# Patient Record
Sex: FEMALE | Race: WHITE | NOT HISPANIC OR LATINO | Employment: UNEMPLOYED | URBAN - METROPOLITAN AREA
[De-identification: names, ages, dates, MRNs, and addresses within clinical notes are randomized per-mention and may not be internally consistent; named-entity substitution may affect disease eponyms.]

---

## 2023-03-24 DIAGNOSIS — I42.2 HYPERTROPHIC CARDIOMYOPATHY (MULTI): Primary | ICD-10-CM

## 2023-03-24 DIAGNOSIS — I10 PRIMARY HYPERTENSION: ICD-10-CM

## 2023-03-24 RX ORDER — LISINOPRIL 20 MG/1
TABLET ORAL
Qty: 180 TABLET | Refills: 0 | Status: SHIPPED | OUTPATIENT
Start: 2023-03-24 | End: 2023-03-30

## 2023-03-24 RX ORDER — METOPROLOL TARTRATE 100 MG/1
TABLET ORAL
Qty: 180 TABLET | Refills: 0 | Status: SHIPPED | OUTPATIENT
Start: 2023-03-24 | End: 2023-03-30

## 2023-03-30 DIAGNOSIS — I42.2 HYPERTROPHIC CARDIOMYOPATHY (MULTI): ICD-10-CM

## 2023-03-30 DIAGNOSIS — I10 PRIMARY HYPERTENSION: ICD-10-CM

## 2023-03-30 RX ORDER — LISINOPRIL 20 MG/1
TABLET ORAL
Qty: 180 TABLET | Refills: 0 | Status: SHIPPED | OUTPATIENT
Start: 2023-03-30 | End: 2023-09-18

## 2023-03-30 RX ORDER — METOPROLOL TARTRATE 100 MG/1
TABLET ORAL
Qty: 180 TABLET | Refills: 0 | Status: SHIPPED | OUTPATIENT
Start: 2023-03-30 | End: 2023-10-13

## 2023-04-15 DIAGNOSIS — E11.9 TYPE 2 DIABETES MELLITUS WITHOUT COMPLICATION, WITHOUT LONG-TERM CURRENT USE OF INSULIN (MULTI): Primary | ICD-10-CM

## 2023-04-18 RX ORDER — SEMAGLUTIDE 1.34 MG/ML
INJECTION, SOLUTION SUBCUTANEOUS
Qty: 1.5 ML | Refills: 0 | Status: SHIPPED | OUTPATIENT
Start: 2023-04-18 | End: 2023-05-16

## 2023-05-16 DIAGNOSIS — E11.9 TYPE 2 DIABETES MELLITUS WITHOUT COMPLICATION, WITHOUT LONG-TERM CURRENT USE OF INSULIN (MULTI): ICD-10-CM

## 2023-05-16 RX ORDER — SEMAGLUTIDE 0.68 MG/ML
INJECTION, SOLUTION SUBCUTANEOUS
Qty: 3 ML | Refills: 0 | Status: SHIPPED | OUTPATIENT
Start: 2023-05-16 | End: 2023-06-20

## 2023-05-22 ENCOUNTER — TELEPHONE (OUTPATIENT)
Dept: PHARMACY | Facility: HOSPITAL | Age: 73
End: 2023-05-22
Payer: MEDICARE

## 2023-05-22 NOTE — TELEPHONE ENCOUNTER
Payor: Sandy   Reason: SUPD (statin use in patients with diabetes)   Medication: not on a statin   Outcome: left the patient a voicemail to discuss starting a statin with the patient.     Please contact the Clinical Pharmacy Team with any Questions. Thank you  Antoinette Santana, PharmD    Meds PGY-1 Pharmacy Resident

## 2023-06-20 DIAGNOSIS — E11.9 TYPE 2 DIABETES MELLITUS WITHOUT COMPLICATION, WITHOUT LONG-TERM CURRENT USE OF INSULIN (MULTI): ICD-10-CM

## 2023-06-20 RX ORDER — SEMAGLUTIDE 0.68 MG/ML
INJECTION, SOLUTION SUBCUTANEOUS
Qty: 3 ML | Refills: 0 | Status: SHIPPED | OUTPATIENT
Start: 2023-06-20 | End: 2023-06-22

## 2023-06-22 ENCOUNTER — OFFICE VISIT (OUTPATIENT)
Dept: PRIMARY CARE | Facility: CLINIC | Age: 73
End: 2023-06-22
Payer: MEDICARE

## 2023-06-22 VITALS
WEIGHT: 256.6 LBS | HEART RATE: 67 BPM | OXYGEN SATURATION: 97 % | BODY MASS INDEX: 42.7 KG/M2 | SYSTOLIC BLOOD PRESSURE: 134 MMHG | DIASTOLIC BLOOD PRESSURE: 86 MMHG

## 2023-06-22 DIAGNOSIS — E11.42 TYPE 2 DIABETES MELLITUS WITH DIABETIC POLYNEUROPATHY, WITHOUT LONG-TERM CURRENT USE OF INSULIN (MULTI): ICD-10-CM

## 2023-06-22 DIAGNOSIS — R21 RASH: Primary | ICD-10-CM

## 2023-06-22 PROBLEM — I10 BENIGN ESSENTIAL HTN: Status: ACTIVE | Noted: 2023-06-22

## 2023-06-22 PROBLEM — G56.02 CARPAL TUNNEL SYNDROME OF LEFT WRIST: Status: ACTIVE | Noted: 2023-06-22

## 2023-06-22 PROBLEM — J01.90 ACUTE SINUSITIS: Status: RESOLVED | Noted: 2023-06-22 | Resolved: 2023-06-22

## 2023-06-22 PROBLEM — R30.0 BURNING WITH URINATION: Status: RESOLVED | Noted: 2023-06-22 | Resolved: 2023-06-22

## 2023-06-22 PROBLEM — E11.9 CONTROLLED TYPE 2 DIABETES MELLITUS WITHOUT COMPLICATION, WITHOUT LONG-TERM CURRENT USE OF INSULIN (MULTI): Status: ACTIVE | Noted: 2023-06-22

## 2023-06-22 PROBLEM — M54.2 CERVICAL PAIN: Status: RESOLVED | Noted: 2023-06-22 | Resolved: 2023-06-22

## 2023-06-22 PROBLEM — G56.01 CARPAL TUNNEL SYNDROME OF RIGHT WRIST: Status: ACTIVE | Noted: 2023-06-22

## 2023-06-22 PROBLEM — R10.9 ABDOMINAL PAIN OF MULTIPLE SITES: Status: RESOLVED | Noted: 2023-06-22 | Resolved: 2023-06-22

## 2023-06-22 PROBLEM — M75.22 BICEPS TENDINITIS OF LEFT UPPER EXTREMITY: Status: RESOLVED | Noted: 2023-06-22 | Resolved: 2023-06-22

## 2023-06-22 PROBLEM — L57.0 ACTINIC KERATOSIS: Status: ACTIVE | Noted: 2023-06-22

## 2023-06-22 LAB — POC HEMOGLOBIN A1C: 8.7 % (ref 4.2–6.5)

## 2023-06-22 PROCEDURE — 1160F RVW MEDS BY RX/DR IN RCRD: CPT

## 2023-06-22 PROCEDURE — 4010F ACE/ARB THERAPY RXD/TAKEN: CPT

## 2023-06-22 PROCEDURE — 3079F DIAST BP 80-89 MM HG: CPT

## 2023-06-22 PROCEDURE — 83036 HEMOGLOBIN GLYCOSYLATED A1C: CPT

## 2023-06-22 PROCEDURE — 1159F MED LIST DOCD IN RCRD: CPT

## 2023-06-22 PROCEDURE — 3075F SYST BP GE 130 - 139MM HG: CPT

## 2023-06-22 PROCEDURE — 1036F TOBACCO NON-USER: CPT

## 2023-06-22 PROCEDURE — 99213 OFFICE O/P EST LOW 20 MIN: CPT

## 2023-06-22 RX ORDER — TRIAMCINOLONE ACETONIDE 1 MG/G
CREAM TOPICAL 2 TIMES DAILY
Qty: 30 G | Refills: 0 | Status: SHIPPED | OUTPATIENT
Start: 2023-06-22 | End: 2023-09-22 | Stop reason: ALTCHOICE

## 2023-06-22 RX ORDER — HYDROCHLOROTHIAZIDE 25 MG/1
25 TABLET ORAL EVERY MORNING
COMMUNITY
Start: 2023-06-15

## 2023-06-22 RX ORDER — IMIQUIMOD 12.5 MG/.25G
CREAM TOPICAL
COMMUNITY
Start: 2018-02-01 | End: 2024-03-28 | Stop reason: ENTERED-IN-ERROR

## 2023-06-22 NOTE — PROGRESS NOTES
Subjective   Patient ID: Debbie Rees is a 73 y.o. female who presents for Arm Pain (R wrist and hand) and Rash.  HPI  Debbie presents for pain on the outer side of her R wrist that has been sore. Both hands hurt worse in the morning, they feel stiff, this is not new. In her R hand she has carpal tunnel that was repaired but she still has pain and numbness that she has had for years.   She takes naproxen for her joint point but ran out about 2 weeks ago and has not taken it since. The pain her wrists seems to have worsened around that same time.   Sometimes over her wrist bone she has shooting pains.  Tylenol and ibuprofen doesn't help.    Also 2 weeks ago she started to get a rash, that is itchy and has red spots.  It is on her hands, forearms, legs. It is not on her upper arms, chest, face, or back.   Started on R arm then L arm and then down to the legs.  No travel, has not been outside a lot, no new exposures to animals.  She did change her shampoo to Pentene.  The little red spots do not open up or drain. No one else has the rash at home.  No fever or chills.    Past Surgical History:   Procedure Laterality Date     SECTION, CLASSIC  2016     Section    CHOLECYSTECTOMY  2016    Cholecystectomy    OTHER SURGICAL HISTORY  2016    Sigmoidoscopy (Fiberoptic, Therapeutic )    OTHER SURGICAL HISTORY  2022    Lower back surgery    OTHER SURGICAL HISTORY  2021    Carpal tunnel surgery    TOTAL KNEE ARTHROPLASTY  2016    Knee Replacement      Past Medical History:   Diagnosis Date    Abdominal pain of multiple sites 2023    Acute sinusitis 2023    Acute upper respiratory infection, unspecified 2017    Acute URI    Anxiety disorder, unspecified     Anxiety    Biceps tendinitis of left upper extremity 2023    Burning with urination 2023    Cervical pain 2023    Cervicalgia     Cervical pain (neck)    Encounter for screening for other  viral diseases 12/14/2017    Need for hepatitis C screening test    Morbid (severe) obesity due to excess calories (CMS/HCC)     Morbid obesity    Pain in left knee     Acute pain of left knee    Personal history of other diseases of the circulatory system     History of hypertension    Personal history of other diseases of the musculoskeletal system and connective tissue     History of arthritis    Personal history of other drug therapy 11/21/2019    History of influenza vaccination    Personal history of other endocrine, nutritional and metabolic disease     History of hyperlipidemia    Personal history of other endocrine, nutritional and metabolic disease     History of diabetes mellitus    Personal history of other specified conditions 04/20/2017    History of dysuria    Streptococcal pharyngitis 03/14/2017    Strep sore throat    Unspecified abdominal hernia without obstruction or gangrene     Abdominal hernia without obstruction and without gangrene    Unspecified asthma, uncomplicated 04/20/2017    Asthmatic bronchitis    Urinary tract infection, site not specified 04/20/2017    Acute UTI     Social History     Tobacco Use    Smoking status: Never    Smokeless tobacco: Never   Substance Use Topics    Alcohol use: Never    Drug use: Never        Review of Systems  10 point review of systems performed and is negative except as noted in the HPI.      Current Outpatient Medications:     hydroCHLOROthiazide (HYDRODiuril) 12.5 mg tablet, , Disp: , Rfl:     imiquimod (Aldara) 5 % cream, APPLY TO AFFECTED AREA IN THE EVENING ON TUESDAY, THURSDAY, AND SATURDAY. WASH OFF IN THE MORNING. x16wks, Disp: , Rfl:     lisinopril 20 mg tablet, TAKE ONE TABLET BY MOUTH TWO TIMES A DAY, Disp: 180 tablet, Rfl: 0    metoprolol tartrate (Lopressor) 100 mg tablet, TAKE ONE TABLET BY MOUTH TWO TIMES A DAY, Disp: 180 tablet, Rfl: 0    semaglutide (OZEMPIC) 1 mg/dose (4 mg/3 mL) pen injector, Inject 1 mg under the skin 1 (one) time per  week., Disp: 3 mL, Rfl: 3    triamcinolone (Kenalog) 0.1 % cream, Apply topically 2 times a day. Apply to affected area 1-2 times daily as needed. Avoid face and groin., Disp: 30 g, Rfl: 0     Objective   /86   Pulse 67   Wt 116 kg (256 lb 9.6 oz)   SpO2 97%   BMI 42.70 kg/m²     Physical Exam  Constitutional:       Appearance: Normal appearance. She is obese.   HENT:      Head: Normocephalic and atraumatic.   Eyes:      Conjunctiva/sclera: Conjunctivae normal.      Pupils: Pupils are equal, round, and reactive to light.   Cardiovascular:      Rate and Rhythm: Normal rate and regular rhythm.      Pulses: Normal pulses.      Heart sounds: Normal heart sounds. No murmur heard.  Pulmonary:      Effort: Pulmonary effort is normal.      Breath sounds: Normal breath sounds. No wheezing, rhonchi or rales.   Musculoskeletal:      Right wrist: Tenderness (along lateral and medial side of wrist) present. No swelling, deformity, effusion, bony tenderness or snuff box tenderness. Normal range of motion. Normal pulse.      Left wrist: Tenderness (along lateral and medial side of wrist) present. No swelling, deformity, effusion, bony tenderness or snuff box tenderness. Normal range of motion. Normal pulse.      Right hand: Normal. No swelling or tenderness. Normal range of motion. Normal sensation. Normal pulse.      Left hand: Normal. No swelling or tenderness. Normal range of motion. Normal sensation. Normal pulse.      Right knee: Normal.      Left knee: Normal.      Right lower leg: Swelling present. No tenderness. Edema (trace) present.      Left lower leg: No tenderness. Edema (trace) present.      Right ankle: No tenderness. Normal range of motion. Normal pulse.      Right Achilles Tendon: No tenderness.      Left ankle: No tenderness. Normal range of motion. Normal pulse.      Left Achilles Tendon: No tenderness.   Skin:     Findings: Rash present. Rash is papular. Rash is not crusting, pustular, scaling,  urticarial or vesicular.      Comments: Small erythematous papules on b/l forearms, dorsal and palmar surfaces of hands, calves of b/l legs. Some erythema on R lower leg.    Neurological:      Mental Status: She is alert and oriented to person, place, and time.   Psychiatric:         Mood and Affect: Mood normal.         Behavior: Behavior normal.         Assessment/Plan   Problem List Items Addressed This Visit    None  Visit Diagnoses       Rash    -  Primary    Relevant Medications    triamcinolone (Kenalog) 0.1 % cream    Type 2 diabetes mellitus with diabetic polyneuropathy, without long-term current use of insulin (CMS/[facility])        Relevant Medications    semaglutide (OZEMPIC) 1 mg/dose (4 mg/3 mL) pen injector    Other Relevant Orders    POCT glycosylated hemoglobin (Hb A1C) manually resulted (Completed)        Rash  Discussed prednisone for the rash, checked hgb A1c and it was 8.7. Prednisone not indicated due to uncontrolled diabetes. Instead gave topical triamcinolone cream to try on the rash.  Also can take zyrtec for the itching     DM Type 2   Increase ozempic to 1mg.  Follow up in 3 months to see how hgb A1c is.     Wrist pain - likely OA in the wrists   Recommend getting Voltaren cream to try as well on the wrists   She is picking up her naproxen refill after this visit and will re-start it  Follow up if not improving      Discussed at visit any disease processes that were of concern as well as the risks, benefits and instructions on any new medication provided. Patient (and/or caretaker of patient if present) stated all questions were answered, and they voiced understanding of instructions.

## 2023-06-22 NOTE — PATIENT INSTRUCTIONS
Voltaren cream - get it at the drug store it is cream for arthritis pain   Use the triamcinolone cream for the rash   Start the Ozempic 1mg. Follow up in 3 months to see how hemoglobin A1C is.   Can try zyrtec at night for the itching as well   Follow up if not improving  Call if you need the naproxen refilled

## 2023-07-18 DIAGNOSIS — G56.01 CARPAL TUNNEL SYNDROME OF RIGHT WRIST: Primary | ICD-10-CM

## 2023-07-19 RX ORDER — NAPROXEN 500 MG/1
TABLET ORAL
Qty: 60 TABLET | Refills: 0 | Status: SHIPPED | OUTPATIENT
Start: 2023-07-19 | End: 2023-09-15

## 2023-09-15 DIAGNOSIS — G56.01 CARPAL TUNNEL SYNDROME OF RIGHT WRIST: ICD-10-CM

## 2023-09-15 RX ORDER — NAPROXEN 500 MG/1
TABLET ORAL
Qty: 60 TABLET | Refills: 0 | Status: SHIPPED | OUTPATIENT
Start: 2023-09-15 | End: 2023-10-13

## 2023-09-16 DIAGNOSIS — I10 PRIMARY HYPERTENSION: ICD-10-CM

## 2023-09-18 RX ORDER — LISINOPRIL 20 MG/1
TABLET ORAL
Qty: 180 TABLET | Refills: 0 | Status: SHIPPED | OUTPATIENT
Start: 2023-09-18 | End: 2024-01-12

## 2023-09-22 ENCOUNTER — OFFICE VISIT (OUTPATIENT)
Dept: PRIMARY CARE | Facility: CLINIC | Age: 73
End: 2023-09-22
Payer: MEDICARE

## 2023-09-22 VITALS
OXYGEN SATURATION: 98 % | HEART RATE: 68 BPM | WEIGHT: 254.6 LBS | BODY MASS INDEX: 42.37 KG/M2 | SYSTOLIC BLOOD PRESSURE: 132 MMHG | DIASTOLIC BLOOD PRESSURE: 84 MMHG

## 2023-09-22 DIAGNOSIS — Z13.220 SCREENING FOR LIPID DISORDERS: ICD-10-CM

## 2023-09-22 DIAGNOSIS — Z00.00 ENCOUNTER FOR MEDICARE ANNUAL WELLNESS EXAM: ICD-10-CM

## 2023-09-22 DIAGNOSIS — Z23 NEED FOR VACCINATION: ICD-10-CM

## 2023-09-22 DIAGNOSIS — E11.9 TYPE 2 DIABETES MELLITUS WITHOUT COMPLICATION, WITHOUT LONG-TERM CURRENT USE OF INSULIN (MULTI): Primary | ICD-10-CM

## 2023-09-22 LAB
ALANINE AMINOTRANSFERASE (SGPT) (U/L) IN SER/PLAS: 30 U/L (ref 7–45)
ALBUMIN (G/DL) IN SER/PLAS: 3.8 G/DL (ref 3.4–5)
ALKALINE PHOSPHATASE (U/L) IN SER/PLAS: 87 U/L (ref 33–136)
ANION GAP IN SER/PLAS: 14 MMOL/L (ref 10–20)
ASPARTATE AMINOTRANSFERASE (SGOT) (U/L) IN SER/PLAS: 26 U/L (ref 9–39)
BASOPHILS (10*3/UL) IN BLOOD BY AUTOMATED COUNT: 0.04 X10E9/L (ref 0–0.1)
BASOPHILS/100 LEUKOCYTES IN BLOOD BY AUTOMATED COUNT: 0.5 % (ref 0–2)
BILIRUBIN TOTAL (MG/DL) IN SER/PLAS: 0.6 MG/DL (ref 0–1.2)
CALCIUM (MG/DL) IN SER/PLAS: 9.7 MG/DL (ref 8.6–10.3)
CARBON DIOXIDE, TOTAL (MMOL/L) IN SER/PLAS: 22 MMOL/L (ref 21–32)
CHLORIDE (MMOL/L) IN SER/PLAS: 102 MMOL/L (ref 98–107)
CHOLESTEROL (MG/DL) IN SER/PLAS: 149 MG/DL (ref 0–199)
CHOLESTEROL IN HDL (MG/DL) IN SER/PLAS: 40.1 MG/DL
CHOLESTEROL/HDL RATIO: 3.7
CREATININE (MG/DL) IN SER/PLAS: 0.78 MG/DL (ref 0.5–1.05)
EOSINOPHILS (10*3/UL) IN BLOOD BY AUTOMATED COUNT: 0.1 X10E9/L (ref 0–0.4)
EOSINOPHILS/100 LEUKOCYTES IN BLOOD BY AUTOMATED COUNT: 1.2 % (ref 0–6)
ERYTHROCYTE DISTRIBUTION WIDTH (RATIO) BY AUTOMATED COUNT: 13.1 % (ref 11.5–14.5)
ERYTHROCYTE MEAN CORPUSCULAR HEMOGLOBIN CONCENTRATION (G/DL) BY AUTOMATED: 31 G/DL (ref 32–36)
ERYTHROCYTE MEAN CORPUSCULAR VOLUME (FL) BY AUTOMATED COUNT: 96 FL (ref 80–100)
ERYTHROCYTES (10*6/UL) IN BLOOD BY AUTOMATED COUNT: 4.52 X10E12/L (ref 4–5.2)
GFR FEMALE: 80 ML/MIN/1.73M2
GLUCOSE (MG/DL) IN SER/PLAS: 150 MG/DL (ref 74–99)
HEMATOCRIT (%) IN BLOOD BY AUTOMATED COUNT: 43.6 % (ref 36–46)
HEMOGLOBIN (G/DL) IN BLOOD: 13.5 G/DL (ref 12–16)
IMMATURE GRANULOCYTES/100 LEUKOCYTES IN BLOOD BY AUTOMATED COUNT: 0.4 % (ref 0–0.9)
LDL: 91 MG/DL (ref 0–99)
LEUKOCYTES (10*3/UL) IN BLOOD BY AUTOMATED COUNT: 8.5 X10E9/L (ref 4.4–11.3)
LYMPHOCYTES (10*3/UL) IN BLOOD BY AUTOMATED COUNT: 1.76 X10E9/L (ref 0.8–3)
LYMPHOCYTES/100 LEUKOCYTES IN BLOOD BY AUTOMATED COUNT: 20.7 % (ref 13–44)
MONOCYTES (10*3/UL) IN BLOOD BY AUTOMATED COUNT: 0.41 X10E9/L (ref 0.05–0.8)
MONOCYTES/100 LEUKOCYTES IN BLOOD BY AUTOMATED COUNT: 4.8 % (ref 2–10)
NEUTROPHILS (10*3/UL) IN BLOOD BY AUTOMATED COUNT: 6.16 X10E9/L (ref 1.6–5.5)
NEUTROPHILS/100 LEUKOCYTES IN BLOOD BY AUTOMATED COUNT: 72.4 % (ref 40–80)
PLATELETS (10*3/UL) IN BLOOD AUTOMATED COUNT: 129 X10E9/L (ref 150–450)
POC HEMOGLOBIN A1C: 8 % (ref 4.2–6.5)
POTASSIUM (MMOL/L) IN SER/PLAS: 4.3 MMOL/L (ref 3.5–5.3)
PROTEIN TOTAL: 7.5 G/DL (ref 6.4–8.2)
SODIUM (MMOL/L) IN SER/PLAS: 134 MMOL/L (ref 136–145)
TRIGLYCERIDE (MG/DL) IN SER/PLAS: 88 MG/DL (ref 0–149)
UREA NITROGEN (MG/DL) IN SER/PLAS: 22 MG/DL (ref 6–23)
VLDL: 18 MG/DL (ref 0–40)

## 2023-09-22 PROCEDURE — 80053 COMPREHEN METABOLIC PANEL: CPT

## 2023-09-22 PROCEDURE — 1170F FXNL STATUS ASSESSED: CPT

## 2023-09-22 PROCEDURE — G0439 PPPS, SUBSEQ VISIT: HCPCS

## 2023-09-22 PROCEDURE — 90662 IIV NO PRSV INCREASED AG IM: CPT

## 2023-09-22 PROCEDURE — 83036 HEMOGLOBIN GLYCOSYLATED A1C: CPT

## 2023-09-22 PROCEDURE — 3075F SYST BP GE 130 - 139MM HG: CPT

## 2023-09-22 PROCEDURE — 1036F TOBACCO NON-USER: CPT

## 2023-09-22 PROCEDURE — 4010F ACE/ARB THERAPY RXD/TAKEN: CPT

## 2023-09-22 PROCEDURE — 99397 PER PM REEVAL EST PAT 65+ YR: CPT

## 2023-09-22 PROCEDURE — 85025 COMPLETE CBC W/AUTO DIFF WBC: CPT

## 2023-09-22 PROCEDURE — G0008 ADMIN INFLUENZA VIRUS VAC: HCPCS

## 2023-09-22 PROCEDURE — 82043 UR ALBUMIN QUANTITATIVE: CPT

## 2023-09-22 PROCEDURE — 1160F RVW MEDS BY RX/DR IN RCRD: CPT

## 2023-09-22 PROCEDURE — 3079F DIAST BP 80-89 MM HG: CPT

## 2023-09-22 PROCEDURE — 82570 ASSAY OF URINE CREATININE: CPT

## 2023-09-22 PROCEDURE — 99213 OFFICE O/P EST LOW 20 MIN: CPT

## 2023-09-22 PROCEDURE — 1159F MED LIST DOCD IN RCRD: CPT

## 2023-09-22 PROCEDURE — 80061 LIPID PANEL: CPT

## 2023-09-22 ASSESSMENT — ENCOUNTER SYMPTOMS
LOSS OF SENSATION IN FEET: 0
DEPRESSION: 0
OCCASIONAL FEELINGS OF UNSTEADINESS: 0

## 2023-09-22 ASSESSMENT — ACTIVITIES OF DAILY LIVING (ADL)
BATHING: INDEPENDENT
HEARING - LEFT EAR: FUNCTIONAL
JUDGMENT_ADEQUATE_SAFELY_COMPLETE_DAILY_ACTIVITIES: YES
DRESSING YOURSELF: INDEPENDENT
HEARING - RIGHT EAR: FUNCTIONAL
FEEDING YOURSELF: INDEPENDENT
GROOMING: INDEPENDENT
PATIENT'S MEMORY ADEQUATE TO SAFELY COMPLETE DAILY ACTIVITIES?: YES
WALKS IN HOME: INDEPENDENT
TOILETING: INDEPENDENT
ADEQUATE_TO_COMPLETE_ADL: YES

## 2023-09-22 ASSESSMENT — COLUMBIA-SUICIDE SEVERITY RATING SCALE - C-SSRS
6. HAVE YOU EVER DONE ANYTHING, STARTED TO DO ANYTHING, OR PREPARED TO DO ANYTHING TO END YOUR LIFE?: NO
2. HAVE YOU ACTUALLY HAD ANY THOUGHTS OF KILLING YOURSELF?: NO
1. IN THE PAST MONTH, HAVE YOU WISHED YOU WERE DEAD OR WISHED YOU COULD GO TO SLEEP AND NOT WAKE UP?: NO

## 2023-09-22 ASSESSMENT — PATIENT HEALTH QUESTIONNAIRE - PHQ9
1. LITTLE INTEREST OR PLEASURE IN DOING THINGS: NOT AT ALL
SUM OF ALL RESPONSES TO PHQ9 QUESTIONS 1 AND 2: 0
2. FEELING DOWN, DEPRESSED OR HOPELESS: NOT AT ALL

## 2023-09-22 NOTE — PROGRESS NOTES
Subjective   Patient ID: Debbie Rees is a 73 y.o. female who presents for Follow-up.  HPI  Debbie presents for diabetes follow up.   She has been doing 1mg of ozempic since she was here in . She has no side effects with this.     She does report that her cheeks got hot/flushed during the night last night.  She did sit on porch, but was not sitting in the sun.  Her cheeks do not itch, they just feel hot.  She did have a cortisone shot on Wednesday (2 days ago) in R shoulder.    BP - no dizziness, lightheadedness but increased headache lately, it does go away, wants to increase her water drinking.    Past Surgical History:   Procedure Laterality Date     SECTION, CLASSIC  2016     Section    CHOLECYSTECTOMY  2016    Cholecystectomy    OTHER SURGICAL HISTORY  2016    Sigmoidoscopy (Fiberoptic, Therapeutic )    OTHER SURGICAL HISTORY  2022    Lower back surgery    OTHER SURGICAL HISTORY  2021    Carpal tunnel surgery    TOTAL KNEE ARTHROPLASTY  2016    Knee Replacement      Past Medical History:   Diagnosis Date    Abdominal pain of multiple sites 2023    Acute sinusitis 2023    Acute upper respiratory infection, unspecified 2017    Acute URI    Anxiety disorder, unspecified     Anxiety    Biceps tendinitis of left upper extremity 2023    Burning with urination 2023    Cervical pain 2023    Cervicalgia     Cervical pain (neck)    Encounter for screening for other viral diseases 2017    Need for hepatitis C screening test    Morbid (severe) obesity due to excess calories (CMS/HCC)     Morbid obesity    Pain in left knee     Acute pain of left knee    Personal history of other diseases of the circulatory system     History of hypertension    Personal history of other diseases of the musculoskeletal system and connective tissue     History of arthritis    Personal history of other drug therapy 2019    History of  influenza vaccination    Personal history of other endocrine, nutritional and metabolic disease     History of hyperlipidemia    Personal history of other endocrine, nutritional and metabolic disease     History of diabetes mellitus    Personal history of other specified conditions 04/20/2017    History of dysuria    Streptococcal pharyngitis 03/14/2017    Strep sore throat    Unspecified abdominal hernia without obstruction or gangrene     Abdominal hernia without obstruction and without gangrene    Unspecified asthma, uncomplicated 04/20/2017    Asthmatic bronchitis    Urinary tract infection, site not specified 04/20/2017    Acute UTI     Social History     Tobacco Use    Smoking status: Never    Smokeless tobacco: Never   Substance Use Topics    Alcohol use: Never    Drug use: Never        Review of Systems  10 point review of systems performed and is negative except as noted in the HPI.      Current Outpatient Medications:     hydroCHLOROthiazide (HYDRODiuril) 12.5 mg tablet, , Disp: , Rfl:     imiquimod (Aldara) 5 % cream, APPLY TO AFFECTED AREA IN THE EVENING ON TUESDAY, THURSDAY, AND SATURDAY. WASH OFF IN THE MORNING. x16wks, Disp: , Rfl:     lisinopril 20 mg tablet, TAKE ONE TABLET BY MOUTH TWO TIMES A DAY, Disp: 180 tablet, Rfl: 0    metoprolol tartrate (Lopressor) 100 mg tablet, TAKE ONE TABLET BY MOUTH TWO TIMES A DAY, Disp: 180 tablet, Rfl: 0    naproxen (Naprosyn) 500 mg tablet, TAKE ONE TABLET BY MOUTH TWICE A DAY AS NEEDED FOR PAIN TAKE WITH FOOD, Disp: 60 tablet, Rfl: 0    semaglutide 2 mg/dose (8 mg/3 mL) pen injector, Inject 2 mg under the skin 1 (one) time per week., Disp: 3 mL, Rfl: 3     Objective   /84   Pulse 68   Wt 115 kg (254 lb 9.6 oz)   SpO2 98%   BMI 42.37 kg/m²     Physical Exam  Constitutional:       Appearance: Normal appearance. She is obese.   HENT:      Head: Normocephalic and atraumatic.      Comments: Both cheeks are flushed, warm to the touch.      Mouth/Throat:       Mouth: Mucous membranes are moist.      Pharynx: Oropharynx is clear.   Eyes:      General:         Right eye: No discharge.         Left eye: No discharge.      Conjunctiva/sclera: Conjunctivae normal.      Pupils: Pupils are equal, round, and reactive to light.   Musculoskeletal:         General: Normal range of motion.      Cervical back: Neck supple.      Right lower leg: No edema.      Left lower leg: No edema.      Comments: Ambulating with assistance of a cane   Skin:     Findings: No rash.   Neurological:      Mental Status: She is alert and oriented to person, place, and time.   Psychiatric:         Mood and Affect: Mood normal.         Behavior: Behavior normal.         Assessment/Plan   Problem List Items Addressed This Visit    None  Visit Diagnoses       Type 2 diabetes mellitus without complication, without long-term current use of insulin (CMS/[facility])    -  Primary    Relevant Medications    semaglutide 2 mg/dose (8 mg/3 mL) pen injector    Other Relevant Orders    POCT glycosylated hemoglobin (Hb A1C) manually resulted (Completed)    Comprehensive Metabolic Panel (Completed)    CBC and Auto Differential (Completed)    Albumin, urine, random (Completed)    Screening for lipid disorders        Relevant Orders    Lipid Panel (Completed)    Need for vaccination        Relevant Orders    Flu vaccine, quadrivalent, high-dose, preservative free, age 65y+ (FLUZONE) (Completed)        Increase ozempic to 2mg weekly    Discussed at visit any disease processes that were of concern as well as the risks, benefits and instructions on any new medication provided. Patient (and/or caretaker of patient if present) stated all questions were answered, and they voiced understanding of instructions.

## 2023-09-23 LAB
ALBUMIN (MG/L) IN URINE: 46.5 MG/L
ALBUMIN/CREATININE (UG/MG) IN URINE: 82.7 UG/MG CRT (ref 0–30)
CREATININE (MG/DL) IN URINE: 56.2 MG/DL (ref 20–320)

## 2023-09-23 ASSESSMENT — ENCOUNTER SYMPTOMS
COLOR CHANGE: 1
FEVER: 0
ACTIVITY CHANGE: 0
DIAPHORESIS: 0
EYE DISCHARGE: 0
HEADACHES: 1
EYE ITCHING: 0
EYE REDNESS: 0
FATIGUE: 0

## 2023-09-23 NOTE — PROGRESS NOTES
Subjective   Reason for Visit: Debbie Rees is an 73 y.o. female here for a Medicare Wellness visit.     Past Medical, Surgical, and Family History reviewed and updated in chart.    Reviewed all medications by prescribing practitioner or clinical pharmacist (such as prescriptions, OTCs, herbal therapies and supplements) and documented in the medical record.    HPI  Debbie presents for diabetes follow up and Medicare Wellness visit.   She has been doing 1mg of ozempic since she was here in June. She has no side effects with this.      She does report that her cheeks got hot/flushed during the night last night.  She did sit on porch, but was not sitting in the sun.  Her cheeks do not itch, they just feel hot.  She did have a cortisone shot on Wednesday (2 days ago) in R shoulder.     BP - no dizziness, lightheadedness but increased headache lately, it does go away, wants to increase her water drinking.    Does see Dr. Dickerson for her legs, has varicose veins and working to get the R leg less swollen. It has been that way for quite some time. She has had sclerotherapy on the R before that was not successful.    Patient Care Team:  Jarrell MACHADO DO as PCP - General  Jarrell MACHADO DO as PCP - [employer] PCP     Review of Systems   Constitutional:  Negative for activity change, diaphoresis, fatigue and fever.   HENT:  Negative for congestion.    Eyes:  Negative for discharge, redness and itching.   Skin:  Positive for color change.   Allergic/Immunologic: Negative for environmental allergies and food allergies.   Neurological:  Positive for headaches.   10 point review of systems performed and is negative except as noted in the HPI and above.    Objective   Vitals:  /84   Pulse 68   Wt 115 kg (254 lb 9.6 oz)   SpO2 98%   BMI 42.37 kg/m²       Physical Exam  Constitutional:       Appearance: Normal appearance. She is obese.   HENT:      Head: Normocephalic and atraumatic.      Comments:  Both cheeks are flushed, warm to the touch.     Nose: Nose normal.      Mouth/Throat:      Mouth: Mucous membranes are moist.      Pharynx: Oropharynx is clear.   Eyes:      General: Lids are normal.         Right eye: No discharge.         Left eye: No discharge.      Conjunctiva/sclera: Conjunctivae normal.      Pupils: Pupils are equal, round, and reactive to light.   Cardiovascular:      Heart sounds: Normal heart sounds.   Pulmonary:      Effort: Pulmonary effort is normal.      Breath sounds: Normal breath sounds.   Musculoskeletal:         General: Swelling (R leg larger than L) present. Normal range of motion.      Cervical back: Neck supple.      Comments: Ambulating with assistance of a cane   Skin:     Findings: No rash.   Neurological:      Mental Status: She is oriented to person, place, and time.   Psychiatric:         Mood and Affect: Mood normal.         Behavior: Behavior normal.         Assessment/Plan   Problem List Items Addressed This Visit    None  Visit Diagnoses       Type 2 diabetes mellitus without complication, without long-term current use of insulin (CMS/[facility])    -  Primary    Relevant Medications    semaglutide 2 mg/dose (8 mg/3 mL) pen injector    Other Relevant Orders    POCT glycosylated hemoglobin (Hb A1C) manually resulted (Completed)    Comprehensive Metabolic Panel (Completed)    CBC and Auto Differential (Completed)    Albumin, urine, random (Completed)    Screening for lipid disorders        Relevant Orders    Lipid Panel (Completed)    Need for vaccination        Relevant Orders    Flu vaccine, quadrivalent, high-dose, preservative free, age 65y+ (FLUZONE) (Completed)    Encounter for Medicare annual wellness exam            POCT hgbA1c is 8, down from last visit where it was 8.5. Increase Ozempic to 2mg.   Updated labs today   Follow up in 3 months to see how diabetes is managed   Updated flu vaccine today   Continuing to follow with Dr. Dickerson for leg swelling   Flushing on  face is likely due to cortisone shot - continue to monitor and call if not improving     -HTN - continue medication   -Cardiomyopathy - hyperthrophic- genetic/Rastafari variant - followed by cardiology   -Hyperlipidemia - refused statin, ASVCD risk 31.4%  -Leg swelling and varicose veins - followed by Dr. Dickerson    Health Maintenance Reminder:  -Blood Work: today  -Colonoscopy: done in 2016, repeat 10 years  -mammo: 3/2022 -updated order at next visit   -Lung Cancer: 50-80y - nonsmoker  -dexa: >65y - 11/14/2022 - recommended 2 year repeat (osteopenia in L femoral neck)  -pap: >65  -Shingrix: >50y - discuss next visit   -Prevnar 20: completed  -Flu:  Yearly - today     Discussed at visit any disease processes that were of concern as well as the risks, benefits and instructions on any new medication provided. Patient (and/or caretaker of patient if present) stated all questions were answered, and they voiced understanding of instructions.

## 2023-10-04 ENCOUNTER — TELEPHONE (OUTPATIENT)
Dept: PRIMARY CARE | Facility: CLINIC | Age: 73
End: 2023-10-04
Payer: MEDICARE

## 2023-10-04 NOTE — TELEPHONE ENCOUNTER
Can you call pt with recent lab results or let me know what they are and I can call her. Thank you.

## 2023-10-13 DIAGNOSIS — I42.2 HYPERTROPHIC CARDIOMYOPATHY (MULTI): ICD-10-CM

## 2023-10-13 DIAGNOSIS — G56.01 CARPAL TUNNEL SYNDROME OF RIGHT WRIST: ICD-10-CM

## 2023-10-13 RX ORDER — NAPROXEN 500 MG/1
TABLET ORAL
Qty: 60 TABLET | Refills: 0 | Status: SHIPPED | OUTPATIENT
Start: 2023-10-13 | End: 2023-12-26 | Stop reason: ALTCHOICE

## 2023-10-13 RX ORDER — METOPROLOL TARTRATE 100 MG/1
TABLET ORAL
Qty: 180 TABLET | Refills: 0 | Status: SHIPPED | OUTPATIENT
Start: 2023-10-13 | End: 2024-01-12

## 2023-10-20 ENCOUNTER — TELEPHONE (OUTPATIENT)
Dept: PRIMARY CARE | Facility: CLINIC | Age: 73
End: 2023-10-20
Payer: MEDICARE

## 2023-10-20 NOTE — TELEPHONE ENCOUNTER
She has not been able to get Ozempic 2mg anywhere so she has not been taking it, asked if she can do 1mg and if so can you send in refills to [facility] please

## 2023-10-23 DIAGNOSIS — E11.9 TYPE 2 DIABETES MELLITUS WITHOUT COMPLICATION, WITHOUT LONG-TERM CURRENT USE OF INSULIN (MULTI): ICD-10-CM

## 2023-12-06 DIAGNOSIS — L97.909 VENOUS ULCER (MULTI): Primary | ICD-10-CM

## 2023-12-06 DIAGNOSIS — I83.009 VENOUS ULCER (MULTI): Primary | ICD-10-CM

## 2023-12-06 RX ORDER — DOXYCYCLINE 100 MG/1
100 CAPSULE ORAL 2 TIMES DAILY
Qty: 14 CAPSULE | Refills: 0 | Status: SHIPPED | OUTPATIENT
Start: 2023-12-06 | End: 2023-12-13

## 2023-12-11 ENCOUNTER — OFFICE VISIT (OUTPATIENT)
Dept: PRIMARY CARE | Facility: CLINIC | Age: 73
End: 2023-12-11
Payer: MEDICARE

## 2023-12-11 VITALS
OXYGEN SATURATION: 98 % | DIASTOLIC BLOOD PRESSURE: 84 MMHG | WEIGHT: 254 LBS | HEART RATE: 62 BPM | BODY MASS INDEX: 42.27 KG/M2 | SYSTOLIC BLOOD PRESSURE: 124 MMHG

## 2023-12-11 DIAGNOSIS — I83.012: ICD-10-CM

## 2023-12-11 DIAGNOSIS — M79.89 LEG SWELLING: Primary | ICD-10-CM

## 2023-12-11 DIAGNOSIS — L97.211: ICD-10-CM

## 2023-12-11 PROCEDURE — 1160F RVW MEDS BY RX/DR IN RCRD: CPT

## 2023-12-11 PROCEDURE — 4010F ACE/ARB THERAPY RXD/TAKEN: CPT

## 2023-12-11 PROCEDURE — 1159F MED LIST DOCD IN RCRD: CPT

## 2023-12-11 PROCEDURE — 99213 OFFICE O/P EST LOW 20 MIN: CPT

## 2023-12-11 PROCEDURE — 3074F SYST BP LT 130 MM HG: CPT

## 2023-12-11 PROCEDURE — 3079F DIAST BP 80-89 MM HG: CPT

## 2023-12-11 PROCEDURE — 1036F TOBACCO NON-USER: CPT

## 2023-12-11 NOTE — PATIENT INSTRUCTIONS
Vascular is closed - call first thing in the morning to schedule ultrasound - [phone] ask for vascular department - STAT ultrasound of R leg for a blood clot

## 2023-12-11 NOTE — PROGRESS NOTES
Subjective   Patient ID: Debbie Rees is a 73 y.o. female who presents for Follow-up.  HPI  Debbie presents for ulcers and swelling in R lower leg.  She was here last week with her  who had an appointment and was started on doxycyline (23) for her leg and referred to a  vascular specialist.   She did not contact the vascular specialist yet.   She states that her lower leg was swelling then and it has continued to swell.   She put on a compression stocking yesterday and it helped some. Her leg did hurt all day. Around 4 or 5 pm she took the stocking off.   The ulcer area oozes and had some bleeding after she hit it on something. She put a wash cloth on it until it stopped.  She says her leg has been swelling for a couple weeks already.  Denies chest pain or tightness.    Her daughter has gotten blood clots when having surgery   Debbie denies having family history of Factor V Leiden or other known clotting or bleeding disorders     Past Surgical History:   Procedure Laterality Date     SECTION, CLASSIC  2016     Section    CHOLECYSTECTOMY  2016    Cholecystectomy    OTHER SURGICAL HISTORY  2016    Sigmoidoscopy (Fiberoptic, Therapeutic )    OTHER SURGICAL HISTORY  2022    Lower back surgery    OTHER SURGICAL HISTORY  2021    Carpal tunnel surgery    TOTAL KNEE ARTHROPLASTY  2016    Knee Replacement      Past Medical History:   Diagnosis Date    Abdominal pain of multiple sites 2023    Acute sinusitis 2023    Acute upper respiratory infection, unspecified 2017    Acute URI    Anxiety disorder, unspecified     Anxiety    Biceps tendinitis of left upper extremity 2023    Burning with urination 2023    Cervical pain 2023    Cervicalgia     Cervical pain (neck)    Encounter for screening for other viral diseases 2017    Need for hepatitis C screening test    Morbid (severe) obesity due to excess calories (CMS/HCC)      Morbid obesity    Pain in left knee     Acute pain of left knee    Personal history of other diseases of the circulatory system     History of hypertension    Personal history of other diseases of the musculoskeletal system and connective tissue     History of arthritis    Personal history of other drug therapy 11/21/2019    History of influenza vaccination    Personal history of other endocrine, nutritional and metabolic disease     History of hyperlipidemia    Personal history of other endocrine, nutritional and metabolic disease     History of diabetes mellitus    Personal history of other specified conditions 04/20/2017    History of dysuria    Streptococcal pharyngitis 03/14/2017    Strep sore throat    Unspecified abdominal hernia without obstruction or gangrene     Abdominal hernia without obstruction and without gangrene    Unspecified asthma, uncomplicated 04/20/2017    Asthmatic bronchitis    Urinary tract infection, site not specified 04/20/2017    Acute UTI     Social History     Tobacco Use    Smoking status: Never    Smokeless tobacco: Never   Vaping Use    Vaping Use: Never used   Substance Use Topics    Alcohol use: Never    Drug use: Never        Review of Systems  10 point review of systems performed and is negative except as noted in the HPI.      Current Outpatient Medications:     doxycycline (Vibramycin) 100 mg capsule, Take 1 capsule (100 mg) by mouth 2 times a day for 7 days. Take with at least 8 ounces (large glass) of water, do not lie down for 30 minutes after, Disp: 14 capsule, Rfl: 0    hydroCHLOROthiazide (HYDRODiuril) 12.5 mg tablet, , Disp: , Rfl:     imiquimod (Aldara) 5 % cream, APPLY TO AFFECTED AREA IN THE EVENING ON TUESDAY, THURSDAY, AND SATURDAY. WASH OFF IN THE MORNING. x16wks, Disp: , Rfl:     lisinopril 20 mg tablet, TAKE ONE TABLET BY MOUTH TWO TIMES A DAY, Disp: 180 tablet, Rfl: 0    metoprolol tartrate (Lopressor) 100 mg tablet, TAKE ONE TABLET BY MOUTH TWO TIMES A DAY,  Disp: 180 tablet, Rfl: 0    naproxen (Naprosyn) 500 mg tablet, TAKE ONE TABLET BY MOUTH TWICE DAILY AS NEEDED for pain take  with food, Disp: 60 tablet, Rfl: 0    semaglutide (OZEMPIC) 1 mg/dose (4 mg/3 mL) pen injector, Inject 1 mg under the skin 1 (one) time per week., Disp: 9 mL, Rfl: 1     Objective   /84   Pulse 62   Wt 115 kg (254 lb)   SpO2 98%   BMI 42.27 kg/m²     Physical Exam  Vitals reviewed.   Constitutional:       General: She is not in acute distress.     Appearance: Normal appearance. She is not ill-appearing.   HENT:      Head: Normocephalic and atraumatic.   Eyes:      Conjunctiva/sclera: Conjunctivae normal.      Pupils: Pupils are equal, round, and reactive to light.   Cardiovascular:      Heart sounds: Normal heart sounds.   Pulmonary:      Effort: Pulmonary effort is normal.      Breath sounds: Normal breath sounds. No wheezing, rhonchi or rales.   Musculoskeletal:      Right knee: Normal. No swelling. No tenderness.      Left knee: Normal. No swelling. No tenderness.      Right lower leg: Swelling and tenderness present. 1+ Pitting Edema present.      Left lower leg: Swelling present.      Right ankle: Swelling present. Anterior drawer test negative.      Comments: R lower leg tender when palpated. Negative stacy's sign.    Skin:            Comments: Dusky color, shiny skin on R lower extremity. 2 ulcers present on R lower extremity. No drainage seen from the ulcers.    Neurological:      Mental Status: She is alert and oriented to person, place, and time.         Assessment/Plan   Problem List Items Addressed This Visit    None  Visit Diagnoses       Leg swelling    -  Primary    Relevant Orders    Vascular US lower extremity venous duplex right    Referral to Vascular Medicine    Venous stasis ulcer of right calf limited to breakdown of skin, unspecified whether varicose veins present (CMS/[facility])        Relevant Orders    Referral to Vascular Medicine        Lower leg swelling   STAT  Venous duplex ultrasound of lower extremity to r/o blood clot due to swelling- Attempt was made to schedule for today, patient states she is unable to get a ride, instead could go tomorrow - vascular was not answering and patient to try to call in the morning - discussed the risks with waiting vs going to the ER   R lower leg has historically been more swollen compared to the L. Patient has been followed by [facility].   Discussed symptoms that would warrant a visit to the ER including but not limited to worsened leg pain, shortness of breath, chest pain, AMS, pain when taking a deep breath - if patient has any of these she is to go to the ER immediately   Discussed referral to vascular medicine as well for evaluation of venous stasis ulcers and venous stasis in her R lower leg that she has been dealing with quite some time. Previously saw Dr. Dickerson and had sclerotherapy but patient states it has been unsuccessful in the leg.     Discussed at visit any disease processes that were of concern as well as the risks, benefits and instructions on any new medication provided. Patient (and/or caretaker of patient if present) stated all questions were answered, and they voiced understanding of instructions.

## 2023-12-12 ENCOUNTER — TELEPHONE (OUTPATIENT)
Dept: PRIMARY CARE | Facility: CLINIC | Age: 73
End: 2023-12-12
Payer: MEDICARE

## 2023-12-12 ENCOUNTER — HOSPITAL ENCOUNTER (EMERGENCY)
Facility: HOSPITAL | Age: 73
Discharge: HOME | End: 2023-12-12
Payer: MEDICARE

## 2023-12-12 ENCOUNTER — APPOINTMENT (OUTPATIENT)
Dept: RADIOLOGY | Facility: HOSPITAL | Age: 73
End: 2023-12-12
Payer: MEDICARE

## 2023-12-12 ENCOUNTER — ANCILLARY PROCEDURE (OUTPATIENT)
Dept: RADIOLOGY | Facility: HOSPITAL | Age: 73
End: 2023-12-12
Payer: MEDICARE

## 2023-12-12 VITALS
RESPIRATION RATE: 17 BRPM | BODY MASS INDEX: 41.65 KG/M2 | WEIGHT: 250 LBS | OXYGEN SATURATION: 98 % | HEART RATE: 85 BPM | DIASTOLIC BLOOD PRESSURE: 76 MMHG | HEIGHT: 65 IN | SYSTOLIC BLOOD PRESSURE: 150 MMHG | TEMPERATURE: 96.3 F

## 2023-12-12 DIAGNOSIS — M79.605 PAIN IN BOTH LOWER EXTREMITIES: Primary | ICD-10-CM

## 2023-12-12 DIAGNOSIS — J18.9 PNEUMONIA DUE TO INFECTIOUS ORGANISM, UNSPECIFIED LATERALITY, UNSPECIFIED PART OF LUNG: ICD-10-CM

## 2023-12-12 DIAGNOSIS — M79.604 PAIN IN BOTH LOWER EXTREMITIES: Primary | ICD-10-CM

## 2023-12-12 DIAGNOSIS — R00.2 PALPITATIONS: ICD-10-CM

## 2023-12-12 DIAGNOSIS — R93.89 ABNORMAL CT SCAN: ICD-10-CM

## 2023-12-12 DIAGNOSIS — R06.00 DYSPNEA, UNSPECIFIED TYPE: ICD-10-CM

## 2023-12-12 LAB
ALBUMIN SERPL BCP-MCNC: 3.7 G/DL (ref 3.4–5)
ALP SERPL-CCNC: 84 U/L (ref 33–136)
ALT SERPL W P-5'-P-CCNC: 30 U/L (ref 7–45)
ANION GAP SERPL CALC-SCNC: 11 MMOL/L (ref 10–20)
APPEARANCE UR: CLEAR
AST SERPL W P-5'-P-CCNC: 29 U/L (ref 9–39)
BASOPHILS # BLD AUTO: 0.05 X10*3/UL (ref 0–0.1)
BASOPHILS NFR BLD AUTO: 0.6 %
BILIRUB SERPL-MCNC: 0.5 MG/DL (ref 0–1.2)
BILIRUB UR STRIP.AUTO-MCNC: NEGATIVE MG/DL
BNP SERPL-MCNC: 56 PG/ML (ref 0–99)
BUN SERPL-MCNC: 15 MG/DL (ref 6–23)
CALCIUM SERPL-MCNC: 9.2 MG/DL (ref 8.6–10.3)
CARDIAC TROPONIN I PNL SERPL HS: 10 NG/L (ref 0–13)
CHLORIDE SERPL-SCNC: 103 MMOL/L (ref 98–107)
CO2 SERPL-SCNC: 28 MMOL/L (ref 21–32)
COLOR UR: YELLOW
CREAT SERPL-MCNC: 0.77 MG/DL (ref 0.5–1.05)
CRP SERPL-MCNC: 0.51 MG/DL
D DIMER PPP FEU-MCNC: 637 NG/ML FEU
EOSINOPHIL # BLD AUTO: 0.18 X10*3/UL (ref 0–0.4)
EOSINOPHIL NFR BLD AUTO: 2 %
ERYTHROCYTE [DISTWIDTH] IN BLOOD BY AUTOMATED COUNT: 14.6 % (ref 11.5–14.5)
ERYTHROCYTE [SEDIMENTATION RATE] IN BLOOD BY WESTERGREN METHOD: 47 MM/H (ref 0–30)
FLUAV RNA RESP QL NAA+PROBE: NOT DETECTED
FLUBV RNA RESP QL NAA+PROBE: NOT DETECTED
GFR SERPL CREATININE-BSD FRML MDRD: 82 ML/MIN/1.73M*2
GLUCOSE SERPL-MCNC: 106 MG/DL (ref 74–99)
GLUCOSE UR STRIP.AUTO-MCNC: NEGATIVE MG/DL
HCT VFR BLD AUTO: 44.7 % (ref 36–46)
HGB BLD-MCNC: 14.4 G/DL (ref 12–16)
IMM GRANULOCYTES # BLD AUTO: 0.02 X10*3/UL (ref 0–0.5)
IMM GRANULOCYTES NFR BLD AUTO: 0.2 % (ref 0–0.9)
KETONES UR STRIP.AUTO-MCNC: NEGATIVE MG/DL
LACTATE SERPL-SCNC: 1 MMOL/L (ref 0.4–2)
LEUKOCYTE ESTERASE UR QL STRIP.AUTO: NEGATIVE
LYMPHOCYTES # BLD AUTO: 2.56 X10*3/UL (ref 0.8–3)
LYMPHOCYTES NFR BLD AUTO: 28.9 %
MAGNESIUM SERPL-MCNC: 1.41 MG/DL (ref 1.6–2.4)
MCH RBC QN AUTO: 30.6 PG (ref 26–34)
MCHC RBC AUTO-ENTMCNC: 32.2 G/DL (ref 32–36)
MCV RBC AUTO: 95 FL (ref 80–100)
MONOCYTES # BLD AUTO: 0.56 X10*3/UL (ref 0.05–0.8)
MONOCYTES NFR BLD AUTO: 6.3 %
NEUTROPHILS # BLD AUTO: 5.49 X10*3/UL (ref 1.6–5.5)
NEUTROPHILS NFR BLD AUTO: 62 %
NITRITE UR QL STRIP.AUTO: NEGATIVE
NRBC BLD-RTO: 0 /100 WBCS (ref 0–0)
PH UR STRIP.AUTO: 6 [PH]
PLATELET # BLD AUTO: 285 X10*3/UL (ref 150–450)
POTASSIUM SERPL-SCNC: 3.5 MMOL/L (ref 3.5–5.3)
PROT SERPL-MCNC: 7.1 G/DL (ref 6.4–8.2)
PROT UR STRIP.AUTO-MCNC: NEGATIVE MG/DL
RBC # BLD AUTO: 4.7 X10*6/UL (ref 4–5.2)
RBC # UR STRIP.AUTO: NEGATIVE /UL
RSV RNA RESP QL NAA+PROBE: NOT DETECTED
SARS-COV-2 RNA RESP QL NAA+PROBE: NOT DETECTED
SODIUM SERPL-SCNC: 138 MMOL/L (ref 136–145)
SP GR UR STRIP.AUTO: 1.01
UROBILINOGEN UR STRIP.AUTO-MCNC: <2 MG/DL
WBC # BLD AUTO: 8.9 X10*3/UL (ref 4.4–11.3)

## 2023-12-12 PROCEDURE — 36415 COLL VENOUS BLD VENIPUNCTURE: CPT | Performed by: NURSE PRACTITIONER

## 2023-12-12 PROCEDURE — 99285 EMERGENCY DEPT VISIT HI MDM: CPT | Mod: 25

## 2023-12-12 PROCEDURE — 83735 ASSAY OF MAGNESIUM: CPT | Performed by: NURSE PRACTITIONER

## 2023-12-12 PROCEDURE — 84075 ASSAY ALKALINE PHOSPHATASE: CPT | Performed by: NURSE PRACTITIONER

## 2023-12-12 PROCEDURE — 99284 EMERGENCY DEPT VISIT MOD MDM: CPT

## 2023-12-12 PROCEDURE — 93971 EXTREMITY STUDY: CPT | Mod: FOREIGN READ | Performed by: RADIOLOGY

## 2023-12-12 PROCEDURE — 81003 URINALYSIS AUTO W/O SCOPE: CPT | Performed by: NURSE PRACTITIONER

## 2023-12-12 PROCEDURE — 83605 ASSAY OF LACTIC ACID: CPT | Performed by: NURSE PRACTITIONER

## 2023-12-12 PROCEDURE — 71275 CT ANGIOGRAPHY CHEST: CPT

## 2023-12-12 PROCEDURE — 85379 FIBRIN DEGRADATION QUANT: CPT | Performed by: NURSE PRACTITIONER

## 2023-12-12 PROCEDURE — 85025 COMPLETE CBC W/AUTO DIFF WBC: CPT | Performed by: NURSE PRACTITIONER

## 2023-12-12 PROCEDURE — 83880 ASSAY OF NATRIURETIC PEPTIDE: CPT | Performed by: NURSE PRACTITIONER

## 2023-12-12 PROCEDURE — 2500000004 HC RX 250 GENERAL PHARMACY W/ HCPCS (ALT 636 FOR OP/ED): Performed by: NURSE PRACTITIONER

## 2023-12-12 PROCEDURE — 85652 RBC SED RATE AUTOMATED: CPT | Performed by: NURSE PRACTITIONER

## 2023-12-12 PROCEDURE — 87637 SARSCOV2&INF A&B&RSV AMP PRB: CPT | Performed by: NURSE PRACTITIONER

## 2023-12-12 PROCEDURE — 2500000001 HC RX 250 WO HCPCS SELF ADMINISTERED DRUGS (ALT 637 FOR MEDICARE OP): Performed by: NURSE PRACTITIONER

## 2023-12-12 PROCEDURE — 2550000001 HC RX 255 CONTRASTS: Performed by: NURSE PRACTITIONER

## 2023-12-12 PROCEDURE — 93970 EXTREMITY STUDY: CPT | Mod: FR

## 2023-12-12 PROCEDURE — 84484 ASSAY OF TROPONIN QUANT: CPT | Performed by: NURSE PRACTITIONER

## 2023-12-12 PROCEDURE — 87040 BLOOD CULTURE FOR BACTERIA: CPT | Mod: GEALAB | Performed by: NURSE PRACTITIONER

## 2023-12-12 PROCEDURE — 71275 CT ANGIOGRAPHY CHEST: CPT | Mod: FOREIGN READ | Performed by: RADIOLOGY

## 2023-12-12 PROCEDURE — 86140 C-REACTIVE PROTEIN: CPT | Performed by: NURSE PRACTITIONER

## 2023-12-12 RX ORDER — AMOXICILLIN AND CLAVULANATE POTASSIUM 875; 125 MG/1; MG/1
1 TABLET, FILM COATED ORAL EVERY 12 HOURS
Qty: 20 TABLET | Refills: 0 | Status: SHIPPED | OUTPATIENT
Start: 2023-12-12 | End: 2023-12-22

## 2023-12-12 RX ORDER — AMOXICILLIN AND CLAVULANATE POTASSIUM 875; 125 MG/1; MG/1
1 TABLET, FILM COATED ORAL ONCE
Status: COMPLETED | OUTPATIENT
Start: 2023-12-12 | End: 2023-12-12

## 2023-12-12 RX ORDER — LANOLIN ALCOHOL/MO/W.PET/CERES
400 CREAM (GRAM) TOPICAL DAILY
Status: DISCONTINUED | OUTPATIENT
Start: 2023-12-12 | End: 2023-12-13 | Stop reason: HOSPADM

## 2023-12-12 RX ADMIN — IOHEXOL 68 ML: 350 INJECTION, SOLUTION INTRAVENOUS at 19:49

## 2023-12-12 RX ADMIN — Medication 400 MG: at 21:54

## 2023-12-12 RX ADMIN — AMOXICILLIN AND CLAVULANATE POTASSIUM 875 MG: 875; 125 TABLET, FILM COATED ORAL at 22:28

## 2023-12-12 ASSESSMENT — PAIN SCALES - GENERAL: PAINLEVEL_OUTOF10: 2

## 2023-12-12 ASSESSMENT — HEART SCORE
AGE: 65+
HISTORY: SLIGHTLY SUSPICIOUS
RISK FACTORS: 1-2 RISK FACTORS
HEART SCORE: 4
TROPONIN: 1-3 TIMES NORMAL LIMIT
ECG: NORMAL

## 2023-12-12 ASSESSMENT — COLUMBIA-SUICIDE SEVERITY RATING SCALE - C-SSRS
6. HAVE YOU EVER DONE ANYTHING, STARTED TO DO ANYTHING, OR PREPARED TO DO ANYTHING TO END YOUR LIFE?: NO
1. IN THE PAST MONTH, HAVE YOU WISHED YOU WERE DEAD OR WISHED YOU COULD GO TO SLEEP AND NOT WAKE UP?: NO
2. HAVE YOU ACTUALLY HAD ANY THOUGHTS OF KILLING YOURSELF?: NO

## 2023-12-12 ASSESSMENT — LIFESTYLE VARIABLES
REASON UNABLE TO ASSESS: NO
EVER FELT BAD OR GUILTY ABOUT YOUR DRINKING: NO
HAVE PEOPLE ANNOYED YOU BY CRITICIZING YOUR DRINKING: NO
EVER HAD A DRINK FIRST THING IN THE MORNING TO STEADY YOUR NERVES TO GET RID OF A HANGOVER: NO
HAVE YOU EVER FELT YOU SHOULD CUT DOWN ON YOUR DRINKING: NO

## 2023-12-12 ASSESSMENT — PAIN DESCRIPTION - ORIENTATION: ORIENTATION: RIGHT

## 2023-12-12 ASSESSMENT — PAIN - FUNCTIONAL ASSESSMENT: PAIN_FUNCTIONAL_ASSESSMENT: 0-10

## 2023-12-12 ASSESSMENT — PAIN DESCRIPTION - PAIN TYPE: TYPE: ACUTE PAIN

## 2023-12-12 ASSESSMENT — PAIN DESCRIPTION - LOCATION: LOCATION: LEG

## 2023-12-12 NOTE — ED PROVIDER NOTES
[facility]  ED  Encounter Note  Admit Date/RoomTime: 2023  4:20 PM  ED Room: 18/[facility]  NAME: Debbie Rees  : 1950  MRN: [id number]     Chief Complaint:  Leg Swelling (Right leg swelling and some SOB. Sent in by Dr. Victoria's office to R/O blood clot. )    HISTORY OF PRESENT ILLNESS        Debbie Rees is a 73 y.o. female who presents to the ED for evaluation of several concerns - she has been treated for right leg swelling/wound for a few weeks. She has had intermittent swelling for some time. She did have what sounds like sclerotherapy by Dr Dickerson some years ago and has had recurrent of her symptoms.    She stated that she was to have us of leg tomorrow but felt could not wait - no prior hx of blood clot. She stated that has been having some chest pain, sob on and off few days or so. She is only on aspirin low dose and hydrochlorothiazide 12.5 mg. She may have had a workup some time ago from a cardiologist aspect, hx of family hocm.  Denied palpitations or near syncope.    Not sure if could have covid or flu. No feels weak. No home 02 or hx of copd.     ROS   Pertinent positives and negatives are stated within HPI, all other systems reviewed and are negative.    Past Medical History:  has a past medical history of Abdominal pain of multiple sites (2023), Acute sinusitis (2023), Acute upper respiratory infection, unspecified (2017), Anxiety disorder, unspecified, Biceps tendinitis of left upper extremity (2023), Burning with urination (2023), Cervical pain (2023), Cervicalgia, Encounter for screening for other viral diseases (2017), Morbid (severe) obesity due to excess calories (CMS/HCC), Pain in left knee, Personal history of other diseases of the circulatory system, Personal history of other diseases of the musculoskeletal system and connective tissue, Personal history of other drug therapy (2019), Personal history  of other endocrine, nutritional and metabolic disease, Personal history of other endocrine, nutritional and metabolic disease, Personal history of other specified conditions (2017), Streptococcal pharyngitis (2017), Unspecified abdominal hernia without obstruction or gangrene, Unspecified asthma, uncomplicated (2017), and Urinary tract infection, site not specified (2017).    Surgical History:  has a past surgical history that includes Cholecystectomy (2016);  section, classic (2016); Total knee arthroplasty (2016); Other surgical history (2016); Other surgical history (2022); and Other surgical history (2021).    Social History:  reports that she has never smoked. She has never used smokeless tobacco. She reports that she does not drink alcohol and does not use drugs.    Family History: family history is not on file.     Allergies: Procaine    PHYSICAL EXAM   Oxygen Saturation Interpretation: Normal.    Physical Exam  Constitutional/General: Alert and oriented x3, well appearing, non toxic  HEENT:  NC/NT. PERRLA.  Airway patent.  Neck: Supple, full ROM. No midline vertebral tenderness or crepitus.   Respiratory: Lung sounds decreased auscultation bilaterally. No wheezes, rhonchi or stridor. Not in respiratory distress.  CV:  Regular rate. Regular rhythm. No murmurs or rubs. 2+ distal pulses.  GI:  Abdomen soft, non-tender, non-distended. +BS. No rebound, guarding, or rigidity. No pulsatile masses.  Musculoskeletal: Moves all extremities x 4. Warm and well perfused. Capillary refill <3 seconds  Integument: Skin warm and dry. No rashes.   Neurologic: Alert and oriented with no focal deficits, symmetric strength 5/5 in the upper and lower extremities bilaterally.  Psychiatric: Normal affect.//right leg with swelling appears to have bilateral chronic venous statis. Neuro intact pulses +2    Lab / Imaging Results   (All laboratory and radiology results  have been personally reviewed by myself)  Labs:  Results for orders placed or performed during the hospital encounter of 12/12/23   Blood Culture    Specimen: Peripheral Venipuncture; Blood culture   Result Value Ref Range    Blood Culture No growth at 4 days -  FINAL REPORT    Blood Culture    Specimen: Peripheral Venipuncture; Blood culture   Result Value Ref Range    Blood Culture No growth at 4 days -  FINAL REPORT    CBC and Auto Differential   Result Value Ref Range    WBC 8.9 4.4 - 11.3 x10*3/uL    nRBC 0.0 0.0 - 0.0 /100 WBCs    RBC 4.70 4.00 - 5.20 x10*6/uL    Hemoglobin 14.4 12.0 - 16.0 g/dL    Hematocrit 44.7 36.0 - 46.0 %    MCV 95 80 - 100 fL    MCH 30.6 26.0 - 34.0 pg    MCHC 32.2 32.0 - 36.0 g/dL    RDW 14.6 (H) 11.5 - 14.5 %    Platelets 285 150 - 450 x10*3/uL    Neutrophils % 62.0 40.0 - 80.0 %    Immature Granulocytes %, Automated 0.2 0.0 - 0.9 %    Lymphocytes % 28.9 13.0 - 44.0 %    Monocytes % 6.3 2.0 - 10.0 %    Eosinophils % 2.0 0.0 - 6.0 %    Basophils % 0.6 0.0 - 2.0 %    Neutrophils Absolute 5.49 1.60 - 5.50 x10*3/uL    Immature Granulocytes Absolute, Automated 0.02 0.00 - 0.50 x10*3/uL    Lymphocytes Absolute 2.56 0.80 - 3.00 x10*3/uL    Monocytes Absolute 0.56 0.05 - 0.80 x10*3/uL    Eosinophils Absolute 0.18 0.00 - 0.40 x10*3/uL    Basophils Absolute 0.05 0.00 - 0.10 x10*3/uL   Magnesium   Result Value Ref Range    Magnesium 1.41 (L) 1.60 - 2.40 mg/dL   Comprehensive metabolic panel   Result Value Ref Range    Glucose 106 (H) 74 - 99 mg/dL    Sodium 138 136 - 145 mmol/L    Potassium 3.5 3.5 - 5.3 mmol/L    Chloride 103 98 - 107 mmol/L    Bicarbonate 28 21 - 32 mmol/L    Anion Gap 11 10 - 20 mmol/L    Urea Nitrogen 15 6 - 23 mg/dL    Creatinine 0.77 0.50 - 1.05 mg/dL    eGFR 82 >60 mL/min/1.73m*2    Calcium 9.2 8.6 - 10.3 mg/dL    Albumin 3.7 3.4 - 5.0 g/dL    Alkaline Phosphatase 84 33 - 136 U/L    Total Protein 7.1 6.4 - 8.2 g/dL    AST 29 9 - 39 U/L    Bilirubin, Total 0.5 0.0 - 1.2  mg/dL    ALT 30 7 - 45 U/L   Lactate   Result Value Ref Range    Lactate 1.0 0.4 - 2.0 mmol/L   Troponin I, High Sensitivity   Result Value Ref Range    Troponin I, High Sensitivity 10 0 - 13 ng/L   B-Type Natriuretic Peptide   Result Value Ref Range    BNP 56 0 - 99 pg/mL   D-dimer, VTE Exclusion   Result Value Ref Range    D-Dimer, Quantitative VTE Exclusion 637 (H) <=500 ng/mL FEU   Influenza A, and B PCR   Result Value Ref Range    Flu A Result Not Detected Not Detected    Flu B Result Not Detected Not Detected   SARS-CoV-2 RT PCR   Result Value Ref Range    Coronavirus 2019, PCR Not Detected Not Detected   RSV PCR   Result Value Ref Range    RSV PCR Not Detected Not Detected   Sedimentation Rate   Result Value Ref Range    Sedimentation Rate 47 (H) 0 - 30 mm/h   C-Reactive Protein   Result Value Ref Range    C-Reactive Protein 0.51 <1.00 mg/dL   Urinalysis with Reflex Microscopic and Culture   Result Value Ref Range    Color, Urine Yellow Straw, Yellow    Appearance, Urine Clear Clear    Specific Gravity, Urine 1.009 1.005 - 1.035    pH, Urine 6.0 5.0, 5.5, 6.0, 6.5, 7.0, 7.5, 8.0    Protein, Urine NEGATIVE NEGATIVE mg/dL    Glucose, Urine NEGATIVE NEGATIVE mg/dL    Blood, Urine NEGATIVE NEGATIVE    Ketones, Urine NEGATIVE NEGATIVE mg/dL    Bilirubin, Urine NEGATIVE NEGATIVE    Urobilinogen, Urine <2.0 <2.0 mg/dL    Nitrite, Urine NEGATIVE NEGATIVE    Leukocyte Esterase, Urine NEGATIVE NEGATIVE   ECG 12 lead   Result Value Ref Range    Ventricular Rate 73 BPM    Atrial Rate 73 BPM    MT Interval 160 ms    QRS Duration 74 ms    QT Interval 394 ms    QTC Calculation(Bazett) 434 ms    P Axis 29 degrees    R Axis -13 degrees    T Axis 49 degrees    QRS Count 12 beats    Q Onset 223 ms    P Onset 143 ms    P Offset 169 ms    T Offset 420 ms    QTC Fredericia 420 ms     Imaging:  All Radiology results interpreted by Radiologist unless otherwise noted.  CT angio chest for pulmonary embolism   Final Result   1.  No  definite CT evidence of a acute pulmonary embolus.   2.  No thoracic aortic aneurysm or dissection on this time gated exam.   3.  Possible mild bowel lower lobe groundglass opacity which may   represent an early pneumonia.   4.  No pleural effusions or overt airspace disease.       Signed by Robin Mccormick MD      Vascular US lower extremity venous duplex bilateral   Final Result   No evidence for DVT within the bilateral lower extremities.   Prominent bilateral inguinal lymph nodes, possibly reactive although   indeterminant. Consider follow-up CT scan in 3 months to reassess.   Signed by Maverick Lopez MD          ED Course / Medical Decision Making     Medications   iohexol (OMNIPaque) 350 mg iodine/mL solution 68 mL (68 mL intravenous Given 12/12/23 1949)   amoxicillin-pot clavulanate (Augmentin) 875-125 mg per tablet 875 mg (875 mg oral Given 12/12/23 2228)     ED Course as of 12/17/23 1642   Tue Dec 12, 2023   2137 Vascular US lower extremity venous duplex bilateral [PK]      ED Course User Index  [PK] Sarah Rhoades, LEAH-CNP         Diagnoses as of 12/17/23 1642   Pain in both lower extremities   Dyspnea, unspecified type   Abnormal CT scan   Palpitations   Pneumonia due to infectious organism, unspecified laterality, unspecified part of lung       Patient’s condition stable.    Consult(s):   None    Procedure(s):   EKG rate 73 bpm  Dec 12 2023 at 1700  Rate 73 bpm  Pr interval 160 ms  Qrs duration 74 ms  Qt qtc 394/434 ms  Prt axes 29 -13 49  Normal sinus rhythm rate and axis  Personally reviewed by me   No other ekgs available for comparision     MDM:     Debbie Rees is a 73 y.o. female who presents to the ED for evaluation of several concerns - she has been treated for right leg swelling/wound for a few weeks. She has had intermittent swelling for some time. She did have what sounds like sclerotherapy by Dr Dickerson some years ago and has had recurrent of her symptoms.    She stated that she was to have  us of leg tomorrow but felt could not wait - no prior hx of blood clot. She stated that has been having some chest pain, sob on and off few days or so. She is only on aspirin low dose and hydrochlorothiazide 12.5 mg. She may have had a workup some time ago from a cardiologist aspect, hx of family hocm.  Denied palpitations or near syncope.    Not sure if could have covid or flu. No feels weak. No home 02 or hx of copd.     ED workup  Us of legs stable no acute dvts. Discussed with pt daughter and multiple other family members that could be early dvt. If still having symptoms after 5 to 7 days, needs to get repeat us. She can call PCP and get script if needed.     + elevated d dimer, ct scan negative for pe but does have pneumonia. Started antibiotics and will continue outpatient - needs to see PCP to ensure that her pna is resolving. Any worsening s/s - breathing appetite cp - come to the ED.    Us did show abnormal lymph nodes in bilateral lower stomach area. She was advised that she needs to discuss these findings with her PCP. She was provided copies of her imaging results to discuss with her PCP= she needs to have ct scan abd pelvis to evaluate the enlarged lymph nodes. Etiology of the enlarged lymph nodes is unclear.    Also provided copies of last echo by Dr Rodriguez. She was to have followed up some months ago in their office re mitral regurg. She was not able to get around to that appt but will do so after the new year    Continue to use light compression to her legs to help with the swelling, elevate them when able.    Palpitations - discussed hx of atrial fib, gerd. She admits that she is overdue for cards appt.    Ddx: pneumonia leg swelling abnormal ct scan palpitations    Plan of Care/Counseling:  I reviewed today's visit with the patient daughter and other family family members in addition to providing specific details for the plan of care and counseling regarding the diagnosis and prognosis.   Questions are answered at this time and are agreeable with the plan.    ASSESSMENT     1. Pain in both lower extremities    2. Dyspnea, unspecified type    3. Abnormal CT scan    4. Palpitations    5. Pneumonia due to infectious organism, unspecified laterality, unspecified part of lung      PLAN   Discharge home    New Medications     New Medications Ordered This Visit   Medications    iohexol (OMNIPaque) 350 mg iodine/mL solution 68 mL    amoxicillin-pot clavulanate (Augmentin) 875-125 mg per tablet 875 mg     Order Specific Question:   Suspected Indication (Select all that apply)     Answer:   Pneumonia     Order Specific Question:   Type of Therapy     Answer:   Empiric    amoxicillin-pot clavulanate (Augmentin) 875-125 mg tablet     Sig: Take 1 tablet (875 mg) by mouth every 12 hours for 10 days.     Dispense:  20 tablet     Refill:  0     Electronically signed by CONSTANCE Becerra  **This report was transcribed using voice recognition software. Every effort was made to ensure accuracy; however, inadvertent computerized transcription errors may be present.  END OF ED PROVIDER NOTE     CONSTANCE Becerra  12/17/23 8470

## 2023-12-12 NOTE — TELEPHONE ENCOUNTER
I spoke to Debbie. She states that she called vascular and was not able to get a ride to an appointment until tomorrow at 11am. She states that now she feels short of breath. She also states she does not feel right. I told her that she should go to the ER for this to be evaluated right away. She agreed.

## 2023-12-13 ENCOUNTER — HOSPITAL ENCOUNTER (OUTPATIENT)
Dept: CARDIOLOGY | Facility: HOSPITAL | Age: 73
Discharge: HOME | End: 2023-12-13
Payer: MEDICARE

## 2023-12-13 ENCOUNTER — APPOINTMENT (OUTPATIENT)
Dept: VASCULAR MEDICINE | Facility: HOSPITAL | Age: 73
End: 2023-12-13
Payer: MEDICARE

## 2023-12-13 PROCEDURE — 93005 ELECTROCARDIOGRAM TRACING: CPT

## 2023-12-13 NOTE — DISCHARGE INSTRUCTIONS
Followup with primary care doctor - need to have ct scan abd pelvis in 3 months (around end of Feb 2023)  Followup with cardiologist re palpitations sob ? Need for monitor  Complete antibiotics

## 2023-12-17 LAB
BACTERIA BLD CULT: NORMAL
BACTERIA BLD CULT: NORMAL

## 2023-12-26 ENCOUNTER — OFFICE VISIT (OUTPATIENT)
Dept: PRIMARY CARE | Facility: CLINIC | Age: 73
End: 2023-12-26
Payer: MEDICARE

## 2023-12-26 VITALS
TEMPERATURE: 99.1 F | SYSTOLIC BLOOD PRESSURE: 136 MMHG | DIASTOLIC BLOOD PRESSURE: 84 MMHG | OXYGEN SATURATION: 98 % | WEIGHT: 257 LBS | HEART RATE: 93 BPM | BODY MASS INDEX: 42.77 KG/M2

## 2023-12-26 DIAGNOSIS — I42.2 HYPERTROPHIC CARDIOMYOPATHY (MULTI): Primary | ICD-10-CM

## 2023-12-26 DIAGNOSIS — I48.91 ATRIAL FIBRILLATION WITH RAPID VENTRICULAR RESPONSE (MULTI): ICD-10-CM

## 2023-12-26 DIAGNOSIS — J01.01 ACUTE RECURRENT MAXILLARY SINUSITIS: ICD-10-CM

## 2023-12-26 DIAGNOSIS — R06.02 SHORTNESS OF BREATH: ICD-10-CM

## 2023-12-26 DIAGNOSIS — E83.42 HYPOMAGNESEMIA: ICD-10-CM

## 2023-12-26 PROBLEM — I47.10 PAROXYSMAL SVT (SUPRAVENTRICULAR TACHYCARDIA) (CMS-HCC): Status: RESOLVED | Noted: 2023-12-26 | Resolved: 2023-12-26

## 2023-12-26 PROBLEM — L98.0 PYOGENIC GRANULOMA OF SKIN AND SUBCUTANEOUS TISSUE: Status: RESOLVED | Noted: 2023-12-26 | Resolved: 2023-12-26

## 2023-12-26 PROBLEM — R35.0 FREQUENT URINATION: Status: RESOLVED | Noted: 2023-12-26 | Resolved: 2023-12-26

## 2023-12-26 PROBLEM — M54.6 THORACIC SPINE PAIN: Status: RESOLVED | Noted: 2023-12-26 | Resolved: 2023-12-26

## 2023-12-26 PROBLEM — R74.01 ELEVATED TRANSAMINASE LEVEL: Status: ACTIVE | Noted: 2023-12-26

## 2023-12-26 PROBLEM — F41.9 ANXIETY: Status: ACTIVE | Noted: 2023-12-26

## 2023-12-26 PROBLEM — K44.9 HIATAL HERNIA: Status: ACTIVE | Noted: 2023-12-26

## 2023-12-26 PROBLEM — F41.1 GENERALIZED ANXIETY DISORDER: Status: ACTIVE | Noted: 2023-12-26

## 2023-12-26 PROBLEM — R20.0 HAND NUMBNESS: Status: RESOLVED | Noted: 2023-12-26 | Resolved: 2023-12-26

## 2023-12-26 LAB — MAGNESIUM SERPL-MCNC: 1.65 MG/DL (ref 1.6–2.4)

## 2023-12-26 PROCEDURE — 36415 COLL VENOUS BLD VENIPUNCTURE: CPT

## 2023-12-26 PROCEDURE — 99215 OFFICE O/P EST HI 40 MIN: CPT | Performed by: FAMILY MEDICINE

## 2023-12-26 PROCEDURE — 1159F MED LIST DOCD IN RCRD: CPT | Performed by: FAMILY MEDICINE

## 2023-12-26 PROCEDURE — 93000 ELECTROCARDIOGRAM COMPLETE: CPT | Performed by: FAMILY MEDICINE

## 2023-12-26 PROCEDURE — 1160F RVW MEDS BY RX/DR IN RCRD: CPT | Performed by: FAMILY MEDICINE

## 2023-12-26 PROCEDURE — 83735 ASSAY OF MAGNESIUM: CPT

## 2023-12-26 PROCEDURE — 4010F ACE/ARB THERAPY RXD/TAKEN: CPT | Performed by: FAMILY MEDICINE

## 2023-12-26 PROCEDURE — 3075F SYST BP GE 130 - 139MM HG: CPT | Performed by: FAMILY MEDICINE

## 2023-12-26 PROCEDURE — 83880 ASSAY OF NATRIURETIC PEPTIDE: CPT

## 2023-12-26 PROCEDURE — 3079F DIAST BP 80-89 MM HG: CPT | Performed by: FAMILY MEDICINE

## 2023-12-26 PROCEDURE — 1125F AMNT PAIN NOTED PAIN PRSNT: CPT | Performed by: FAMILY MEDICINE

## 2023-12-26 PROCEDURE — 1036F TOBACCO NON-USER: CPT | Performed by: FAMILY MEDICINE

## 2023-12-26 RX ORDER — AMOXICILLIN AND CLAVULANATE POTASSIUM 875; 125 MG/1; MG/1
875 TABLET, FILM COATED ORAL 2 TIMES DAILY
Qty: 14 TABLET | Refills: 0 | Status: SHIPPED | OUTPATIENT
Start: 2023-12-26 | End: 2024-01-02

## 2023-12-26 RX ORDER — LANOLIN ALCOHOL/MO/W.PET/CERES
400 CREAM (GRAM) TOPICAL DAILY
Qty: 30 TABLET | Refills: 11 | Status: SHIPPED | OUTPATIENT
Start: 2023-12-26 | End: 2024-12-25

## 2023-12-26 NOTE — PROGRESS NOTES
Subjective   Patient ID: Debbie Rees is a 73 y.o. female who presents for Cough (Chest congestion since yesterday).    HPI   Was sick better then sick again  Cough congested dry  Was in ER 2 weeks ago Dx pneumonia ( was week arms sore)  Completed augmentin yesterday   Legs arms week  Breathing not good   Was in ER Tx antibiotic was better   Today much worse  She feels things worse yesterday and today worse  Fatigue CAMERON much worse today  Was at [employer] Sunday  Has had spells heart racing coughs and goes away   Felt like heart is racing this is occurred over the years  Never truly diagnosed  To have a history of hypertrophic cardiomyopathy  Due to have an echocardiogram next summer  Did have a brother who had to have portion of the heart shaved down because muscle was too thick/myomectomy    Review of Systems    Objective   /84   Pulse 93   Temp 37.3 °C (99.1 °F)   Wt 117 kg (257 lb)   SpO2 98%   BMI 42.77 kg/m²     Physical Exam  General: alert, no apparent distress, good hygiene   HEAD:  Normocephalic, atraumatic    EARS:  EAC patent, TMs normal,   EYES:  sclera white, TETE, conjunctiva noninjected  NOSE: Nasal passages patent   MOUTH: Pharynx clear, tongue uvula midline, grade 3 airway  Neck:  supple, no masses, thyroid non enlarged non nodular, no cervical adenopathy,  Lungs:  no wheezing, no rales , no rhonchi, normal respiratory pattern, breath sounds not diminished  Heart: Irregularly irregular, no murmur, no ectopy, no S3 or S4, no carotid bruits  Extremities:  1+ / trace R>L no edema, no cyanosis, no clubbing,  2+ posterior tibialis pulse    Skin: Small venous ulcer right lower lateral malleolus , no erythema no odor 5 to 6 mm wide and 5 to 6 mm deep no rashes, no concerning skin lesions, normal texture  Assessment/Plan   Problem List Items Addressed This Visit             ICD-10-CM    Hypertrophic cardiomyopathy (CMS/HCC) - Primary I42.2    Relevant Orders    ECG 12 lead (Clinic Performed)     B-Type Natriuretic Peptide     Other Visit Diagnoses         Codes    Acute recurrent maxillary sinusitis     J01.01    Relevant Medications    amoxicillin-pot clavulanate (Augmentin) 875-125 mg tablet    Atrial fibrillation with rapid ventricular response (CMS/[facility])     I48.91    Relevant Medications    apixaban (Eliquis) 5 mg tablet    Other Relevant Orders    B-Type Natriuretic Peptide    Hypomagnesemia     E83.42    Relevant Medications    magnesium oxide (Mag-Ox) 400 mg (241.3 mg magnesium) tablet    Other Relevant Orders    Magnesium    Shortness of breath     R06.02    Relevant Orders    B-Type Natriuretic Peptide        Echocardiogram from 5/22 mild to moderate mitral regurg mild left atrial enlargement normal left ventricular outflow tract velocity moderate septal hypertrophy  EKG shows atrial fibrillation.  Discussed warfarin versus apixaban anticoagulation.  Patient has insurance hopefully apixaban will be covered samples given for enough for 1 month.  Discussed need to see cardiology they may possibly repeat echocardiogram at visit.  She is to follow-up if develops worsening shortness of breath more edema in legs.  Edema she states is more than she has had in the past and did end up sleeping in a chair last night.   Patient has some acute onset mild CHF.  Dyspnea on exertion PND increased edema.  Rate controlled upper 90s.  Do not feel she needs to be hospitalized at this time.  Will send EKG to cardiology and discussed with them when they would prefer to see her 3 to 4 weeks after anticoagulation or immediately.  Also depending how patient is doing at this point seems stable.  Pulse ox was 98% at rest.  Patient is not to perform any heavy activity.  Visit total was over 50 minutes.

## 2023-12-27 LAB — BNP SERPL-MCNC: 157 PG/ML (ref 0–99)

## 2024-01-09 LAB
ATRIAL RATE: 73 BPM
P AXIS: 29 DEGREES
P OFFSET: 169 MS
P ONSET: 143 MS
PR INTERVAL: 160 MS
Q ONSET: 223 MS
QRS COUNT: 12 BEATS
QRS DURATION: 74 MS
QT INTERVAL: 394 MS
QTC CALCULATION(BAZETT): 434 MS
QTC FREDERICIA: 420 MS
R AXIS: -13 DEGREES
T AXIS: 49 DEGREES
T OFFSET: 420 MS
VENTRICULAR RATE: 73 BPM

## 2024-01-12 DIAGNOSIS — I42.2 HYPERTROPHIC CARDIOMYOPATHY (MULTI): ICD-10-CM

## 2024-01-12 DIAGNOSIS — I10 PRIMARY HYPERTENSION: ICD-10-CM

## 2024-01-12 RX ORDER — LISINOPRIL 20 MG/1
TABLET ORAL
Qty: 180 TABLET | Refills: 0 | Status: SHIPPED | OUTPATIENT
Start: 2024-01-12 | End: 2024-04-25

## 2024-01-12 RX ORDER — METOPROLOL TARTRATE 100 MG/1
TABLET ORAL
Qty: 180 TABLET | Refills: 0 | Status: SHIPPED | OUTPATIENT
Start: 2024-01-12 | End: 2024-03-30 | Stop reason: HOSPADM

## 2024-03-28 ENCOUNTER — APPOINTMENT (OUTPATIENT)
Dept: PRIMARY CARE | Facility: CLINIC | Age: 74
End: 2024-03-28
Payer: MEDICARE

## 2024-03-28 ENCOUNTER — HOSPITAL ENCOUNTER (OUTPATIENT)
Facility: HOSPITAL | Age: 74
Setting detail: OBSERVATION
LOS: 1 days | Discharge: HOME | DRG: 309 | End: 2024-03-30
Attending: EMERGENCY MEDICINE | Admitting: FAMILY MEDICINE
Payer: MEDICARE

## 2024-03-28 ENCOUNTER — APPOINTMENT (OUTPATIENT)
Dept: CARDIOLOGY | Facility: HOSPITAL | Age: 74
DRG: 309 | End: 2024-03-28
Payer: MEDICARE

## 2024-03-28 ENCOUNTER — APPOINTMENT (OUTPATIENT)
Dept: RADIOLOGY | Facility: HOSPITAL | Age: 74
DRG: 309 | End: 2024-03-28
Payer: MEDICARE

## 2024-03-28 DIAGNOSIS — I48.0 PAROXYSMAL ATRIAL FIBRILLATION (MULTI): ICD-10-CM

## 2024-03-28 DIAGNOSIS — R07.9 CHEST PAIN, UNSPECIFIED TYPE: ICD-10-CM

## 2024-03-28 DIAGNOSIS — I48.91 ATRIAL FIBRILLATION WITH RAPID VENTRICULAR RESPONSE (MULTI): Primary | ICD-10-CM

## 2024-03-28 LAB
ALBUMIN SERPL BCP-MCNC: 3.9 G/DL (ref 3.4–5)
ALP SERPL-CCNC: 95 U/L (ref 33–136)
ALT SERPL W P-5'-P-CCNC: 40 U/L (ref 7–45)
ANION GAP SERPL CALC-SCNC: 12 MMOL/L (ref 10–20)
AST SERPL W P-5'-P-CCNC: 32 U/L (ref 9–39)
BASOPHILS # BLD AUTO: 0.06 X10*3/UL (ref 0–0.1)
BASOPHILS NFR BLD AUTO: 0.6 %
BILIRUB SERPL-MCNC: 0.5 MG/DL (ref 0–1.2)
BUN SERPL-MCNC: 21 MG/DL (ref 6–23)
CALCIUM SERPL-MCNC: 10.3 MG/DL (ref 8.6–10.3)
CARDIAC TROPONIN I PNL SERPL HS: 18 NG/L (ref 0–13)
CARDIAC TROPONIN I PNL SERPL HS: 23 NG/L (ref 0–13)
CARDIAC TROPONIN I PNL SERPL HS: 28 NG/L (ref 0–13)
CHLORIDE SERPL-SCNC: 100 MMOL/L (ref 98–107)
CHOLEST SERPL-MCNC: 144 MG/DL (ref 0–199)
CHOLESTEROL/HDL RATIO: 4.1
CO2 SERPL-SCNC: 25 MMOL/L (ref 21–32)
CREAT SERPL-MCNC: 0.87 MG/DL (ref 0.5–1.05)
EGFRCR SERPLBLD CKD-EPI 2021: 70 ML/MIN/1.73M*2
EOSINOPHIL # BLD AUTO: 0.16 X10*3/UL (ref 0–0.4)
EOSINOPHIL NFR BLD AUTO: 1.7 %
ERYTHROCYTE [DISTWIDTH] IN BLOOD BY AUTOMATED COUNT: 12.7 % (ref 11.5–14.5)
FLUAV RNA RESP QL NAA+PROBE: NOT DETECTED
FLUBV RNA RESP QL NAA+PROBE: NOT DETECTED
GLUCOSE BLD MANUAL STRIP-MCNC: 245 MG/DL (ref 74–99)
GLUCOSE SERPL-MCNC: 206 MG/DL (ref 74–99)
HCT VFR BLD AUTO: 43.9 % (ref 36–46)
HDLC SERPL-MCNC: 35 MG/DL
HGB BLD-MCNC: 14.5 G/DL (ref 12–16)
IMM GRANULOCYTES # BLD AUTO: 0.02 X10*3/UL (ref 0–0.5)
IMM GRANULOCYTES NFR BLD AUTO: 0.2 % (ref 0–0.9)
LYMPHOCYTES # BLD AUTO: 2.38 X10*3/UL (ref 0.8–3)
LYMPHOCYTES NFR BLD AUTO: 25.5 %
MAGNESIUM SERPL-MCNC: 1.61 MG/DL (ref 1.6–2.4)
MCH RBC QN AUTO: 30.9 PG (ref 26–34)
MCHC RBC AUTO-ENTMCNC: 33 G/DL (ref 32–36)
MCV RBC AUTO: 94 FL (ref 80–100)
MONOCYTES # BLD AUTO: 0.5 X10*3/UL (ref 0.05–0.8)
MONOCYTES NFR BLD AUTO: 5.4 %
NEUTROPHILS # BLD AUTO: 6.21 X10*3/UL (ref 1.6–5.5)
NEUTROPHILS NFR BLD AUTO: 66.6 %
NON-HDL CHOLESTEROL: 109 MG/DL (ref 0–149)
NRBC BLD-RTO: 0 /100 WBCS (ref 0–0)
PLATELET # BLD AUTO: 282 X10*3/UL (ref 150–450)
POTASSIUM SERPL-SCNC: 4 MMOL/L (ref 3.5–5.3)
PROT SERPL-MCNC: 7.7 G/DL (ref 6.4–8.2)
RBC # BLD AUTO: 4.69 X10*6/UL (ref 4–5.2)
S PYO DNA THROAT QL NAA+PROBE: NOT DETECTED
SARS-COV-2 RNA RESP QL NAA+PROBE: NOT DETECTED
SODIUM SERPL-SCNC: 133 MMOL/L (ref 136–145)
TSH SERPL-ACNC: 1.47 MIU/L (ref 0.44–3.98)
WBC # BLD AUTO: 9.3 X10*3/UL (ref 4.4–11.3)

## 2024-03-28 PROCEDURE — 84443 ASSAY THYROID STIM HORMONE: CPT | Performed by: FAMILY MEDICINE

## 2024-03-28 PROCEDURE — 2500000001 HC RX 250 WO HCPCS SELF ADMINISTERED DRUGS (ALT 637 FOR MEDICARE OP): Performed by: NURSE PRACTITIONER

## 2024-03-28 PROCEDURE — 84484 ASSAY OF TROPONIN QUANT: CPT | Performed by: EMERGENCY MEDICINE

## 2024-03-28 PROCEDURE — 82947 ASSAY GLUCOSE BLOOD QUANT: CPT | Mod: 59

## 2024-03-28 PROCEDURE — 2500000001 HC RX 250 WO HCPCS SELF ADMINISTERED DRUGS (ALT 637 FOR MEDICARE OP): Performed by: FAMILY MEDICINE

## 2024-03-28 PROCEDURE — 80053 COMPREHEN METABOLIC PANEL: CPT | Performed by: EMERGENCY MEDICINE

## 2024-03-28 PROCEDURE — 96365 THER/PROPH/DIAG IV INF INIT: CPT

## 2024-03-28 PROCEDURE — 84484 ASSAY OF TROPONIN QUANT: CPT | Performed by: FAMILY MEDICINE

## 2024-03-28 PROCEDURE — 87636 SARSCOV2 & INF A&B AMP PRB: CPT | Performed by: EMERGENCY MEDICINE

## 2024-03-28 PROCEDURE — 83036 HEMOGLOBIN GLYCOSYLATED A1C: CPT | Mod: GEALAB | Performed by: FAMILY MEDICINE

## 2024-03-28 PROCEDURE — 2500000001 HC RX 250 WO HCPCS SELF ADMINISTERED DRUGS (ALT 637 FOR MEDICARE OP): Performed by: INTERNAL MEDICINE

## 2024-03-28 PROCEDURE — 96375 TX/PRO/DX INJ NEW DRUG ADDON: CPT

## 2024-03-28 PROCEDURE — 83718 ASSAY OF LIPOPROTEIN: CPT | Performed by: FAMILY MEDICINE

## 2024-03-28 PROCEDURE — G0378 HOSPITAL OBSERVATION PER HR: HCPCS

## 2024-03-28 PROCEDURE — 93005 ELECTROCARDIOGRAM TRACING: CPT

## 2024-03-28 PROCEDURE — 96374 THER/PROPH/DIAG INJ IV PUSH: CPT | Mod: 59

## 2024-03-28 PROCEDURE — 2060000001 HC INTERMEDIATE ICU ROOM DAILY

## 2024-03-28 PROCEDURE — 85025 COMPLETE CBC W/AUTO DIFF WBC: CPT | Performed by: EMERGENCY MEDICINE

## 2024-03-28 PROCEDURE — 2500000004 HC RX 250 GENERAL PHARMACY W/ HCPCS (ALT 636 FOR OP/ED): Performed by: NURSE PRACTITIONER

## 2024-03-28 PROCEDURE — 96376 TX/PRO/DX INJ SAME DRUG ADON: CPT

## 2024-03-28 PROCEDURE — 36415 COLL VENOUS BLD VENIPUNCTURE: CPT | Performed by: EMERGENCY MEDICINE

## 2024-03-28 PROCEDURE — 99291 CRITICAL CARE FIRST HOUR: CPT | Mod: 25 | Performed by: EMERGENCY MEDICINE

## 2024-03-28 PROCEDURE — 71045 X-RAY EXAM CHEST 1 VIEW: CPT | Performed by: RADIOLOGY

## 2024-03-28 PROCEDURE — 2500000002 HC RX 250 W HCPCS SELF ADMINISTERED DRUGS (ALT 637 FOR MEDICARE OP, ALT 636 FOR OP/ED): Performed by: FAMILY MEDICINE

## 2024-03-28 PROCEDURE — 83735 ASSAY OF MAGNESIUM: CPT | Performed by: EMERGENCY MEDICINE

## 2024-03-28 PROCEDURE — 2500000004 HC RX 250 GENERAL PHARMACY W/ HCPCS (ALT 636 FOR OP/ED): Performed by: EMERGENCY MEDICINE

## 2024-03-28 PROCEDURE — 87651 STREP A DNA AMP PROBE: CPT | Performed by: EMERGENCY MEDICINE

## 2024-03-28 PROCEDURE — 99223 1ST HOSP IP/OBS HIGH 75: CPT | Performed by: FAMILY MEDICINE

## 2024-03-28 PROCEDURE — 71045 X-RAY EXAM CHEST 1 VIEW: CPT

## 2024-03-28 RX ORDER — DEXTROSE 50 % IN WATER (D50W) INTRAVENOUS SYRINGE
12.5
Status: DISCONTINUED | OUTPATIENT
Start: 2024-03-28 | End: 2024-03-30 | Stop reason: HOSPADM

## 2024-03-28 RX ORDER — HYDROCHLOROTHIAZIDE 25 MG/1
25 TABLET ORAL EVERY MORNING
Status: DISCONTINUED | OUTPATIENT
Start: 2024-03-29 | End: 2024-03-30 | Stop reason: HOSPADM

## 2024-03-28 RX ORDER — TALC
3 POWDER (GRAM) TOPICAL NIGHTLY PRN
Status: DISCONTINUED | OUTPATIENT
Start: 2024-03-28 | End: 2024-03-30 | Stop reason: HOSPADM

## 2024-03-28 RX ORDER — INSULIN LISPRO 100 [IU]/ML
0-10 INJECTION, SOLUTION INTRAVENOUS; SUBCUTANEOUS
Status: DISCONTINUED | OUTPATIENT
Start: 2024-03-28 | End: 2024-03-30 | Stop reason: HOSPADM

## 2024-03-28 RX ORDER — METOPROLOL SUCCINATE 50 MG/1
100 TABLET, EXTENDED RELEASE ORAL 2 TIMES DAILY
Status: DISCONTINUED | OUTPATIENT
Start: 2024-03-28 | End: 2024-03-30 | Stop reason: HOSPADM

## 2024-03-28 RX ORDER — LISINOPRIL 20 MG/1
20 TABLET ORAL 2 TIMES DAILY
Status: DISCONTINUED | OUTPATIENT
Start: 2024-03-28 | End: 2024-03-30 | Stop reason: HOSPADM

## 2024-03-28 RX ORDER — DILTIAZEM HCL/D5W 125 MG/125
5-15 PLASTIC BAG, INJECTION (ML) INTRAVENOUS CONTINUOUS
Status: DISCONTINUED | OUTPATIENT
Start: 2024-03-28 | End: 2024-03-28

## 2024-03-28 RX ORDER — METOPROLOL TARTRATE 50 MG/1
100 TABLET ORAL 2 TIMES DAILY
Status: DISCONTINUED | OUTPATIENT
Start: 2024-03-28 | End: 2024-03-28

## 2024-03-28 RX ORDER — ACETAMINOPHEN 325 MG/1
650 TABLET ORAL ONCE
Status: COMPLETED | OUTPATIENT
Start: 2024-03-28 | End: 2024-03-28

## 2024-03-28 RX ADMIN — AMIODARONE HYDROCHLORIDE 1 MG/MIN: 1.8 INJECTION, SOLUTION INTRAVENOUS at 16:46

## 2024-03-28 RX ADMIN — Medication 3 MG: at 22:02

## 2024-03-28 RX ADMIN — METOPROLOL SUCCINATE 100 MG: 50 TABLET, EXTENDED RELEASE ORAL at 21:46

## 2024-03-28 RX ADMIN — APIXABAN 5 MG: 5 TABLET, FILM COATED ORAL at 21:46

## 2024-03-28 RX ADMIN — Medication 5 MG/HR: at 11:16

## 2024-03-28 RX ADMIN — INSULIN LISPRO 4 UNITS: 100 INJECTION, SOLUTION INTRAVENOUS; SUBCUTANEOUS at 18:32

## 2024-03-28 RX ADMIN — ACETAMINOPHEN 650 MG: 325 TABLET ORAL at 22:02

## 2024-03-28 RX ADMIN — AMIODARONE HYDROCHLORIDE 150 MG: 1.5 INJECTION, SOLUTION INTRAVENOUS at 16:46

## 2024-03-28 RX ADMIN — LISINOPRIL 20 MG: 20 TABLET ORAL at 21:46

## 2024-03-28 RX ADMIN — AMIODARONE HYDROCHLORIDE 1 MG/MIN: 1.8 INJECTION, SOLUTION INTRAVENOUS at 22:38

## 2024-03-28 SDOH — SOCIAL STABILITY: SOCIAL INSECURITY: WERE YOU ABLE TO COMPLETE ALL THE BEHAVIORAL HEALTH SCREENINGS?: YES

## 2024-03-28 SDOH — SOCIAL STABILITY: SOCIAL INSECURITY: DO YOU FEEL UNSAFE GOING BACK TO THE PLACE WHERE YOU ARE LIVING?: NO

## 2024-03-28 SDOH — SOCIAL STABILITY: SOCIAL INSECURITY: ARE THERE ANY APPARENT SIGNS OF INJURIES/BEHAVIORS THAT COULD BE RELATED TO ABUSE/NEGLECT?: NO

## 2024-03-28 SDOH — SOCIAL STABILITY: SOCIAL INSECURITY: HAVE YOU HAD THOUGHTS OF HARMING ANYONE ELSE?: NO

## 2024-03-28 SDOH — SOCIAL STABILITY: SOCIAL INSECURITY: ARE YOU OR HAVE YOU BEEN THREATENED OR ABUSED PHYSICALLY, EMOTIONALLY, OR SEXUALLY BY ANYONE?: NO

## 2024-03-28 SDOH — SOCIAL STABILITY: SOCIAL INSECURITY: DOES ANYONE TRY TO KEEP YOU FROM HAVING/CONTACTING OTHER FRIENDS OR DOING THINGS OUTSIDE YOUR HOME?: NO

## 2024-03-28 SDOH — SOCIAL STABILITY: SOCIAL INSECURITY: DO YOU FEEL ANYONE HAS EXPLOITED OR TAKEN ADVANTAGE OF YOU FINANCIALLY OR OF YOUR PERSONAL PROPERTY?: NO

## 2024-03-28 SDOH — SOCIAL STABILITY: SOCIAL INSECURITY: ABUSE: ADULT

## 2024-03-28 SDOH — SOCIAL STABILITY: SOCIAL INSECURITY: HAS ANYONE EVER THREATENED TO HURT YOUR FAMILY OR YOUR PETS?: NO

## 2024-03-28 ASSESSMENT — ACTIVITIES OF DAILY LIVING (ADL)
HEARING - LEFT EAR: FUNCTIONAL
PATIENT'S MEMORY ADEQUATE TO SAFELY COMPLETE DAILY ACTIVITIES?: YES
LACK_OF_TRANSPORTATION: NO
WALKS IN HOME: INDEPENDENT
ASSISTIVE_DEVICE: CANE;DENTURES UPPER
GROOMING: INDEPENDENT
FEEDING YOURSELF: INDEPENDENT
BATHING: INDEPENDENT
DRESSING YOURSELF: INDEPENDENT
JUDGMENT_ADEQUATE_SAFELY_COMPLETE_DAILY_ACTIVITIES: YES
TOILETING: INDEPENDENT
ADEQUATE_TO_COMPLETE_ADL: YES
HEARING - RIGHT EAR: FUNCTIONAL

## 2024-03-28 ASSESSMENT — COGNITIVE AND FUNCTIONAL STATUS - GENERAL
DAILY ACTIVITIY SCORE: 24
MOBILITY SCORE: 24
PATIENT BASELINE BEDBOUND: NO
DAILY ACTIVITIY SCORE: 24
MOBILITY SCORE: 24

## 2024-03-28 ASSESSMENT — HEART SCORE
HEART SCORE: 5
RISK FACTORS: 1-2 RISK FACTORS
HISTORY: SLIGHTLY SUSPICIOUS
AGE: 65+
TROPONIN: 1-3 TIMES NORMAL LIMIT
ECG: NON-SPECIFIC REPOLARIZATION DISTURBANCE

## 2024-03-28 ASSESSMENT — ENCOUNTER SYMPTOMS
COUGH: 0
GASTROINTESTINAL NEGATIVE: 1
CHEST TIGHTNESS: 1
APPETITE CHANGE: 1
ENDOCRINE NEGATIVE: 1
DIZZINESS: 0
ABDOMINAL PAIN: 0
WEAKNESS: 0
PSYCHIATRIC NEGATIVE: 1
MUSCULOSKELETAL NEGATIVE: 1
FEVER: 0
CONSTITUTIONAL NEGATIVE: 1
PALPITATIONS: 1
NEUROLOGICAL NEGATIVE: 1
CHILLS: 0
SHORTNESS OF BREATH: 1

## 2024-03-28 ASSESSMENT — LIFESTYLE VARIABLES
AUDIT-C TOTAL SCORE: 0
EVER HAD A DRINK FIRST THING IN THE MORNING TO STEADY YOUR NERVES TO GET RID OF A HANGOVER: NO
TOTAL SCORE: 0
HOW OFTEN DO YOU HAVE 6 OR MORE DRINKS ON ONE OCCASION: NEVER
AUDIT-C TOTAL SCORE: 0
HAVE YOU EVER FELT YOU SHOULD CUT DOWN ON YOUR DRINKING: NO
SKIP TO QUESTIONS 9-10: 1
EVER FELT BAD OR GUILTY ABOUT YOUR DRINKING: NO
HAVE PEOPLE ANNOYED YOU BY CRITICIZING YOUR DRINKING: NO
HOW OFTEN DO YOU HAVE A DRINK CONTAINING ALCOHOL: NEVER
HOW MANY STANDARD DRINKS CONTAINING ALCOHOL DO YOU HAVE ON A TYPICAL DAY: PATIENT DOES NOT DRINK

## 2024-03-28 ASSESSMENT — PAIN SCALES - GENERAL
PAINLEVEL_OUTOF10: 8
PAINLEVEL_OUTOF10: 0 - NO PAIN
PAINLEVEL_OUTOF10: 0 - NO PAIN

## 2024-03-28 ASSESSMENT — PATIENT HEALTH QUESTIONNAIRE - PHQ9
1. LITTLE INTEREST OR PLEASURE IN DOING THINGS: NOT AT ALL
2. FEELING DOWN, DEPRESSED OR HOPELESS: NOT AT ALL
SUM OF ALL RESPONSES TO PHQ9 QUESTIONS 1 & 2: 0

## 2024-03-28 ASSESSMENT — PAIN DESCRIPTION - DESCRIPTORS: DESCRIPTORS: PRESSURE

## 2024-03-28 ASSESSMENT — PAIN DESCRIPTION - PAIN TYPE: TYPE: ACUTE PAIN

## 2024-03-28 ASSESSMENT — PAIN DESCRIPTION - LOCATION: LOCATION: ARM

## 2024-03-28 ASSESSMENT — PAIN - FUNCTIONAL ASSESSMENT
PAIN_FUNCTIONAL_ASSESSMENT: 0-10
PAIN_FUNCTIONAL_ASSESSMENT: 0-10

## 2024-03-28 NOTE — PROGRESS NOTES
Pharmacy Medication History Review    Debbie Rees is a 74 y.o. female admitted for Atrial fibrillation with rapid ventricular response (CMS/[facility]). Pharmacy reviewed the patient's [id number] medications and allergies for accuracy.    The list below reflectives the updated PTA list. Please review each medication in order reconciliation for additional clarification and justification.  Prior to Admission medications    Medication Sig Start Date End Date Taking? Authorizing Provider   apixaban (Eliquis) 5 mg tablet Take 1 tablet (5 mg) by mouth 2 times a day. 12/26/23 6/23/24  Dedrick Victoria DO   hydroCHLOROthiazide (HYDRODiuril) 25 mg tablet Take 1 tablet (25 mg) by mouth once daily in the morning. 6/15/23   Historical Provider, MD   lisinopril 20 mg tablet TAKE ONE TABLET BY MOUTH TWO TIMES A DAY 1/12/24   Jarrell MACHADO DO   magnesium oxide (Mag-Ox) 400 mg (241.3 mg magnesium) tablet Take 1 tablet (400 mg) by mouth once daily.  Patient taking differently: Take 1 tablet (400 mg) by mouth once a day on Monday, Wednesday, and Friday. 12/26/23 12/25/24  Dedrick Victoria DO   metoprolol tartrate (Lopressor) 100 mg tablet TAKE ONE TABLET BY MOUTH TWO TIMES A DAY 1/12/24   Jarrell MACHADO DO   semaglutide (OZEMPIC) 1 mg/dose (4 mg/3 mL) pen injector Inject 1 mg under the skin 1 (one) time per week.  Patient taking differently: Inject 1 mg under the skin 1 (one) time per week. SATURDAY 10/23/23   Jarrell MACHADO DO   imiquimod (Aldara) 5 % cream APPLY TO AFFECTED AREA IN THE EVENING ON TUESDAY, THURSDAY, AND SATURDAY. WASH OFF IN THE MORNING. x16wks 2/1/18 3/28/24  Historical Provider, MD        The list below reflectives the updated allergy list. Please review each documented allergy for additional clarification and justification.  Allergies  Reviewed by Molly Cogan, EMT on 3/28/2024        Severity Reactions Comments    Procaine Medium Other, Palpitations palpitations            Below are additional  concerns with the patient's PTA list.      Angela Roman

## 2024-03-28 NOTE — ED PROCEDURE NOTE
Procedure  Critical Care    Performed by: Nilo Stokes MD  Authorized by: Nilo Stokes MD    Critical care provider statement:     Critical care time (minutes):  40    Critical care time was exclusive of:  Separately billable procedures and treating other patients    Critical care was necessary to treat or prevent imminent or life-threatening deterioration of the following conditions:  Circulatory failure    Critical care was time spent personally by me on the following activities:  Ordering and performing treatments and interventions, ordering and review of laboratory studies, ordering and review of radiographic studies, pulse oximetry, re-evaluation of patient's condition, examination of patient, discussions with consultants and development of treatment plan with patient or surrogate    Care discussed with: admitting provider                 Nilo Stokes MD  03/28/24 [id number]

## 2024-03-28 NOTE — ED PROVIDER NOTES
HPI   Chief Complaint   Patient presents with    Chest Pain     PT reports new onset CP, jaw pain, head pressure and pain radiating down her left arm about 5 hours ago. Pt has hx of A-Fib and hypertropic cardiomyopathy. Pt currently takes Eliquis. Pt also reported feeling occasional palpitations.        Debbie is a 74-year-old woman with a history of A-fib who sees Dr. Rodriguez presents with not feeling well.  She feels her heart fluttering and going fast she has some chest discomfort with it and jaw discomfort.  She normally takes metoprolol lisinopril and hydrochlorothiazide.  She denies any fever but says her throat hurts her.                          [place] Coma Scale Score: 15   HEART Score: 5                   Patient History   Past Medical History:   Diagnosis Date    Abdominal pain of multiple sites 06/22/2023    Acute sinusitis 06/22/2023    Acute upper respiratory infection, unspecified 03/14/2017    Acute URI    Anxiety disorder, unspecified     Anxiety    Biceps tendinitis of left upper extremity 06/22/2023    Burning with urination 06/22/2023    Cervical pain 06/22/2023    Cervicalgia     Cervical pain (neck)    Encounter for screening for other viral diseases 12/14/2017    Need for hepatitis C screening test    Hand numbness 12/26/2023    Morbid (severe) obesity due to excess calories (CMS/HCC)     Morbid obesity    Pain in left knee     Acute pain of left knee    Paroxysmal SVT (supraventricular tachycardia) 12/26/2023    Personal history of other diseases of the circulatory system     History of hypertension    Personal history of other diseases of the musculoskeletal system and connective tissue     History of arthritis    Personal history of other drug therapy 11/21/2019    History of influenza vaccination    Personal history of other endocrine, nutritional and metabolic disease     History of hyperlipidemia    Personal history of other endocrine, nutritional and metabolic disease     History of  diabetes mellitus    Personal history of other specified conditions 2017    History of dysuria    Pyogenic granuloma of skin and subcutaneous tissue 2023    Streptococcal pharyngitis 2017    Strep sore throat    Thoracic spine pain 2023    Unspecified abdominal hernia without obstruction or gangrene     Abdominal hernia without obstruction and without gangrene    Unspecified asthma, uncomplicated 2017    Asthmatic bronchitis    Urinary tract infection, site not specified 2017    Acute UTI     Past Surgical History:   Procedure Laterality Date     SECTION, CLASSIC  2016     Section    CHOLECYSTECTOMY  2016    Cholecystectomy    OTHER SURGICAL HISTORY  2016    Sigmoidoscopy (Fiberoptic, Therapeutic )    OTHER SURGICAL HISTORY  2022    Lower back surgery    OTHER SURGICAL HISTORY  2021    Carpal tunnel surgery    TOTAL KNEE ARTHROPLASTY  2016    Knee Replacement     No family history on file.  Social History     Tobacco Use    Smoking status: Never    Smokeless tobacco: Never   Vaping Use    Vaping Use: Never used   Substance Use Topics    Alcohol use: Never    Drug use: Never       Physical Exam   ED Triage Vitals [24 1044]   Temperature Heart Rate Respirations BP   36.1 °C (97 °F) (!) 126 18 138/75      Pulse Ox Temp Source Heart Rate Source Patient Position   98 % Temporal Monitor Sitting      BP Location FiO2 (%)     Left arm --       Physical Exam  Vitals reviewed.   Constitutional:       General: She is awake.      Appearance: Normal appearance. She is obese.   HENT:      Head: Normocephalic.      Nose: Nose normal.   Cardiovascular:      Rate and Rhythm: Tachycardia present. Rhythm irregular.   Pulmonary:      Effort: Pulmonary effort is normal.      Breath sounds: Normal breath sounds.   Abdominal:      Palpations: Abdomen is soft.   Musculoskeletal:      Cervical back: Normal range of motion.   Skin:     General: Skin  is warm.      Capillary Refill: Capillary refill takes less than 2 seconds.   Neurological:      Mental Status: She is alert.         ED Course & MDM   ED Course as of 03/28/24 1308   u Mar 28, 2024   1127 Is in A-fib RVR with a rate that varies to 150 and around 100.  I talked to pharmacy in which she will be started on Cardizem drip we will attempt to titrate her rate down close to 100.  We will workup with labs and chest x-ray anticipate hospitalization I spoke to patient and family at bedside. [RZ]   1128 EKG done at 1040 interpreted by me shows A-fib with RVR rate of 120 bpm.  No obvious ischemia this is different from previous EKG December 12, 2023 since previously patient was in normal sinus rhythm.  All previous EKGs found in MUSE were normal sinus rhythm. [RZ]   1255 Is improved and patient reported feeling better.  Will attempt to reach cardiology.  Talk to patient family bedside. [RZ]   1255 Repeat EKG done at 1244 interpreted by me shows A-fib rate of 91 bpm no obvious ischemia similar to previous except rate [RZ]   1308 I spoke to Dr. Gutierrez who agrees to consult.  I then talked to the hospitalist who recommends full admission to stepdown. [RZ]      ED Course User Index  [RZ] Nlio Stokes MD         Diagnoses as of 03/28/24 1308   Atrial fibrillation with rapid ventricular response (CMS/HCC)   Chest pain, unspecified type       Medical Decision Making      Procedure  Procedures     Nilo Stokes MD  03/28/24 1308

## 2024-03-28 NOTE — CONSULTS
"Inpatient consult to Cardiology  Consult performed by: Kathryn Swan, LEAH-CNP  Consult ordered by: Niol Stokes MD  Reason for consult: AF          History Of Present Illness  Debbie Rees is a 74 y.o. female presenting with \"not feeling well.\" She states overnight she didn't feel well and then this morning when she woke up, she felt worse. She felt palpitations along with both sides of her face hurting down into her neck and also had midsternal chest pain and left arm pain. She checked her HR and pulse ox and noted her HR to be in the 150's.     In the ER, lab work showed glucose 206, sodium 133, potassium 4.0, BUN/Cr 21/0.87, magnesium 1.6, troponin 18, WBC 9.3, H&H 14.5/43.9, CXR negative.    EKG showed atrial fibrillation with RVR, rate 122bpm. /75 on arrival.    She was started on a diltiazem gtt and her HR improved. She states the chest pain and shortness of breath have eased also.       Past Medical History  Past Medical History:   Diagnosis Date    Abdominal pain of multiple sites 06/22/2023    Acute sinusitis 06/22/2023    Acute upper respiratory infection, unspecified 03/14/2017    Acute URI    Anxiety disorder, unspecified     Anxiety    Biceps tendinitis of left upper extremity 06/22/2023    Burning with urination 06/22/2023    Cervical pain 06/22/2023    Cervicalgia     Cervical pain (neck)    Encounter for screening for other viral diseases 12/14/2017    Need for hepatitis C screening test    Hand numbness 12/26/2023    Morbid (severe) obesity due to excess calories (CMS/HCC)     Morbid obesity    Pain in left knee     Acute pain of left knee    Paroxysmal SVT (supraventricular tachycardia) 12/26/2023    Personal history of other diseases of the circulatory system     History of hypertension    Personal history of other diseases of the musculoskeletal system and connective tissue     History of arthritis    Personal history of other drug therapy 11/21/2019    History of influenza " vaccination    Personal history of other endocrine, nutritional and metabolic disease     History of hyperlipidemia    Personal history of other endocrine, nutritional and metabolic disease     History of diabetes mellitus    Personal history of other specified conditions 2017    History of dysuria    Pyogenic granuloma of skin and subcutaneous tissue 2023    Streptococcal pharyngitis 2017    Strep sore throat    Thoracic spine pain 2023    Unspecified abdominal hernia without obstruction or gangrene     Abdominal hernia without obstruction and without gangrene    Unspecified asthma, uncomplicated 2017    Asthmatic bronchitis    Urinary tract infection, site not specified 2017    Acute UTI   Hypertrophic CMO, HTN, venous insufficiency s/p EVLA, PVD, OA, diabetes mellitus type 2, paroxysmal atrial fibrillation    Surgical History  Past Surgical History:   Procedure Laterality Date     SECTION, CLASSIC  2016     Section    CHOLECYSTECTOMY  2016    Cholecystectomy    OTHER SURGICAL HISTORY  2016    Sigmoidoscopy (Fiberoptic, Therapeutic )    OTHER SURGICAL HISTORY  2022    Lower back surgery    OTHER SURGICAL HISTORY  2021    Carpal tunnel surgery    TOTAL KNEE ARTHROPLASTY  2016    Knee Replacement        Social History  She reports that she has never smoked. She has never used smokeless tobacco. She reports that she does not drink alcohol and does not use drugs.    Family History  No family history on file.     Allergies  Procaine    Review of Systems  Review of Systems   Constitutional:  Positive for appetite change. Negative for chills and fever.   Respiratory:  Positive for chest tightness and shortness of breath. Negative for cough.    Cardiovascular:  Positive for chest pain, palpitations and leg swelling.   Gastrointestinal:  Negative for abdominal pain.   Neurological:  Negative for dizziness and weakness.   All other  "systems reviewed and are negative.         Physical Exam  Constitutional: Well developed, awake/alert/oriented x3, no distress, alert and cooperative  Eyes: PERRL, EOMI, clear sclera  ENMT: mucous membranes moist, no apparent injury, no lesions seen  Head/Neck: Neck supple, no apparent injury, thyroid without mass or tenderness, No JVD, trachea midline, no bruits  Respiratory/Thorax: Patent airways, CTAB, normal breath sounds with good chest expansion, thorax symmetric  Cardiovascular: irregular rhythm, no murmurs, 2+ equal pulses of the extremities, normal S 1and S 2  Gastrointestinal: Nondistended, soft, non-tender, no rebound tenderness or guarding, no masses palpable, no organomegaly, +BS, no bruits  Musculoskeletal: ROM intact, no joint swelling, normal strength  Extremities: normal extremities, no cyanosis edema, contusions or wounds, no clubbing  Neurological: alert and oriented x3, intact senses, motor, response and reflexes, normal strength  Lymphatic: No significant lymphadenopathy  Psychological: Appropriate mood and behavior  Skin: Warm and dry, no lesions, no rashes       Last Recorded Vitals  Blood pressure 128/85, pulse 86, temperature 36.4 °C (97.6 °F), temperature source Temporal, resp. rate 20, height 1.676 m (5' 6\"), weight 114 kg (250 lb 10.6 oz), SpO2 97 %.    Relevant Results    Scheduled medications  amiodarone, 150 mg, intravenous, Once  apixaban, 5 mg, oral, BID  [START ON 3/29/2024] hydroCHLOROthiazide, 25 mg, oral, q AM  insulin lispro, 0-10 Units, subcutaneous, TID with meals  lisinopril, 20 mg, oral, BID  metoprolol succinate XL, 100 mg, oral, BID      Continuous medications  amiodarone, 0.5-1 mg/min      PRN medications  PRN medications: dextrose, glucagon    Results for orders placed or performed during the hospital encounter of 03/28/24 (from the past 24 hour(s))   CBC and Auto Differential   Result Value Ref Range    WBC 9.3 4.4 - 11.3 x10*3/uL    nRBC 0.0 0.0 - 0.0 /100 WBCs    RBC " 4.69 4.00 - 5.20 x10*6/uL    Hemoglobin 14.5 12.0 - 16.0 g/dL    Hematocrit 43.9 36.0 - 46.0 %    MCV 94 80 - 100 fL    MCH 30.9 26.0 - 34.0 pg    MCHC 33.0 32.0 - 36.0 g/dL    RDW 12.7 11.5 - 14.5 %    Platelets 282 150 - 450 x10*3/uL    Neutrophils % 66.6 40.0 - 80.0 %    Immature Granulocytes %, Automated 0.2 0.0 - 0.9 %    Lymphocytes % 25.5 13.0 - 44.0 %    Monocytes % 5.4 2.0 - 10.0 %    Eosinophils % 1.7 0.0 - 6.0 %    Basophils % 0.6 0.0 - 2.0 %    Neutrophils Absolute 6.21 (H) 1.60 - 5.50 x10*3/uL    Immature Granulocytes Absolute, Automated 0.02 0.00 - 0.50 x10*3/uL    Lymphocytes Absolute 2.38 0.80 - 3.00 x10*3/uL    Monocytes Absolute 0.50 0.05 - 0.80 x10*3/uL    Eosinophils Absolute 0.16 0.00 - 0.40 x10*3/uL    Basophils Absolute 0.06 0.00 - 0.10 x10*3/uL   Comprehensive Metabolic Panel   Result Value Ref Range    Glucose 206 (H) 74 - 99 mg/dL    Sodium 133 (L) 136 - 145 mmol/L    Potassium 4.0 3.5 - 5.3 mmol/L    Chloride 100 98 - 107 mmol/L    Bicarbonate 25 21 - 32 mmol/L    Anion Gap 12 10 - 20 mmol/L    Urea Nitrogen 21 6 - 23 mg/dL    Creatinine 0.87 0.50 - 1.05 mg/dL    eGFR 70 >60 mL/min/1.73m*2    Calcium 10.3 8.6 - 10.3 mg/dL    Albumin 3.9 3.4 - 5.0 g/dL    Alkaline Phosphatase 95 33 - 136 U/L    Total Protein 7.7 6.4 - 8.2 g/dL    AST 32 9 - 39 U/L    Bilirubin, Total 0.5 0.0 - 1.2 mg/dL    ALT 40 7 - 45 U/L   Magnesium   Result Value Ref Range    Magnesium 1.61 1.60 - 2.40 mg/dL   Troponin I, High Sensitivity, Initial   Result Value Ref Range    Troponin I, High Sensitivity 18 (H) 0 - 13 ng/L   ECG 12 lead   Result Value Ref Range    Ventricular Rate 91 BPM    QRS Duration 80 ms    QT Interval 368 ms    QTC Calculation(Bazett) 452 ms    R Axis -14 degrees    T Axis 58 degrees    QRS Count 15 beats    Q Onset 221 ms    T Offset 405 ms    QTC Fredericia 423 ms   Group A Streptococcus, PCR    Specimen: Throat/Pharynx; Swab   Result Value Ref Range    Group A Strep PCR Not Detected Not Detected    Sars-CoV-2 and Influenza A/B PCR   Result Value Ref Range    Flu A Result Not Detected Not Detected    Flu B Result Not Detected Not Detected    Coronavirus 2019, PCR Not Detected Not Detected   Troponin, High Sensitivity, 1 Hour   Result Value Ref Range    Troponin I, High Sensitivity 23 (H) 0 - 13 ng/L   ECG 12 lead   Result Value Ref Range    Ventricular Rate 122 BPM    QRS Duration 76 ms    QT Interval 288 ms    QTC Calculation(Bazett) 410 ms    R Axis -5 degrees    T Axis 69 degrees    QRS Count 19 beats    Q Onset 222 ms    T Offset 366 ms    QTC Fredericia 364 ms       XR chest 1 view   Final Result   No focal infiltrate or pneumothorax is identified.        MACRO:   None.        Signed by: Jace Weinstein 3/28/2024 11:56 AM   Dictation workstation:   [id number]      Transthoracic Echo (TTE) Complete    (Results Pending)          Assessment/Plan   Echocardiogram from June 2023: vigorous LV systolic function, asymmetric moderate septal hypertrophy, mod MR      Paroxysmal atrial fibrillation with RVR  -EKG reviewed, AF RVR  -Started on Diltiazem gtt  -Rate improved, still AF  -Cont eliquis  -Change Diltiazem gtt to Amiodarone gtt with 150mg IV bolus then 1mg/min x6 hours then 0.5mg/min  -Change lopressor to Toprol XL 100mg BID  -Check TSH  -Repeat echo  -Monitor on tele    2. Elevated troponin-Non MI elevation 2/2 AF RVR  -Does report CP and SOB but improved with rate control  -Troponin 18, 23  -CXR negative  -I reviewed the EKG, no acute changes, ST elevation, or depression  -Stress test in Jan 2024 negative for ischemia  -Check lipid panel  -Cont metoprolol    3. Hypertension  -stable  -2gm na diet  -cont meds  -monitor    4. Diabetes Mellitus type 2  -ISS  -Accuchecks  -hgb A1C      Kathryn Swan, LEAH-CNP

## 2024-03-28 NOTE — CARE PLAN
The patient's goals for the shift include      The clinical goals for the shift include Pt will deny chest pain during this shift.    Over the shift, the patient did make progress toward the following goals. Barriers to progression include patient continuing to be in atrial fibrillation, but denies further chest pain. Recommendations to address these barriers include continue to monitor and medicate as ordered this shift..

## 2024-03-28 NOTE — H&P
History Of Present Illness  Debbie Rees is a 74 y.o. female with a medical history of diabetes mellitus, hypertension, anxiety, hypertrophic cardiomyopathy and paroxysmal atrial fibrillation on Eliquis presents to the emergency room complaint of chest pain and was found to have A-fib with RVR.  The patient reports going to bed in her usual state of health however through the night noted her heart was racing and decided to check her pulse.  She reported throughout the night her pulse was as high as 150 and as low as 60.  In the morning when she woke up she reported a substernal chest pressure radiating to her jaw and left upper extremity.  She reported her pain was exacerbated by physical activity and improved with rest.     Past Medical History  She has a past medical history of Abdominal pain of multiple sites (06/22/2023), Acute sinusitis (06/22/2023), Acute upper respiratory infection, unspecified (03/14/2017), Anxiety disorder, unspecified, Biceps tendinitis of left upper extremity (06/22/2023), Burning with urination (06/22/2023), Cervical pain (06/22/2023), Cervicalgia, Encounter for screening for other viral diseases (12/14/2017), Hand numbness (12/26/2023), Morbid (severe) obesity due to excess calories (CMS/[facility]), Pain in left knee, Paroxysmal SVT (supraventricular tachycardia) (12/26/2023), Personal history of other diseases of the circulatory system, Personal history of other diseases of the musculoskeletal system and connective tissue, Personal history of other drug therapy (11/21/2019), Personal history of other endocrine, nutritional and metabolic disease, Personal history of other endocrine, nutritional and metabolic disease, Personal history of other specified conditions (04/20/2017), Pyogenic granuloma of skin and subcutaneous tissue (12/26/2023), Streptococcal pharyngitis (03/14/2017), Thoracic spine pain (12/26/2023), Unspecified abdominal hernia without obstruction or gangrene, Unspecified  asthma, uncomplicated (2017), and Urinary tract infection, site not specified (2017).    Surgical History  She has a past surgical history that includes Cholecystectomy (2016);  section, classic (2016); Total knee arthroplasty (2016); Other surgical history (2016); Other surgical history (2022); and Other surgical history (2021).     Social History  She reports that she has never smoked. She has never used smokeless tobacco. She reports that she does not drink alcohol and does not use drugs.    Family History  No family history on file.     Allergies  Procaine    Review of Systems   Constitutional: Negative.    HENT: Negative.     Respiratory:  Positive for shortness of breath.    Cardiovascular:  Positive for chest pain.   Gastrointestinal: Negative.    Endocrine: Negative.    Genitourinary: Negative.    Musculoskeletal: Negative.    Neurological: Negative.    Psychiatric/Behavioral: Negative.          Physical Exam  Constitutional:       Appearance: She is obese.   HENT:      Head: Normocephalic and atraumatic.      Nose: Nose normal.      Mouth/Throat:      Mouth: Mucous membranes are moist.      Pharynx: Oropharynx is clear.   Eyes:      Conjunctiva/sclera: Conjunctivae normal.      Pupils: Pupils are equal, round, and reactive to light.   Cardiovascular:      Rate and Rhythm: Normal rate and regular rhythm.      Pulses: Normal pulses.      Heart sounds: Normal heart sounds.   Pulmonary:      Effort: Pulmonary effort is normal.      Breath sounds: Normal breath sounds.   Abdominal:      General: Abdomen is flat. Bowel sounds are normal.   Musculoskeletal:         General: Normal range of motion.      Cervical back: Normal range of motion.   Skin:     General: Skin is warm.      Capillary Refill: Capillary refill takes less than 2 seconds.   Neurological:      General: No focal deficit present.      Mental Status: She is alert and oriented to person, place,  and time. Mental status is at baseline.      Cranial Nerves: No cranial nerve deficit.   Psychiatric:         Mood and Affect: Mood normal.          Last Recorded Vitals  /85 (BP Location: Right arm, Patient Position: Lying)   Pulse 86   Temp 36.4 °C (97.6 °F) (Temporal)   Resp 20   Wt 114 kg (250 lb 10.6 oz)   SpO2 97%      Assessment/Plan   Debbie Rees is a 74 y.o. female with a medical history of diabetes mellitus, hypertension, anxiety, hypertrophic cardiomyopathy and paroxysmal atrial fibrillation on Eliquis presents to the emergency room complaint of chest pain and was found to have A-fib with RVR.    A-fib with the RVR  Started on Cardizem drip from ER, will continue  Echocardiogram pending  Monitor on telemetry  Continue Eliquis  Consult cardiology  With TSH    Chest pain/elevated troponin  EKG reveals atrial fibrillation with RVR and nonspecific ST abnormalities  Troponin elevation is mild  Will repeat  Monitor on telemetry  Echocardiogram pending  Make n.p.o. at midnight pending need for ischemic evaluation    Hypertension  Continue home medications monitor vitals    Type 2 diabetes mellitus  Patient only on Ozempic at home  Will start insulin sliding scale  Follow-up with hemoglobin A1c  Last hemoglobin A1c was 7 in 2019    DVT prophylaxis  With Eliquis as mentioned above        Sarkis Sherman DO

## 2024-03-29 ENCOUNTER — APPOINTMENT (OUTPATIENT)
Dept: CARDIOLOGY | Facility: HOSPITAL | Age: 74
DRG: 309 | End: 2024-03-29
Payer: MEDICARE

## 2024-03-29 LAB
ANION GAP SERPL CALC-SCNC: 11 MMOL/L (ref 10–20)
AORTIC VALVE MEAN GRADIENT: 4.9 MMHG
AORTIC VALVE PEAK VELOCITY: 1.44 M/S
AV PEAK GRADIENT: 8.3 MMHG
AVA (PEAK VEL): 2.9 CM2
AVA (VTI): 2.38 CM2
BUN SERPL-MCNC: 20 MG/DL (ref 6–23)
CALCIUM SERPL-MCNC: 9.7 MG/DL (ref 8.6–10.3)
CARDIAC TROPONIN I PNL SERPL HS: 26 NG/L (ref 0–13)
CHLORIDE SERPL-SCNC: 101 MMOL/L (ref 98–107)
CO2 SERPL-SCNC: 27 MMOL/L (ref 21–32)
CREAT SERPL-MCNC: 0.87 MG/DL (ref 0.5–1.05)
EGFRCR SERPLBLD CKD-EPI 2021: 70 ML/MIN/1.73M*2
EJECTION FRACTION APICAL 4 CHAMBER: 68.9
EJECTION FRACTION: 68 %
EST. AVERAGE GLUCOSE BLD GHB EST-MCNC: 192 MG/DL
GLUCOSE BLD MANUAL STRIP-MCNC: 136 MG/DL (ref 74–99)
GLUCOSE BLD MANUAL STRIP-MCNC: 183 MG/DL (ref 74–99)
GLUCOSE BLD MANUAL STRIP-MCNC: 186 MG/DL (ref 74–99)
GLUCOSE BLD MANUAL STRIP-MCNC: 190 MG/DL (ref 74–99)
GLUCOSE SERPL-MCNC: 177 MG/DL (ref 74–99)
HBA1C MFR BLD: 8.3 %
HOLD SPECIMEN: NORMAL
LEFT ATRIUM VOLUME AREA LENGTH INDEX BSA: 24.5 ML/M2
LEFT VENTRICLE INTERNAL DIMENSION DIASTOLE: 3.98 CM (ref 3.5–6)
LEFT VENTRICULAR OUTFLOW TRACT DIAMETER: 1.98 CM
MAGNESIUM SERPL-MCNC: 1.61 MG/DL (ref 1.6–2.4)
POTASSIUM SERPL-SCNC: 3.8 MMOL/L (ref 3.5–5.3)
RIGHT VENTRICLE PEAK SYSTOLIC PRESSURE: 23.3 MMHG
SODIUM SERPL-SCNC: 135 MMOL/L (ref 136–145)

## 2024-03-29 PROCEDURE — 83735 ASSAY OF MAGNESIUM: CPT | Performed by: NURSE PRACTITIONER

## 2024-03-29 PROCEDURE — 96376 TX/PRO/DX INJ SAME DRUG ADON: CPT | Mod: 59

## 2024-03-29 PROCEDURE — 2060000001 HC INTERMEDIATE ICU ROOM DAILY

## 2024-03-29 PROCEDURE — 84484 ASSAY OF TROPONIN QUANT: CPT | Performed by: NURSE PRACTITIONER

## 2024-03-29 PROCEDURE — 2500000004 HC RX 250 GENERAL PHARMACY W/ HCPCS (ALT 636 FOR OP/ED): Performed by: NURSE PRACTITIONER

## 2024-03-29 PROCEDURE — 2500000001 HC RX 250 WO HCPCS SELF ADMINISTERED DRUGS (ALT 637 FOR MEDICARE OP): Performed by: NURSE PRACTITIONER

## 2024-03-29 PROCEDURE — 82947 ASSAY GLUCOSE BLOOD QUANT: CPT

## 2024-03-29 PROCEDURE — 93306 TTE W/DOPPLER COMPLETE: CPT

## 2024-03-29 PROCEDURE — 2500000001 HC RX 250 WO HCPCS SELF ADMINISTERED DRUGS (ALT 637 FOR MEDICARE OP): Performed by: FAMILY MEDICINE

## 2024-03-29 PROCEDURE — 2500000001 HC RX 250 WO HCPCS SELF ADMINISTERED DRUGS (ALT 637 FOR MEDICARE OP): Performed by: INTERNAL MEDICINE

## 2024-03-29 PROCEDURE — G0378 HOSPITAL OBSERVATION PER HR: HCPCS

## 2024-03-29 PROCEDURE — 2500000002 HC RX 250 W HCPCS SELF ADMINISTERED DRUGS (ALT 637 FOR MEDICARE OP, ALT 636 FOR OP/ED): Performed by: FAMILY MEDICINE

## 2024-03-29 PROCEDURE — 36415 COLL VENOUS BLD VENIPUNCTURE: CPT | Performed by: NURSE PRACTITIONER

## 2024-03-29 PROCEDURE — 80048 BASIC METABOLIC PNL TOTAL CA: CPT | Performed by: NURSE PRACTITIONER

## 2024-03-29 PROCEDURE — 99233 SBSQ HOSP IP/OBS HIGH 50: CPT | Performed by: FAMILY MEDICINE

## 2024-03-29 RX ORDER — ACETAMINOPHEN 325 MG/1
650 TABLET ORAL EVERY 6 HOURS PRN
Status: DISCONTINUED | OUTPATIENT
Start: 2024-03-29 | End: 2024-03-30 | Stop reason: HOSPADM

## 2024-03-29 RX ORDER — TRAZODONE HYDROCHLORIDE 50 MG/1
50 TABLET ORAL NIGHTLY PRN
Status: DISCONTINUED | OUTPATIENT
Start: 2024-03-29 | End: 2024-03-30 | Stop reason: HOSPADM

## 2024-03-29 RX ORDER — POLYETHYLENE GLYCOL 3350 17 G/17G
17 POWDER, FOR SOLUTION ORAL DAILY
Status: DISCONTINUED | OUTPATIENT
Start: 2024-03-30 | End: 2024-03-30 | Stop reason: HOSPADM

## 2024-03-29 RX ADMIN — METOPROLOL SUCCINATE 100 MG: 50 TABLET, EXTENDED RELEASE ORAL at 21:24

## 2024-03-29 RX ADMIN — PERFLUTREN 1 ML OF DILUTION: 6.52 INJECTION, SUSPENSION INTRAVENOUS at 10:59

## 2024-03-29 RX ADMIN — LISINOPRIL 20 MG: 20 TABLET ORAL at 21:24

## 2024-03-29 RX ADMIN — LISINOPRIL 20 MG: 20 TABLET ORAL at 11:28

## 2024-03-29 RX ADMIN — INSULIN LISPRO 2 UNITS: 100 INJECTION, SOLUTION INTRAVENOUS; SUBCUTANEOUS at 11:29

## 2024-03-29 RX ADMIN — AMIODARONE HYDROCHLORIDE 0.5 MG/MIN: 1.8 INJECTION, SOLUTION INTRAVENOUS at 10:20

## 2024-03-29 RX ADMIN — APIXABAN 5 MG: 5 TABLET, FILM COATED ORAL at 21:24

## 2024-03-29 RX ADMIN — METOPROLOL SUCCINATE 100 MG: 50 TABLET, EXTENDED RELEASE ORAL at 11:29

## 2024-03-29 RX ADMIN — HYDROCHLOROTHIAZIDE 25 MG: 25 TABLET ORAL at 11:28

## 2024-03-29 RX ADMIN — TRAZODONE HYDROCHLORIDE 50 MG: 50 TABLET ORAL at 23:07

## 2024-03-29 RX ADMIN — APIXABAN 5 MG: 5 TABLET, FILM COATED ORAL at 11:28

## 2024-03-29 RX ADMIN — AMIODARONE HYDROCHLORIDE 0.5 MG/MIN: 1.8 INJECTION, SOLUTION INTRAVENOUS at 21:24

## 2024-03-29 ASSESSMENT — PAIN - FUNCTIONAL ASSESSMENT
PAIN_FUNCTIONAL_ASSESSMENT: 0-10
PAIN_FUNCTIONAL_ASSESSMENT: 0-10

## 2024-03-29 ASSESSMENT — COGNITIVE AND FUNCTIONAL STATUS - GENERAL
MOBILITY SCORE: 24
DAILY ACTIVITIY SCORE: 24
MOBILITY SCORE: 24
DAILY ACTIVITIY SCORE: 24

## 2024-03-29 ASSESSMENT — ACTIVITIES OF DAILY LIVING (ADL): LACK_OF_TRANSPORTATION: NO

## 2024-03-29 ASSESSMENT — PAIN SCALES - GENERAL
PAINLEVEL_OUTOF10: 0 - NO PAIN
PAINLEVEL_OUTOF10: 0 - NO PAIN

## 2024-03-29 NOTE — PROGRESS NOTES
Debbie Rees is a 74 y.o. female on day 1 of admission presenting with Atrial fibrillation with rapid ventricular response (CMS/HCC).      Subjective   No acute events overnight       Objective     Last Recorded Vitals  /85 (BP Location: Right arm, Patient Position: Lying)   Pulse 83   Temp 35.9 °C (96.6 °F) (Temporal)   Resp 20   Wt 114 kg (250 lb 10.6 oz)   SpO2 94%   Intake/Output last 3 Shifts:    Intake/Output Summary (Last 24 hours) at 3/29/2024 1014  Last data filed at 3/29/2024 0855  Gross per 24 hour   Intake 819.25 ml   Output --   Net 819.25 ml       Admission Weight  Weight: 113 kg (250 lb) (03/28/24 1044)    Daily Weight  03/28/24 : 114 kg (250 lb 10.6 oz)    Image Results  ECG 12 lead  Atrial fibrillation with rapid ventricular response  Nonspecific ST abnormality  Abnormal ECG  When compared with ECG of 12-DEC-2023 17:00,  Atrial fibrillation has replaced Sinus rhythm  Vent. rate has increased BY  49 BPM  Non-specific change in ST segment in Lateral leads  ECG 12 lead  Atrial fibrillation  Nonspecific ST abnormality  Abnormal ECG  When compared with ECG of 28-MAR-2024 10:40, (unconfirmed)  No significant change was found  XR chest 1 view  Narrative: Interpreted By:  Jace Weinstein,   STUDY:  XR CHEST 1 VIEW  3/28/2024 11:29 am      INDICATION:  Signs/Symptoms:Chest Pain      COMPARISON:  11/22/2011      ACCESSION NUMBER(S):  [id number]      ORDERING CLINICIAN:  JATIN BRANDT      TECHNIQUE:  A single AP portable radiograph of the chest was obtained.      FINDINGS:  Multiple cardiac monitoring leads are seen over the chest.   No focal  infiltrate, pleural effusion or pneumothorax is identified. The  cardiac silhouette is within normal limits for size.      Impression: No focal infiltrate or pneumothorax is identified.      MACRO:  None.      Signed by: Jace Weinstein 3/28/2024 11:56 AM  Dictation workstation:   [id number]      Physical Exam  Gen: lying comfortably in bed, not in acute  distress  HEENT: atraumatic, normocephalic  Pulm: normal respiratory effort, clear to auscultation b/l  Cardiac: Irregularly irregular  GI: Soft, nontender, BS+  MSK: normal ROM without joint swelling  Extremities: no LE edema, cyanosis  Neuro: AOX3, CN II-XII grossly intact, equal b/l strength, no loss in sensation   Psych: calm and appropriate for situation     Assessment/Plan      Debbie Rees is a 74 y.o. female with a medical history of diabetes mellitus, hypertension, anxiety, hypertrophic cardiomyopathy and paroxysmal atrial fibrillation on Eliquis presents to the emergency room complaint of chest pain and was found to have A-fib with RVR.     A-fib with the RVR  Cardizem gtt DCed  Amiodarone started   Lopressor changed to Toprol XL 100mg BID  Echocardiogram pending  Monitor on telemetry  Eliquis continued   Consult cardiology  With TSH     Chest pain/elevated troponin  EKG reveals atrial fibrillation with RVR and nonspecific ST abnormalities  Troponin elevation is mild  Or ST elevation or depression  Stress test from 01/24 was negative for signs of stress ischemia    Type 2 diabetes mellitus  Patient only on Ozempic at home  Continue insulin sliding scale  Hemoglobin A1c 8.3  Last hemoglobin A1c was 7 in 2019  Consult diabetic educator  Plan to discharge on metformin     DVT prophylaxis  With Eliquis as mentioned above       Sarkis Sherman DO

## 2024-03-29 NOTE — PROGRESS NOTES
Debbie Rees is a 74 y.o. female on day 1 of admission presenting with Atrial fibrillation with rapid ventricular response (CMS/HCC).      Subjective   She is sitting up in the bed, states she feels better today, no further chest pain. Still with slight shortness of breath with exertion.     Telemetry reviewed, AF, rate 80's at rest and increased to 100's with ambulation and movement.       Review of systems:  Constitutional: negative for fever, chills, or malaise  Neuro: negative for dizziness, headache, numbness, tingling  ENT: Negative for nasal congestion or sore throat  Resp: negative for cough, or wheezing  CV: negative for chest pain, palpitations  GI: negative for abd pain, nausea, vomiting or diarrhea  : negative for dysuria, frequency, or urgency  Skin: negative for lesions, wounds, or rash  Musculoskeletal: Negative for weakness, myalgia, or arthralgia  Endocrine: Negative for polyuria or polydipsia         Objective   Constitutional: Well developed, awake/alert/oriented x3, no distress, alert and cooperative  Eyes: PERRL, EOMI, clear sclera  ENMT: mucous membranes moist, no apparent injury, no lesions seen  Head/Neck: Neck supple, no apparent injury, thyroid without mass or tenderness, No JVD, trachea midline, no bruits  Respiratory/Thorax: Patent airways, CTAB, normal breath sounds with good chest expansion, thorax symmetric  Cardiovascular: irregular rhythm, no murmurs, 2+ equal pulses of the extremities, normal S 1and S 2  Gastrointestinal: Nondistended, soft, non-tender, no rebound tenderness or guarding, no masses palpable, no organomegaly, +BS, no bruits  Musculoskeletal: ROM intact, no joint swelling, normal strength  Extremities: normal extremities, no cyanosis edema, contusions or wounds, no clubbing  Neurological: alert and oriented x3, intact senses, motor, response and reflexes, normal strength  Lymphatic: No significant lymphadenopathy  Psychological: Appropriate mood and  "behavior  Skin: Warm and dry, no lesions, no rashes      Last Recorded Vitals  /69   Pulse 101   Temp 35.9 °C (96.6 °F) (Temporal)   Resp 16   Ht 1.676 m (5' 6\")   Wt 114 kg (250 lb 10.6 oz)   SpO2 96%   BMI 40.46 kg/m²     Intake/Output last 3 Shifts:  I/O last 3 completed shifts:  In: 419.3 (3.7 mL/kg) [P.O.:300; I.V.:119.3 (1 mL/kg)]  Out: - (0 mL/kg)   Weight: 113.7 kg   I/O this shift:  In: 640 [P.O.:640]  Out: -     Relevant Results  Scheduled medications  apixaban, 5 mg, oral, BID  hydroCHLOROthiazide, 25 mg, oral, q AM  insulin lispro, 0-10 Units, subcutaneous, TID with meals  lisinopril, 20 mg, oral, BID  metoprolol succinate XL, 100 mg, oral, BID      Continuous medications  amiodarone, 0.5-1 mg/min, Last Rate: 0.5 mg/min (03/29/24 1020)      PRN medications  PRN medications: acetaminophen, dextrose, glucagon, melatonin    Results for orders placed or performed during the hospital encounter of 03/28/24 (from the past 24 hour(s))   POCT GLUCOSE   Result Value Ref Range    POCT Glucose 245 (H) 74 - 99 mg/dL   POCT GLUCOSE   Result Value Ref Range    POCT Glucose 186 (H) 74 - 99 mg/dL   Basic Metabolic Panel   Result Value Ref Range    Glucose 177 (H) 74 - 99 mg/dL    Sodium 135 (L) 136 - 145 mmol/L    Potassium 3.8 3.5 - 5.3 mmol/L    Chloride 101 98 - 107 mmol/L    Bicarbonate 27 21 - 32 mmol/L    Anion Gap 11 10 - 20 mmol/L    Urea Nitrogen 20 6 - 23 mg/dL    Creatinine 0.87 0.50 - 1.05 mg/dL    eGFR 70 >60 mL/min/1.73m*2    Calcium 9.7 8.6 - 10.3 mg/dL   Troponin I, High Sensitivity   Result Value Ref Range    Troponin I, High Sensitivity 26 (H) 0 - 13 ng/L   Magnesium   Result Value Ref Range    Magnesium 1.61 1.60 - 2.40 mg/dL   Lavender Top   Result Value Ref Range    Extra Tube Hold for add-ons.    Transthoracic Echo (TTE) Complete   Result Value Ref Range    AV pk mike 1.44 m/s    AV mn grad 4.9 mmHg    LVOT diam 1.98 cm    LV biplane EF 68 %    LA vol index A/L 24.5 ml/m2    LVIDd 3.98 " cm    RVSP 23.3 mmHg    Aortic Valve Area by Continuity of VTI 2.38 cm2    Aortic Valve Area by Continuity of Peak Velocity 2.90 cm2    AV pk grad 8.3 mmHg    LV A4C EF 68.9    POCT GLUCOSE   Result Value Ref Range    POCT Glucose 183 (H) 74 - 99 mg/dL       Transthoracic Echo (TTE) Complete   Final Result      XR chest 1 view   Final Result   No focal infiltrate or pneumothorax is identified.        MACRO:   None.        Signed by: Jace Weinstein 3/28/2024 11:56 AM   Dictation workstation:   [id number]          Transthoracic Echo (TTE) Complete    Result Date: 3/29/2024   [facility], [address] [place] [address]               Tel [phone] and Fax [phone] TRANSTHORACIC ECHOCARDIOGRAM REPORT  Patient Name:      VIVIAN JACOBSEN JUDY      Reading Physician:    [id number] Dania Gutierrez MD Study Date:        3/29/2024            Ordering Provider:    [id number] RANULFO MELGOZA MRN/PID:           [id number]             Fellow: Accession#:        [id number]         Nurse:                Nina Dickerson RN Date of Birth/Age: 1950 / 74 years  Sonographer:          Janneth Collins RDCS Gender:            F                    Additional Staff: Height:            167.64 cm            Admit Date:           3/28/2024 Weight:            113.40 kg            Admission Status:     Inpatient -                                                               Routine BSA / BMI:         2.20 m2 / 40.35      Encounter#:           [id number]                    kg/m2                                         Department Location:  [facility] Non                                                               Invasive Blood Pressure: 128 /85 mmHg Study Type:    TRANSTHORACIC ECHO (TTE) COMPLETE Diagnosis/ICD: Unspecified atrial  fibrillation-I48.91 Indication:    Atrial Fibrillation CPT Code:      Echo Complete w Full Doppler-10119 Patient History: Diabetes:          Yes Pertinent History: HTN and A-Fib. HCM. Study Detail: The following Echo studies were performed: 2D, M-Mode, Doppler and               color flow. Technically challenging study due to poor acoustic               windows. Definity used as a contrast agent for endocardial border               definition. Total contrast used for this procedure was 1 mL via IV               push.  PHYSICIAN INTERPRETATION: Left Ventricle: The left ventricular systolic function is hyperdynamic, with an estimated ejection fraction of 65-70%. There are no regional wall motion abnormalities. The left ventricular cavity size is normal. Left ventricular diastolic filling was indeterminate. Left Atrium: The left atrium is mildly dilated. Right Ventricle: The right ventricle is normal in size. There is normal right ventricular global systolic function. Right Atrium: The right atrium is normal in size. Aortic Valve: The aortic valve is trileaflet. There is no evidence of aortic valve regurgitation. The peak instantaneous gradient of the aortic valve is 8.3 mmHg. The mean gradient of the aortic valve is 4.9 mmHg. Mitral Valve: The mitral valve is normal in structure. There is trace to mild mitral valve regurgitation. Tricuspid Valve: The tricuspid valve is structurally normal. There is trace to mild tricuspid regurgitation. Pulmonic Valve: The pulmonic valve is not well visualized. The pulmonic valve regurgitation was not well visualized. Pericardium: There is no pericardial effusion noted. Aorta: The aortic root is normal. Pulmonary Artery: The tricuspid regurgitant velocity is 2.25 m/s, and with an estimated right atrial pressure of 3 mmHg, the estimated pulmonary artery pressure is normal with the RVSP at 23.3 mmHg.  CONCLUSIONS:  1. Left ventricular systolic function is hyperdynamic with a 65-70%  estimated ejection fraction. QUANTITATIVE DATA SUMMARY: 2D MEASUREMENTS:                           Normal Ranges: IVSd:          1.84 cm    (0.6-1.1cm) LVPWd:         1.09 cm    (0.6-1.1cm) LVIDd:         3.98 cm    (3.9-5.9cm) LVIDs:         2.50 cm LV Mass Index: 101.3 g/m2 LV % FS        37.1 % LA VOLUME:                              Normal Ranges: LA Vol A4C:       52.5 ml    (22+/-6mL/m2) LA Vol A2C:       53.8 ml LA Vol BP:        53.9 ml LA Vol Index A4C: 23.9 ml/m2 LA Vol Index A2C: 24.5 ml/m2 LA Vol Index BP:  24.5 ml/m2 LA Volume Index:  24.5 ml/m2 LA Vol A4C:       51.0 ml LA Vol A2C:       50.8 ml RA VOLUME BY A/L METHOD:                       Normal Ranges: RA Area A4C: 13.6 cm2 M-MODE MEASUREMENTS:                  Normal Ranges: Ao Root: 2.90 cm (2.0-3.7cm) LAs:     4.17 cm (2.7-4.0cm) LV SYSTOLIC FUNCTION BY 2D PLANIMETRY (MOD):                     Normal Ranges: EF-A4C View: 68.9 % (>=55%) EF-A2C View: 67.6 % EF-Biplane:  68.5 % LV DIASTOLIC FUNCTION:                     Normal Ranges: MV Peak E: 0.83 m/s (0.7-1.2 m/s) MITRAL VALVE:                 Normal Ranges: MV DT: 197 msec (150-240msec) AORTIC VALVE:                                   Normal Ranges: AoV Vmax:                1.44 m/s (<=1.7m/s) AoV Peak P.3 mmHg (<20mmHg) AoV Mean P.9 mmHg (1.7-11.5mmHg) LVOT Max Salazar:            1.35 m/s (<=1.1m/s) AoV VTI:                 27.74 cm (18-25cm) LVOT VTI:                21.49 cm LVOT Diameter:           1.98 cm  (1.8-2.4cm) AoV Area, VTI:           2.38 cm2 (2.5-5.5cm2) AoV Area,Vmax:           2.90 cm2 (2.5-4.5cm2) AoV Dimensionless Index: 0.77 TRICUSPID VALVE/RVSP:                             Normal Ranges: Peak TR Velocity: 2.25 m/s RV Syst Pressure: 23.3 mmHg (< 30mmHg) PULMONIC VALVE:                      Normal Ranges: PV Max Salazar: 1.2 m/s  (0.6-0.9m/s) PV Max P.2 mmHg  [id number] Dania Gutierrez MD Electronically signed on 3/29/2024 at 11:55:50 AM  ** Final **            Assessment/Plan   Principal Problem:    Atrial fibrillation with rapid ventricular response (CMS/HCC)    Echocardiogram from June 2023: vigorous LV systolic function, asymmetric moderate septal hypertrophy, mod MR        Paroxysmal atrial fibrillation with RVR  -EKG reviewed, AF RVR  -Started on Diltiazem gtt  -Rate improved, still AF occasionally up to 100's  -Cont eliquis  -Changed Diltiazem gtt to Amiodarone gtt->cont another 24 hours then change to PO  -Changed lopressor to Toprol XL 100mg BID  -TSH WNL  -Echo as above  -Monitor on tele     2. Elevated troponin-Non MI elevation 2/2 AF RVR  -Does report CP and SOB but improved with rate control  -Troponin 18, 23  -CXR negative  -I reviewed the EKG, no acute changes, ST elevation, or depression  -Stress test in Jan 2024 negative for ischemia  -lipid panel reviewed  -Cont metoprolol  -Advise outpt [facility]     3. Hypertension  -stable  -2gm na diet  -cont meds  -monitor     4. Diabetes Mellitus type 2  -ISS  -Accuchecks  -hgb A1C      LEAH Guadarrama-CNP

## 2024-03-29 NOTE — PROGRESS NOTES
03/29/24 1256   Discharge Planning   Living Arrangements Spouse/significant other   Support Systems Spouse/significant other;Children   Assistance Needed Patient from home with spouse. A&0x3, ambulates with no devices , independent with ADLs, room air, no cpap or bipap, not active with any HHC, PCP is Dr. Dominguez, doesn't drive ( [facility])   Type of Residence Private residence   Number of Stairs to Enter Residence 4   Number of Stairs Within Residence 0   Do you have animals or pets at home? No   Who is requesting discharge planning? Provider   Home or Post Acute Services None   Patient expects to be discharged to: home with spouse, denies any HHC needs   Does the patient need discharge transport arranged? No   Financial Resource Strain   How hard is it for you to pay for the very basics like food, housing, medical care, and heating? Not very   Housing Stability   In the last 12 months, was there a time when you were not able to pay the mortgage or rent on time? N   In the last 12 months, how many places have you lived? 1   In the last 12 months, was there a time when you did not have a steady place to sleep or slept in a shelter (including now)? N   Transportation Needs   In the past 12 months, has lack of transportation kept you from medical appointments or from getting medications? no   In the past 12 months, has lack of transportation kept you from meetings, work, or from getting things needed for daily living? No

## 2024-03-29 NOTE — CARE PLAN
The patient's goals for the shift include  decrease HR.    The clinical goals for the shift include Pt will achieve HR within normal limits.

## 2024-03-30 VITALS
HEART RATE: 81 BPM | SYSTOLIC BLOOD PRESSURE: 133 MMHG | DIASTOLIC BLOOD PRESSURE: 72 MMHG | BODY MASS INDEX: 40.28 KG/M2 | RESPIRATION RATE: 18 BRPM | OXYGEN SATURATION: 90 % | HEIGHT: 66 IN | TEMPERATURE: 98.2 F | WEIGHT: 250.66 LBS

## 2024-03-30 PROBLEM — I48.91 ATRIAL FIBRILLATION WITH RAPID VENTRICULAR RESPONSE (MULTI): Status: RESOLVED | Noted: 2024-03-28 | Resolved: 2024-03-30

## 2024-03-30 LAB
GLUCOSE BLD MANUAL STRIP-MCNC: 186 MG/DL (ref 74–99)
GLUCOSE BLD MANUAL STRIP-MCNC: 187 MG/DL (ref 74–99)

## 2024-03-30 PROCEDURE — 99238 HOSP IP/OBS DSCHRG MGMT 30/<: CPT | Performed by: FAMILY MEDICINE

## 2024-03-30 PROCEDURE — 2500000002 HC RX 250 W HCPCS SELF ADMINISTERED DRUGS (ALT 637 FOR MEDICARE OP, ALT 636 FOR OP/ED): Performed by: FAMILY MEDICINE

## 2024-03-30 PROCEDURE — 96366 THER/PROPH/DIAG IV INF ADDON: CPT

## 2024-03-30 PROCEDURE — 2500000001 HC RX 250 WO HCPCS SELF ADMINISTERED DRUGS (ALT 637 FOR MEDICARE OP): Performed by: NURSE PRACTITIONER

## 2024-03-30 PROCEDURE — 2500000002 HC RX 250 W HCPCS SELF ADMINISTERED DRUGS (ALT 637 FOR MEDICARE OP, ALT 636 FOR OP/ED): Mod: MUE

## 2024-03-30 PROCEDURE — 2500000001 HC RX 250 WO HCPCS SELF ADMINISTERED DRUGS (ALT 637 FOR MEDICARE OP): Performed by: FAMILY MEDICINE

## 2024-03-30 PROCEDURE — 82947 ASSAY GLUCOSE BLOOD QUANT: CPT

## 2024-03-30 PROCEDURE — 2500000004 HC RX 250 GENERAL PHARMACY W/ HCPCS (ALT 636 FOR OP/ED): Performed by: NURSE PRACTITIONER

## 2024-03-30 PROCEDURE — G0378 HOSPITAL OBSERVATION PER HR: HCPCS

## 2024-03-30 PROCEDURE — 96365 THER/PROPH/DIAG IV INF INIT: CPT | Mod: 59

## 2024-03-30 RX ORDER — METOPROLOL SUCCINATE 100 MG/1
100 TABLET, EXTENDED RELEASE ORAL 2 TIMES DAILY
Qty: 60 TABLET | Refills: 0 | Status: SHIPPED | OUTPATIENT
Start: 2024-03-30 | End: 2024-04-29 | Stop reason: SDUPTHER

## 2024-03-30 RX ORDER — AMIODARONE HYDROCHLORIDE 200 MG/1
400 TABLET ORAL 2 TIMES DAILY
Status: DISCONTINUED | OUTPATIENT
Start: 2024-03-30 | End: 2024-03-30

## 2024-03-30 RX ORDER — AMIODARONE HYDROCHLORIDE 200 MG/1
400 TABLET ORAL DAILY
Status: DISCONTINUED | OUTPATIENT
Start: 2024-04-02 | End: 2024-03-30 | Stop reason: HOSPADM

## 2024-03-30 RX ORDER — AMIODARONE HYDROCHLORIDE 200 MG/1
400 TABLET ORAL DAILY
Status: DISCONTINUED | OUTPATIENT
Start: 2024-04-02 | End: 2024-03-30

## 2024-03-30 RX ORDER — AMIODARONE HYDROCHLORIDE 200 MG/1
400 TABLET ORAL DAILY
Qty: 60 TABLET | Refills: 0 | Status: SHIPPED | OUTPATIENT
Start: 2024-03-30 | End: 2024-05-01 | Stop reason: SDUPTHER

## 2024-03-30 RX ORDER — AMIODARONE HYDROCHLORIDE 200 MG/1
400 TABLET ORAL 2 TIMES DAILY
Status: DISCONTINUED | OUTPATIENT
Start: 2024-03-30 | End: 2024-03-30 | Stop reason: HOSPADM

## 2024-03-30 RX ADMIN — INSULIN LISPRO 2 UNITS: 100 INJECTION, SOLUTION INTRAVENOUS; SUBCUTANEOUS at 08:59

## 2024-03-30 RX ADMIN — APIXABAN 5 MG: 5 TABLET, FILM COATED ORAL at 08:59

## 2024-03-30 RX ADMIN — LISINOPRIL 20 MG: 20 TABLET ORAL at 08:55

## 2024-03-30 RX ADMIN — INSULIN LISPRO 2 UNITS: 100 INJECTION, SOLUTION INTRAVENOUS; SUBCUTANEOUS at 12:04

## 2024-03-30 RX ADMIN — METOPROLOL SUCCINATE 100 MG: 50 TABLET, EXTENDED RELEASE ORAL at 08:55

## 2024-03-30 RX ADMIN — AMIODARONE HYDROCHLORIDE 400 MG: 200 TABLET ORAL at 11:25

## 2024-03-30 RX ADMIN — AMIODARONE HYDROCHLORIDE 0.5 MG/MIN: 1.8 INJECTION, SOLUTION INTRAVENOUS at 06:44

## 2024-03-30 RX ADMIN — HYDROCHLOROTHIAZIDE 25 MG: 25 TABLET ORAL at 08:55

## 2024-03-30 ASSESSMENT — PAIN SCALES - GENERAL
PAINLEVEL_OUTOF10: 0 - NO PAIN
PAINLEVEL_OUTOF10: 0 - NO PAIN

## 2024-03-30 ASSESSMENT — COGNITIVE AND FUNCTIONAL STATUS - GENERAL
DAILY ACTIVITIY SCORE: 24
MOBILITY SCORE: 24

## 2024-03-30 ASSESSMENT — PAIN - FUNCTIONAL ASSESSMENT: PAIN_FUNCTIONAL_ASSESSMENT: 0-10

## 2024-03-30 NOTE — DISCHARGE SUMMARY
Discharge Diagnosis  Atrial fibrillation with rapid ventricular response, elevated troponin due to demand ischemia due to atrial fibrillation, type 2 diabetes mellitus    Issues Requiring Follow-Up  Follow-up with cardiology to consider cardioversion and possible ablation if needed    Discharge Meds     Your medication list        START taking these medications        Instructions Last Dose Given Next Dose Due   amiodarone 200 mg tablet  Commonly known as: Pacerone      Take 2 tablets (400 mg) by mouth once daily.       metoprolol succinate  mg 24 hr tablet  Commonly known as: Toprol-XL      Take 1 tablet (100 mg) by mouth 2 times a day. Do not crush or chew.              CHANGE how you take these medications        Instructions Last Dose Given Next Dose Due   magnesium oxide 400 mg (241.3 mg magnesium) tablet  Commonly known as: Mag-Ox  What changed: when to take this      Take 1 tablet (400 mg) by mouth once daily.       semaglutide 1 mg/dose (4 mg/3 mL) pen injector  Commonly known as: OZEMPIC  What changed: additional instructions      Inject 1 mg under the skin 1 (one) time per week.              CONTINUE taking these medications        Instructions Last Dose Given Next Dose Due   apixaban 5 mg tablet  Commonly known as: Eliquis      Take 1 tablet (5 mg) by mouth 2 times a day.       hydroCHLOROthiazide 25 mg tablet  Commonly known as: HYDRODiuril           lisinopril 20 mg tablet      TAKE ONE TABLET BY MOUTH TWO TIMES A DAY              STOP taking these medications      metoprolol tartrate 100 mg tablet  Commonly known as: Lopressor                  Where to Get Your Medications        These medications were sent to [id number] - [place], [place] - [address] [facility]  [address] [facility] [address]      Phone: [phone]   amiodarone 200 mg tablet  metoprolol succinate  mg 24 hr tablet         Test Results Pending At Discharge  Pending Labs       No current pending labs.             Hospital Course   Debbie Rees is a 74 y.o. female with a medical history of diabetes mellitus, hypertension, anxiety, hypertrophic cardiomyopathy and paroxysmal atrial fibrillation on Eliquis presents to the emergency room complaint of chest pain and was found to have A-fib with RVR.   Patient was started on Cardizem drip in the emergency room and during hospitalization this was transitioned to amiodarone drip then oral amiodarone.  Home medication of Lopressor 100 mg twice daily was transitioned to Toprol- mg twice daily.  Echocardiogram revealed LVEF of 65 to 70%.  Cardiology advised to continue a higher dose of amiodarone at 400 mg daily upon discharge.  Troponin was elevated on admission the patient complained of chest pain on admission.  Cardiology was consulted and review of their records reported the patient had a negative stress test in January 2024.  They suspected the troponin elevation was related to atrial fibrillation with RVR.  They further advised they will consider an outpatient left heart cath.    Pertinent Physical Exam At Time of Discharge  Physical Exam  Gen: lying comfortably in bed, not in acute distress  HEENT: atraumatic, normocephalic  Pulm: normal respiratory effort, clear to auscultation b/l  Cardiac: RRR, no murmurs noted, normal S1/S2  GI: Soft, nontender, BS+  MSK: normal ROM without joint swelling  Extremities: no LE edema, cyanosis  Neuro: AOX3, CN II-XII grossly intact, equal b/l strength, no loss in sensation   Psych: calm and appropriate for situation   Outpatient Follow-Up  No future appointments.      Sarkis Sherman DO

## 2024-03-30 NOTE — PROGRESS NOTES
Debbie Rees is a 74 y.o. female on day 2 of admission presenting with Atrial fibrillation with rapid ventricular response (CMS/HCC).      Subjective     Debbie was laying in bed with daughter at bedside, continues to be in atrial fibrillation. She reports fatigue and shortness of breath with ambulation. She is very eager to go home today. HR with ambulation goes up to 124    Review of systems:  Constitutional: negative for fever, chills, or malaise  Neuro: negative for dizziness, headache, numbness, tingling  ENT: Negative for nasal congestion or sore throat  Resp: positive for shortness of breath, cough, or wheezing  CV: negative for chest pain, palpitations  GI: negative for abd pain, nausea, vomiting or diarrhea  : negative for dysuria, frequency, or urgency  Skin: negative for lesions, wounds, or rash  Musculoskeletal: Negative for weakness, myalgia, or arthralgia  Endocrine: Negative for polyuria or polydipsia         Objective   Constitutional: Well developed, awake/alert/oriented x3, no distress, alert and cooperative  Eyes: PERRL, EOMI, clear sclera  ENMT: mucous membranes moist, no apparent injury, no lesions seen  Head/Neck: Neck supple, no apparent injury, thyroid without mass or tenderness, No JVD, trachea midline, no bruits  Respiratory/Thorax: Patent airways, CTAB, normal breath sounds with good chest expansion, thorax symmetric  Cardiovascular: Regular, rate and rhythm, no murmurs, 2+ equal pulses of the extremities, normal S 1and S 2  Gastrointestinal: Nondistended, soft, non-tender, no rebound tenderness or guarding, no masses palpable, no organomegaly, +BS, no bruits  Musculoskeletal: ROM intact, no joint swelling, normal strength  Extremities: normal extremities, no cyanosis edema, contusions or wounds, no clubbing  Neurological: alert and oriented x3, intact senses, motor, response and reflexes, normal strength  Lymphatic: No significant lymphadenopathy  Psychological: Appropriate mood and  "behavior  Skin: Warm and dry, no lesions, no rashes      Last Recorded Vitals  /71 (BP Location: Right arm, Patient Position: Lying)   Pulse 93   Temp 35.2 °C (95.4 °F) (Temporal)   Resp 16   Ht 1.676 m (5' 6\")   Wt 114 kg (250 lb 10.6 oz)   SpO2 95%   BMI 40.46 kg/m²     Intake/Output last 3 Shifts:  I/O last 3 completed shifts:  In: 1100 (9.7 mL/kg) [P.O.:1100]  Out: - (0 mL/kg)   Weight: 113.7 kg   No intake/output data recorded.    Relevant Results  Scheduled medications  amiodarone, 400 mg, oral, BID   Followed by  [START ON 4/2/2024] amiodarone, 400 mg, oral, Daily  apixaban, 5 mg, oral, BID  hydroCHLOROthiazide, 25 mg, oral, q AM  insulin lispro, 0-10 Units, subcutaneous, TID with meals  lisinopril, 20 mg, oral, BID  metoprolol succinate XL, 100 mg, oral, BID  polyethylene glycol, 17 g, oral, Daily      Continuous medications     PRN medications  PRN medications: acetaminophen, dextrose, glucagon, melatonin, traZODone    Results for orders placed or performed during the hospital encounter of 03/28/24 (from the past 24 hour(s))   POCT GLUCOSE   Result Value Ref Range    POCT Glucose 136 (H) 74 - 99 mg/dL   POCT GLUCOSE   Result Value Ref Range    POCT Glucose 190 (H) 74 - 99 mg/dL   POCT GLUCOSE   Result Value Ref Range    POCT Glucose 186 (H) 74 - 99 mg/dL   POCT GLUCOSE   Result Value Ref Range    POCT Glucose 187 (H) 74 - 99 mg/dL       Transthoracic Echo (TTE) Complete   Final Result      XR chest 1 view   Final Result   No focal infiltrate or pneumothorax is identified.        MACRO:   None.        Signed by: Jace Weinstein 3/28/2024 11:56 AM   Dictation workstation:   [id number]          Transthoracic Echo (TTE) Complete    Result Date: 3/29/2024   [facility], [address] [address]               Tel [phone] and Fax [phone] TRANSTHORACIC ECHOCARDIOGRAM REPORT  Patient Name:      VIVIAN JACOBSEN BRYJOSÉ LUIS      Reading Physician:    [id number] Dania Gutierrez             "                                                   MD Study Date:        3/29/2024            Ordering Provider:    [id number] RANULFO MELGOZA MRN/PID:           [id number]             Fellow: Accession#:        [id number]         Nurse:                Nina Dickerson RN Date of Birth/Age: 1950 / 74 years  Sonographer:          Janneth Collins                                                               RDCS Gender:            F                    Additional Staff: Height:            167.64 cm            Admit Date:           3/28/2024 Weight:            113.40 kg            Admission Status:     Inpatient -                                                               Routine BSA / BMI:         2.20 m2 / 40.35      Encounter#:           [id number]                    kg/m2                                         Department Location:  [facility]                                                               Invasive Blood Pressure: 128 /85 mmHg Study Type:    TRANSTHORACIC ECHO (TTE) COMPLETE Diagnosis/ICD: Unspecified atrial fibrillation-I48.91 Indication:    Atrial Fibrillation CPT Code:      Echo Complete w Full [id number] Patient History: Diabetes:          Yes Pertinent History: HTN and A-Fib. HCM. Study Detail: The following Echo studies were performed: 2D, M-Mode, Doppler and               color flow. Technically challenging study due to poor acoustic               windows. Definity used as a contrast agent for endocardial border               definition. Total contrast used for this procedure was 1 mL via IV               push.  PHYSICIAN INTERPRETATION: Left Ventricle: The left ventricular systolic function is hyperdynamic, with an estimated ejection fraction of 65-70%. There are no regional wall motion abnormalities. The left ventricular cavity size is normal. Left ventricular diastolic filling was indeterminate. Left Atrium: The left atrium  is mildly dilated. Right Ventricle: The right ventricle is normal in size. There is normal right ventricular global systolic function. Right Atrium: The right atrium is normal in size. Aortic Valve: The aortic valve is trileaflet. There is no evidence of aortic valve regurgitation. The peak instantaneous gradient of the aortic valve is 8.3 mmHg. The mean gradient of the aortic valve is 4.9 mmHg. Mitral Valve: The mitral valve is normal in structure. There is trace to mild mitral valve regurgitation. Tricuspid Valve: The tricuspid valve is structurally normal. There is trace to mild tricuspid regurgitation. Pulmonic Valve: The pulmonic valve is not well visualized. The pulmonic valve regurgitation was not well visualized. Pericardium: There is no pericardial effusion noted. Aorta: The aortic root is normal. Pulmonary Artery: The tricuspid regurgitant velocity is 2.25 m/s, and with an estimated right atrial pressure of 3 mmHg, the estimated pulmonary artery pressure is normal with the RVSP at 23.3 mmHg.  CONCLUSIONS:  1. Left ventricular systolic function is hyperdynamic with a 65-70% estimated ejection fraction. QUANTITATIVE DATA SUMMARY: 2D MEASUREMENTS:                           Normal Ranges: IVSd:          1.84 cm    (0.6-1.1cm) LVPWd:         1.09 cm    (0.6-1.1cm) LVIDd:         3.98 cm    (3.9-5.9cm) LVIDs:         2.50 cm LV Mass Index: 101.3 g/m2 LV % FS        37.1 % LA VOLUME:                              Normal Ranges: LA Vol A4C:       52.5 ml    (22+/-6mL/m2) LA Vol A2C:       53.8 ml LA Vol BP:        53.9 ml LA Vol Index A4C: 23.9 ml/m2 LA Vol Index A2C: 24.5 ml/m2 LA Vol Index BP:  24.5 ml/m2 LA Volume Index:  24.5 ml/m2 LA Vol A4C:       51.0 ml LA Vol A2C:       50.8 ml RA VOLUME BY A/L METHOD:                       Normal Ranges: RA Area A4C: 13.6 cm2 M-MODE MEASUREMENTS:                  Normal Ranges: Ao Root: 2.90 cm (2.0-3.7cm) LAs:     4.17 cm (2.7-4.0cm) LV SYSTOLIC FUNCTION BY 2D PLANIMETRY  (MOD):                     Normal Ranges: EF-A4C View: 68.9 % (>=55%) EF-A2C View: 67.6 % EF-Biplane:  68.5 % LV DIASTOLIC FUNCTION:                     Normal Ranges: MV Peak E: 0.83 m/s (0.7-1.2 m/s) MITRAL VALVE:                 Normal Ranges: MV DT: 197 msec (150-240msec) AORTIC VALVE:                                   Normal Ranges: AoV Vmax:                1.44 m/s (<=1.7m/s) AoV Peak P.3 mmHg (<20mmHg) AoV Mean P.9 mmHg (1.7-11.5mmHg) LVOT Max Salazar:            1.35 m/s (<=1.1m/s) AoV VTI:                 27.74 cm (18-25cm) LVOT VTI:                21.49 cm LVOT Diameter:           1.98 cm  (1.8-2.4cm) AoV Area, VTI:           2.38 cm2 (2.5-5.5cm2) AoV Area,Vmax:           2.90 cm2 (2.5-4.5cm2) AoV Dimensionless Index: 0.77 TRICUSPID VALVE/RVSP:                             Normal Ranges: Peak TR Velocity: 2.25 m/s RV Syst Pressure: 23.3 mmHg (< 30mmHg) PULMONIC VALVE:                      Normal Ranges: PV Max Salazar: 1.2 m/s  (0.6-0.9m/s) PV Max P.2 mmHg  [id number] Dania Gutierrez MD Electronically signed on 3/29/2024 at 11:55:50 AM  ** Final **           Assessment/Plan   Principal Problem:    Atrial fibrillation with rapid ventricular response (CMS/HCC)      Echocardiogram from 2023: vigorous LV systolic function, asymmetric moderate septal hypertrophy, mod MR       Paroxysmal atrial fibrillation with RVR  -EKG reviewed, AF RVR  -Started on Diltiazem gtt  -Rate improved, still AF occasionally up to 100's  -Cont eliquis  -Changed Diltiazem gtt to Amiodarone gtt->cont another 24 hours then change to PO  - continue amiodarone 400 mg once daily when discharged  -Changed lopressor to Toprol XL 100mg BID  -TSH WNL  -Echo as above  -Monitor on tele  -Plan for cardioversion outpatient   -Refer for ablation outpatient     2. Elevated troponin-Non MI elevation 2/2 AF RVR  -Does report CP and SOB but improved with rate control  -Troponin 18, 23  -CXR negative  -I reviewed the EKG, no  acute changes, ST elevation, or depression  -Stress test in Jan 2024 negative for ischemia  -lipid panel reviewed  -Cont metoprolol  -Advise outpt [facility]     3. Hypertension  -stable  -2gm na diet  -cont meds  -monitor     4. Diabetes Mellitus type 2  -ISS  -Accuchecks  -hgb A1C       LEAH Ang-CNP

## 2024-03-30 NOTE — CARE PLAN
The patient's goals for the shift include  pt will convert to NSR and dc to home     The clinical goals for the shift include Pt will maintain HR within normal limits.    Pt dc to home, instructions reivewed with the pt ans her daughter

## 2024-03-30 NOTE — CARE PLAN
The patient's goals for the shift include  get some rest throughout the night.    The clinical goals for the shift include Pt will maintain HR within normal limits.

## 2024-04-01 LAB
Q ONSET: 221 MS
Q ONSET: 222 MS
QRS COUNT: 15 BEATS
QRS COUNT: 19 BEATS
QRS DURATION: 76 MS
QRS DURATION: 80 MS
QT INTERVAL: 288 MS
QT INTERVAL: 368 MS
QTC CALCULATION(BAZETT): 410 MS
QTC CALCULATION(BAZETT): 452 MS
QTC FREDERICIA: 364 MS
QTC FREDERICIA: 423 MS
R AXIS: -14 DEGREES
R AXIS: -5 DEGREES
T AXIS: 58 DEGREES
T AXIS: 69 DEGREES
T OFFSET: 366 MS
T OFFSET: 405 MS
VENTRICULAR RATE: 122 BPM
VENTRICULAR RATE: 91 BPM

## 2024-04-02 ENCOUNTER — PATIENT OUTREACH (OUTPATIENT)
Dept: PRIMARY CARE | Facility: CLINIC | Age: 74
End: 2024-04-02
Payer: MEDICARE

## 2024-04-02 DIAGNOSIS — I48.91 ATRIAL FIBRILLATION WITH RVR (MULTI): ICD-10-CM

## 2024-04-04 ENCOUNTER — TELEPHONE (OUTPATIENT)
Dept: PRIMARY CARE | Facility: CLINIC | Age: 74
End: 2024-04-04
Payer: MEDICARE

## 2024-04-04 NOTE — TELEPHONE ENCOUNTER
----- Message from Merary Peralta MA sent at 4/2/2024  4:16 PM EDT -----  Regarding: FW: Hospital FU    ----- Message -----  From: Debbie Bhardwaj RN  Sent: 4/2/2024   4:10 PM EDT  To: [facility] Clinical Support Staff  Subject: Hospital FU                                      Can you please contact pt to schedule hospital  follow up within 14 days of discharge? Patient states blood sugars are elevated and she needs FU for DM 2.    Discharge Facility: [facility]  Discharge Diagnosis: Atrial fibrillation with rapid ventricular response, elevated troponin due to demand ischemia due to atrial fibrillation, Diabetes mellitus type II  Admission Date: 3/28/2024  Discharge Date: 3/30/2024    Thank you!

## 2024-04-11 ENCOUNTER — OFFICE VISIT (OUTPATIENT)
Dept: PRIMARY CARE | Facility: CLINIC | Age: 74
End: 2024-04-11
Payer: MEDICARE

## 2024-04-11 VITALS
HEART RATE: 66 BPM | DIASTOLIC BLOOD PRESSURE: 81 MMHG | HEIGHT: 66 IN | WEIGHT: 247 LBS | SYSTOLIC BLOOD PRESSURE: 124 MMHG | BODY MASS INDEX: 39.7 KG/M2 | OXYGEN SATURATION: 98 %

## 2024-04-11 DIAGNOSIS — E11.9 TYPE 2 DIABETES MELLITUS WITHOUT COMPLICATION, WITHOUT LONG-TERM CURRENT USE OF INSULIN (MULTI): Primary | ICD-10-CM

## 2024-04-11 DIAGNOSIS — R59.0 INGUINAL LYMPHADENOPATHY: ICD-10-CM

## 2024-04-11 DIAGNOSIS — Z09 HOSPITAL DISCHARGE FOLLOW-UP: ICD-10-CM

## 2024-04-11 DIAGNOSIS — I42.2 HYPERTROPHIC CARDIOMYOPATHY (MULTI): ICD-10-CM

## 2024-04-11 DIAGNOSIS — I48.91 ATRIAL FIBRILLATION WITH RAPID VENTRICULAR RESPONSE (MULTI): ICD-10-CM

## 2024-04-11 LAB — POC HEMOGLOBIN A1C: 7.7 % (ref 4.2–6.5)

## 2024-04-11 PROCEDURE — 1159F MED LIST DOCD IN RCRD: CPT

## 2024-04-11 PROCEDURE — 3079F DIAST BP 80-89 MM HG: CPT

## 2024-04-11 PROCEDURE — 1160F RVW MEDS BY RX/DR IN RCRD: CPT

## 2024-04-11 PROCEDURE — 83036 HEMOGLOBIN GLYCOSYLATED A1C: CPT

## 2024-04-11 PROCEDURE — 1111F DSCHRG MED/CURRENT MED MERGE: CPT

## 2024-04-11 PROCEDURE — 99495 TRANSJ CARE MGMT MOD F2F 14D: CPT

## 2024-04-11 PROCEDURE — 3074F SYST BP LT 130 MM HG: CPT

## 2024-04-11 PROCEDURE — 1036F TOBACCO NON-USER: CPT

## 2024-04-11 PROCEDURE — 1157F ADVNC CARE PLAN IN RCRD: CPT

## 2024-04-11 PROCEDURE — 3052F HG A1C>EQUAL 8.0%<EQUAL 9.0%: CPT

## 2024-04-11 PROCEDURE — 4010F ACE/ARB THERAPY RXD/TAKEN: CPT

## 2024-04-11 ASSESSMENT — PATIENT HEALTH QUESTIONNAIRE - PHQ9
SUM OF ALL RESPONSES TO PHQ9 QUESTIONS 1 AND 2: 0
2. FEELING DOWN, DEPRESSED OR HOPELESS: NOT AT ALL
1. LITTLE INTEREST OR PLEASURE IN DOING THINGS: NOT AT ALL

## 2024-04-11 NOTE — PROGRESS NOTES
Subjective   Patient ID: Debbie Rees is a 74 y.o. female who presents for Hospital Follow-up (In [facility] for fast heart rate, per pt heart rate is better.).  HPI  Debbie presents for hospital follow up after being admitted 3/28/24-3/30/24 for atrial fibrillation with RVR  medical history of diabetes mellitus, hypertension, anxiety, hypertrophic cardiomyopathy and paroxysmal atrial fibrillation on Eliquis     She states she is not feeling bad  Heart rate is around 60   Supposed to call a cardioversion appointment at Dr Rodriguez's office - waiting for an appointment  She is taking amiodarone 400 mg   Still taking eliquis   Plan for cardioversion outpatient     She denies lightheadedness, dizziness, SOB, chest pain, headache    Past Surgical History:   Procedure Laterality Date     SECTION, CLASSIC  2016     Section    CHOLECYSTECTOMY  2016    Cholecystectomy    OTHER SURGICAL HISTORY  2016    Sigmoidoscopy (Fiberoptic, Therapeutic )    OTHER SURGICAL HISTORY  2022    Lower back surgery    OTHER SURGICAL HISTORY  2021    Carpal tunnel surgery    TOTAL KNEE ARTHROPLASTY  2016    Knee Replacement      Past Medical History:   Diagnosis Date    Abdominal pain of multiple sites 2023    Acute sinusitis 2023    Acute upper respiratory infection, unspecified 2017    Acute URI    Anxiety disorder, unspecified     Anxiety    Biceps tendinitis of left upper extremity 2023    Burning with urination 2023    Cervical pain 2023    Cervicalgia     Cervical pain (neck)    Encounter for screening for other viral diseases 2017    Need for hepatitis C screening test    Hand numbness 2023    Morbid (severe) obesity due to excess calories (Multi)     Morbid obesity    Pain in left knee     Acute pain of left knee    Paroxysmal SVT (supraventricular tachycardia) (CMS-HCC) 2023    Personal history of other diseases of the circulatory system      History of hypertension    Personal history of other diseases of the musculoskeletal system and connective tissue     History of arthritis    Personal history of other drug therapy 11/21/2019    History of influenza vaccination    Personal history of other endocrine, nutritional and metabolic disease     History of hyperlipidemia    Personal history of other endocrine, nutritional and metabolic disease     History of diabetes mellitus    Personal history of other specified conditions 04/20/2017    History of dysuria    Pyogenic granuloma of skin and subcutaneous tissue 12/26/2023    Streptococcal pharyngitis 03/14/2017    Strep sore throat    Thoracic spine pain 12/26/2023    Unspecified abdominal hernia without obstruction or gangrene     Abdominal hernia without obstruction and without gangrene    Unspecified asthma, uncomplicated ([facility]) 04/20/2017    Asthmatic bronchitis    Urinary tract infection, site not specified 04/20/2017    Acute UTI     Social History     Tobacco Use    Smoking status: Never    Smokeless tobacco: Never   Vaping Use    Vaping status: Never Used   Substance Use Topics    Alcohol use: Never    Drug use: Never        Review of Systems  10 point review of systems performed and is negative except as noted in the HPI.      Current Outpatient Medications:     amiodarone (Pacerone) 200 mg tablet, Take 2 tablets (400 mg) by mouth once daily., Disp: 60 tablet, Rfl: 0    apixaban (Eliquis) 5 mg tablet, Take 1 tablet (5 mg) by mouth 2 times a day., Disp: 180 tablet, Rfl: 1    hydroCHLOROthiazide (HYDRODiuril) 25 mg tablet, Take 1 tablet (25 mg) by mouth once daily in the morning., Disp: , Rfl:     lisinopril 20 mg tablet, TAKE ONE TABLET BY MOUTH TWO TIMES A DAY, Disp: 180 tablet, Rfl: 0    magnesium oxide (Mag-Ox) 400 mg (241.3 mg magnesium) tablet, Take 1 tablet (400 mg) by mouth once daily., Disp: 30 tablet, Rfl: 11    metoprolol succinate XL (Toprol-XL) 100 mg 24 hr tablet, Take 1 tablet  "(100 mg) by mouth 2 times a day. Do not crush or chew., Disp: 60 tablet, Rfl: 0    semaglutide (OZEMPIC) 1 mg/dose (4 mg/3 mL) pen injector, Inject 1 mg under the skin 1 (one) time per week., Disp: 9 mL, Rfl: 1     Objective   /81   Pulse 66   Ht 1.676 m (5' 6\")   Wt 112 kg (247 lb)   SpO2 98%   BMI 39.87 kg/m²     Physical Exam  Constitutional:       General: She is not in acute distress.     Appearance: Normal appearance. She is not ill-appearing or toxic-appearing.   HENT:      Head: Normocephalic and atraumatic.      Right Ear: Tympanic membrane, ear canal and external ear normal.      Left Ear: Tympanic membrane, ear canal and external ear normal.      Nose: Nose normal. No congestion or rhinorrhea.      Mouth/Throat:      Mouth: Mucous membranes are moist.      Pharynx: Oropharynx is clear. No oropharyngeal exudate or posterior oropharyngeal erythema.   Eyes:      Conjunctiva/sclera: Conjunctivae normal.      Pupils: Pupils are equal, round, and reactive to light.   Neck:      Vascular: No carotid bruit.   Cardiovascular:      Rate and Rhythm: Normal rate. Rhythm irregular.      Pulses: Normal pulses.      Heart sounds: Normal heart sounds. No murmur heard.  Pulmonary:      Effort: Pulmonary effort is normal.      Breath sounds: Normal breath sounds. No wheezing, rhonchi or rales.   Abdominal:      General: Bowel sounds are normal. There is no distension.      Palpations: Abdomen is soft. There is no mass.      Tenderness: There is no abdominal tenderness. There is no guarding or rebound.   Musculoskeletal:         General: Normal range of motion.      Cervical back: Normal range of motion and neck supple. No tenderness.   Lymphadenopathy:      Cervical: No cervical adenopathy.   Skin:     General: Skin is warm and dry.   Neurological:      Mental Status: She is alert and oriented to person, place, and time.   Psychiatric:         Mood and Affect: Mood normal.         Behavior: Behavior normal. "         Assessment/Plan   Problem List Items Addressed This Visit       Hypertrophic cardiomyopathy (Multi)     Other Visit Diagnoses       Type 2 diabetes mellitus without complication, without long-term current use of insulin (Multi)    -  Primary    Relevant Orders    POCT glycosylated hemoglobin (Hb A1C) manually resulted (Completed)    Inguinal lymphadenopathy        Relevant Orders    CT abdomen pelvis w IV contrast    Hospital discharge follow-up        Atrial fibrillation with rapid ventricular response (Multi)              DMT2  Hgb A1c today 7.7  Discussed increasing ozempic  Follow up in 3 months to check hgb A1c again     Also she had a recent US DVT 12/2023 that showed - prominent b/l inguinal lymph nodes thought to possibly be reactive but CT scan follow-up was recommended - order placed     Atrial fib with RVR   Being followed by cardiology - they recommend cardiovesion outpatient and ablation   Continue amiodarone continue eliquis    Elevated troponin-non MI elevation 2/2 AF RVR  Cardiology following - recommend patient consider outpatient L heart cath    Hypertrophic Cardiomyopathy  Echo from 3/29/24 - showed L ventricular systolic function is hyperdynamic with EF of 65-70%  Cardiology following    HTN   Controlled     Discussed at visit any disease processes that were of concern as well as the risks, benefits and instructions on any new medication provided. Patient (and/or caretaker of patient if present) stated all questions were answered, and they voiced understanding of instructions.

## 2024-04-11 NOTE — PATIENT INSTRUCTIONS
Call [phone] to schedule the CT scan of your abdomen and pelvis at [facility]     Call Dr. Rodriguez's office to check in     Increase ozempic   3 month follow up

## 2024-04-24 ENCOUNTER — APPOINTMENT (OUTPATIENT)
Dept: CARDIOLOGY | Facility: CLINIC | Age: 74
End: 2024-04-24
Payer: MEDICARE

## 2024-04-25 DIAGNOSIS — I10 PRIMARY HYPERTENSION: ICD-10-CM

## 2024-04-25 RX ORDER — LISINOPRIL 20 MG/1
TABLET ORAL
Qty: 180 TABLET | Refills: 0 | Status: SHIPPED | OUTPATIENT
Start: 2024-04-25

## 2024-04-29 ENCOUNTER — HOSPITAL ENCOUNTER (OUTPATIENT)
Dept: CARDIOLOGY | Facility: HOSPITAL | Age: 74
Setting detail: OUTPATIENT SURGERY
Discharge: HOME | End: 2024-04-29
Payer: MEDICARE

## 2024-04-29 ENCOUNTER — HOSPITAL ENCOUNTER (OUTPATIENT)
Dept: CARDIOLOGY | Facility: HOSPITAL | Age: 74
Discharge: HOME | End: 2024-04-29
Payer: MEDICARE

## 2024-04-29 VITALS
WEIGHT: 246.91 LBS | HEIGHT: 66 IN | SYSTOLIC BLOOD PRESSURE: 150 MMHG | RESPIRATION RATE: 15 BRPM | HEART RATE: 82 BPM | OXYGEN SATURATION: 98 % | DIASTOLIC BLOOD PRESSURE: 90 MMHG | BODY MASS INDEX: 39.68 KG/M2

## 2024-04-29 DIAGNOSIS — I48.91 ATRIAL FIBRILLATION WITH RAPID VENTRICULAR RESPONSE (MULTI): ICD-10-CM

## 2024-04-29 DIAGNOSIS — I48.0 PAF (PAROXYSMAL ATRIAL FIBRILLATION) (MULTI): ICD-10-CM

## 2024-04-29 LAB — BODY SURFACE AREA: 2.28 M2

## 2024-04-29 PROCEDURE — 93005 ELECTROCARDIOGRAM TRACING: CPT | Mod: 59

## 2024-04-29 PROCEDURE — 92960 CARDIOVERSION ELECTRIC EXT: CPT

## 2024-04-29 RX ORDER — METOPROLOL SUCCINATE 100 MG/1
100 TABLET, EXTENDED RELEASE ORAL 2 TIMES DAILY
Qty: 60 TABLET | Refills: 2 | Status: SHIPPED | OUTPATIENT
Start: 2024-04-29 | End: 2024-05-28 | Stop reason: SDUPTHER

## 2024-04-29 ASSESSMENT — COLUMBIA-SUICIDE SEVERITY RATING SCALE - C-SSRS
2. HAVE YOU ACTUALLY HAD ANY THOUGHTS OF KILLING YOURSELF?: NO
6. HAVE YOU EVER DONE ANYTHING, STARTED TO DO ANYTHING, OR PREPARED TO DO ANYTHING TO END YOUR LIFE?: NO
1. IN THE PAST MONTH, HAVE YOU WISHED YOU WERE DEAD OR WISHED YOU COULD GO TO SLEEP AND NOT WAKE UP?: NO

## 2024-04-29 ASSESSMENT — PAIN - FUNCTIONAL ASSESSMENT: PAIN_FUNCTIONAL_ASSESSMENT: 0-10

## 2024-04-29 NOTE — H&P
History Of Present Illness  Debbie Rees is a 74 y.o. female presenting with AF here for cardioversion.     Past Medical History  Past Medical History:   Diagnosis Date    Abdominal pain of multiple sites 06/22/2023    Acute sinusitis 06/22/2023    Acute upper respiratory infection, unspecified 03/14/2017    Acute URI    Anxiety disorder, unspecified     Anxiety    Biceps tendinitis of left upper extremity 06/22/2023    Burning with urination 06/22/2023    Cervical pain 06/22/2023    Cervicalgia     Cervical pain (neck)    Encounter for screening for other viral diseases 12/14/2017    Need for hepatitis C screening test    Hand numbness 12/26/2023    Morbid (severe) obesity due to excess calories (Multi)     Morbid obesity    Pain in left knee     Acute pain of left knee    Paroxysmal SVT (supraventricular tachycardia) (CMS-HCC) 12/26/2023    Personal history of other diseases of the circulatory system     History of hypertension    Personal history of other diseases of the musculoskeletal system and connective tissue     History of arthritis    Personal history of other drug therapy 11/21/2019    History of influenza vaccination    Personal history of other endocrine, nutritional and metabolic disease     History of hyperlipidemia    Personal history of other endocrine, nutritional and metabolic disease     History of diabetes mellitus    Personal history of other specified conditions 04/20/2017    History of dysuria    Pyogenic granuloma of skin and subcutaneous tissue 12/26/2023    Streptococcal pharyngitis 03/14/2017    Strep sore throat    Thoracic spine pain 12/26/2023    Unspecified abdominal hernia without obstruction or gangrene     Abdominal hernia without obstruction and without gangrene    Unspecified asthma, uncomplicated ([facility]) 04/20/2017    Asthmatic bronchitis    Urinary tract infection, site not specified 04/20/2017    Acute UTI       Surgical History  Past Surgical History:   Procedure  Laterality Date     SECTION, CLASSIC  2016     Section    CHOLECYSTECTOMY  2016    Cholecystectomy    OTHER SURGICAL HISTORY  2016    Sigmoidoscopy (Fiberoptic, Therapeutic )    OTHER SURGICAL HISTORY  2022    Lower back surgery    OTHER SURGICAL HISTORY  2021    Carpal tunnel surgery    TOTAL KNEE ARTHROPLASTY  2016    Knee Replacement        Social History  She reports that she has never smoked. She has never used smokeless tobacco. She reports that she does not drink alcohol and does not use drugs.    Family History  No family history on file.     Allergies  Procaine and Empagliflozin    Review of Systems   Comprehensive 10-point review of systems negative otherwise as noted above in HPI    Physical Exam   Constitutional: Well developed, awake/alert/oriented x3, no distress, alert and cooperative  Eyes: PERRL, EOMI, clear sclera  ENMT: mucous membranes moist, no apparent injury, no lesions seen  Head/Neck: Neck supple, no apparent injury, thyroid without mass or tenderness, No JVD, trachea midline, no bruits  Respiratory/Thorax: Patent airways, CTAB, normal breath sounds with good chest expansion, thorax symmetric  Cardiovascular: Regular, rate and rhythm, no murmurs, 2+ equal pulses of the extremities, normal S 1and S 2  Gastrointestinal: Nondistended, soft, non-tender, no rebound tenderness or guarding, no masses palpable, no organomegaly, +BS, no bruits  Musculoskeletal: ROM intact, no joint swelling, normal strength  Extremities: normal extremities, no cyanosis edema, contusions or wounds, no clubbing  Neurological: alert and oriented x3, intact senses, motor, response and reflexes, normal strength  Lymphatic: No significant lymphadenopathy  Psychological: Appropriate mood and behavior  Skin: Warm and dry, no lesions, no rashes    Last Recorded Vitals  There were no vitals taken for this visit.    Relevant Results        See office notes for details      Assessment/Plan   Active Problems:  There are no active Hospital Problems.      Consent obtained for cardioversion       I spent 15 minutes in the professional and overall care of this patient.      Michael Lozano MD

## 2024-04-29 NOTE — SIGNIFICANT EVENT
Dr Rodriguez at bedside talking about future plans for possible ablation.. Pt currently in NSR.   Decrease functional mobility.

## 2024-05-01 DIAGNOSIS — I48.91 ATRIAL FIBRILLATION WITH RAPID VENTRICULAR RESPONSE (MULTI): ICD-10-CM

## 2024-05-01 RX ORDER — AMIODARONE HYDROCHLORIDE 200 MG/1
400 TABLET ORAL DAILY
Qty: 60 TABLET | Refills: 0 | Status: SHIPPED | OUTPATIENT
Start: 2024-05-01

## 2024-05-08 ENCOUNTER — OFFICE VISIT (OUTPATIENT)
Dept: CARDIOLOGY | Facility: CLINIC | Age: 74
End: 2024-05-08
Payer: MEDICARE

## 2024-05-08 VITALS
SYSTOLIC BLOOD PRESSURE: 108 MMHG | HEART RATE: 81 BPM | WEIGHT: 245 LBS | HEIGHT: 66 IN | OXYGEN SATURATION: 94 % | BODY MASS INDEX: 39.37 KG/M2 | DIASTOLIC BLOOD PRESSURE: 68 MMHG

## 2024-05-08 DIAGNOSIS — Z01.818 PREOP TESTING: ICD-10-CM

## 2024-05-08 DIAGNOSIS — I48.0 PAF (PAROXYSMAL ATRIAL FIBRILLATION) (MULTI): ICD-10-CM

## 2024-05-08 PROBLEM — L97.219: Status: ACTIVE | Noted: 2024-05-08

## 2024-05-08 PROBLEM — R07.9 CHEST PAIN: Status: ACTIVE | Noted: 2024-05-08

## 2024-05-08 PROBLEM — R06.02 SHORTNESS OF BREATH: Status: ACTIVE | Noted: 2024-05-08

## 2024-05-08 PROBLEM — I48.19 PERSISTENT ATRIAL FIBRILLATION (MULTI): Status: ACTIVE | Noted: 2024-05-08

## 2024-05-08 PROBLEM — R00.2 PALPITATIONS: Status: ACTIVE | Noted: 2024-05-08

## 2024-05-08 PROBLEM — M79.89 SWELLING OF LOWER EXTREMITY: Status: ACTIVE | Noted: 2024-05-08

## 2024-05-08 PROCEDURE — 99205 OFFICE O/P NEW HI 60 MIN: CPT | Performed by: INTERNAL MEDICINE

## 2024-05-08 PROCEDURE — 3074F SYST BP LT 130 MM HG: CPT | Performed by: INTERNAL MEDICINE

## 2024-05-08 PROCEDURE — 1160F RVW MEDS BY RX/DR IN RCRD: CPT | Performed by: INTERNAL MEDICINE

## 2024-05-08 PROCEDURE — 4010F ACE/ARB THERAPY RXD/TAKEN: CPT | Performed by: INTERNAL MEDICINE

## 2024-05-08 PROCEDURE — 3052F HG A1C>EQUAL 8.0%<EQUAL 9.0%: CPT | Performed by: INTERNAL MEDICINE

## 2024-05-08 PROCEDURE — 3078F DIAST BP <80 MM HG: CPT | Performed by: INTERNAL MEDICINE

## 2024-05-08 PROCEDURE — 1159F MED LIST DOCD IN RCRD: CPT | Performed by: INTERNAL MEDICINE

## 2024-05-08 PROCEDURE — 1157F ADVNC CARE PLAN IN RCRD: CPT | Performed by: INTERNAL MEDICINE

## 2024-05-08 PROCEDURE — 1126F AMNT PAIN NOTED NONE PRSNT: CPT | Performed by: INTERNAL MEDICINE

## 2024-05-08 ASSESSMENT — ENCOUNTER SYMPTOMS
LOSS OF SENSATION IN FEET: 0
OCCASIONAL FEELINGS OF UNSTEADINESS: 0
DEPRESSION: 0

## 2024-05-08 ASSESSMENT — PATIENT HEALTH QUESTIONNAIRE - PHQ9
SUM OF ALL RESPONSES TO PHQ9 QUESTIONS 1 AND 2: 0
1. LITTLE INTEREST OR PLEASURE IN DOING THINGS: NOT AT ALL
2. FEELING DOWN, DEPRESSED OR HOPELESS: NOT AT ALL

## 2024-05-08 ASSESSMENT — COLUMBIA-SUICIDE SEVERITY RATING SCALE - C-SSRS
1. IN THE PAST MONTH, HAVE YOU WISHED YOU WERE DEAD OR WISHED YOU COULD GO TO SLEEP AND NOT WAKE UP?: NO
6. HAVE YOU EVER DONE ANYTHING, STARTED TO DO ANYTHING, OR PREPARED TO DO ANYTHING TO END YOUR LIFE?: NO
2. HAVE YOU ACTUALLY HAD ANY THOUGHTS OF KILLING YOURSELF?: NO

## 2024-05-08 ASSESSMENT — PAIN SCALES - GENERAL: PAINLEVEL: 0-NO PAIN

## 2024-05-08 NOTE — PROGRESS NOTES
I had the pleasure seeing Debbie Rees     Chief Complaint   Patient presents with    Cardiomyopathy    Atrial Fibrillation     OUTPATIENT CONSULTATION: Cardiac Electrophysiology  DOS: May 8, 2024  REASON:  Persistent AF and HOCM  REFERRING:  Michael Lozano MD          Current Outpatient Medications   Medication Instructions    amiodarone (PACERONE) 400 mg, oral, Daily    apixaban (ELIQUIS) 5 mg, oral, 2 times daily    hydroCHLOROthiazide (HYDRODiuril) 25 mg tablet Take 1 tablet (25 mg) by mouth once daily in the morning.    lisinopril 20 mg tablet TAKE ONE TABLET BY MOUTH TWO TIMES A DAY    magnesium oxide (MAG-OX) 400 mg, oral, Daily    metoprolol succinate XL (TOPROL-XL) 100 mg, oral, 2 times daily, Do not crush or chew.    semaglutide (OZEMPIC) 1 mg, subcutaneous, Once Weekly      Debbie Rees is a 74 y.o. with:     HTN, DM T2, Anxiety  PVD / Venous insufficiency s/p EVLA   Hypertrophic Obstructive Cardiomyopathy  Palpitations / persistent AF - s/p DCCV (4/29/24)       March 2024:  (3/38) Pt presented to [facility] with chest pain and elevated HR.  EKG: AF with  bpm.   Pt started on diltiazem drip with HR improvement.  Dilt changed to oral amiodarone.  (3/30) Pt Dc'd home on Amio and Metoprolol.   (4/29/24) s/p DCCV        TESTING    -ECHO/ TTE:  (3/29/24) LVEF 65-70%.    -ECHO/ TTE:  (5/25/22) LVEF >65% with nrl size and function.  LVH, mild LAE.      4/2/24:EKG: A-Fib    01/08/24:  Stress test:  Perfusion Findings:  SPECT stress imaging showed mildly reduced radiotracer uptake in the distal anterior wall and apex.  There was no significant redistribution at rest.  Gated imaging showed normal myocardial thickening suggesting attenuation as the cause of reduced uptake.  LV Function Findings:     LVEF: 56%   Global Function: Normal    LV Volume: Normal    Regional Function: Normal   Summary:     1.  No chest discomfort or ischemic EKG changes with pharmacological stress.   2.  Normal left  ventricular systolic function.   3.  No perfusion evidence of scar, or focal ischemia.   4.  Mild resting systolic and diastolic HTN    12/18/23:  EKG: SR with PAC's; LVH.    06/26/23:  Echocardiogram shows vigorous LV systolic function, asymmetric moderate septal hypertrophy, moderate MR, normal estimated CVP    5/31/23: EKG:  SR with 1 AVB, anterior infarct undetermined    05/26/23:  Carotid ultrasound:  CONCLUSIONS:   Right Internal Carotid Stenosis:   20-49%.  Right Vertebral Flow:   Antegrade.  Left Internal Carotid Stenosis:   20-49%.  Left Vertebral Flow:    Antegrade.  Bilateral Upper Extremity:     Findings consistent with a normal interarm systolic pressure gradient.  Recommend repeat study annually. No significant change since the study of 05/25/2022-[facility]     05/31/22:  Electrocardiogram shows sinus rhythm, no ischemia, 77 beats per minute, left anterior fascicular block, moderate voltage criteria for LVH     05/25/22:  Carotid ultrasound:  CONCLUSIONS:   Right Internal Carotid Stenosis:   20-49%.  Right Vertebral Flow:   Antegrade.  Left Internal Carotid Stenosis:   20-49%.  Left Vertebral Flow:    Antegrade.  Bilateral Upper Extremity:     Findings consistent with a normal interarm systolic pressure gradient.  Recommend repeat study annually. No significant change since the study of 5/25/2021 - [facility].     05/25/22:  Echocardiogram;  CONCLUSIONS:   normal LV size and function, vigorous LV systolic function, no obvious LV wall motion abnormalities, LVH, left atrial enlargement, mild to moderate mitral regurgitation, trace tricuspid regurgitation, normal estimated PASP and normal estimated CVP    11/24/15:  Stress test:  Perfusion Findings:  SPECT stress images show uniform distribution of radiotracer in the entire myocardium. No fixed or reversible perfusion defects to suggest myocardial ischemia or scar are identified. Gated images show normal LV myocardial thickening.       LV Function Findings:     LVEF:  "60%   Global Function: Normal.   LV Volume: Normal   Regional Function: Normal  Summary:   1.  No chest discomfort or ischemic EKG changes with LexiScan (regadenoson) infusion.   2.  Normal left ventricular systolic function.   3.  No perfusion evidence of scar or focal ischemia      Objective   Physical Exam  /68 (Patient Position: Sitting)   Pulse 81   Ht 1.676 m (5' 6\")   Wt 111 kg (245 lb)   SpO2 94%   BMI 39.54 kg/m²     General Appearance:  Alert, cooperative, no distress, appears stated age   Head:  Normocephalic, without obvious abnormality, atraumatic   Eyes:  PERRL, conjunctiva/corneas clear, EOM's intact, fundi benign, both eyes   Ears:  Normal TM's and external ear canals, both ears   Nose: Nares normal, septum midline,mucosa normal, no drainage or sinus tenderness   Throat: Lips, mucosa, and tongue normal; teeth and gums normal   Neck: Supple, symmetrical, trachea midline, no adenopathy;  thyroid: not enlarged, symmetric, no tenderness/mass/nodules; no carotid bruit or JVD   Back:   Symmetric, no curvature, ROM normal, no CVA tenderness   Lungs:   Clear to auscultation bilaterally, respirations unlabored   Breasts:  No masses or tenderness   Heart:  Regular rate and rhythm, S1 and S2 normal, no murmur, rub, or gallop   Abdomen:   Soft, non-tender, bowel sounds active all four quadrants,  no masses, no organomegaly   Pelvic: Deferred   Extremities: Extremities normal, atraumatic, no cyanosis or edema   Pulses: 2+ and symmetric   Skin: Skin color, texture, turgor normal, no rashes or lesions   Lymph nodes: Cervical, supraclavicular, and axillary nodes normal   Neurologic: Normal         /68 (Patient Position: Sitting)   Pulse 81   Ht 1.676 m (5' 6\")   Wt 111 kg (245 lb)   SpO2 94%   BMI 39.54 kg/m²    Assessment/Plan   1.   Hypertension, optimal  2.   Hypertrophic Cardiomyopathy, stable  Normal EF on 3/29/24 echo  NYHA Class I-II, stage A  3.   Paroxysmal Atrial Fibrillation, " rate    84    DMT2  AIC: 8.0, 9/22/23  Ozempic  ADA/low carb/low tyron diet  5.    BMI=43  6 Recent hospitalization, 3/24, A-Fib with RVR, elevated troponin d/t tachycardia,    She is very symptomatic. On Amiodarone, has diarrhea    PLAN: We discussed the AF. She is fairly symptomatic. Fortunately it is paroxysmal atrial fibrillation. She does have side effects on Amiodarone. We discussed the PVI procedure. We discussed the procedure and the risks associated with it. At this time we are proceeding with PVI on July 8th, 2024.

## 2024-05-09 DIAGNOSIS — E11.9 TYPE 2 DIABETES MELLITUS WITHOUT COMPLICATION, WITHOUT LONG-TERM CURRENT USE OF INSULIN (MULTI): ICD-10-CM

## 2024-05-10 ENCOUNTER — HOSPITAL ENCOUNTER (OUTPATIENT)
Dept: RADIOLOGY | Facility: HOSPITAL | Age: 74
Discharge: HOME | End: 2024-05-10
Payer: MEDICARE

## 2024-05-10 DIAGNOSIS — R59.0 INGUINAL LYMPHADENOPATHY: ICD-10-CM

## 2024-05-10 PROCEDURE — 74177 CT ABD & PELVIS W/CONTRAST: CPT | Performed by: RADIOLOGY

## 2024-05-10 PROCEDURE — 74177 CT ABD & PELVIS W/CONTRAST: CPT

## 2024-05-10 PROCEDURE — 2550000001 HC RX 255 CONTRASTS

## 2024-05-10 RX ADMIN — IOHEXOL 75 ML: 350 INJECTION, SOLUTION INTRAVENOUS at 12:10

## 2024-05-14 ENCOUNTER — PATIENT OUTREACH (OUTPATIENT)
Dept: PRIMARY CARE | Facility: CLINIC | Age: 74
End: 2024-05-14
Payer: MEDICARE

## 2024-05-14 NOTE — PROGRESS NOTES
Unable to reach patient for call back after patient's follow up appointment with Annabel Morgan CNP. LVM with call back number for patient to call if needed.

## 2024-05-17 LAB
ATRIAL RATE: 84 BPM
P AXIS: 65 DEGREES
P OFFSET: 179 MS
P ONSET: 112 MS
PR INTERVAL: 226 MS
Q ONSET: 225 MS
QRS COUNT: 14 BEATS
QRS DURATION: 82 MS
QT INTERVAL: 374 MS
QTC CALCULATION(BAZETT): 441 MS
QTC FREDERICIA: 418 MS
R AXIS: -25 DEGREES
T AXIS: 63 DEGREES
T OFFSET: 412 MS
VENTRICULAR RATE: 84 BPM

## 2024-05-22 ENCOUNTER — TELEPHONE (OUTPATIENT)
Dept: PRIMARY CARE | Facility: CLINIC | Age: 74
End: 2024-05-22
Payer: MEDICARE

## 2024-05-22 NOTE — TELEPHONE ENCOUNTER
Rosaline Pérez would like a call with results of her mom's CT please call  [phone] they have been in for a few weeks

## 2024-05-23 NOTE — TELEPHONE ENCOUNTER
I tried to call both Rosaline and Debbie, no answer. Left a message for Debbie to call back for her results.

## 2024-05-28 DIAGNOSIS — I48.91 ATRIAL FIBRILLATION WITH RAPID VENTRICULAR RESPONSE (MULTI): ICD-10-CM

## 2024-05-28 RX ORDER — METOPROLOL SUCCINATE 100 MG/1
100 TABLET, EXTENDED RELEASE ORAL 2 TIMES DAILY
Qty: 60 TABLET | Refills: 2 | Status: SHIPPED | OUTPATIENT
Start: 2024-05-28

## 2024-06-05 ENCOUNTER — PATIENT OUTREACH (OUTPATIENT)
Dept: PRIMARY CARE | Facility: CLINIC | Age: 74
End: 2024-06-05
Payer: MEDICARE

## 2024-06-05 NOTE — PROGRESS NOTES
Outreach made for monthly post discharge follow up. At the time of outreach call, the patient stated she still does not feel like she has returned to baseline. Her heart medicine gave her terrible diarrhea and her cardiologist does not want to consider changing it until after her ablation since it is working to regulate her heart. The patient stated she started taking a natural remedy for the diarrhea which is working. The patient will call back if her symptoms are not improved.

## 2024-06-25 ENCOUNTER — OFFICE VISIT (OUTPATIENT)
Dept: PRIMARY CARE | Facility: CLINIC | Age: 74
End: 2024-06-25
Payer: MEDICARE

## 2024-06-25 VITALS
HEART RATE: 79 BPM | OXYGEN SATURATION: 98 % | TEMPERATURE: 97.9 F | WEIGHT: 248.6 LBS | BODY MASS INDEX: 40.13 KG/M2 | DIASTOLIC BLOOD PRESSURE: 70 MMHG | SYSTOLIC BLOOD PRESSURE: 120 MMHG

## 2024-06-25 DIAGNOSIS — L03.115 CELLULITIS OF RIGHT LOWER EXTREMITY: Primary | ICD-10-CM

## 2024-06-25 PROCEDURE — 1157F ADVNC CARE PLAN IN RCRD: CPT | Performed by: FAMILY MEDICINE

## 2024-06-25 PROCEDURE — 3052F HG A1C>EQUAL 8.0%<EQUAL 9.0%: CPT | Performed by: FAMILY MEDICINE

## 2024-06-25 PROCEDURE — 99213 OFFICE O/P EST LOW 20 MIN: CPT | Performed by: FAMILY MEDICINE

## 2024-06-25 PROCEDURE — 3078F DIAST BP <80 MM HG: CPT | Performed by: FAMILY MEDICINE

## 2024-06-25 PROCEDURE — 3074F SYST BP LT 130 MM HG: CPT | Performed by: FAMILY MEDICINE

## 2024-06-25 PROCEDURE — 4010F ACE/ARB THERAPY RXD/TAKEN: CPT | Performed by: FAMILY MEDICINE

## 2024-06-25 PROCEDURE — 1159F MED LIST DOCD IN RCRD: CPT | Performed by: FAMILY MEDICINE

## 2024-06-25 PROCEDURE — 1160F RVW MEDS BY RX/DR IN RCRD: CPT | Performed by: FAMILY MEDICINE

## 2024-06-25 RX ORDER — DOXYCYCLINE 100 MG/1
100 CAPSULE ORAL 2 TIMES DAILY
Qty: 14 CAPSULE | Refills: 0 | Status: SHIPPED | OUTPATIENT
Start: 2024-06-25 | End: 2024-07-02

## 2024-06-25 NOTE — PATIENT INSTRUCTIONS
Antibiotic -  doxycycline 100 mg twice  a day for 7 days   Take with food       Can take hydrochlorothiazide - 2 pills a day for 3 days to help with the swelling   Increase potassium  in your diet for a few days too       Keep leg elevated when sitting       Apply bacitracin, non stick bandage and gauze wrap - do not tape the skin.     Change daily.       EDUCATION ABOUT TAKING ANTIBIOTICS:     It is very important to complete the entire course of antibiotics as directed.  This helps prevent antibiotic resistant forms of bacteria.     You may want to create a chart, and yun off the doses taken to remember them all.     Common side effects of antibiotics include yeast infections, diarrhea and nausea. Sometimes over-the-counter probiotics (such as eating yogurt or taking acidophilus or Culturelle)  may help prevent the diarrhea and yeast infections caused by antibiotics. If you develop persistent or bloody diarrhea after taking an antibiotic, please contact your provider about the possibility of a serious secondary infection of your colon caused by the antibiotic. Sometimes the nausea from antibiotics can be helped by taking the antibiotic with food unless otherwise specified not to by the pharmacist.     If you develop a rash while on the antibiotic, if could be from the antibiotic, from the illness itself, or could be from a response by some viral infections to the antibiotic. Please discuss this with your provider before assuming that you are allergic to the medication.    If it is determined that you have had an allergic reaction to the antibiotic, please make sure you make note of that for yourself to be sure to never get that antibiotic again as a more serious reaction - called anaphylaxis - may occur.   You should also ask about similar antibiotics that may be dangerous as well.    If you are a woman on birth control, it is important you use a back up form of contraception for the next month to prevent  pregnancy as some antibiotics reduce the effectiveness of birth control. This could result in an unplanned pregnancy.

## 2024-06-25 NOTE — PROGRESS NOTES
Subjective   Patient ID: Debbie Rees is a 74 y.o. female who presents for Leg Pain (R Lower leg ulcer).    HPI     In the heat last week, right leg was very swollen - developed a vesicle on the right lower leg -   Opened it last night -     Very painful today and leg is red.     No fever or chills     Can't take Keflex     (Had the same thing in 2023 - doxy helped)     Review of Systems    Objective   /70   Pulse 79   Temp 36.6 °C (97.9 °F)   Wt 113 kg (248 lb 9.6 oz)   SpO2 98%   BMI 40.13 kg/m²     Physical Exam  Vitals reviewed.   Constitutional:       Appearance: Normal appearance.   HENT:      Head: Normocephalic and atraumatic.   Cardiovascular:      Rate and Rhythm: Normal rate and regular rhythm.   Musculoskeletal:         General: Swelling (2 plus left leg) present.   Skin:     Comments: About 2 - 3 cm superficial area of skin open  -   Lower half of leg is pink and tender to touch  -   No signs of exudate -   Calf non tender    Neurological:      Mental Status: She is alert.         Assessment/Plan   Problem List Items Addressed This Visit    None  Visit Diagnoses         Codes    Cellulitis of right lower extremity    -  Primary L03.115    Relevant Medications    doxycycline (Vibramycin) 100 mg capsule          After I dressed leg with bacitracin, non stick gauze and gauze wrap it started to feel much better.     Discussed the abx and double hydrochlorothiazide a few days

## 2024-06-28 ENCOUNTER — PATIENT OUTREACH (OUTPATIENT)
Dept: PRIMARY CARE | Facility: CLINIC | Age: 74
End: 2024-06-28
Payer: MEDICARE

## 2024-06-28 NOTE — PROGRESS NOTES
Outreach made to wrap up Transitional Care Management (TCM) program. At the time of call, the patient stated she is doing better. She was recently seen in the office for RLE cellulitis which is improving. The patient is scheduled for an ablation on 7/8/2024 and is nervous about the procedure. Provided reassurance to patient. Offered CCM program to the patient for ongoing support. Patient agreeable and referral made.

## 2024-07-05 ENCOUNTER — LAB (OUTPATIENT)
Dept: LAB | Facility: LAB | Age: 74
End: 2024-07-05
Payer: MEDICARE

## 2024-07-05 DIAGNOSIS — J01.01 ACUTE RECURRENT MAXILLARY SINUSITIS: ICD-10-CM

## 2024-07-05 DIAGNOSIS — Z01.818 PREOP TESTING: ICD-10-CM

## 2024-07-05 DIAGNOSIS — I48.0 PAF (PAROXYSMAL ATRIAL FIBRILLATION) (MULTI): ICD-10-CM

## 2024-07-05 LAB
ANION GAP SERPL CALC-SCNC: 12 MMOL/L (ref 10–20)
BUN SERPL-MCNC: 26 MG/DL (ref 6–23)
CALCIUM SERPL-MCNC: 9.8 MG/DL (ref 8.6–10.3)
CHLORIDE SERPL-SCNC: 104 MMOL/L (ref 98–107)
CO2 SERPL-SCNC: 25 MMOL/L (ref 21–32)
CREAT SERPL-MCNC: 1.05 MG/DL (ref 0.5–1.05)
EGFRCR SERPLBLD CKD-EPI 2021: 56 ML/MIN/1.73M*2
ERYTHROCYTE [DISTWIDTH] IN BLOOD BY AUTOMATED COUNT: 12.9 % (ref 11.5–14.5)
GLUCOSE SERPL-MCNC: 190 MG/DL (ref 74–99)
HCT VFR BLD AUTO: 38.8 % (ref 36–46)
HGB BLD-MCNC: 12.6 G/DL (ref 12–16)
MCH RBC QN AUTO: 30.9 PG (ref 26–34)
MCHC RBC AUTO-ENTMCNC: 32.5 G/DL (ref 32–36)
MCV RBC AUTO: 95 FL (ref 80–100)
NRBC BLD-RTO: 0 /100 WBCS (ref 0–0)
PLATELET # BLD AUTO: 276 X10*3/UL (ref 150–450)
POTASSIUM SERPL-SCNC: 4.3 MMOL/L (ref 3.5–5.3)
RBC # BLD AUTO: 4.08 X10*6/UL (ref 4–5.2)
SODIUM SERPL-SCNC: 137 MMOL/L (ref 136–145)
WBC # BLD AUTO: 8.1 X10*3/UL (ref 4.4–11.3)

## 2024-07-05 PROCEDURE — 85027 COMPLETE CBC AUTOMATED: CPT

## 2024-07-05 PROCEDURE — 80048 BASIC METABOLIC PNL TOTAL CA: CPT

## 2024-07-05 PROCEDURE — 36415 COLL VENOUS BLD VENIPUNCTURE: CPT

## 2024-07-08 ENCOUNTER — HOSPITAL ENCOUNTER (OUTPATIENT)
Facility: HOSPITAL | Age: 74
Setting detail: OBSERVATION
Discharge: HOME | End: 2024-07-09
Attending: EMERGENCY MEDICINE | Admitting: INTERNAL MEDICINE
Payer: MEDICARE

## 2024-07-08 ENCOUNTER — APPOINTMENT (OUTPATIENT)
Dept: RADIOLOGY | Facility: HOSPITAL | Age: 74
End: 2024-07-08
Payer: MEDICARE

## 2024-07-08 ENCOUNTER — APPOINTMENT (OUTPATIENT)
Dept: CARDIOLOGY | Facility: HOSPITAL | Age: 74
End: 2024-07-08
Payer: MEDICARE

## 2024-07-08 ENCOUNTER — HOSPITAL ENCOUNTER (OUTPATIENT)
Facility: HOSPITAL | Age: 74
Setting detail: OUTPATIENT SURGERY
Discharge: HOME | End: 2024-07-08
Attending: INTERNAL MEDICINE | Admitting: INTERNAL MEDICINE
Payer: MEDICARE

## 2024-07-08 ENCOUNTER — ANESTHESIA (OUTPATIENT)
Dept: CARDIOLOGY | Facility: HOSPITAL | Age: 74
End: 2024-07-08
Payer: MEDICARE

## 2024-07-08 ENCOUNTER — ANESTHESIA EVENT (OUTPATIENT)
Dept: CARDIOLOGY | Facility: HOSPITAL | Age: 74
End: 2024-07-08
Payer: MEDICARE

## 2024-07-08 VITALS
BODY MASS INDEX: 39.7 KG/M2 | HEIGHT: 66 IN | WEIGHT: 247 LBS | DIASTOLIC BLOOD PRESSURE: 74 MMHG | SYSTOLIC BLOOD PRESSURE: 135 MMHG

## 2024-07-08 DIAGNOSIS — I48.19 PERSISTENT ATRIAL FIBRILLATION (MULTI): ICD-10-CM

## 2024-07-08 DIAGNOSIS — I48.91 ATRIAL FIBRILLATION WITH RVR (MULTI): Primary | ICD-10-CM

## 2024-07-08 LAB
ACT BLD: 247 SEC (ref 82–174)
ACT BLD: 302 SEC (ref 82–174)
ACT BLD: 340 SEC (ref 82–174)
ALBUMIN SERPL BCP-MCNC: 3.7 G/DL (ref 3.4–5)
ALP SERPL-CCNC: 69 U/L (ref 33–136)
ALT SERPL W P-5'-P-CCNC: 34 U/L (ref 7–45)
ANION GAP SERPL CALC-SCNC: 15 MMOL/L (ref 10–20)
APTT PPP: 30 SECONDS (ref 27–38)
AST SERPL W P-5'-P-CCNC: 73 U/L (ref 9–39)
BASOPHILS # BLD AUTO: 0.03 X10*3/UL (ref 0–0.1)
BASOPHILS NFR BLD AUTO: 0.3 %
BILIRUB SERPL-MCNC: 0.5 MG/DL (ref 0–1.2)
BNP SERPL-MCNC: 150 PG/ML (ref 0–99)
BUN SERPL-MCNC: 22 MG/DL (ref 6–23)
CALCIUM SERPL-MCNC: 9.1 MG/DL (ref 8.6–10.3)
CARDIAC TROPONIN I PNL SERPL HS: 2977 NG/L (ref 0–13)
CARDIAC TROPONIN I PNL SERPL HS: 3420 NG/L (ref 0–13)
CHLORIDE SERPL-SCNC: 101 MMOL/L (ref 98–107)
CO2 SERPL-SCNC: 21 MMOL/L (ref 21–32)
CREAT SERPL-MCNC: 1.05 MG/DL (ref 0.5–1.05)
EGFRCR SERPLBLD CKD-EPI 2021: 56 ML/MIN/1.73M*2
EOSINOPHIL # BLD AUTO: 0.02 X10*3/UL (ref 0–0.4)
EOSINOPHIL NFR BLD AUTO: 0.2 %
ERYTHROCYTE [DISTWIDTH] IN BLOOD BY AUTOMATED COUNT: 13 % (ref 11.5–14.5)
GLUCOSE SERPL-MCNC: 234 MG/DL (ref 74–99)
HCT VFR BLD AUTO: 41.5 % (ref 36–46)
HGB BLD-MCNC: 13.5 G/DL (ref 12–16)
IMM GRANULOCYTES # BLD AUTO: 0.05 X10*3/UL (ref 0–0.5)
IMM GRANULOCYTES NFR BLD AUTO: 0.4 % (ref 0–0.9)
INR PPP: 1.2 (ref 0.9–1.1)
LYMPHOCYTES # BLD AUTO: 2.44 X10*3/UL (ref 0.8–3)
LYMPHOCYTES NFR BLD AUTO: 20.7 %
MAGNESIUM SERPL-MCNC: 1.6 MG/DL (ref 1.6–2.4)
MCH RBC QN AUTO: 30.7 PG (ref 26–34)
MCHC RBC AUTO-ENTMCNC: 32.5 G/DL (ref 32–36)
MCV RBC AUTO: 94 FL (ref 80–100)
MONOCYTES # BLD AUTO: 0.62 X10*3/UL (ref 0.05–0.8)
MONOCYTES NFR BLD AUTO: 5.2 %
NEUTROPHILS # BLD AUTO: 8.65 X10*3/UL (ref 1.6–5.5)
NEUTROPHILS NFR BLD AUTO: 73.2 %
NRBC BLD-RTO: 0 /100 WBCS (ref 0–0)
PLATELET # BLD AUTO: 275 X10*3/UL (ref 150–450)
POTASSIUM SERPL-SCNC: 3.7 MMOL/L (ref 3.5–5.3)
PROT SERPL-MCNC: 7.1 G/DL (ref 6.4–8.2)
PROTHROMBIN TIME: 13.1 SECONDS (ref 9.8–12.8)
RBC # BLD AUTO: 4.4 X10*6/UL (ref 4–5.2)
SODIUM SERPL-SCNC: 133 MMOL/L (ref 136–145)
WBC # BLD AUTO: 11.8 X10*3/UL (ref 4.4–11.3)

## 2024-07-08 PROCEDURE — 71275 CT ANGIOGRAPHY CHEST: CPT

## 2024-07-08 PROCEDURE — 36415 COLL VENOUS BLD VENIPUNCTURE: CPT | Performed by: EMERGENCY MEDICINE

## 2024-07-08 PROCEDURE — 80053 COMPREHEN METABOLIC PANEL: CPT | Performed by: EMERGENCY MEDICINE

## 2024-07-08 PROCEDURE — 93656 COMPRE EP EVAL ABLTJ ATR FIB: CPT | Performed by: INTERNAL MEDICINE

## 2024-07-08 PROCEDURE — 2500000005 HC RX 250 GENERAL PHARMACY W/O HCPCS

## 2024-07-08 PROCEDURE — 83735 ASSAY OF MAGNESIUM: CPT | Performed by: EMERGENCY MEDICINE

## 2024-07-08 PROCEDURE — 93657 TX L/R ATRIAL FIB ADDL: CPT | Performed by: INTERNAL MEDICINE

## 2024-07-08 PROCEDURE — G0269 OCCLUSIVE DEVICE IN VEIN ART: HCPCS | Mod: TC | Performed by: INTERNAL MEDICINE

## 2024-07-08 PROCEDURE — 2780000003 HC OR 278 NO HCPCS: Performed by: INTERNAL MEDICINE

## 2024-07-08 PROCEDURE — 93005 ELECTROCARDIOGRAM TRACING: CPT

## 2024-07-08 PROCEDURE — 84484 ASSAY OF TROPONIN QUANT: CPT | Performed by: EMERGENCY MEDICINE

## 2024-07-08 PROCEDURE — C1760 CLOSURE DEV, VASC: HCPCS | Performed by: INTERNAL MEDICINE

## 2024-07-08 PROCEDURE — 2720000007 HC OR 272 NO HCPCS: Performed by: INTERNAL MEDICINE

## 2024-07-08 PROCEDURE — 7100000010 HC PHASE TWO TIME - EACH INCREMENTAL 1 MINUTE: Performed by: INTERNAL MEDICINE

## 2024-07-08 PROCEDURE — 85347 COAGULATION TIME ACTIVATED: CPT

## 2024-07-08 PROCEDURE — 71045 X-RAY EXAM CHEST 1 VIEW: CPT | Performed by: STUDENT IN AN ORGANIZED HEALTH CARE EDUCATION/TRAINING PROGRAM

## 2024-07-08 PROCEDURE — C1732 CATH, EP, DIAG/ABL, 3D/VECT: HCPCS | Performed by: INTERNAL MEDICINE

## 2024-07-08 PROCEDURE — 3700000001 HC GENERAL ANESTHESIA TIME - INITIAL BASE CHARGE: Performed by: INTERNAL MEDICINE

## 2024-07-08 PROCEDURE — 85025 COMPLETE CBC W/AUTO DIFF WBC: CPT | Performed by: EMERGENCY MEDICINE

## 2024-07-08 PROCEDURE — 83880 ASSAY OF NATRIURETIC PEPTIDE: CPT | Performed by: EMERGENCY MEDICINE

## 2024-07-08 PROCEDURE — 99285 EMERGENCY DEPT VISIT HI MDM: CPT

## 2024-07-08 PROCEDURE — 7100000009 HC PHASE TWO TIME - INITIAL BASE CHARGE: Performed by: INTERNAL MEDICINE

## 2024-07-08 PROCEDURE — 2500000005 HC RX 250 GENERAL PHARMACY W/O HCPCS: Performed by: ANESTHESIOLOGIST ASSISTANT

## 2024-07-08 PROCEDURE — C1766 INTRO/SHEATH,STRBLE,NON-PEEL: HCPCS | Performed by: INTERNAL MEDICINE

## 2024-07-08 PROCEDURE — 2500000004 HC RX 250 GENERAL PHARMACY W/ HCPCS (ALT 636 FOR OP/ED): Performed by: ANESTHESIOLOGIST ASSISTANT

## 2024-07-08 PROCEDURE — 3700000002 HC GENERAL ANESTHESIA TIME - EACH INCREMENTAL 1 MINUTE: Performed by: INTERNAL MEDICINE

## 2024-07-08 PROCEDURE — 96375 TX/PRO/DX INJ NEW DRUG ADDON: CPT

## 2024-07-08 PROCEDURE — 2500000001 HC RX 250 WO HCPCS SELF ADMINISTERED DRUGS (ALT 637 FOR MEDICARE OP): Performed by: EMERGENCY MEDICINE

## 2024-07-08 PROCEDURE — 85730 THROMBOPLASTIN TIME PARTIAL: CPT | Performed by: EMERGENCY MEDICINE

## 2024-07-08 PROCEDURE — 71045 X-RAY EXAM CHEST 1 VIEW: CPT

## 2024-07-08 PROCEDURE — C1759 CATH, INTRA ECHOCARDIOGRAPHY: HCPCS | Performed by: INTERNAL MEDICINE

## 2024-07-08 PROCEDURE — 96376 TX/PRO/DX INJ SAME DRUG ADON: CPT | Mod: 59

## 2024-07-08 PROCEDURE — 76937 US GUIDE VASCULAR ACCESS: CPT | Performed by: INTERNAL MEDICINE

## 2024-07-08 PROCEDURE — 85347 COAGULATION TIME ACTIVATED: CPT | Performed by: INTERNAL MEDICINE

## 2024-07-08 PROCEDURE — 2500000004 HC RX 250 GENERAL PHARMACY W/ HCPCS (ALT 636 FOR OP/ED): Performed by: INTERNAL MEDICINE

## 2024-07-08 PROCEDURE — 85610 PROTHROMBIN TIME: CPT | Performed by: EMERGENCY MEDICINE

## 2024-07-08 PROCEDURE — C1730 CATH, EP, 19 OR FEW ELECT: HCPCS | Performed by: INTERNAL MEDICINE

## 2024-07-08 PROCEDURE — 2500000004 HC RX 250 GENERAL PHARMACY W/ HCPCS (ALT 636 FOR OP/ED): Performed by: EMERGENCY MEDICINE

## 2024-07-08 RX ORDER — ONDANSETRON HYDROCHLORIDE 2 MG/ML
4 INJECTION, SOLUTION INTRAVENOUS ONCE
Status: COMPLETED | OUTPATIENT
Start: 2024-07-08 | End: 2024-07-08

## 2024-07-08 RX ORDER — MIDAZOLAM HYDROCHLORIDE 1 MG/ML
INJECTION INTRAMUSCULAR; INTRAVENOUS AS NEEDED
Status: DISCONTINUED | OUTPATIENT
Start: 2024-07-08 | End: 2024-07-08

## 2024-07-08 RX ORDER — METOPROLOL TARTRATE 1 MG/ML
INJECTION, SOLUTION INTRAVENOUS
Status: COMPLETED
Start: 2024-07-08 | End: 2024-07-08

## 2024-07-08 RX ORDER — HEPARIN SODIUM 1000 [USP'U]/ML
INJECTION, SOLUTION INTRAVENOUS; SUBCUTANEOUS AS NEEDED
Status: DISCONTINUED | OUTPATIENT
Start: 2024-07-08 | End: 2024-07-08 | Stop reason: HOSPADM

## 2024-07-08 RX ORDER — PANTOPRAZOLE SODIUM 40 MG/1
40 TABLET, DELAYED RELEASE ORAL
Qty: 60 TABLET | Refills: 0 | Status: SHIPPED | OUTPATIENT
Start: 2024-07-08 | End: 2024-08-07

## 2024-07-08 RX ORDER — PROPOFOL 10 MG/ML
INJECTION, EMULSION INTRAVENOUS AS NEEDED
Status: DISCONTINUED | OUTPATIENT
Start: 2024-07-08 | End: 2024-07-08

## 2024-07-08 RX ORDER — ONDANSETRON HYDROCHLORIDE 2 MG/ML
INJECTION, SOLUTION INTRAVENOUS AS NEEDED
Status: DISCONTINUED | OUTPATIENT
Start: 2024-07-08 | End: 2024-07-08

## 2024-07-08 RX ORDER — METOPROLOL SUCCINATE 50 MG/1
100 TABLET, EXTENDED RELEASE ORAL 2 TIMES DAILY
Status: DISCONTINUED | OUTPATIENT
Start: 2024-07-08 | End: 2024-07-09 | Stop reason: HOSPADM

## 2024-07-08 RX ORDER — PROTAMINE SULFATE 10 MG/ML
INJECTION, SOLUTION INTRAVENOUS AS NEEDED
Status: DISCONTINUED | OUTPATIENT
Start: 2024-07-08 | End: 2024-07-08

## 2024-07-08 RX ORDER — HEPARIN SODIUM 10000 [USP'U]/100ML
INJECTION, SOLUTION INTRAVENOUS CONTINUOUS PRN
Status: DISCONTINUED | OUTPATIENT
Start: 2024-07-08 | End: 2024-07-08 | Stop reason: HOSPADM

## 2024-07-08 RX ORDER — FENTANYL CITRATE 50 UG/ML
INJECTION, SOLUTION INTRAMUSCULAR; INTRAVENOUS AS NEEDED
Status: DISCONTINUED | OUTPATIENT
Start: 2024-07-08 | End: 2024-07-08

## 2024-07-08 RX ORDER — AMOXICILLIN AND CLAVULANATE POTASSIUM 875; 125 MG/1; MG/1
TABLET, FILM COATED ORAL
Qty: 14 TABLET | Refills: 0 | Status: ON HOLD | OUTPATIENT
Start: 2024-07-08 | End: 2024-07-09 | Stop reason: WASHOUT

## 2024-07-08 RX ORDER — ROCURONIUM BROMIDE 10 MG/ML
INJECTION, SOLUTION INTRAVENOUS AS NEEDED
Status: DISCONTINUED | OUTPATIENT
Start: 2024-07-08 | End: 2024-07-08

## 2024-07-08 RX ORDER — PHENYLEPHRINE 10 MG/250 ML(40 MCG/ML)IN 0.9 % SOD.CHLORIDE INTRAVENOUS
CONTINUOUS PRN
Status: DISCONTINUED | OUTPATIENT
Start: 2024-07-08 | End: 2024-07-08

## 2024-07-08 RX ORDER — LABETALOL HYDROCHLORIDE 5 MG/ML
INJECTION, SOLUTION INTRAVENOUS AS NEEDED
Status: DISCONTINUED | OUTPATIENT
Start: 2024-07-08 | End: 2024-07-08

## 2024-07-08 RX ORDER — METOPROLOL TARTRATE 1 MG/ML
5 INJECTION, SOLUTION INTRAVENOUS ONCE
Status: COMPLETED | OUTPATIENT
Start: 2024-07-08 | End: 2024-07-08

## 2024-07-08 ASSESSMENT — PAIN - FUNCTIONAL ASSESSMENT
PAIN_FUNCTIONAL_ASSESSMENT: 0-10

## 2024-07-08 ASSESSMENT — PAIN SCALES - GENERAL
PAINLEVEL_OUTOF10: 0 - NO PAIN
PAINLEVEL_OUTOF10: 5 - MODERATE PAIN
PAINLEVEL_OUTOF10: 4
PAINLEVEL_OUTOF10: 3
PAINLEVEL_OUTOF10: 0 - NO PAIN
PAIN_LEVEL: 1
PAINLEVEL_OUTOF10: 0 - NO PAIN

## 2024-07-08 ASSESSMENT — ENCOUNTER SYMPTOMS
CONFUSION: 0
NAUSEA: 0
PALPITATIONS: 1
EYE PAIN: 0
FEVER: 0
ARTHRALGIAS: 1
VOMITING: 0
EYE REDNESS: 0
NECK PAIN: 0
WHEEZING: 0
CHILLS: 0
DYSURIA: 0
FLANK PAIN: 0
SORE THROAT: 0
ABDOMINAL PAIN: 0
SHORTNESS OF BREATH: 0
WEAKNESS: 0
HEADACHES: 0

## 2024-07-08 NOTE — ANESTHESIA PROCEDURE NOTES
Arterial Line:    Date/Time: 7/8/2024 8:30 AM    Staffing  Performed: CAA and MATTY   Authorized by: Jewel Arteaga MD    Performed by: ROSARIO Vasquez    An arterial line was placed. in the OR for the following indication(s): continuous blood pressure monitoring and blood sampling needed.    A 20 gauge (size), 1 and 3/4 inch (length), Angiocath (type) catheter was placed into the Left radial artery, secured by Tegaderm,   Seldinger technique not used.  Events:  patient tolerated procedure well with no complications.

## 2024-07-08 NOTE — SIGNIFICANT EVENT
Expected Patient:     Patient being sent to the ED from EP lab due to lightheadedness.  Has a history of hypertension and A-fib.  Underwent successful ablation today.  Was leaving the building when she suddenly felt lightheaded.  Has had normal vitals but has persistent lightheadedness with standing.  Being brought to triage for evaluation.    Libertad Pearl,   Emergency Medicine

## 2024-07-08 NOTE — ANESTHESIA PROCEDURE NOTES
Airway  Date/Time: 7/8/2024 8:06 AM  Urgency: elective    Airway not difficult    Staffing  Performed: ROSARIO and MATTY   Authorized by: Jewel Arteaga MD    Performed by: ROSARIO Vasquez  Patient location during procedure: OR    Indications and Patient Condition  Indications for airway management: anesthesia and airway protection  Spontaneous Ventilation: absent  Sedation level: deep  Preoxygenated: yes  Patient position: sniffing  MILS not maintained throughout  Mask difficulty assessment: 1 - vent by mask  Planned trial extubation    Final Airway Details  Final airway type: endotracheal airway      Successful airway: ETT  Cuffed: yes   Successful intubation technique: direct laryngoscopy  Facilitating devices/methods: intubating stylet  Endotracheal tube insertion site: oral  Blade: [place]  Cormack-Lehane Classification: grade I - full view of glottis  Placement verified by: chest auscultation and capnometry   Measured from: lips  Number of attempts at approach: 1  Ventilation between attempts: none  Number of other approaches attempted: 0

## 2024-07-08 NOTE — SIGNIFICANT EVENT
Pt had successful Afib ablation this morning and was recovered according to protocol then discharged. However, on her way out of the building, she was going to the restroom when she felt lightheaded.  She went to the restroom and urinated then came out feeling more lightheaded and about to pass out. No chest pain, or SOB but felt some palpitations.     She came back to EP lab, exam and groin are stable with no signs of bleeding. Vitals: HR 90s (sinus on bedside monitor), BP 120s/70s. She was observed for ~45 mins but continued to feel lightheaded when standing so pt was sent to the ED. I called ED attending (Dr. Varela) and gave signout.

## 2024-07-08 NOTE — ANESTHESIA PREPROCEDURE EVALUATION
Patient: Debbie Rees    Procedure Information       Date/Time: 2430    Procedure: Ablation A-Fib Persistant - EPS 3D W CARTO GA WHOLE CASE.    PT [facility] - NEEDS TIME OF PROCEDURE AHEAD OF TIME TO ARRANGE TRANSPORT.    Location: [facility] STEREO / Virtual [facility] MAT 8129 Cardiac Cath Lab    Providers: Ney Sommers MD            Relevant Problems   Anesthesia (within normal limits)      Cardiac   (+) Chest pain   (+) Essential hypertension   (+) Persistent atrial fibrillation (Multi)      Pulmonary (within normal limits)      Neuro   (+) Anxiety   (+) Carpal tunnel syndrome of left wrist   (+) Carpal tunnel syndrome of right wrist   (+) Generalized anxiety disorder      GI   (+) Hiatal hernia      /Renal (within normal limits)      Liver (within normal limits)      Endocrine   (+) Controlled type 2 diabetes mellitus without complication, without long-term current use of insulin (Multi)      Musculoskeletal   (+) Carpal tunnel syndrome of left wrist   (+) Carpal tunnel syndrome of right wrist     Past Surgical History:   Procedure Laterality Date   •  SECTION, CLASSIC  2016     Section   • CHOLECYSTECTOMY  2016    Cholecystectomy   • OTHER SURGICAL HISTORY  2016    Sigmoidoscopy (Fiberoptic, Therapeutic )   • OTHER SURGICAL HISTORY  2022    Lower back surgery   • OTHER SURGICAL HISTORY  2021    Carpal tunnel surgery   • TOTAL KNEE ARTHROPLASTY  2016    Knee Replacement     No anesthesia complications.  Pt is NPO after midnight.      Clinical information reviewed:    Allergies  Meds               NPO Detail:  NPO/Void Status  Date of Last Liquid: 24  Time of Last Liquid:   Date of Last Solid: 24  Time of Last Solid:          Physical Exam    Airway  Mallampati: III  TM distance: >3 FB  Neck ROM: full     Cardiovascular - normal exam  Rhythm: regular  Rate: normal     Dental   (+) upper dentures     Pulmonary - normal exam     Abdominal           Anesthesia Plan    History of general anesthesia?: yes  History of complications of general anesthesia?: no    ASA 3     general     intravenous induction   Anesthetic plan and risks discussed with patient.    Plan discussed with CAA and attending.

## 2024-07-08 NOTE — DISCHARGE INSTRUCTIONS
INSTRUCTIONS AFTER ABLATION PROCEDURE:    * You will need to continue blood thinner (Eliquis) until instructed otherwise. It is important not to interrupt blood thinner for any reason (other than an emergency) during the first 30 days after ablation.    * You will be on Pantoprazole (a heartburn medicine) for 4 weeks to protect the esophagus as it can become irritated with ablation. It is very important that you take this medication.    * All other medications will generally remain the same unless you are told otherwise.  Resume taking your home medications today (including blood thinner) as listed on the discharge instructions.    * In the first week post-ablation you should take it easy. No heavy lifting or heavy exercise, no treadmill. You can use the stairs if needed but go slowly and minimize the number of times up and down.    * Some minor bruising is common at each groin access site with minor soreness as if you had banged the area. Bruising may occasionally be seen to extend down the leg. This is normal as is an occasional small quarter sized bump in the area. If larger swelling or more significant pain occurs at the area, please contact the office or go the nearest Emergency Room.    * You may have some minor chest pain for the next week or so. The pain will often worsen with a deep breath and be better when leaning forward. This is pericardial chest pain from the ablation and is generally not of concern. It should resolve within a week although it might increase for a day or so after the ablation.    * If you develop unexplained fevers exceeding 100 degrees anytime within the first 3 weeks post-ablation, you need to contact the office. Low grade fevers of around 99 degrees are common in the first day or so post-ablation.    * Atrial fibrillation (AFib) can recur in all patients who undergo this ablation for up to 4-8 weeks post-ablation. The ablation itself can cause inflammation (pericarditis) in the  atria and this can cause AFib. Some patients will actually experience an increased amount of atrial arrhythmia early after ablation. Approximately 1/3 of patients will have this early recurrence of AFib. Medications should be continued and your heart rate controlled. Nothing else needs be done initially except waiting as in many cases these episodes of AFib will prove self limited.    * Continue to follow up with your primary care physician, primary cardiologist, and any other specialists you normally see.    * No driving for 2 days post procedure (IF you were driving prior to procedure)    *Diet: Heart healthy    Call Provider If:  Breathing faster than normal.     Fever of 100.4 F (38 C) or higher.     Chills.     Any new concerning symptoms.     Passing out.     Patient Instructions, Next 24 hours:  DO NOT drive a car, operate machinery or power tools.  It is recommended that a responsible adult be with you for the first 24 hours.     DO NOT drink any alcoholic drinks or take any non-prescriptive medications that contain alcohol for the first 24 hours.     DO NOT make any important decisions for the first 24 hours.    Activity:  You are advised to go directly home from the hospital.     DO NOT lift anything heavier than 10 pounds for one week, this allows for proper healing of the groin.     No excessive exercise or treadmill use for one week. You may walk and do stairs, slowly.     No sexual activities for 24 hours after you arrive home.    Wound Care:  If slight bleeding should occur at groin site, lie down and have someone apply firm pressure just above the puncture site for 5 minutes.  If it continues or is profuse, call 911. Always notify your doctor if bleeding occurs.     Keep site clean and dry. Let air dry or you may use a simple bandaid.     Gently cleanse the puncture site in your groin with soap and water only.     You may experience some tenderness, bruising or minimal inflammation.  If you have any  concerns, you may contact the EP Lab or if any of these symptoms become excessive, contact your electrophysiologist or go to the emergency room.     No tub baths, soaking, hot tubs, or swimming for one week.     May shower the next day after your procedure.    Other Instructions:  If you have any questions about the effects of the sedative drugs or groin care, call the physician who performed your procedure.    FOLLOW UP:  1) Primary care physician 2 weeks--call to schedule    2) Lissett Mendosa CNP ( Electrophysiology) 1 month after ablation   will notify you of appointment. If you haven't heard in 1 week, please call [phone]

## 2024-07-08 NOTE — PROGRESS NOTES
Pharmacy Medication History Review    Debbie Rees is a 74 y.o. female admitted for No Principal Problem: There is no principal problem currently on the Problem List. Please update the Problem List and refresh.. Pharmacy reviewed the patient's [id number] medications and allergies for accuracy.    The list below reflects the updated PTA list. Comments regarding how patient may be taking medications differently can be found in the Admit Orders Activity  Prior to Admission Medications   Prescriptions Last Dose Informant   apixaban (Eliquis) 5 mg tablet 7/6/2024 Self   Sig: Take 1 tablet (5 mg) by mouth 2 times a day.   dronedarone (Multaq) 400 mg tablet 7/7/2024 Self   Sig: Take 1 tablet (400 mg) by mouth 2 times daily (morning and late afternoon).   hydroCHLOROthiazide (HYDRODiuril) 25 mg tablet 7/7/2024 Self   Sig: Take 1 tablet (25 mg) by mouth once daily in the morning.   lisinopril 20 mg tablet 7/7/2024 Self   Sig: TAKE ONE TABLET BY MOUTH TWO TIMES A DAY   magnesium oxide (Mag-Ox) 400 mg (241.3 mg magnesium) tablet Past Week Self   Sig: Take 1 tablet (400 mg) by mouth once daily.   Patient taking differently: Take 1 tablet (400 mg) by mouth 3 (three) times a week. Monday, Wednesday, Friday   metoprolol succinate XL (Toprol-XL) 100 mg 24 hr tablet 7/7/2024 Self   Sig: Take 1 tablet (100 mg) by mouth 2 times a day. Do not crush or chew.   semaglutide (OZEMPIC) 1 mg/dose (4 mg/3 mL) pen injector 7/3/2024 Self   Sig: Inject 1 mg under the skin 1 (one) time per week.      Facility-Administered Medications: None        The list below reflects the updated allergy list. Please review each documented allergy for additional clarification and justification.  Allergies  Reviewed by Rosanna Polo, PharmD on 7/8/2024        Severity Reactions Comments    Procaine Medium Other, Palpitations palpitations    Empagliflozin Not Specified Unknown             Patient was unable to be assessed for M2B at discharge.  Pharmacy has been updated to [facility]. M2B service not offered prior to surgery, please reassess prior to patient discharge if Meds to Beds is desired.     Sources used to complete the med history include: Patient interview - good historian of medications/ Pharmacy - [facility]/ Chart review - Primary care visit 6/25/24    Rosanna Polo PharmD  Transitions of Care Pharmacist  [facility]  Please reach out via Secure Chat for questions, or if no response call [employer] or [facility]

## 2024-07-08 NOTE — ANESTHESIA PROCEDURE NOTES
Peripheral IV  Date/Time: 7/8/2024 8:55 AM  Inserted by: ROSARIO Vasquez    Placement  Needle size: 18 G  Laterality: right  Location: forearm  Site prep: alcohol  Technique: anatomical landmarks  Attempts: 1

## 2024-07-08 NOTE — H&P
"History Of Present Illness  Debbie Rees is a 74 y.o. female with PMH of HTN, HCM and paroxysmal Afib who is here for Afib ablation.     Her Afib is asymptomatic with palpitations and feeling \"sick\". She reports first episode of afib last Christmas then many episodes since then. She was previously on amiodarone then stopped due to diarreha. She is currently on Multaq BID, metop  BID and apixaban. Last apixaban dose was on 7/6 evening.      March 2024: (3/38) [facility] and elevated HR. EKG: AF with  bpm. Pt started on diltiazem drip with HR improvement. Dilt changed to oral amiodarone. (3/30) Pt Dc'd home on Amio and Metoprolol. (4/29/24) s/p DCCV      Past Medical History  Past Medical History:   Diagnosis Date    Abdominal pain of multiple sites 06/22/2023    Acute sinusitis 06/22/2023    Acute upper respiratory infection, unspecified 03/14/2017    Acute URI    Anxiety disorder, unspecified     Anxiety    Biceps tendinitis of left upper extremity 06/22/2023    Burning with urination 06/22/2023    Cervical pain 06/22/2023    Cervicalgia     Cervical pain (neck)    Encounter for screening for other viral diseases 12/14/2017    Need for hepatitis C screening test    Hand numbness 12/26/2023    Morbid (severe) obesity due to excess calories (Multi)     Morbid obesity    Pain in left knee     Acute pain of left knee    Paroxysmal SVT (supraventricular tachycardia) (CMS-HCC) 12/26/2023    Personal history of other diseases of the circulatory system     History of hypertension    Personal history of other diseases of the musculoskeletal system and connective tissue     History of arthritis    Personal history of other drug therapy 11/21/2019    History of influenza vaccination    Personal history of other endocrine, nutritional and metabolic disease     History of hyperlipidemia    Personal history of other endocrine, nutritional and metabolic disease     History of diabetes " mellitus    Personal history of other specified conditions 2017    History of dysuria    Pyogenic granuloma of skin and subcutaneous tissue 2023    Streptococcal pharyngitis 2017    Strep sore throat    Thoracic spine pain 2023    Unspecified abdominal hernia without obstruction or gangrene     Abdominal hernia without obstruction and without gangrene    Unspecified asthma, uncomplicated ([facility]) 2017    Asthmatic bronchitis    Urinary tract infection, site not specified 2017    Acute UTI       Surgical History  Past Surgical History:   Procedure Laterality Date     SECTION, CLASSIC  2016     Section    CHOLECYSTECTOMY  2016    Cholecystectomy    OTHER SURGICAL HISTORY  2016    Sigmoidoscopy (Fiberoptic, Therapeutic )    OTHER SURGICAL HISTORY  2022    Lower back surgery    OTHER SURGICAL HISTORY  2021    Carpal tunnel surgery    TOTAL KNEE ARTHROPLASTY  2016    Knee Replacement        Social History  She reports that she has never smoked. She has never used smokeless tobacco. She reports that she does not drink alcohol and does not use drugs.    Family History  No family history on file.     Allergies  Procaine and Empagliflozin    Review of Systems   Constitutional:  Negative for chills and fever.   HENT:  Negative for sore throat.    Eyes:  Negative for pain and redness.   Respiratory:  Negative for shortness of breath and wheezing.    Cardiovascular:  Positive for palpitations and leg swelling. Negative for chest pain.   Gastrointestinal:  Negative for abdominal pain, nausea and vomiting.   Endocrine: Negative for cold intolerance and heat intolerance.   Genitourinary:  Negative for dysuria and flank pain.   Musculoskeletal:  Positive for arthralgias (L knee). Negative for neck pain.   Neurological:  Negative for weakness and headaches.   Psychiatric/Behavioral:  Negative for behavioral problems and confusion.          Physical Exam  Constitutional:       Appearance: Normal appearance. She is normal weight.   HENT:      Head: Normocephalic and atraumatic.      Nose: Nose normal.      Mouth/Throat:      Mouth: Mucous membranes are moist.   Eyes:      Extraocular Movements: Extraocular movements intact.      Conjunctiva/sclera: Conjunctivae normal.      Pupils: Pupils are equal, round, and reactive to light.   Cardiovascular:      Rate and Rhythm: Rhythm irregular.      Pulses: Normal pulses.   Pulmonary:      Effort: Pulmonary effort is normal.      Breath sounds: Normal breath sounds.   Abdominal:      General: There is no distension.      Tenderness: There is no abdominal tenderness.   Musculoskeletal:         General: Swelling (+2 R>L) present. Normal range of motion.      Cervical back: Normal range of motion.   Skin:     General: Skin is warm and dry.      Capillary Refill: Capillary refill takes less than 2 seconds.   Neurological:      General: No focal deficit present.      Mental Status: She is alert and oriented to person, place, and time.   Psychiatric:         Mood and Affect: Mood normal.         Behavior: Behavior normal.          Last Recorded Vitals  There were no vitals taken for this visit.    Relevant Results         Assessment/Plan   Active Problems:  There are no active Hospital Problems.    Debbie Rees is a 74 y.o. female with PMH of HTN, HCM and paroxysmal Afib who is here for Afib ablation.     Plan for Afib ablation (PVI) with general anesthesia.        I spent 30 minutes in the professional and overall care of this patient.      Nargis Sheets MD

## 2024-07-08 NOTE — ANESTHESIA PROCEDURE NOTES
Peripheral IV  Date/Time: 7/8/2024 8:45 AM  Inserted by: Jewel Arteaga MD    Placement  Needle size: 18 G  Laterality: left  Location: wrist  Site prep: alcohol  Technique: anatomical landmarks  Attempts: 1

## 2024-07-08 NOTE — ANESTHESIA POSTPROCEDURE EVALUATION
Patient: Debbie Rees    Procedure Summary       Date: 07/08/24 Room / Location: [facility] STEREO / Virtual [facility] MAT 3529 Cardiac Cath Lab    Anesthesia Start: 0756 Anesthesia Stop: 1207    Procedure: Ablation A-Fib Persistant Diagnosis:       Persistent atrial fibrillation (Multi)      (Persistent atrial fibrillation (Multi) [I48.19])    Providers: Ney Sommers MD Responsible Provider: Jewel Arteaga MD    Anesthesia Type: general ASA Status: 3            Anesthesia Type: general    Vitals Value Taken Time   /82 07/08/24 1231   Temp 36.2 07/08/24 1231   Pulse 66 07/08/24 1231   Resp 15 07/08/24 1231   SpO2 98 07/08/24 1231       Anesthesia Post Evaluation    Patient location during evaluation: PACU  Patient participation: complete - patient participated  Level of consciousness: awake and alert  Pain score: 1  Pain management: adequate  Airway patency: patent  Cardiovascular status: acceptable  Respiratory status: acceptable  Hydration status: acceptable  Postoperative Nausea and Vomiting: none        There were no known notable events for this encounter.

## 2024-07-09 ENCOUNTER — PHARMACY VISIT (OUTPATIENT)
Dept: PHARMACY | Facility: CLINIC | Age: 74
End: 2024-07-09
Payer: MEDICARE

## 2024-07-09 VITALS
OXYGEN SATURATION: 98 % | BODY MASS INDEX: 39.7 KG/M2 | HEART RATE: 83 BPM | SYSTOLIC BLOOD PRESSURE: 115 MMHG | DIASTOLIC BLOOD PRESSURE: 77 MMHG | TEMPERATURE: 97.9 F | RESPIRATION RATE: 18 BRPM | HEIGHT: 66 IN | WEIGHT: 247 LBS

## 2024-07-09 PROBLEM — I48.91 ATRIAL FIBRILLATION WITH RVR (MULTI): Status: ACTIVE | Noted: 2024-07-09

## 2024-07-09 LAB
ANION GAP SERPL CALC-SCNC: 13 MMOL/L (ref 10–20)
BUN SERPL-MCNC: 20 MG/DL (ref 6–23)
CALCIUM SERPL-MCNC: 8.8 MG/DL (ref 8.6–10.3)
CHLORIDE SERPL-SCNC: 101 MMOL/L (ref 98–107)
CO2 SERPL-SCNC: 23 MMOL/L (ref 21–32)
CREAT SERPL-MCNC: 0.97 MG/DL (ref 0.5–1.05)
EGFRCR SERPLBLD CKD-EPI 2021: 61 ML/MIN/1.73M*2
ERYTHROCYTE [DISTWIDTH] IN BLOOD BY AUTOMATED COUNT: 13.2 % (ref 11.5–14.5)
EST. AVERAGE GLUCOSE BLD GHB EST-MCNC: 166 MG/DL
GLUCOSE BLD MANUAL STRIP-MCNC: 119 MG/DL (ref 74–99)
GLUCOSE BLD MANUAL STRIP-MCNC: 137 MG/DL (ref 74–99)
GLUCOSE SERPL-MCNC: 161 MG/DL (ref 74–99)
HBA1C MFR BLD: 7.4 %
HCT VFR BLD AUTO: 38.3 % (ref 36–46)
HGB BLD-MCNC: 12.4 G/DL (ref 12–16)
MAGNESIUM SERPL-MCNC: 1.6 MG/DL (ref 1.6–2.4)
MCH RBC QN AUTO: 31.7 PG (ref 26–34)
MCHC RBC AUTO-ENTMCNC: 32.4 G/DL (ref 32–36)
MCV RBC AUTO: 98 FL (ref 80–100)
NRBC BLD-RTO: 0 /100 WBCS (ref 0–0)
PLATELET # BLD AUTO: 227 X10*3/UL (ref 150–450)
POTASSIUM SERPL-SCNC: 3.9 MMOL/L (ref 3.5–5.3)
RBC # BLD AUTO: 3.91 X10*6/UL (ref 4–5.2)
SODIUM SERPL-SCNC: 133 MMOL/L (ref 136–145)
TSH SERPL-ACNC: 0.68 MIU/L (ref 0.44–3.98)
WBC # BLD AUTO: 11.5 X10*3/UL (ref 4.4–11.3)

## 2024-07-09 PROCEDURE — 82374 ASSAY BLOOD CARBON DIOXIDE: CPT | Performed by: INTERNAL MEDICINE

## 2024-07-09 PROCEDURE — 96367 TX/PROPH/DG ADDL SEQ IV INF: CPT

## 2024-07-09 PROCEDURE — 2500000001 HC RX 250 WO HCPCS SELF ADMINISTERED DRUGS (ALT 637 FOR MEDICARE OP): Performed by: EMERGENCY MEDICINE

## 2024-07-09 PROCEDURE — RXMED WILLOW AMBULATORY MEDICATION CHARGE

## 2024-07-09 PROCEDURE — 36415 COLL VENOUS BLD VENIPUNCTURE: CPT | Performed by: INTERNAL MEDICINE

## 2024-07-09 PROCEDURE — 2500000004 HC RX 250 GENERAL PHARMACY W/ HCPCS (ALT 636 FOR OP/ED): Performed by: INTERNAL MEDICINE

## 2024-07-09 PROCEDURE — G0378 HOSPITAL OBSERVATION PER HR: HCPCS

## 2024-07-09 PROCEDURE — 96361 HYDRATE IV INFUSION ADD-ON: CPT

## 2024-07-09 PROCEDURE — 2500000004 HC RX 250 GENERAL PHARMACY W/ HCPCS (ALT 636 FOR OP/ED)

## 2024-07-09 PROCEDURE — 96365 THER/PROPH/DIAG IV INF INIT: CPT

## 2024-07-09 PROCEDURE — 85027 COMPLETE CBC AUTOMATED: CPT | Performed by: INTERNAL MEDICINE

## 2024-07-09 PROCEDURE — 2500000001 HC RX 250 WO HCPCS SELF ADMINISTERED DRUGS (ALT 637 FOR MEDICARE OP): Performed by: INTERNAL MEDICINE

## 2024-07-09 PROCEDURE — C9113 INJ PANTOPRAZOLE SODIUM, VIA: HCPCS | Performed by: INTERNAL MEDICINE

## 2024-07-09 PROCEDURE — 2550000001 HC RX 255 CONTRASTS: Performed by: EMERGENCY MEDICINE

## 2024-07-09 PROCEDURE — 83735 ASSAY OF MAGNESIUM: CPT | Performed by: NURSE PRACTITIONER

## 2024-07-09 PROCEDURE — 96366 THER/PROPH/DIAG IV INF ADDON: CPT

## 2024-07-09 PROCEDURE — 83036 HEMOGLOBIN GLYCOSYLATED A1C: CPT | Mod: AHULAB | Performed by: NURSE PRACTITIONER

## 2024-07-09 PROCEDURE — 84443 ASSAY THYROID STIM HORMONE: CPT | Performed by: NURSE PRACTITIONER

## 2024-07-09 PROCEDURE — 99222 1ST HOSP IP/OBS MODERATE 55: CPT | Performed by: INTERNAL MEDICINE

## 2024-07-09 PROCEDURE — 82947 ASSAY GLUCOSE BLOOD QUANT: CPT

## 2024-07-09 RX ORDER — LANOLIN ALCOHOL/MO/W.PET/CERES
400 CREAM (GRAM) TOPICAL DAILY
Status: DISCONTINUED | OUTPATIENT
Start: 2024-07-09 | End: 2024-07-09 | Stop reason: HOSPADM

## 2024-07-09 RX ORDER — CALCIUM CARBONATE 300MG(750)
400 TABLET,CHEWABLE ORAL DAILY
Status: ON HOLD | COMMUNITY

## 2024-07-09 RX ORDER — DEXTROSE 50 % IN WATER (D50W) INTRAVENOUS SYRINGE
25
Status: DISCONTINUED | OUTPATIENT
Start: 2024-07-09 | End: 2024-07-09 | Stop reason: HOSPADM

## 2024-07-09 RX ORDER — ACETAMINOPHEN 325 MG/1
975 TABLET ORAL ONCE
Status: COMPLETED | OUTPATIENT
Start: 2024-07-09 | End: 2024-07-09

## 2024-07-09 RX ORDER — ONDANSETRON HYDROCHLORIDE 2 MG/ML
4 INJECTION, SOLUTION INTRAVENOUS EVERY 8 HOURS PRN
Status: DISCONTINUED | OUTPATIENT
Start: 2024-07-09 | End: 2024-07-09 | Stop reason: HOSPADM

## 2024-07-09 RX ORDER — DEXTROSE 50 % IN WATER (D50W) INTRAVENOUS SYRINGE
12.5
Status: DISCONTINUED | OUTPATIENT
Start: 2024-07-09 | End: 2024-07-09 | Stop reason: HOSPADM

## 2024-07-09 RX ORDER — SODIUM CHLORIDE 9 MG/ML
100 INJECTION, SOLUTION INTRAVENOUS CONTINUOUS
Status: DISCONTINUED | OUTPATIENT
Start: 2024-07-09 | End: 2024-07-09

## 2024-07-09 RX ORDER — MAGNESIUM SULFATE HEPTAHYDRATE 40 MG/ML
2 INJECTION, SOLUTION INTRAVENOUS ONCE
Status: COMPLETED | OUTPATIENT
Start: 2024-07-09 | End: 2024-07-09

## 2024-07-09 RX ORDER — METOPROLOL TARTRATE 25 MG/1
25 TABLET, FILM COATED ORAL AS NEEDED
Qty: 30 TABLET | Refills: 0 | Status: ON HOLD | OUTPATIENT
Start: 2024-07-09

## 2024-07-09 RX ORDER — DILTIAZEM HCL/D5W 125 MG/125
15 PLASTIC BAG, INJECTION (ML) INTRAVENOUS CONTINUOUS
Status: DISCONTINUED | OUTPATIENT
Start: 2024-07-09 | End: 2024-07-09

## 2024-07-09 RX ORDER — METOPROLOL SUCCINATE 50 MG/1
100 TABLET, EXTENDED RELEASE ORAL 2 TIMES DAILY
Status: DISCONTINUED | OUTPATIENT
Start: 2024-07-09 | End: 2024-07-09

## 2024-07-09 RX ORDER — LISINOPRIL 20 MG/1
20 TABLET ORAL 2 TIMES DAILY
Status: DISCONTINUED | OUTPATIENT
Start: 2024-07-09 | End: 2024-07-09 | Stop reason: HOSPADM

## 2024-07-09 RX ORDER — DILTIAZEM HCL/D5W 125 MG/125
PLASTIC BAG, INJECTION (ML) INTRAVENOUS
Status: COMPLETED
Start: 2024-07-09 | End: 2024-07-09

## 2024-07-09 RX ORDER — INSULIN LISPRO 100 [IU]/ML
0-10 INJECTION, SOLUTION INTRAVENOUS; SUBCUTANEOUS
Status: DISCONTINUED | OUTPATIENT
Start: 2024-07-09 | End: 2024-07-09 | Stop reason: HOSPADM

## 2024-07-09 RX ORDER — ACETAMINOPHEN 325 MG/1
650 TABLET ORAL EVERY 4 HOURS PRN
Status: DISCONTINUED | OUTPATIENT
Start: 2024-07-09 | End: 2024-07-09 | Stop reason: HOSPADM

## 2024-07-09 RX ORDER — PANTOPRAZOLE SODIUM 40 MG/1
40 TABLET, DELAYED RELEASE ORAL
Status: DISCONTINUED | OUTPATIENT
Start: 2024-07-09 | End: 2024-07-09 | Stop reason: HOSPADM

## 2024-07-09 RX ORDER — ACETAMINOPHEN 160 MG/5ML
650 SOLUTION ORAL EVERY 4 HOURS PRN
Status: DISCONTINUED | OUTPATIENT
Start: 2024-07-09 | End: 2024-07-09 | Stop reason: HOSPADM

## 2024-07-09 RX ORDER — ONDANSETRON 4 MG/1
4 TABLET, FILM COATED ORAL EVERY 8 HOURS PRN
Status: DISCONTINUED | OUTPATIENT
Start: 2024-07-09 | End: 2024-07-09 | Stop reason: HOSPADM

## 2024-07-09 RX ORDER — ACETAMINOPHEN 650 MG/1
650 SUPPOSITORY RECTAL EVERY 4 HOURS PRN
Status: DISCONTINUED | OUTPATIENT
Start: 2024-07-09 | End: 2024-07-09 | Stop reason: HOSPADM

## 2024-07-09 RX ORDER — HYDROCHLOROTHIAZIDE 25 MG/1
25 TABLET ORAL EVERY MORNING
Status: DISCONTINUED | OUTPATIENT
Start: 2024-07-09 | End: 2024-07-09 | Stop reason: HOSPADM

## 2024-07-09 RX ORDER — PANTOPRAZOLE SODIUM 40 MG/10ML
40 INJECTION, POWDER, LYOPHILIZED, FOR SOLUTION INTRAVENOUS
Status: DISCONTINUED | OUTPATIENT
Start: 2024-07-09 | End: 2024-07-09 | Stop reason: HOSPADM

## 2024-07-09 SDOH — SOCIAL STABILITY: SOCIAL INSECURITY: ARE YOU OR HAVE YOU BEEN THREATENED OR ABUSED PHYSICALLY, EMOTIONALLY, OR SEXUALLY BY ANYONE?: NO

## 2024-07-09 SDOH — SOCIAL STABILITY: SOCIAL INSECURITY: WERE YOU ABLE TO COMPLETE ALL THE BEHAVIORAL HEALTH SCREENINGS?: YES

## 2024-07-09 SDOH — SOCIAL STABILITY: SOCIAL INSECURITY: DO YOU FEEL UNSAFE GOING BACK TO THE PLACE WHERE YOU ARE LIVING?: NO

## 2024-07-09 SDOH — SOCIAL STABILITY: SOCIAL INSECURITY: DO YOU FEEL ANYONE HAS EXPLOITED OR TAKEN ADVANTAGE OF YOU FINANCIALLY OR OF YOUR PERSONAL PROPERTY?: NO

## 2024-07-09 SDOH — HEALTH STABILITY: MENTAL HEALTH: HOW OFTEN DO YOU HAVE A DRINK CONTAINING ALCOHOL?: NEVER

## 2024-07-09 SDOH — SOCIAL STABILITY: SOCIAL INSECURITY: ABUSE: ADULT

## 2024-07-09 SDOH — HEALTH STABILITY: MENTAL HEALTH: HOW OFTEN DO YOU HAVE 6 OR MORE DRINKS ON ONE OCCASION?: NEVER

## 2024-07-09 SDOH — SOCIAL STABILITY: SOCIAL INSECURITY: DOES ANYONE TRY TO KEEP YOU FROM HAVING/CONTACTING OTHER FRIENDS OR DOING THINGS OUTSIDE YOUR HOME?: NO

## 2024-07-09 SDOH — SOCIAL STABILITY: SOCIAL INSECURITY: HAVE YOU HAD ANY THOUGHTS OF HARMING ANYONE ELSE?: NO

## 2024-07-09 SDOH — SOCIAL STABILITY: SOCIAL INSECURITY: HAS ANYONE EVER THREATENED TO HURT YOUR FAMILY OR YOUR PETS?: NO

## 2024-07-09 SDOH — HEALTH STABILITY: MENTAL HEALTH: HOW MANY STANDARD DRINKS CONTAINING ALCOHOL DO YOU HAVE ON A TYPICAL DAY?: PATIENT DOES NOT DRINK

## 2024-07-09 SDOH — SOCIAL STABILITY: SOCIAL INSECURITY: ARE THERE ANY APPARENT SIGNS OF INJURIES/BEHAVIORS THAT COULD BE RELATED TO ABUSE/NEGLECT?: NO

## 2024-07-09 SDOH — SOCIAL STABILITY: SOCIAL INSECURITY: HAVE YOU HAD THOUGHTS OF HARMING ANYONE ELSE?: NO

## 2024-07-09 ASSESSMENT — COGNITIVE AND FUNCTIONAL STATUS - GENERAL
MOBILITY SCORE: 21
CLIMB 3 TO 5 STEPS WITH RAILING: A LITTLE
MOBILITY SCORE: 21
HELP NEEDED FOR BATHING: A LITTLE
PATIENT BASELINE BEDBOUND: NO
DRESSING REGULAR UPPER BODY CLOTHING: A LITTLE
DRESSING REGULAR UPPER BODY CLOTHING: A LITTLE
TOILETING: A LITTLE
CLIMB 3 TO 5 STEPS WITH RAILING: A LITTLE
TOILETING: A LITTLE
WALKING IN HOSPITAL ROOM: A LITTLE
HELP NEEDED FOR BATHING: A LITTLE
STANDING UP FROM CHAIR USING ARMS: A LITTLE
STANDING UP FROM CHAIR USING ARMS: A LITTLE
DAILY ACTIVITIY SCORE: 20
WALKING IN HOSPITAL ROOM: A LITTLE
DRESSING REGULAR LOWER BODY CLOTHING: A LITTLE
DRESSING REGULAR LOWER BODY CLOTHING: A LITTLE
DAILY ACTIVITIY SCORE: 20

## 2024-07-09 ASSESSMENT — PAIN - FUNCTIONAL ASSESSMENT
PAIN_FUNCTIONAL_ASSESSMENT: 0-10

## 2024-07-09 ASSESSMENT — LIFESTYLE VARIABLES
HOW MANY STANDARD DRINKS CONTAINING ALCOHOL DO YOU HAVE ON A TYPICAL DAY: PATIENT DOES NOT DRINK
HOW OFTEN DO YOU HAVE 6 OR MORE DRINKS ON ONE OCCASION: NEVER
AUDIT-C TOTAL SCORE: 0
SKIP TO QUESTIONS 9-10: 1
SKIP TO QUESTIONS 9-10: 1
HOW OFTEN DO YOU HAVE A DRINK CONTAINING ALCOHOL: NEVER

## 2024-07-09 ASSESSMENT — ACTIVITIES OF DAILY LIVING (ADL)
HEARING - RIGHT EAR: FUNCTIONAL
GROOMING: INDEPENDENT
WALKS IN HOME: NEEDS ASSISTANCE
FEEDING YOURSELF: INDEPENDENT
DRESSING YOURSELF: INDEPENDENT
HEARING - LEFT EAR: FUNCTIONAL
LACK_OF_TRANSPORTATION: NO
PATIENT'S MEMORY ADEQUATE TO SAFELY COMPLETE DAILY ACTIVITIES?: YES
ADEQUATE_TO_COMPLETE_ADL: YES
LACK_OF_TRANSPORTATION: NO
TOILETING: NEEDS ASSISTANCE
JUDGMENT_ADEQUATE_SAFELY_COMPLETE_DAILY_ACTIVITIES: YES
BATHING: NEEDS ASSISTANCE

## 2024-07-09 ASSESSMENT — ENCOUNTER SYMPTOMS
WEAKNESS: 1
DIAPHORESIS: 1
LIGHT-HEADEDNESS: 1

## 2024-07-09 ASSESSMENT — PAIN SCALES - GENERAL
PAINLEVEL_OUTOF10: 5 - MODERATE PAIN
PAINLEVEL_OUTOF10: 6
PAINLEVEL_OUTOF10: 0 - NO PAIN
PAINLEVEL_OUTOF10: 0 - NO PAIN

## 2024-07-09 ASSESSMENT — PATIENT HEALTH QUESTIONNAIRE - PHQ9
1. LITTLE INTEREST OR PLEASURE IN DOING THINGS: NOT AT ALL
SUM OF ALL RESPONSES TO PHQ9 QUESTIONS 1 & 2: 0
2. FEELING DOWN, DEPRESSED OR HOPELESS: NOT AT ALL

## 2024-07-09 NOTE — ED TRIAGE NOTES
"Patient had heart ablation this morning at [facility]. Discharged around 330pm. Before leaving the hospital patient stated she couldn't get up and got dizzy and lightheaded and went pale. Patient went to ER at [facility] and it was extremely crowded so the patient decided to leave and come here. Currently feeling weak and \"fluttery\" and a little bit of dizziness and nausea.  "

## 2024-07-09 NOTE — H&P
History Of Present Illness    Debbie Rees is a 74 y.o. female with PMH HTN, HCM, paroxysmal atrial fibrillation (on eliquis, multaq (diarrhea with amiodarone), ad metoprolol.  She underwent afib ablation yesterday at [facility] [place].  After she was discharged, she felt lightheaded, weak, and sweaty.  She was walking to the restroom and felt like she wasn't going to make it there.  Also reports she could barely lift her arms.  She had a heaviness in her chest and upper back. Symptoms resolved with rest, but would reoccur with any exertion. She has had similar symptoms before, both times she was found to be in afib RVR.  In ED, EKG and tele confirmed afib RVR with rates up to 130s. HS troponins were elevated to 3420/2977.  CXR and CTA Chest were negative.          Past Medical History  She has a past medical history of Abdominal pain of multiple sites (06/22/2023), Acute sinusitis (06/22/2023), Acute upper respiratory infection, unspecified (03/14/2017), Anxiety disorder, unspecified, Biceps tendinitis of left upper extremity (06/22/2023), Burning with urination (06/22/2023), Cervical pain (06/22/2023), Cervicalgia, Encounter for screening for other viral diseases (12/14/2017), Hand numbness (12/26/2023), Morbid (severe) obesity due to excess calories (Multi), Pain in left knee, Paroxysmal SVT (supraventricular tachycardia) (CMS-HCC) (12/26/2023), Personal history of other diseases of the circulatory system, Personal history of other diseases of the musculoskeletal system and connective tissue, Personal history of other drug therapy (11/21/2019), Personal history of other endocrine, nutritional and metabolic disease, Personal history of other endocrine, nutritional and metabolic disease, Personal history of other specified conditions (04/20/2017), Pyogenic granuloma of skin and subcutaneous tissue (12/26/2023), Streptococcal pharyngitis (03/14/2017), Thoracic spine pain (12/26/2023), Unspecified abdominal hernia without  obstruction or gangrene, Unspecified asthma, uncomplicated (HHS-HCC) (2017), and Urinary tract infection, site not specified (2017).    Surgical History  She has a past surgical history that includes Cholecystectomy (2016);  section, classic (2016); Total knee arthroplasty (2016); Other surgical history (2016); Other surgical history (2022); Other surgical history (2021); and Cardiac electrophysiology procedure (N/A, 2024).     Social History  She reports that she has never smoked. She has never used smokeless tobacco. She reports that she does not drink alcohol and does not use drugs.    Family History  No family history on file.     Allergies  Procaine and Empagliflozin    Review of Systems   Constitutional:  Positive for diaphoresis.   Cardiovascular:  Positive for chest pain.   Neurological:  Positive for weakness and light-headedness.   All other systems reviewed and are negative.       Physical Exam  Vitals reviewed.   Constitutional:       General: She is not in acute distress.     Appearance: Normal appearance. She is obese. She is not ill-appearing, toxic-appearing or diaphoretic.   Eyes:      General: No scleral icterus.     Conjunctiva/sclera: Conjunctivae normal.   Cardiovascular:      Rate and Rhythm: Normal rate and regular rhythm.      Pulses: Normal pulses.      Heart sounds: Normal heart sounds. No murmur heard.  Pulmonary:      Effort: Pulmonary effort is normal.      Breath sounds: Normal breath sounds and air entry.   Abdominal:      General: Abdomen is flat. Bowel sounds are normal.      Palpations: Abdomen is soft.      Tenderness: There is no abdominal tenderness.   Musculoskeletal:         General: Normal range of motion.      Right lower leg: Edema (nonpitting) present.      Left lower leg: No edema.   Skin:     General: Skin is warm and dry.      Capillary Refill: Capillary refill takes less than 2 seconds.      Comments: RLE with  dressing from open blister.  C/D/I.  Surrounding erythema, chronic per patient.     Neurological:      General: No focal deficit present.      Mental Status: She is alert and oriented to person, place, and time.      Motor: Motor function is intact.      Coordination: Coordination is intact.   Psychiatric:         Attention and Perception: Attention normal.         Mood and Affect: Mood normal.         Speech: Speech normal.         Behavior: Behavior normal. Behavior is cooperative.          Last Recorded Vitals  /69 (BP Location: Right arm, Patient Position: Lying)   Pulse 74   Temp 36.6 °C (97.8 °F) (Oral)   Resp 18   Wt 112 kg (247 lb)   SpO2 98%     Relevant Results      Scheduled medications  apixaban, 5 mg, oral, BID  dronedarone, 400 mg, oral, BID  [Held by provider] hydroCHLOROthiazide, 25 mg, oral, q AM  insulin lispro, 0-10 Units, subcutaneous, TID  lisinopril, 20 mg, oral, BID  magnesium oxide, 400 mg, oral, Daily  metoprolol succinate XL, 100 mg, oral, BID  pantoprazole, 40 mg, oral, Daily before breakfast   Or  pantoprazole, 40 mg, intravenous, Daily before breakfast      Continuous medications     PRN medications  PRN medications: acetaminophen **OR** acetaminophen **OR** acetaminophen, dextrose, dextrose, glucagon, glucagon, ondansetron **OR** ondansetron     Results for orders placed or performed during the hospital encounter of 07/08/24 (from the past 24 hour(s))   CBC and Auto Differential   Result Value Ref Range    WBC 11.8 (H) 4.4 - 11.3 x10*3/uL    nRBC 0.0 0.0 - 0.0 /100 WBCs    RBC 4.40 4.00 - 5.20 x10*6/uL    Hemoglobin 13.5 12.0 - 16.0 g/dL    Hematocrit 41.5 36.0 - 46.0 %    MCV 94 80 - 100 fL    MCH 30.7 26.0 - 34.0 pg    MCHC 32.5 32.0 - 36.0 g/dL    RDW 13.0 11.5 - 14.5 %    Platelets 275 150 - 450 x10*3/uL    Neutrophils % 73.2 40.0 - 80.0 %    Immature Granulocytes %, Automated 0.4 0.0 - 0.9 %    Lymphocytes % 20.7 13.0 - 44.0 %    Monocytes % 5.2 2.0 - 10.0 %     Eosinophils % 0.2 0.0 - 6.0 %    Basophils % 0.3 0.0 - 2.0 %    Neutrophils Absolute 8.65 (H) 1.60 - 5.50 x10*3/uL    Immature Granulocytes Absolute, Automated 0.05 0.00 - 0.50 x10*3/uL    Lymphocytes Absolute 2.44 0.80 - 3.00 x10*3/uL    Monocytes Absolute 0.62 0.05 - 0.80 x10*3/uL    Eosinophils Absolute 0.02 0.00 - 0.40 x10*3/uL    Basophils Absolute 0.03 0.00 - 0.10 x10*3/uL   Comprehensive Metabolic Panel   Result Value Ref Range    Glucose 234 (H) 74 - 99 mg/dL    Sodium 133 (L) 136 - 145 mmol/L    Potassium 3.7 3.5 - 5.3 mmol/L    Chloride 101 98 - 107 mmol/L    Bicarbonate 21 21 - 32 mmol/L    Anion Gap 15 10 - 20 mmol/L    Urea Nitrogen 22 6 - 23 mg/dL    Creatinine 1.05 0.50 - 1.05 mg/dL    eGFR 56 (L) >60 mL/min/1.73m*2    Calcium 9.1 8.6 - 10.3 mg/dL    Albumin 3.7 3.4 - 5.0 g/dL    Alkaline Phosphatase 69 33 - 136 U/L    Total Protein 7.1 6.4 - 8.2 g/dL    AST 73 (H) 9 - 39 U/L    Bilirubin, Total 0.5 0.0 - 1.2 mg/dL    ALT 34 7 - 45 U/L   Magnesium   Result Value Ref Range    Magnesium 1.60 1.60 - 2.40 mg/dL   aPTT   Result Value Ref Range    aPTT 30 27 - 38 seconds   Protime-INR   Result Value Ref Range    Protime 13.1 (H) 9.8 - 12.8 seconds    INR 1.2 (H) 0.9 - 1.1   B-Type Natriuretic Peptide   Result Value Ref Range     (H) 0 - 99 pg/mL   Troponin I, High Sensitivity, Initial   Result Value Ref Range    Troponin I, High Sensitivity 3,420 (HH) 0 - 13 ng/L   ECG 12 lead   Result Value Ref Range    Ventricular Rate 102 BPM    Atrial Rate 102 BPM    CO Interval 208 ms    QRS Duration 74 ms    QT Interval 364 ms    QTC Calculation(Bazett) 474 ms    P Axis 60 degrees    R Axis -11 degrees    T Axis 40 degrees    QRS Count 17 beats    Q Onset 223 ms    P Onset 119 ms    P Offset 168 ms    T Offset 405 ms    QTC Fredericia 434 ms   Troponin, High Sensitivity, 1 Hour   Result Value Ref Range    Troponin I, High Sensitivity 2,977 () 0 - 13 ng/L   ECG 12 lead   Result Value Ref Range    Ventricular  Rate 135 BPM    QRS Duration 78 ms    QT Interval 310 ms    QTC Calculation(Bazett) 465 ms    R Axis -20 degrees    T Axis 150 degrees    QRS Count 22 beats    Q Onset 223 ms    T Offset 378 ms    QTC Fredericia 406 ms   ECG 12 lead   Result Value Ref Range    Ventricular Rate 95 BPM    QRS Duration 74 ms    QT Interval 362 ms    QTC Calculation(Bazett) 454 ms    R Axis -5 degrees    T Axis 51 degrees    QRS Count 16 beats    Q Onset 218 ms    T Offset 399 ms    QTC Fredericia 421 ms   CBC   Result Value Ref Range    WBC 11.5 (H) 4.4 - 11.3 x10*3/uL    nRBC 0.0 0.0 - 0.0 /100 WBCs    RBC 3.91 (L) 4.00 - 5.20 x10*6/uL    Hemoglobin 12.4 12.0 - 16.0 g/dL    Hematocrit 38.3 36.0 - 46.0 %    MCV 98 80 - 100 fL    MCH 31.7 26.0 - 34.0 pg    MCHC 32.4 32.0 - 36.0 g/dL    RDW 13.2 11.5 - 14.5 %    Platelets 227 150 - 450 x10*3/uL   Basic metabolic panel   Result Value Ref Range    Glucose 161 (H) 74 - 99 mg/dL    Sodium 133 (L) 136 - 145 mmol/L    Potassium 3.9 3.5 - 5.3 mmol/L    Chloride 101 98 - 107 mmol/L    Bicarbonate 23 21 - 32 mmol/L    Anion Gap 13 10 - 20 mmol/L    Urea Nitrogen 20 6 - 23 mg/dL    Creatinine 0.97 0.50 - 1.05 mg/dL    eGFR 61 >60 mL/min/1.73m*2    Calcium 8.8 8.6 - 10.3 mg/dL   TSH with reflex to Free T4 if abnormal   Result Value Ref Range    Thyroid Stimulating Hormone 0.68 0.44 - 3.98 mIU/L   Magnesium   Result Value Ref Range    Magnesium 1.60 1.60 - 2.40 mg/dL   POCT GLUCOSE   Result Value Ref Range    POCT Glucose 119 (H) 74 - 99 mg/dL   POCT GLUCOSE   Result Value Ref Range    POCT Glucose 137 (H) 74 - 99 mg/dL     ECG 12 lead    Result Date: 7/9/2024  Atrial fibrillation with rapid ventricular response Marked ST abnormality, possible lateral subendocardial injury Abnormal ECG When compared with ECG of 08-JUL-2024 21:51, (unconfirmed) Atrial fibrillation has replaced Sinus rhythm ST now depressed in Lateral leads T wave inversion now evident in Lateral leads    Electrophysiology  procedure    Result Date: 7/9/2024  Images from the original result were not included. Atrial Fibrillation ablation Procedures Atrial Fibrillation ablation (98611), LA Pacing and recording (98381), 3D Mapping (19422), Intracardiac Echocardiogram (14095), Transseptal Catheterization (91396), Ultrasound Guided vascular access (73925),Additional Afib Ablation (32232) 3D Mapping (77320) Transseptal Catheterization (98019) Ultrasound Guided vascular access (42744) Atrial Fibrillation ablation (79927) Patient history: Please refer to the detailed history and physical on the patient's medical chart. Procedure narrative: The patient was in the fasting state. A baseline ECG was recorded and showed Sinus rhythm. The patient was set up for continuous monitoring of surface 12 lead ECG and pulse oximetry. Blood pressure was monitored with automatic cuff measurements. Bilateral groins were clipped, prepped with chlorhexidine, and draped in the usual sterile fashion. The procedure was performed under general anesthesia (administered and monitored by anesthesia attending and CRNA). Anesthesia sedated and intubated the patient without any acute complications.  Radial arterial line was placed for continuous hemodynamic monitoring. The right femoral vein was accessed x 3 using the modified Seldinger technique. 3 sheaths were inserted. The right common femoral vein was evaluated with ultrasound, it was normal in size and anatomy.  The vein was accessed using ultrasound guidance and a 18G Cook needle, with direct visualization of the needle at all times. Over the guidewire an 8F, 8.5F and 8.5F sheaths were inserted into the right femoral vein.   Through right femoral venous access a Octapolar catheter was introduced and placed into the distal coronary sinus. The right femoral 8.5F vein access was then switched to a deflectable sheath Medium [place] deflectable sheath Under fluoroscopic guidance a intracardiac echocardiogram catheter was  introduced into the right atrium.  The right atrium, left atrium, left atrial appendage, RV were evaluated.  No obvious structural abnormalities were noted.  Initial imaging revealed No pericardial effusion.  15,000 units of heparin were given and a drip at 1,500 units/hr was initiated.  ACT was monitored and recorded at regular intervals throughout the case. Please see case log for ACT details.  Transseptal catheterization: Under fluoroscopic and intracardiac echo guidance Hart[place] needle was introduced via the Medium [place] deflectable sheath and  single transseptal catheterization was performed. The successful single-septostomy site was dilated to allow the sheath to be placed into the left atrium.   The deflectable sheath was connected to a pressurized bag of heparinized saline with slow continuous infusion. 3D mapping: A 3D shell using the 3D electroanatomic mapping was made with the mapping catheter (Spaseebo).  Areas of fractionated signals, low voltage and Anatomy/OS of pulmonary veins were marked. There were low voltages (scar) at the anterior wall. Posterior wall was normal. The voltage was assessed in sinus rhythm. At this point the deflectable sheath was brought back into the left atrium and the high-definition mapping catheter was removed.  The ablation catheter ST/SF RF was then introduced via the sheath into the left atrium.  Ablation: During ablation esophageal temperature monitoring was utilized throughout the procedure. No significant change in esophageal temperature was noted. For majority of the lesions high power short duration lesions were performed with power of 30-40W and 10-15s lesions along posterior wall and roof, and 10-20s lesions along the anterior wall. Lesions were tagged using Impedance change and target impedance drop for a abation lesion was atleast 10ohms. Distal local EGMs were also noted to be attenuated after each lesion application. First pass isolation of PVs: Yes Entrance and  exit block was confirmed by pacing from CS catheter and ablation catheter within the veins. Catheter and sheath were pulled back to the right atrium. RFA Atrial Fibrillation lesions performed: Left Pulmonary Veins: WACA with Amira line Right Pulmonary Veins: WACA with Amira line Roof Line: Olga's bundle was isolated with an ablation line from the middle of the first anterior line up to the roof. Posterior Wall Isolation: Not indicated. Anterior Mitral Line: Scar at the anterior wall was isolated with several anterior lines. The first anterior line started at the right superior pulmonary vein and extended down to the anterior mitral valve annulus. The second anterior line started at the left superior pulmonary vein and extended down to the anterior mitral valve annulus.  Between the abovementioned two anterior lines, additional ablation lines were performed to homogenize anterior wall scar tissue. Pacing confirmed exit block and complete isolation of the anterior wall. Lateral Mitral Line: Not indicated. At this time all catheters were pulled back into the IVC. 30 mg protamine was given.  ACT was maintained with a combination of heparin boluses +/- infusion. End-of-case: We made a final evaluation for pericardial effusion using the intracardiac echocardiogram catheter.  No pericardial effusion was noted at the end of the procedure, which was noted to be unchanged from initial imaging. Sheaths were removed and hemostasis was obtained at the right femoral venous access sites with Vascade MVP. 10-15 mins of manual compression was applied to maintain hemostasis. At the end of the case pt was successfully extubated. Pt was able to move all 4 extremities spontaneously and follow commands. Pt was moved to PACU/holding for recovery. Summary: Acutely successful radiofrequency ablation for Atrial fibrillation with PVI and adjunctive lines. Recommendation: Tentatively patient will be discharged Today, following bed rest  and subsequent ambulation, provided the recovery parameters are appropriate. Resume AC Apixaban 5mg BID. Please DO NOT hold blood thinner unless emergency without asking your doctor. Bedrest for 3 hours post sheath removal Start Protonix 40mg BID x30 days Resume rest of home medications Patient Instructions: No driving, alcohol or making legal decisions for 24 hours. Remove band-aid/tegaderm in 1 day. No heavy lifting, strenuous exercise for 1 week Please call our office if you notice any groin discharge, swelling or fever. For cardiology electrophysiology emergencies (such as severe heart racing, bleeding, severe groin pain/swelling, high fever, wound discharge, stroke symptoms, or recent severe chest pain), please seek immediate medical attention. See complete procedural log and parameters.     ECG 12 lead    Result Date: 7/9/2024  Sinus tachycardia Otherwise normal ECG When compared with ECG of 08-JUL-2024 20:16, (unconfirmed) Sinus rhythm has replaced Atrial fibrillation    ECG 12 lead    Result Date: 7/9/2024  Atrial fibrillation Cannot rule out Anterior infarct (cited on or before 29-APR-2024) Abnormal ECG When compared with ECG of 29-APR-2024 07:33, Atrial fibrillation has replaced Sinus rhythm Questionable change in initial forces of Septal leads    CT angio chest for pulmonary embolism    Result Date: 7/9/2024  Interpreted By:  Jose Bustillo, STUDY: CT ANGIO CHEST FOR PULMONARY EMBOLISM;  7/8/2024 11:59 pm   INDICATION: Signs/Symptoms:ablation today off eliquis hx dvt.   COMPARISON: None.   ACCESSION NUMBER(S): [id number]   ORDERING CLINICIAN: ROBERTA CARTER   TECHNIQUE: Contiguous axial images of the chest were obtained after the intravenous administration of  contrast. Coronal and sagittal reformatted images were obtained from the axial images.  In addition, dual energy reconstructions were also performed including low energy mono-energetic images and iodine density maps.   FINDINGS: The examination  is limited secondary to patient body habitus and motion.   No axillary, mediastinal, or hilar lymphadenopathy.   The heart is normal in size. Coronary artery atherosclerotic calcifications. No significant pericardial effusion. No evidence of acute central, main, lobar or proximal segmental pulmonary embolism. Evaluation for more distal emboli is limited secondary to patient respiratory motion and discussed limitations of the examination.   Mild bilateral facet atelectasis. No significant pleural effusion. No pneumothorax.   Limited evaluation of the upper abdomen. Hepatic steatosis and postsurgical change of cholecystectomy.   Fatty atrophy of the pancreas.   Multilevel degenerative change of the thoracic spine.       No evidence of acute pulmonary embolism.   No evidence of pneumonia.   MACRO: None   Signed by: Jose Bustillo 7/9/2024 1:10 AM Dictation workstation:   [id number]    XR chest 1 view    Result Date: 7/8/2024  Interpreted By:  Roberto Lopez, STUDY: XR CHEST 1 VIEW;  7/8/2024 10:00 pm   INDICATION: Signs/Symptoms:Chest Pain.   COMPARISON: CXR 03/28/2024   ACCESSION NUMBER(S): [id number]   ORDERING CLINICIAN: ROBERTA CARTER   FINDINGS:     CARDIOMEDIASTINAL SILHOUETTE: Cardiomediastinal silhouette is normal in size and configuration.   LUNGS: No pulmonary consolidation, pleural effusion or pneumothorax.   ABDOMEN: No remarkable upper abdominal findings.   BONES: No acute osseous abnormality.       No acute cardiopulmonary process.   MACRO: None   Signed by: Roberto Lopez 7/8/2024 10:55 PM Dictation workstation:   [id number]       Assessment/Plan     Debbie Rees is a 74 y.o. female with PMH HTN, HCM, paroxysmal atrial fibrillation (on eliquis, multaq (diarrhea with amiodarone), ad metoprolol.  She underwent afib ablation yesterday at [facility] [place].  After she was discharged, she felt lightheaded, weak, and sweaty.  She was walking to the restroom and felt like she wasn't going to make it there.  Also  reports she could barely lift her arms.  She had a heaviness in her chest and upper back. Symptoms resolved with rest, but would reoccur with any exertion. She has had similar symptoms before, both times she was found to be in afib RVR.  In ED, EKG and tele confirmed afib RVR with rates up to 130s. HS troponins were elevated to 3420/2977.  CXR and CTA Chest were negative.     Paroxysmal Atrial fibrillation with RVR  Troponin elevation  - resolved with IV metoprolol and IV cardizem. Telemetry currently showing NSR   - cardiology consult   - continue home dronedarone, metoprolol, eliquis   - troponin elevation likely related to ablation, afib RVR. Per cardiology not consistent with ACS       HTN   - stable.  Continue home regimen     VTE/GI Prophylaxis   - therapeutic AC on eliquis   - bowel regimen in place     Discharge Planning   - plan to discharge home when medically stable          Rosanna Rashid, LEAH-CNP

## 2024-07-09 NOTE — DISCHARGE SUMMARY
Discharge Diagnosis  Atrial fibrillation with RVR (Multi)    Issues Requiring Follow-Up  Cardiology EP follow up    Discharge Meds     Your medication list        START taking these medications        Instructions Last Dose Given Next Dose Due   metoprolol tartrate 25 mg tablet  Commonly known as: Lopressor      Take 1 tablet (25 mg) by mouth if needed (for recurrent palpitations) for up to 30 doses.              CONTINUE taking these medications        Instructions Last Dose Given Next Dose Due   apixaban 5 mg tablet  Commonly known as: Eliquis      Take 1 tablet (5 mg) by mouth 2 times a day.       hydroCHLOROthiazide 25 mg tablet  Commonly known as: HYDRODiuril           lisinopril 20 mg tablet      TAKE ONE TABLET BY MOUTH TWO TIMES A DAY       magnesium oxide 400 mg tablet  Commonly known as: Mag-Ox           metoprolol succinate  mg 24 hr tablet  Commonly known as: Toprol-XL      Take 1 tablet (100 mg) by mouth 2 times a day. Do not crush or chew.       Multaq 400 mg tablet  Generic drug: dronedarone           pantoprazole 40 mg EC tablet  Commonly known as: ProtoNix      Take 1 tablet (40 mg) by mouth 2 times a day before meals. Do not crush, chew, or split.       semaglutide 1 mg/dose (4 mg/3 mL) pen injector  Commonly known as: OZEMPIC      Inject 1 mg under the skin 1 (one) time per week.                 Where to Get Your Medications        These medications were sent to [facility]  [address], [place] [address]      Hours: 8 AM to 6 PM Mon-Fri, 9 AM to 1 PM Saturday Phone: [phone]   metoprolol tartrate 25 mg tablet         Test Results Pending At Discharge  Pending Labs       No current pending labs.            Hospital Course    Debbie Rees is a 74 y.o. female with PMH HTN, HCM, paroxysmal atrial fibrillation (on eliquis, multaq (diarrhea with amiodarone), ad metoprolol.  She underwent afib ablation yesterday at [facility].  After she was discharged, she felt  lightheaded, weak, and sweaty.  She was walking to the restroom and felt like she wasn't going to make it there.  Also reports she could barely lift her arms.  She had a heaviness in her chest and upper back. Symptoms resolved with rest, but would reoccur with any exertion. She has had similar symptoms before, both times she was found to be in afib RVR.  In ED, EKG and tele confirmed afib RVR with rates up to 130s. HS troponins were elevated to 3420/2977.  CXR and CTA Chest were negative.      Paroxysmal Atrial fibrillation with RVR  Troponin elevation  - resolved with IV metoprolol and IV cardizem. Telemetry while admitted in observation showing NSR   - seen and evaluated by cardiology   - continue home dronedarone, metoprolol, eliquis   - additional 25 mg metoprolol added as needed for palpations   - troponin elevation likely related to ablation, afib RVR. Per cardiology not consistent with ACS      HTN   - stable.  Continue home regimen      VTE/GI Prophylaxis   - therapeutic AC on eliquis   - bowel regimen in place      Discharge Planning   - Medically stable for discharge   - follow up with PCP and Cardiology EP        Pertinent Physical Exam At Time of Discharge  Physical Exam    Constitutional:       General: She is not in acute distress.     Appearance: Normal appearance. She is obese. She is not ill-appearing, toxic-appearing or diaphoretic.   Eyes:      General: No scleral icterus.     Conjunctiva/sclera: Conjunctivae normal.   Cardiovascular:      Rate and Rhythm: Normal rate and regular rhythm.      Pulses: Normal pulses.      Heart sounds: Normal heart sounds. No murmur heard.  Pulmonary:      Effort: Pulmonary effort is normal.      Breath sounds: Normal breath sounds and air entry.   Abdominal:      General: Abdomen is flat. Bowel sounds are normal.      Palpations: Abdomen is soft.      Tenderness: There is no abdominal tenderness.   Musculoskeletal:         General: Normal range of motion.       Right lower leg: Edema (nonpitting) present.      Left lower leg: No edema.   Skin:     General: Skin is warm and dry.      Capillary Refill: Capillary refill takes less than 2 seconds.      Comments: RLE with dressing from open blister.  C/D/I.  Surrounding erythema, chronic per patient.     Neurological:      General: No focal deficit present.      Mental Status: She is alert and oriented to person, place, and time.      Motor: Motor function is intact.      Coordination: Coordination is intact.   Psychiatric:         Attention and Perception: Attention normal.         Mood and Affect: Mood normal.         Speech: Speech normal.         Behavior: Behavior normal. Behavior is cooperative.     Outpatient Follow-Up  Future Appointments   Date Time Provider Department Center   8/2/2024 10:00 AM Jarrell MACHADO DO [id number] Schmidt-CNP

## 2024-07-09 NOTE — PROGRESS NOTES
Pharmacy Medication History Review    Debbie Rees is a 74 y.o. female admitted for Atrial fibrillation with RVR (Multi). Pharmacy reviewed the patient's [id number] medications and allergies for accuracy.    Below are additional concerns with the patient's PTA list.  Collected from patient.     The list below reflectives the updated PTA list. Please review each medication in order reconciliation for additional clarification and justification.    Prior to Admission Medications   Prescriptions   apixaban (Eliquis) 5 mg tablet   Sig: Take 1 tablet (5 mg) by mouth 2 times a day.   dronedarone (Multaq) 400 mg tablet   Sig: Take 1 tablet (400 mg) by mouth 2 times daily (morning and late afternoon).   hydroCHLOROthiazide (HYDRODiuril) 25 mg tablet   Sig: Take 1 tablet (25 mg) by mouth once daily in the morning.   lisinopril 20 mg tablet   Sig: TAKE ONE TABLET BY MOUTH TWO TIMES A DAY   magnesium oxide (Mag-Ox) 400 mg tablet   Sig: Take 1 tablet (400 mg) by mouth once daily.   metoprolol succinate XL (Toprol-XL) 100 mg 24 hr tablet   Sig: Take 1 tablet (100 mg) by mouth 2 times a day. Do not crush or chew.   pantoprazole (ProtoNix) 40 mg EC tablet   Sig: Take 1 tablet (40 mg) by mouth 2 times a day before meals. Do not crush, chew, or split.   semaglutide (OZEMPIC) 1 mg/dose (4 mg/3 mL) pen injector   Sig: Inject 1 mg under the skin 1 (one) time per week.      Facility-Administered Medications: None          Candis Jones, PharmD

## 2024-07-09 NOTE — CONSULTS
Inpatient consult to Cardiology  Consult performed by: Peterson Maddox, APRN-CNP  Consult ordered by: James Maldonado DO  Reason for consult: Afib with RVR      History Of Present Illness:    Debbie Rees is a 74 y.o. female with past medical history significant for Paroxysmal atrial fibrillation on Multaq and Eliquis status post DCCV 4/29/2024 and RFA yesterday  7/8/2024 with Dr. Sommers, Hypertrophic obstructive cardiomyopathy, Hypertension, Diabetes currently on Ozempic, PVD, Venous insufficiency status post EVLA. Presented with dizziness and lightheadedness.  Cardiology is consulted for afib RVR.     Patient underwent successful A-fib RFA with Dr. Sommers of yesterday.  She completed postprocedure protocol and subsequently discharged home.  When she was leaving the building she developed palpitations and lightheadedness. States she felt like she was in afib.  Per documentation, vital signs remained stable and she was sinus on the monitor.  She was observed for 45 minutes and continued to have lightheadedness.  He was referred to the emergency room for further management. Patient report she continue to feel palpations which were associated with chest pressure and lightheadness.  Chest pressure was somewhat pleuritic.  She initially went to ED at [facility] however due to long wait times she opted to present to [facility] for further management.    Initial ECG shows sinus rhythm frequent PACs heart rate 95.  Repeat ECG showed A-fib RVR heart rate 135 with lateral ST depression chest x-ray showed no acute cardiopulmonary process. High sensitivity troponin 3420/2977 K 3.7  Mag 1.6 BUN 22 creatinine 1.05  WBC 11.8 hemoglobin 13.5 hematocrit 41.5.  Initial vital signs temp 36.8 heart rate 98 RR 20 /83 pulse ox 98% on room air.  She was treated with metoprolol 5 mg IV push x 2, Zofran 4 mg IV push x 1 normal saline 1 L bolus.  She was briefly on diltiazem drip which was discontinued at 3 AM.       Home CV  "meds  Hydrochlorothiazide 25 mg daily  Lisinopril 20 mg BID   Metoprolol succinate 100 mg daily  Eliquis 5 mg BID  Multaq 400 mg BID      Last Recorded Vitals:  Vitals:    07/09/24 0300 07/09/24 0330 07/09/24 0400 07/09/24 0600   BP: 106/67 113/59 103/55 117/66   Patient Position:    Lying   Pulse: 74 70 69 83   Resp: 14 12 12 18   Temp:    36.3 °C (97.3 °F)   TempSrc:    Temporal   SpO2: 99% 98% 99% 98%   Weight:    112 kg (247 lb)   Height:    1.67 m (5' 5.75\")       Last Labs:  LABS:  CMP:  Results from last 7 days   Lab Units 07/09/24  0526 07/08/24 2141 07/05/24  1033   SODIUM mmol/L 133* 133* 137   POTASSIUM mmol/L 3.9 3.7 4.3   CHLORIDE mmol/L 101 101 104   CO2 mmol/L 23 21 25   ANION GAP mmol/L 13 15 12   BUN mg/dL 20 22 26*   CREATININE mg/dL 0.97 1.05 1.05   EGFR mL/min/1.73m*2 61 56* 56*   MAGNESIUM mg/dL  --  1.60  --    ALBUMIN g/dL  --  3.7  --    ALT U/L  --  34  --    AST U/L  --  73*  --    BILIRUBIN TOTAL mg/dL  --  0.5  --      CBC:  Results from last 7 days   Lab Units 07/09/24 0526 07/08/24 2141 07/05/24  1033   WBC AUTO x10*3/uL 11.5* 11.8* 8.1   HEMOGLOBIN g/dL 12.4 13.5 12.6   HEMATOCRIT % 38.3 41.5 38.8   PLATELETS AUTO x10*3/uL 227 275 276   MCV fL 98 94 95     COAG:   Results from last 7 days   Lab Units 07/08/24 2141   INR  1.2*     ABO: No results found for: \"ABO\"  HEME/ENDO:     CARDIAC:   Results from last 7 days   Lab Units 07/08/24 2241 07/08/24  2141   TROPHS ng/L 2,977* 3,420*   BNP pg/mL  --  150*     Recent Labs     03/28/24  1501 09/22/23  1123 03/02/22  1008 03/22/21  0931   CHOL 144 149 178 167   LDLF  --  91 100* 102*   HDL 35.0 40.1 45.8 38.7*   TRIG  --  88 161* 134        Imagine Results  CT angio chest for pulmonary embolism   Final Result   No evidence of acute pulmonary embolism.        No evidence of pneumonia.        MACRO:   None        Signed by: Jose Bustillo 7/9/2024 1:10 AM   Dictation workstation:   [id number]      XR chest 1 view   Final Result   No acute " cardiopulmonary process.        MACRO:   None        Signed by: Roberto Lopez 7/8/2024 10:55 PM   Dictation workstation:   [id number]         Last I/O:  I/O last 3 completed shifts:  In: 360.2 (3.2 mL/kg) [P.O.:240; I.V.:120.2 (1.1 mL/kg)]  Out: - (0 mL/kg)   Weight: 112 kg     Past Cardiology Tests (Last 3 Years):  EKG:    ECG 12 Lead 7/8/2024 23:17        ECG 12 Lead 7/8/2024 20:16        Echo:  Transthoracic Echo (TTE) Complete 03/29/2024  1. Left ventricular systolic function is hyperdynamic with a 65-70% estimated ejection fraction.    Echocardiogram 5/25/2022- -  LVEF >65% with nrl size and function.  LVH, mild LAE.         Echocardiogram 5/2019    Ejection Fractions:  EF   Date/Time Value Ref Range Status   03/29/2024 11:05 AM 68 %      Cath:  No results found for this or any previous visit from the past 1095 days.    Stress Test:  01/08/24:  Stress test:  Perfusion Findings:  SPECT stress imaging showed mildly reduced radiotracer uptake in the distal anterior wall and apex.  There was no significant redistribution at rest.  Gated imaging showed normal myocardial thickening suggesting attenuation as the cause of reduced uptake.  LV Function Findings:                LVEF: 56%              Global Function: Normal               LV Volume: Normal               Regional Function: Normal   Summary:                1.  No chest discomfort or ischemic EKG changes with pharmacological stress.              2.  Normal left ventricular systolic function.              3.  No perfusion evidence of scar, or focal ischemia.              4.  Mild resting systolic and diastolic HTN    Cardiac Imaging:  No results found for this or any previous visit from the past 1095 days.      Past Medical History:  She has a past medical history of Abdominal pain of multiple sites (06/22/2023), Acute sinusitis (06/22/2023), Acute upper respiratory infection, unspecified (03/14/2017), Anxiety disorder, unspecified, Biceps tendinitis of left  upper extremity (2023), Burning with urination (2023), Cervical pain (2023), Cervicalgia, Encounter for screening for other viral diseases (2017), Hand numbness (2023), Morbid (severe) obesity due to excess calories (Multi), Pain in left knee, Paroxysmal SVT (supraventricular tachycardia) (CMS-HCC) (2023), Personal history of other diseases of the circulatory system, Personal history of other diseases of the musculoskeletal system and connective tissue, Personal history of other drug therapy (2019), Personal history of other endocrine, nutritional and metabolic disease, Personal history of other endocrine, nutritional and metabolic disease, Personal history of other specified conditions (2017), Pyogenic granuloma of skin and subcutaneous tissue (2023), Streptococcal pharyngitis (2017), Thoracic spine pain (2023), Unspecified abdominal hernia without obstruction or gangrene, Unspecified asthma, uncomplicated ([facility]) (2017), and Urinary tract infection, site not specified (2017).    Past Surgical History:  She has a past surgical history that includes Cholecystectomy (2016);  section, classic (2016); Total knee arthroplasty (2016); Other surgical history (2016); Other surgical history (2022); and Other surgical history (2021).      Social History:  She reports that she has never smoked. She has never used smokeless tobacco. She reports that she does not drink alcohol and does not use drugs.    Family History:  No family history on file.     Allergies:  Procaine and Empagliflozin    Inpatient Medications:  Scheduled medications   Medication Dose Route Frequency    apixaban  5 mg oral BID    dronedarone  400 mg oral BID    [Held by provider] hydroCHLOROthiazide  25 mg oral q AM    lisinopril  20 mg oral BID    magnesium oxide  400 mg oral Daily    magnesium sulfate  2 g intravenous Once     metoprolol succinate XL  100 mg oral BID    metoprolol succinate XL  100 mg oral BID    pantoprazole  40 mg oral Daily before breakfast    Or    pantoprazole  40 mg intravenous Daily before breakfast     PRN medications   Medication    acetaminophen    Or    acetaminophen    Or    acetaminophen    ondansetron    Or    ondansetron     Continuous Medications   Medication Dose Last Rate     Outpatient Medications:  Current Outpatient Medications   Medication Instructions    amoxicillin-pot clavulanate (Augmentin) 875-125 mg tablet TAKE ONE TABLET BY MOUTH TWO TIMES A DAY FOR 7 DAYS    apixaban (ELIQUIS) 5 mg, oral, 2 times daily    dronedarone (MULTAQ) 400 mg, oral, 2 times daily (morning and late afternoon)    hydroCHLOROthiazide (HYDRODiuril) 25 mg tablet Take 1 tablet (25 mg) by mouth once daily in the morning.    lisinopril 20 mg tablet TAKE ONE TABLET BY MOUTH TWO TIMES A DAY    magnesium oxide (MAG-OX) 400 mg, oral, Daily    metoprolol succinate XL (TOPROL-XL) 100 mg, oral, 2 times daily, Do not crush or chew.    pantoprazole (PROTONIX) 40 mg, oral, 2 times daily before meals, Do not crush, chew, or split.    semaglutide (OZEMPIC) 1 mg, subcutaneous, Once Weekly       Physical Exam:  GENERAL: alert, cooperative, pleasant, in no acute distress  SKIN: warm, dry  NECK: no JVD  CARDIAC: Regular rate and rhythm no murmurs  PULMONARY: Normal respiratory efforts, lungs clear to auscultation bilaterally.  ABDOMEN: soft, nondistended  EXTREMITIES: Right lower extremity with wound wrapped  NEURO: Alert and oriented x 3.  Grossly normal.  Moves all 4 extremities.      Assessment/Plan   Debbie Rees is a 74 y.o. female with past medical history significant for Paroxysmal atrial fibrillation on Multaq and Eliquis status post DCCV 4/29/2024 and RFA yesterday  7/8/2024 with Dr. Sommers, Hypertrophic obstructive cardiomyopathy, Hypertension, Diabetes currently on Ozempic, PVD, Venous insufficiency status post EVLA. Presented  with dizziness and lightheadedness.  Cardiology is consulted for afib RVR.     Paroxysmal atrial flutter with RVR  ECG with paroxysmal a flutter with RVR.  Treated with IV Metoprolol  and IV Cardizem.  She is currently sinus rhythm.  -Recommend adding PRN metoprolol for recurrent a flutter  -Continue metoprolol succinate 100 mg daily  -Continue Multaq 400 mg twice daily  -Continue Eliquis 5 mg twice daily    2.  Paroxysmal atrial fibrillation  Status post DCCV and RFA yesterday 7/8/2024  Management as #1  Continue Protonix x 1 month    3.  Hypertension-BP acceptable    4.  Hypertrophic obstructive cardiomyopathy  -Follows with cardiology Dr. Rodriguez     5. Elevated troponin  In setting of recent RFA  Non-MI troponin elevation / acute non-traumatic myocardial injury. Core measures do not apply.     Recommendations  Metoprolol tartrate 25 mg as needed for recurrent palpitations  Continue other CV meds as above    Follow with Dr. Sommers 6 weeks post RFA or sooner       Code Status:  Full Code      Peterson Maddox, APRN-CNP

## 2024-07-09 NOTE — PROGRESS NOTES
07/09/24 0904   Discharge Planning   Living Arrangements Spouse/significant other   Support Systems Spouse/significant other   Assistance Needed none   Type of Residence Private residence   Number of Stairs to Enter Residence 5   Number of Stairs Within Residence 0   Do you have animals or pets at home? No   Who is requesting discharge planning? Provider   Home or Post Acute Services None   Patient expects to be discharged to: home   Does the patient need discharge transport arranged? No   Financial Resource Strain   How hard is it for you to pay for the very basics like food, housing, medical care, and heating? Not hard   Housing Stability   In the last 12 months, was there a time when you were not able to pay the mortgage or rent on time? N   In the last 12 months, how many places have you lived? 1   In the last 12 months, was there a time when you did not have a steady place to sleep or slept in a shelter (including now)? N   Transportation Needs   In the past 12 months, has lack of transportation kept you from medical appointments or from getting medications? no   In the past 12 months, has lack of transportation kept you from meetings, work, or from getting things needed for daily living? No     I met with patient and family bedside and explained tcc role. She lives in a house with her , has a cane she uses when outside, no issues with transportation. POD 1 ablation. She was discharged yesterday and became lightheaded when leaving, went back to ED. Denies any hhc needs at this time.

## 2024-07-09 NOTE — CARE PLAN
Problem: Pain - Adult  Goal: Verbalizes/displays adequate comfort level or baseline comfort level  Outcome: Progressing     Problem: Safety - Adult  Goal: Free from fall injury  Outcome: Progressing     Problem: Discharge Planning  Goal: Discharge to home or other facility with appropriate resources  Outcome: Progressing     Problem: Chronic Conditions and Co-morbidities  Goal: Patient's chronic conditions and co-morbidity symptoms are monitored and maintained or improved  Outcome: Progressing     Problem: Diabetes  Goal: Achieve decreasing blood glucose levels by end of shift  Outcome: Progressing  Goal: Increase stability of blood glucose readings by end of shift  Outcome: Progressing  Goal: Decrease in ketones present in urine by end of shift  Outcome: Progressing  Goal: Maintain electrolyte levels within acceptable range throughout shift  Outcome: Progressing  Goal: Maintain glucose levels >70mg/dl to <250mg/dl throughout shift  Outcome: Progressing  Goal: No changes in neurological exam by end of shift  Outcome: Progressing  Goal: Learn about and adhere to nutrition recommendations by end of shift  Outcome: Progressing  Goal: Vital signs within normal range for age by end of shift  Outcome: Progressing  Goal: Increase self care and/or family involovement by end of shift  Outcome: Progressing  Goal: Receive DSME education by end of shift  Outcome: Progressing     Problem: Pain  Goal: Takes deep breaths with improved pain control throughout the shift  Outcome: Progressing  Goal: Turns in bed with improved pain control throughout the shift  Outcome: Progressing  Goal: Walks with improved pain control throughout the shift  Outcome: Progressing  Goal: Performs ADL's with improved pain control throughout shift  Outcome: Progressing  Goal: Participates in PT with improved pain control throughout the shift  Outcome: Progressing  Goal: Free from opioid side effects throughout the shift  Outcome: Progressing  Goal:  Free from acute confusion related to pain meds throughout the shift  Outcome: Progressing     Problem: Fall/Injury  Goal: Not fall by end of shift  Outcome: Progressing  Goal: Be free from injury by end of the shift  Outcome: Progressing  Goal: Verbalize understanding of personal risk factors for fall in the hospital  Outcome: Progressing  Goal: Verbalize understanding of risk factor reduction measures to prevent injury from fall in the home  Outcome: Progressing  Goal: Use assistive devices by end of the shift  Outcome: Progressing  Goal: Pace activities to prevent fatigue by end of the shift  Outcome: Progressing   The patient's goals for the shift include      The clinical goals for the shift include to decrease palpitations

## 2024-07-11 ENCOUNTER — HOSPITAL ENCOUNTER (INPATIENT)
Facility: HOSPITAL | Age: 74
LOS: 1 days | Discharge: HOME | End: 2024-07-12
Attending: EMERGENCY MEDICINE | Admitting: INTERNAL MEDICINE
Payer: MEDICARE

## 2024-07-11 ENCOUNTER — APPOINTMENT (OUTPATIENT)
Dept: CARDIOLOGY | Facility: HOSPITAL | Age: 74
End: 2024-07-11
Payer: MEDICARE

## 2024-07-11 ENCOUNTER — APPOINTMENT (OUTPATIENT)
Dept: RADIOLOGY | Facility: HOSPITAL | Age: 74
End: 2024-07-11
Payer: MEDICARE

## 2024-07-11 DIAGNOSIS — I47.10 SVT (SUPRAVENTRICULAR TACHYCARDIA) (CMS-HCC): Primary | ICD-10-CM

## 2024-07-11 DIAGNOSIS — I48.91 ATRIAL FIBRILLATION WITH RVR (MULTI): ICD-10-CM

## 2024-07-11 DIAGNOSIS — I48.0 PAROXYSMAL ATRIAL FIBRILLATION (MULTI): ICD-10-CM

## 2024-07-11 LAB
ANION GAP SERPL CALC-SCNC: 13 MMOL/L (ref 10–20)
AORTIC VALVE MEAN GRADIENT: 7 MMHG
AORTIC VALVE PEAK VELOCITY: 1.63 M/S
ATRIAL RATE: 74 BPM
AV PEAK GRADIENT: 10.6 MMHG
AVA (PEAK VEL): 2.62 CM2
AVA (VTI): 2.79 CM2
BASOPHILS # BLD AUTO: 0.05 X10*3/UL (ref 0–0.1)
BASOPHILS NFR BLD AUTO: 0.5 %
BUN SERPL-MCNC: 19 MG/DL (ref 6–23)
CALCIUM SERPL-MCNC: 9.4 MG/DL (ref 8.6–10.3)
CARDIAC TROPONIN I PNL SERPL HS: 455 NG/L (ref 0–13)
CARDIAC TROPONIN I PNL SERPL HS: 500 NG/L (ref 0–13)
CHLORIDE SERPL-SCNC: 102 MMOL/L (ref 98–107)
CO2 SERPL-SCNC: 24 MMOL/L (ref 21–32)
CREAT SERPL-MCNC: 1.05 MG/DL (ref 0.5–1.05)
EGFRCR SERPLBLD CKD-EPI 2021: 56 ML/MIN/1.73M*2
EJECTION FRACTION APICAL 4 CHAMBER: 61.2
EJECTION FRACTION: 70 %
EOSINOPHIL # BLD AUTO: 0.14 X10*3/UL (ref 0–0.4)
EOSINOPHIL NFR BLD AUTO: 1.4 %
ERYTHROCYTE [DISTWIDTH] IN BLOOD BY AUTOMATED COUNT: 13.2 % (ref 11.5–14.5)
GLUCOSE BLD MANUAL STRIP-MCNC: 122 MG/DL (ref 74–99)
GLUCOSE BLD MANUAL STRIP-MCNC: 124 MG/DL (ref 74–99)
GLUCOSE BLD MANUAL STRIP-MCNC: 175 MG/DL (ref 74–99)
GLUCOSE SERPL-MCNC: 214 MG/DL (ref 74–99)
HCT VFR BLD AUTO: 40.3 % (ref 36–46)
HGB BLD-MCNC: 12.7 G/DL (ref 12–16)
HOLD SPECIMEN: NORMAL
IMM GRANULOCYTES # BLD AUTO: 0.04 X10*3/UL (ref 0–0.5)
IMM GRANULOCYTES NFR BLD AUTO: 0.4 % (ref 0–0.9)
LACTATE SERPL-SCNC: 1.3 MMOL/L (ref 0.4–2)
LEFT ATRIUM VOLUME AREA LENGTH INDEX BSA: 32.3 ML/M2
LEFT VENTRICLE INTERNAL DIMENSION DIASTOLE: 3.32 CM (ref 3.5–6)
LEFT VENTRICULAR OUTFLOW TRACT DIAMETER: 2 CM
LYMPHOCYTES # BLD AUTO: 2.12 X10*3/UL (ref 0.8–3)
LYMPHOCYTES NFR BLD AUTO: 20.7 %
MAGNESIUM SERPL-MCNC: 1.62 MG/DL (ref 1.6–2.4)
MCH RBC QN AUTO: 30.7 PG (ref 26–34)
MCHC RBC AUTO-ENTMCNC: 31.5 G/DL (ref 32–36)
MCV RBC AUTO: 97 FL (ref 80–100)
MITRAL VALVE E/A RATIO: 1.07
MONOCYTES # BLD AUTO: 0.59 X10*3/UL (ref 0.05–0.8)
MONOCYTES NFR BLD AUTO: 5.8 %
NEUTROPHILS # BLD AUTO: 7.3 X10*3/UL (ref 1.6–5.5)
NEUTROPHILS NFR BLD AUTO: 71.2 %
NRBC BLD-RTO: 0 /100 WBCS (ref 0–0)
P AXIS: 70 DEGREES
P OFFSET: 165 MS
P ONSET: 115 MS
PLATELET # BLD AUTO: 252 X10*3/UL (ref 150–450)
POTASSIUM SERPL-SCNC: 4.1 MMOL/L (ref 3.5–5.3)
PR INTERVAL: 210 MS
Q ONSET: 218 MS
Q ONSET: 219 MS
Q ONSET: 220 MS
QRS COUNT: 13 BEATS
QRS COUNT: 21 BEATS
QRS COUNT: 23 BEATS
QRS DURATION: 76 MS
QRS DURATION: 76 MS
QRS DURATION: 78 MS
QT INTERVAL: 294 MS
QT INTERVAL: 304 MS
QT INTERVAL: 386 MS
QTC CALCULATION(BAZETT): 428 MS
QTC CALCULATION(BAZETT): 448 MS
QTC CALCULATION(BAZETT): 450 MS
QTC FREDERICIA: 390 MS
QTC FREDERICIA: 395 MS
QTC FREDERICIA: 414 MS
R AXIS: -10 DEGREES
R AXIS: -20 DEGREES
R AXIS: -20 DEGREES
RBC # BLD AUTO: 4.14 X10*6/UL (ref 4–5.2)
RIGHT VENTRICLE FREE WALL PEAK S': 17.1 CM/S
RIGHT VENTRICLE PEAK SYSTOLIC PRESSURE: 34.8 MMHG
SODIUM SERPL-SCNC: 135 MMOL/L (ref 136–145)
T AXIS: 46 DEGREES
T AXIS: 68 DEGREES
T AXIS: 88 DEGREES
T OFFSET: 366 MS
T OFFSET: 370 MS
T OFFSET: 413 MS
TRICUSPID ANNULAR PLANE SYSTOLIC EXCURSION: 1.5 CM
VENTRICULAR RATE: 132 BPM
VENTRICULAR RATE: 140 BPM
VENTRICULAR RATE: 74 BPM
WBC # BLD AUTO: 10.2 X10*3/UL (ref 4.4–11.3)

## 2024-07-11 PROCEDURE — 83605 ASSAY OF LACTIC ACID: CPT | Performed by: EMERGENCY MEDICINE

## 2024-07-11 PROCEDURE — 2500000001 HC RX 250 WO HCPCS SELF ADMINISTERED DRUGS (ALT 637 FOR MEDICARE OP): Performed by: EMERGENCY MEDICINE

## 2024-07-11 PROCEDURE — 80048 BASIC METABOLIC PNL TOTAL CA: CPT | Performed by: EMERGENCY MEDICINE

## 2024-07-11 PROCEDURE — 96375 TX/PRO/DX INJ NEW DRUG ADDON: CPT

## 2024-07-11 PROCEDURE — 84484 ASSAY OF TROPONIN QUANT: CPT | Performed by: EMERGENCY MEDICINE

## 2024-07-11 PROCEDURE — 96361 HYDRATE IV INFUSION ADD-ON: CPT

## 2024-07-11 PROCEDURE — 2500000001 HC RX 250 WO HCPCS SELF ADMINISTERED DRUGS (ALT 637 FOR MEDICARE OP): Performed by: INTERNAL MEDICINE

## 2024-07-11 PROCEDURE — 2500000001 HC RX 250 WO HCPCS SELF ADMINISTERED DRUGS (ALT 637 FOR MEDICARE OP)

## 2024-07-11 PROCEDURE — 36415 COLL VENOUS BLD VENIPUNCTURE: CPT | Performed by: EMERGENCY MEDICINE

## 2024-07-11 PROCEDURE — 2060000001 HC INTERMEDIATE ICU ROOM DAILY

## 2024-07-11 PROCEDURE — 99285 EMERGENCY DEPT VISIT HI MDM: CPT | Mod: 25

## 2024-07-11 PROCEDURE — 85025 COMPLETE CBC W/AUTO DIFF WBC: CPT | Performed by: EMERGENCY MEDICINE

## 2024-07-11 PROCEDURE — 71045 X-RAY EXAM CHEST 1 VIEW: CPT | Performed by: RADIOLOGY

## 2024-07-11 PROCEDURE — 93005 ELECTROCARDIOGRAM TRACING: CPT

## 2024-07-11 PROCEDURE — 93306 TTE W/DOPPLER COMPLETE: CPT

## 2024-07-11 PROCEDURE — 99223 1ST HOSP IP/OBS HIGH 75: CPT

## 2024-07-11 PROCEDURE — 71045 X-RAY EXAM CHEST 1 VIEW: CPT

## 2024-07-11 PROCEDURE — 2500000004 HC RX 250 GENERAL PHARMACY W/ HCPCS (ALT 636 FOR OP/ED): Performed by: NURSE PRACTITIONER

## 2024-07-11 PROCEDURE — 96374 THER/PROPH/DIAG INJ IV PUSH: CPT

## 2024-07-11 PROCEDURE — 2500000005 HC RX 250 GENERAL PHARMACY W/O HCPCS: Performed by: EMERGENCY MEDICINE

## 2024-07-11 PROCEDURE — 2500000004 HC RX 250 GENERAL PHARMACY W/ HCPCS (ALT 636 FOR OP/ED)

## 2024-07-11 PROCEDURE — 2500000002 HC RX 250 W HCPCS SELF ADMINISTERED DRUGS (ALT 637 FOR MEDICARE OP, ALT 636 FOR OP/ED)

## 2024-07-11 PROCEDURE — 96376 TX/PRO/DX INJ SAME DRUG ADON: CPT

## 2024-07-11 PROCEDURE — 82947 ASSAY GLUCOSE BLOOD QUANT: CPT

## 2024-07-11 PROCEDURE — 2500000001 HC RX 250 WO HCPCS SELF ADMINISTERED DRUGS (ALT 637 FOR MEDICARE OP): Performed by: NURSE PRACTITIONER

## 2024-07-11 PROCEDURE — 83735 ASSAY OF MAGNESIUM: CPT | Performed by: EMERGENCY MEDICINE

## 2024-07-11 PROCEDURE — 2500000004 HC RX 250 GENERAL PHARMACY W/ HCPCS (ALT 636 FOR OP/ED): Performed by: EMERGENCY MEDICINE

## 2024-07-11 RX ORDER — DEXTROSE 50 % IN WATER (D50W) INTRAVENOUS SYRINGE
25
Status: DISCONTINUED | OUTPATIENT
Start: 2024-07-11 | End: 2024-07-12 | Stop reason: HOSPADM

## 2024-07-11 RX ORDER — ONDANSETRON HYDROCHLORIDE 2 MG/ML
4 INJECTION, SOLUTION INTRAVENOUS ONCE
Status: DISCONTINUED | OUTPATIENT
Start: 2024-07-11 | End: 2024-07-11

## 2024-07-11 RX ORDER — HYDROCHLOROTHIAZIDE 25 MG/1
25 TABLET ORAL EVERY MORNING
Status: DISCONTINUED | OUTPATIENT
Start: 2024-07-11 | End: 2024-07-12 | Stop reason: HOSPADM

## 2024-07-11 RX ORDER — METOPROLOL SUCCINATE 50 MG/1
100 TABLET, EXTENDED RELEASE ORAL 2 TIMES DAILY
Status: DISCONTINUED | OUTPATIENT
Start: 2024-07-11 | End: 2024-07-11

## 2024-07-11 RX ORDER — MAGNESIUM SULFATE HEPTAHYDRATE 40 MG/ML
2 INJECTION, SOLUTION INTRAVENOUS ONCE
Status: COMPLETED | OUTPATIENT
Start: 2024-07-11 | End: 2024-07-11

## 2024-07-11 RX ORDER — HYDROCHLOROTHIAZIDE 25 MG/1
25 TABLET ORAL ONCE
Status: COMPLETED | OUTPATIENT
Start: 2024-07-11 | End: 2024-07-11

## 2024-07-11 RX ORDER — DIGOXIN 125 MCG
125 TABLET ORAL DAILY
Status: DISCONTINUED | OUTPATIENT
Start: 2024-07-11 | End: 2024-07-12 | Stop reason: HOSPADM

## 2024-07-11 RX ORDER — DILTIAZEM HYDROCHLORIDE 5 MG/ML
5 INJECTION INTRAVENOUS ONCE
Status: COMPLETED | OUTPATIENT
Start: 2024-07-11 | End: 2024-07-11

## 2024-07-11 RX ORDER — INSULIN LISPRO 100 [IU]/ML
0-10 INJECTION, SOLUTION INTRAVENOUS; SUBCUTANEOUS
Status: DISCONTINUED | OUTPATIENT
Start: 2024-07-11 | End: 2024-07-12 | Stop reason: HOSPADM

## 2024-07-11 RX ORDER — PANTOPRAZOLE SODIUM 40 MG/1
40 TABLET, DELAYED RELEASE ORAL
Status: DISCONTINUED | OUTPATIENT
Start: 2024-07-11 | End: 2024-07-12 | Stop reason: HOSPADM

## 2024-07-11 RX ORDER — METOPROLOL SUCCINATE 50 MG/1
100 TABLET, EXTENDED RELEASE ORAL 2 TIMES DAILY
Status: DISCONTINUED | OUTPATIENT
Start: 2024-07-11 | End: 2024-07-12 | Stop reason: HOSPADM

## 2024-07-11 RX ORDER — METOPROLOL SUCCINATE 50 MG/1
100 TABLET, EXTENDED RELEASE ORAL ONCE
Status: COMPLETED | OUTPATIENT
Start: 2024-07-11 | End: 2024-07-11

## 2024-07-11 RX ORDER — ACETAMINOPHEN 160 MG/5ML
650 SOLUTION ORAL EVERY 4 HOURS PRN
Status: DISCONTINUED | OUTPATIENT
Start: 2024-07-11 | End: 2024-07-12 | Stop reason: HOSPADM

## 2024-07-11 RX ORDER — DILTIAZEM HYDROCHLORIDE 5 MG/ML
10 INJECTION INTRAVENOUS ONCE
Status: COMPLETED | OUTPATIENT
Start: 2024-07-11 | End: 2024-07-11

## 2024-07-11 RX ORDER — LISINOPRIL 20 MG/1
20 TABLET ORAL 2 TIMES DAILY
Status: DISCONTINUED | OUTPATIENT
Start: 2024-07-12 | End: 2024-07-12 | Stop reason: HOSPADM

## 2024-07-11 RX ORDER — LANOLIN ALCOHOL/MO/W.PET/CERES
400 CREAM (GRAM) TOPICAL DAILY
Status: DISCONTINUED | OUTPATIENT
Start: 2024-07-11 | End: 2024-07-12 | Stop reason: HOSPADM

## 2024-07-11 RX ORDER — FAMOTIDINE 10 MG/ML
40 INJECTION INTRAVENOUS ONCE
Status: COMPLETED | OUTPATIENT
Start: 2024-07-11 | End: 2024-07-11

## 2024-07-11 RX ORDER — ACETAMINOPHEN 325 MG/1
650 TABLET ORAL EVERY 4 HOURS PRN
Status: DISCONTINUED | OUTPATIENT
Start: 2024-07-11 | End: 2024-07-12 | Stop reason: HOSPADM

## 2024-07-11 RX ORDER — DIGOXIN 0.25 MG/ML
250 INJECTION INTRAMUSCULAR; INTRAVENOUS ONCE
Status: DISCONTINUED | OUTPATIENT
Start: 2024-07-11 | End: 2024-07-12 | Stop reason: HOSPADM

## 2024-07-11 RX ORDER — LISINOPRIL 20 MG/1
20 TABLET ORAL 2 TIMES DAILY
Status: DISCONTINUED | OUTPATIENT
Start: 2024-07-11 | End: 2024-07-11

## 2024-07-11 RX ORDER — DEXTROSE 50 % IN WATER (D50W) INTRAVENOUS SYRINGE
12.5
Status: DISCONTINUED | OUTPATIENT
Start: 2024-07-11 | End: 2024-07-12 | Stop reason: HOSPADM

## 2024-07-11 RX ORDER — LISINOPRIL 20 MG/1
20 TABLET ORAL ONCE
Status: COMPLETED | OUTPATIENT
Start: 2024-07-11 | End: 2024-07-11

## 2024-07-11 RX ORDER — DIGOXIN 0.25 MG/ML
500 INJECTION INTRAMUSCULAR; INTRAVENOUS ONCE
Status: COMPLETED | OUTPATIENT
Start: 2024-07-11 | End: 2024-07-11

## 2024-07-11 RX ORDER — TRAZODONE HYDROCHLORIDE 50 MG/1
50 TABLET ORAL ONCE
Status: COMPLETED | OUTPATIENT
Start: 2024-07-11 | End: 2024-07-11

## 2024-07-11 SDOH — SOCIAL STABILITY: SOCIAL INSECURITY: HAVE YOU HAD ANY THOUGHTS OF HARMING ANYONE ELSE?: NO

## 2024-07-11 SDOH — SOCIAL STABILITY: SOCIAL INSECURITY: HAS ANYONE EVER THREATENED TO HURT YOUR FAMILY OR YOUR PETS?: NO

## 2024-07-11 SDOH — SOCIAL STABILITY: SOCIAL INSECURITY: DOES ANYONE TRY TO KEEP YOU FROM HAVING/CONTACTING OTHER FRIENDS OR DOING THINGS OUTSIDE YOUR HOME?: NO

## 2024-07-11 SDOH — SOCIAL STABILITY: SOCIAL INSECURITY: ABUSE: ADULT

## 2024-07-11 SDOH — SOCIAL STABILITY: SOCIAL INSECURITY: HAVE YOU HAD THOUGHTS OF HARMING ANYONE ELSE?: NO

## 2024-07-11 SDOH — SOCIAL STABILITY: SOCIAL INSECURITY: DO YOU FEEL ANYONE HAS EXPLOITED OR TAKEN ADVANTAGE OF YOU FINANCIALLY OR OF YOUR PERSONAL PROPERTY?: NO

## 2024-07-11 SDOH — SOCIAL STABILITY: SOCIAL INSECURITY: ARE THERE ANY APPARENT SIGNS OF INJURIES/BEHAVIORS THAT COULD BE RELATED TO ABUSE/NEGLECT?: NO

## 2024-07-11 SDOH — SOCIAL STABILITY: SOCIAL INSECURITY: ARE YOU OR HAVE YOU BEEN THREATENED OR ABUSED PHYSICALLY, EMOTIONALLY, OR SEXUALLY BY ANYONE?: NO

## 2024-07-11 SDOH — SOCIAL STABILITY: SOCIAL INSECURITY: DO YOU FEEL UNSAFE GOING BACK TO THE PLACE WHERE YOU ARE LIVING?: NO

## 2024-07-11 SDOH — SOCIAL STABILITY: SOCIAL INSECURITY: WERE YOU ABLE TO COMPLETE ALL THE BEHAVIORAL HEALTH SCREENINGS?: YES

## 2024-07-11 ASSESSMENT — COGNITIVE AND FUNCTIONAL STATUS - GENERAL
DRESSING REGULAR LOWER BODY CLOTHING: A LITTLE
MOVING TO AND FROM BED TO CHAIR: A LITTLE
TOILETING: A LITTLE
HELP NEEDED FOR BATHING: A LITTLE
TOILETING: A LITTLE
DAILY ACTIVITIY SCORE: 20
STANDING UP FROM CHAIR USING ARMS: A LITTLE
MOBILITY SCORE: 20
DRESSING REGULAR LOWER BODY CLOTHING: A LITTLE
MOBILITY SCORE: 20
MOVING TO AND FROM BED TO CHAIR: A LITTLE
DRESSING REGULAR UPPER BODY CLOTHING: A LITTLE
HELP NEEDED FOR BATHING: A LITTLE
PATIENT BASELINE BEDBOUND: NO
DAILY ACTIVITIY SCORE: 20
CLIMB 3 TO 5 STEPS WITH RAILING: A LITTLE
STANDING UP FROM CHAIR USING ARMS: A LITTLE
WALKING IN HOSPITAL ROOM: A LITTLE
CLIMB 3 TO 5 STEPS WITH RAILING: A LITTLE
WALKING IN HOSPITAL ROOM: A LITTLE
DRESSING REGULAR UPPER BODY CLOTHING: A LITTLE

## 2024-07-11 ASSESSMENT — ENCOUNTER SYMPTOMS
FEVER: 0
ABDOMINAL PAIN: 0
SHORTNESS OF BREATH: 1
ABDOMINAL DISTENTION: 0
COUGH: 0
PALPITATIONS: 1
CHILLS: 0
WEAKNESS: 0
DIZZINESS: 0

## 2024-07-11 ASSESSMENT — ACTIVITIES OF DAILY LIVING (ADL)
BATHING: NEEDS ASSISTANCE
JUDGMENT_ADEQUATE_SAFELY_COMPLETE_DAILY_ACTIVITIES: YES
TOILETING: NEEDS ASSISTANCE
PATIENT'S MEMORY ADEQUATE TO SAFELY COMPLETE DAILY ACTIVITIES?: YES
GROOMING: INDEPENDENT
WALKS IN HOME: NEEDS ASSISTANCE
HEARING - RIGHT EAR: FUNCTIONAL
ADEQUATE_TO_COMPLETE_ADL: YES
FEEDING YOURSELF: INDEPENDENT
DRESSING YOURSELF: INDEPENDENT
HEARING - LEFT EAR: FUNCTIONAL
LACK_OF_TRANSPORTATION: NO

## 2024-07-11 ASSESSMENT — PAIN SCALES - GENERAL
PAINLEVEL_OUTOF10: 3
PAINLEVEL_OUTOF10: 3
PAINLEVEL_OUTOF10: 1
PAINLEVEL_OUTOF10: 8
PAINLEVEL_OUTOF10: 4

## 2024-07-11 ASSESSMENT — LIFESTYLE VARIABLES
AUDIT-C TOTAL SCORE: 0
HAVE PEOPLE ANNOYED YOU BY CRITICIZING YOUR DRINKING: NO
AUDIT-C TOTAL SCORE: 0
TOTAL SCORE: 0
SKIP TO QUESTIONS 9-10: 1
EVER HAD A DRINK FIRST THING IN THE MORNING TO STEADY YOUR NERVES TO GET RID OF A HANGOVER: NO
HAVE YOU EVER FELT YOU SHOULD CUT DOWN ON YOUR DRINKING: NO
EVER FELT BAD OR GUILTY ABOUT YOUR DRINKING: NO
HOW OFTEN DO YOU HAVE A DRINK CONTAINING ALCOHOL: NEVER
HOW MANY STANDARD DRINKS CONTAINING ALCOHOL DO YOU HAVE ON A TYPICAL DAY: PATIENT DOES NOT DRINK
HOW OFTEN DO YOU HAVE 6 OR MORE DRINKS ON ONE OCCASION: NEVER

## 2024-07-11 ASSESSMENT — PATIENT HEALTH QUESTIONNAIRE - PHQ9
2. FEELING DOWN, DEPRESSED OR HOPELESS: NOT AT ALL
1. LITTLE INTEREST OR PLEASURE IN DOING THINGS: NOT AT ALL
SUM OF ALL RESPONSES TO PHQ9 QUESTIONS 1 & 2: 0

## 2024-07-11 ASSESSMENT — PAIN DESCRIPTION - LOCATION
LOCATION: CHEST
LOCATION: BACK

## 2024-07-11 ASSESSMENT — PAIN - FUNCTIONAL ASSESSMENT
PAIN_FUNCTIONAL_ASSESSMENT: 0-10

## 2024-07-11 ASSESSMENT — PAIN DESCRIPTION - DESCRIPTORS: DESCRIPTORS: DISCOMFORT

## 2024-07-11 NOTE — CARE PLAN
The patient's goals for the shift include  controling heart rate.     The clinical goals for the shift include Pt will remain hemodynamically stable during this shift.

## 2024-07-11 NOTE — H&P
Patient: Debbie Rees   Age: 74 y.o.   Gender: female   Room/Bed: 239/239-B     Attending: Mykel Sweet DO  Code Status:  Full Code    History of Presenting Illness  Debbie Rees is a 74 y.o. female with past medical history of hypertrophic cardiomyopathy, hypertension, venous insufficiency status post EVLA, PVD, OA, type 2 diabetes, and paroxysmal A-fib presenting with palpitations and shortness of breath. She was at [facility] on Monday for an ablation. The procedure went well and she was then discharged. After she was discharged, she felt palpitations and had shortness of breath and went to [facility] on her way home. She was given Diltiazem which helped convert her and was discharged on PRN metoprolol in addition to her scheduled metoprolol. She states she was at home and felt the palpitations. She was checking her HR on her pulse ox and noted her HR to be in the 30's so she didn't want to take the metoprolol. When she checked her BP, she noted her BP to be normal but HR elevated so she called EMS. On EMS arrival, she was noted to be tachycardic. She also reports a burning sensation in the middle of her chest that radiates to her back.      Lab work in the ER showed glucose 214, sodium 135, potassium 4.1, BUN/Cr 19/1.05, magnesium 1.62, lactate 1.3, troponin 500, WBC 10.2, H&H 12.7/40.3, CXR negative.      EKG showed SVT. She was given diltiazem IV and her HR improved. Her HR then increased again and BP dropped. She was given another IV diltiazem.     ED contacted cardiology recommended admission for further management.      Past Medical History   Past Medical History:   Diagnosis Date   • Abdominal pain of multiple sites 06/22/2023   • Acute sinusitis 06/22/2023   • Acute upper respiratory infection, unspecified 03/14/2017    Acute URI   • Anxiety disorder, unspecified     Anxiety   • Biceps tendinitis of left upper extremity 06/22/2023   • Burning with urination 06/22/2023   • Cervical pain  2023   • Cervicalgia     Cervical pain (neck)   • Encounter for screening for other viral diseases 2017    Need for hepatitis C screening test   • Hand numbness 2023   • Morbid (severe) obesity due to excess calories (Multi)     Morbid obesity   • Pain in left knee     Acute pain of left knee   • Paroxysmal SVT (supraventricular tachycardia) (CMS-HCC) 2023   • Personal history of other diseases of the circulatory system     History of hypertension   • Personal history of other diseases of the musculoskeletal system and connective tissue     History of arthritis   • Personal history of other drug therapy 2019    History of influenza vaccination   • Personal history of other endocrine, nutritional and metabolic disease     History of hyperlipidemia   • Personal history of other endocrine, nutritional and metabolic disease     History of diabetes mellitus   • Personal history of other specified conditions 2017    History of dysuria   • Pyogenic granuloma of skin and subcutaneous tissue 2023   • Streptococcal pharyngitis 2017    Strep sore throat   • Thoracic spine pain 2023   • Unspecified abdominal hernia without obstruction or gangrene     Abdominal hernia without obstruction and without gangrene   • Unspecified asthma, uncomplicated ([facility]) 2017    Asthmatic bronchitis   • Urinary tract infection, site not specified 2017    Acute UTI      Past Surgical History:   Past Surgical History:   Procedure Laterality Date   • CARDIAC ELECTROPHYSIOLOGY PROCEDURE N/A 2024    Procedure: Ablation A-Fib Persistant;  Surgeon: Ney Sommers MD;  Location: [place] [address] Cardiac Cath Lab;  Service: Electrophysiology;  Laterality: N/A;  EPS 3D W CARTO GA WHOLE CASE.    PT [facility] - NEEDS TIME OF PROCEDURE AHEAD OF TIME TO ARRANGE TRANSPORT.   •  SECTION, CLASSIC  2016     Section   • CHOLECYSTECTOMY  2016    Cholecystectomy   • OTHER  SURGICAL HISTORY  11/29/2016    Sigmoidoscopy (Fiberoptic, Therapeutic )   • OTHER SURGICAL HISTORY  12/01/2022    Lower back surgery   • OTHER SURGICAL HISTORY  03/22/2021    Carpal tunnel surgery   • TOTAL KNEE ARTHROPLASTY  11/29/2016    Knee Replacement     Family History:   No family history on file.  Social History:   Tobacco Use: Low Risk  (6/25/2024)    Patient History    • Smoking Tobacco Use: Never    • Smokeless Tobacco Use: Never    • Passive Exposure: Not on file      Social History     Substance and Sexual Activity   Alcohol Use Never       Outpatient Medications:  Current Outpatient Medications   Medication Instructions   • apixaban (ELIQUIS) 5 mg, oral, 2 times daily   • dronedarone (MULTAQ) 400 mg, oral, 2 times daily (morning and late afternoon)   • hydroCHLOROthiazide (HYDRODiuril) 25 mg tablet Take 1 tablet (25 mg) by mouth once daily in the morning.   • lisinopril 20 mg tablet TAKE ONE TABLET BY MOUTH TWO TIMES A DAY   • magnesium oxide (MAG-OX) 400 mg, oral, Daily   • metoprolol succinate XL (TOPROL-XL) 100 mg, oral, 2 times daily, Do not crush or chew.   • metoprolol tartrate (LOPRESSOR) 25 mg, oral, As needed   • pantoprazole (PROTONIX) 40 mg, oral, 2 times daily before meals, Do not crush, chew, or split.   • semaglutide (OZEMPIC) 1 mg, subcutaneous, Once Weekly        ED Course   Vitals - /83   Pulse (!) 113   Temp 35.8 °C (96.4 °F)   Resp 20   Wt 116 kg (254 lb 13.6 oz)   SpO2 98%     Labs:   CBC:  Recent Labs     07/11/24  0707 07/09/24  0526 07/08/24  2141   WBC 10.2 11.5* 11.8*   HGB 12.7 12.4 13.5   HCT 40.3 38.3 41.5    227 275   MCV 97 98 94     CMP:  Recent Labs     07/11/24  0707 07/09/24  0526 07/08/24  2141   * 133* 133*   K 4.1 3.9 3.7    101 101   CO2 24 23 21   ANIONGAP 13 13 15   BUN 19 20 22   CREATININE 1.05 0.97 1.05   EGFR 56* 61 56*   GLUCOSE 214* 161* 234*     Recent Labs     07/08/24  2141 03/28/24  1107 12/12/23  1827 11/07/18  1002  "01/19/18  2350   ALBUMIN 3.7 3.9 3.7   < > 4.2   ALKPHOS 69 95 84   < > 102   ALT 34 40 30   < > 38   AST 73* 32 29   < > 27   BILITOT 0.5 0.5 0.5   < > 0.4   LIPASE  --   --   --   --  53    < > = values in this interval not displayed.     Calcium/Phos:   Lab Results   Component Value Date    CALCIUM 9.4 07/11/2024      COAG:   Recent Labs     07/08/24  2141 12/12/23  1642   INR 1.2*  --    DDIMERVTE  --  637*     CRP:   Lab Results   Component Value Date    CRP 0.51 12/12/2023      [unfilled]   ENDO:  Recent Labs     07/09/24  0526 04/11/24  1105 03/28/24  1501 09/22/23  1203 06/22/23  1437 11/21/19  1035 01/31/19  1319 11/07/18  1002   TSH 0.68  --  1.47  --   --  1.41  --  1.10   HGBA1C 7.4* 7.7* 8.3* 8.0*   < >  --    < >  --     < > = values in this interval not displayed.      CARDIAC:   Recent Labs     07/11/24  0757 07/11/24  0707 07/08/24  2241 07/08/24  2141 03/28/24  1107 12/26/23  1500 12/12/23  1642   TROPHS 455* 500* 2,977* 3,420*   < >  --  10   BNP  --   --   --  150*  --  157* 56    < > = values in this interval not displayed.     Recent Labs     03/28/24  1501 09/22/23  1123 03/02/22  1008 03/22/21  0931   CHOL 144 149 178 167   LDLF  --  91 100* 102*   HDL 35.0 40.1 45.8 38.7*   TRIG  --  88 161* 134     No data recorded    Micro/ID:   No results found for the last 90 days.                   No lab exists for component: \"AGALPCRNB\"   .ID  Lab Results   Component Value Date    BLOODCULT No growth at 4 days -  FINAL REPORT 12/12/2023    BLOODCULT No growth at 4 days -  FINAL REPORT 12/12/2023       Imaging:   ECG 12 lead   Preliminary Result by Interface, Ecg - Cpoe Imaging Orders In (07/11 1057)   Atrial fibrillation with rapid ventricular response   Nonspecific ST and T wave abnormality   Abnormal ECG   When compared with ECG of 11-JUL-2024 07:56, (unconfirmed)   Atrial fibrillation has replaced Sinus rhythm   Vent. rate has increased BY  58 BPM   Non-specific change in ST segment in " Inferior leads   T wave inversion now evident in Lateral leads      ECG 12 lead   Preliminary Result by Interface, Ecg - Cpoe Imaging Orders In (07/11 0823)   Supraventricular tachycardia   Nonspecific ST abnormality   Abnormal ECG   When compared with ECG of 08-JUL-2024 23:17, (unconfirmed)   Sinus rhythm has replaced Atrial fibrillation      ECG 12 lead   Preliminary Result by Interface, Ecg - Cpoe Imaging Orders In (07/11 0823)   Sinus rhythm with marked sinus arrhythmia with 1st degree AV block   Otherwise normal ECG   When compared with ECG of 11-JUL-2024 06:53, (unconfirmed)   ND interval has increased   Vent. rate has decreased BY  66 BPM        Encounter Date: 07/11/24   ECG 12 lead   Result Value    Ventricular Rate 132    QRS Duration 76    QT Interval 304    QTC Calculation(Bazett) 450    R Axis -20    T Axis 88    QRS Count 21    Q Onset 218    T Offset 370    QTC Fredericia 395    Narrative    Atrial fibrillation with rapid ventricular response  Nonspecific ST and T wave abnormality  Abnormal ECG  When compared with ECG of 11-JUL-2024 07:56, (unconfirmed)  Atrial fibrillation has replaced Sinus rhythm  Vent. rate has increased BY  58 BPM  Non-specific change in ST segment in Inferior leads  T wave inversion now evident in Lateral leads     Transthoracic Echo (TTE) Complete    Result Date: 3/29/2024   [facility], [address] [place] [address]               Tel [phone] and Fax [phone] TRANSTHORACIC ECHOCARDIOGRAM REPORT  Patient Name:      VIVIAN KIM      Reading Physician:    [id number] Dania Gutierrez MD Study Date:        3/29/2024            Ordering Provider:    [id number] RANULFO MELGOZA MRN/PID:           [id number]             Fellow: Accession#:        [id number]         Nurse:                Nina Dickerson RN Date of Birth/Age: 1950 / 74 years   Sonographer:          Janneth Collins                                                               [facility] Gender:            F                    Additional Staff: Height:            167.64 cm            Admit Date:           3/28/2024 Weight:            113.40 kg            Admission Status:     Inpatient -                                                               Routine BSA / BMI:         2.20 m2 / 40.35      Encounter#:           [id number]                    kg/m2                                         Department Location:  [facility] Non                                                               Invasive Blood Pressure: 128 /85 mmHg Study Type:    TRANSTHORACIC ECHO (TTE) COMPLETE Diagnosis/ICD: Unspecified atrial fibrillation-I48.91 Indication:    Atrial Fibrillation CPT Code:      Echo Complete w Full Doppler-[id number] Patient History: Diabetes:          Yes Pertinent History: HTN and A-Fib. HCM. Study Detail: The following Echo studies were performed: 2D, M-Mode, Doppler and               color flow. Technically challenging study due to poor acoustic               windows. Definity used as a contrast agent for endocardial border               definition. Total contrast used for this procedure was 1 mL via IV               push.  PHYSICIAN INTERPRETATION: Left Ventricle: The left ventricular systolic function is hyperdynamic, with an estimated ejection fraction of 65-70%. There are no regional wall motion abnormalities. The left ventricular cavity size is normal. Left ventricular diastolic filling was indeterminate. Left Atrium: The left atrium is mildly dilated. Right Ventricle: The right ventricle is normal in size. There is normal right ventricular global systolic function. Right Atrium: The right atrium is normal in size. Aortic Valve: The aortic valve is trileaflet. There is no evidence of aortic valve regurgitation. The peak instantaneous gradient of the aortic valve is 8.3 mmHg. The mean gradient  of the aortic valve is 4.9 mmHg. Mitral Valve: The mitral valve is normal in structure. There is trace to mild mitral valve regurgitation. Tricuspid Valve: The tricuspid valve is structurally normal. There is trace to mild tricuspid regurgitation. Pulmonic Valve: The pulmonic valve is not well visualized. The pulmonic valve regurgitation was not well visualized. Pericardium: There is no pericardial effusion noted. Aorta: The aortic root is normal. Pulmonary Artery: The tricuspid regurgitant velocity is 2.25 m/s, and with an estimated right atrial pressure of 3 mmHg, the estimated pulmonary artery pressure is normal with the RVSP at 23.3 mmHg.  CONCLUSIONS:  1. Left ventricular systolic function is hyperdynamic with a 65-70% estimated ejection fraction. QUANTITATIVE DATA SUMMARY: 2D MEASUREMENTS:                           Normal Ranges: IVSd:          1.84 cm    (0.6-1.1cm) LVPWd:         1.09 cm    (0.6-1.1cm) LVIDd:         3.98 cm    (3.9-5.9cm) LVIDs:         2.50 cm LV Mass Index: 101.3 g/m2 LV % FS        37.1 % LA VOLUME:                              Normal Ranges: LA Vol A4C:       52.5 ml    (22+/-6mL/m2) LA Vol A2C:       53.8 ml LA Vol BP:        53.9 ml LA Vol Index A4C: 23.9 ml/m2 LA Vol Index A2C: 24.5 ml/m2 LA Vol Index BP:  24.5 ml/m2 LA Volume Index:  24.5 ml/m2 LA Vol A4C:       51.0 ml LA Vol A2C:       50.8 ml RA VOLUME BY A/L METHOD:                       Normal Ranges: RA Area A4C: 13.6 cm2 M-MODE MEASUREMENTS:                  Normal Ranges: Ao Root: 2.90 cm (2.0-3.7cm) LAs:     4.17 cm (2.7-4.0cm) LV SYSTOLIC FUNCTION BY 2D PLANIMETRY (MOD):                     Normal Ranges: EF-A4C View: 68.9 % (>=55%) EF-A2C View: 67.6 % EF-Biplane:  68.5 % LV DIASTOLIC FUNCTION:                     Normal Ranges: MV Peak E: 0.83 m/s (0.7-1.2 m/s) MITRAL VALVE:                 Normal Ranges: MV DT: 197 msec (150-240msec) AORTIC VALVE:                                   Normal Ranges: AoV Vmax:                 1.44 m/s (<=1.7m/s) AoV Peak P.3 mmHg (<20mmHg) AoV Mean P.9 mmHg (1.7-11.5mmHg) LVOT Max Salazar:            1.35 m/s (<=1.1m/s) AoV VTI:                 27.74 cm (18-25cm) LVOT VTI:                21.49 cm LVOT Diameter:           1.98 cm  (1.8-2.4cm) AoV Area, VTI:           2.38 cm2 (2.5-5.5cm2) AoV Area,Vmax:           2.90 cm2 (2.5-4.5cm2) AoV Dimensionless Index: 0.77 TRICUSPID VALVE/RVSP:                             Normal Ranges: Peak TR Velocity: 2.25 m/s RV Syst Pressure: 23.3 mmHg (< 30mmHg) PULMONIC VALVE:                      Normal Ranges: PV Max Salazar: 1.2 m/s  (0.6-0.9m/s) PV Max P.2 mmHg  [id number] Dania Gutierrez MD Electronically signed on 3/29/2024 at 11:55:50 AM  ** Final **    XR chest 1 view    Result Date: 2024  Interpreted By:  Bj Erwin, STUDY: XR CHEST 1 VIEW;  2024 10:12 am   INDICATION: Signs/Symptoms:tachycardia; SOB.   COMPARISON: 2024   ACCESSION NUMBER(S): [id number]   ORDERING CLINICIAN: SUJATHA HUGHES   FINDINGS: Calcified right paratracheal lymph nodes stable.   Widening of the mediastinum is likely related to the portable projection of the film.   CARDIOMEDIASTINAL SILHOUETTE: Stable. Prominent pericardial fat pad.   LUNGS: Lungs are clear.   ABDOMEN: No remarkable upper abdominal findings.   BONES: No acute osseous changes.       1.  No radiographic change.       MACRO: None   Signed by: Bj Erwin 2024 11:08 AM Dictation workstation:   [id number]    ECG 12 lead    Result Date: 2024  Atrial fibrillation with rapid ventricular response Nonspecific ST and T wave abnormality Abnormal ECG When compared with ECG of 2024 07:56, (unconfirmed) Atrial fibrillation has replaced Sinus rhythm Vent. rate has increased BY  58 BPM Non-specific change in ST segment in Inferior leads T wave inversion now evident in Lateral leads    ECG 12 lead    Result Date: 2024  Supraventricular tachycardia Nonspecific ST abnormality  Abnormal ECG When compared with ECG of 08-JUL-2024 23:17, (unconfirmed) Sinus rhythm has replaced Atrial fibrillation    ECG 12 lead    Result Date: 7/11/2024  Sinus rhythm with marked sinus arrhythmia with 1st degree AV block Otherwise normal ECG When compared with ECG of 11-JUL-2024 06:53, (unconfirmed) UT interval has increased Vent. rate has decreased BY  66 BPM     Interventions:  Medications   glucagon (Glucagen) injection 1 mg (has no administration in time range)   dextrose 50 % injection 25 g (has no administration in time range)   glucagon (Glucagen) injection 1 mg (has no administration in time range)   dextrose 50 % injection 12.5 g (has no administration in time range)   insulin regular (HumuLIN R,NovoLIN R) injection 0-10 Units (2 Units subcutaneous Given 7/11/24 1335)   digoxin (Lanoxin) injection 250 mcg (has no administration in time range)   hydroCHLOROthiazide (HYDRODiuril) tablet 25 mg ( oral Dose Auto Held 7/15/24 0900)   magnesium oxide (Mag-Ox) tablet 400 mg (400 mg oral Given 7/11/24 1425)   pantoprazole (ProtoNix) EC tablet 40 mg (has no administration in time range)   dronedarone (Multaq) tablet 400 mg (has no administration in time range)   lisinopril tablet 20 mg ( oral Dose Auto Held 7/16/24 2100)   metoprolol succinate XL (Toprol-XL) 24 hr tablet 100 mg (has no administration in time range)   apixaban (Eliquis) tablet 5 mg (has no administration in time range)   dilTIAZem (Cardizem) injection 10 mg (10 mg intravenous Given 7/11/24 0740)   lactated Ringer's bolus 250 mL (0 mL intravenous Stopped 7/11/24 0908)   famotidine PF (Pepcid) injection 40 mg (40 mg intravenous Given 7/11/24 0740)   metoprolol succinate XL (Toprol-XL) 24 hr tablet 100 mg (100 mg oral Given 7/11/24 1043)   lisinopril tablet 20 mg (20 mg oral Given 7/11/24 1043)   hydroCHLOROthiazide (HYDRODiuril) tablet 25 mg (25 mg oral Given 7/11/24 1043)   dronedarone (Multaq) tablet 400 mg (400 mg oral Given 7/11/24 1341)    apixaban (Eliquis) tablet 5 mg (5 mg oral Given 7/11/24 1043)   lactated Ringer's bolus 250 mL (0 mL intravenous Stopped 7/11/24 1156)   dilTIAZem (Cardizem) injection 5 mg (5 mg intravenous Given 7/11/24 1056)   magnesium sulfate 2 g in sterile water for injection 50 mL (2 g intravenous New Bag 7/11/24 1215)   digoxin (Lanoxin) injection 500 mcg (500 mcg intravenous Given 7/11/24 1212)       Objective Data  Physical Exam  Constitutional:       General: She is not in acute distress.  Cardiovascular:      Rate and Rhythm: Tachycardia present. Rhythm irregular.   Pulmonary:      Effort: Pulmonary effort is normal. No respiratory distress.      Breath sounds: No wheezing.   Abdominal:      Palpations: Abdomen is soft.      Tenderness: There is no abdominal tenderness.   Musculoskeletal:         General: Swelling present.      Right lower leg: Edema (R>L) present.      Left lower leg: Edema present.   Skin:     General: Skin is warm.   Neurological:      General: No focal deficit present.      Mental Status: She is alert.      Motor: No weakness.   Psychiatric:         Mood and Affect: Mood normal.         Behavior: Behavior normal.         Thought Content: Thought content normal.         Judgment: Judgment normal.              Assessment and Plan   Debbie Rees is a 74 y.o. female with past medical history of hypertrophic cardiomyopathy, hypertension, venous insufficiency status post EVLA, PVD, OA, type 2 diabetes, and paroxysmal A-fib presenting with palpitations and shortness of breath. She was at [facility] on Monday for an ablation. The procedure went well and she was then discharged. After she was discharged, she felt palpitations and had shortness of breath and went to [facility] on her way home. She was given Diltiazem which helped convert her and was discharged on PRN metoprolol in addition to her scheduled metoprolol. She states she was at home and felt the palpitations. She was checking her HR on her  pulse ox and noted her HR to be in the 30's so she didn't want to take the metoprolol. When she checked her BP, she noted her BP to be normal but HR elevated so she called EMS. On EMS arrival, she was noted to be tachycardic. She also reports a burning sensation in the middle of her chest that radiates to her back.     Lab work in the ER showed glucose 214, sodium 135, potassium 4.1, BUN/Cr 19/1.05, magnesium 1.62, lactate 1.3, troponin 500, WBC 10.2, H&H 12.7/40.3, CXR negative. EKG showed SVT. She was given diltiazem IV and her HR improved. Her HR then increased again and BP dropped. She was given another IV diltiazem. Cardio consulted;         Acute Medical Issues  #Paroxysmal atrial fibrillation with RVR  -s/p ablation 7-8-24 with Dr. Sommers  -EKG reviewed, SVT, repeat EKG showed atrial fibrillation with RVR  -Given Diltiazem IV x2, BP dropped  -Magnesium 1.6->supplemented  -IVF boluses given  -Give Digoxin 0.5mg IV x1 now and repeat in 4 hours  -Had been on amiodarone in the past but had diarrhea and changed to Multaq  -If HR conts to be elevated, start amiodarone gtt and stop Multaq per Cardio recs  -Cont eliquis  -Cont Toprol XL 100mg BID  -Repeat echo  -Monitor on tele  -Cardiology following; appreciate recs     #Elevated troponin-Non MI elevation 2/2 AF RVR and recent ablation  -Does report CP and SOB   -Troponin 500->455  -CXR negative  -EKG showed no acute changes, ST elevation, or depression  -Stress test in Jan 2024 negative for ischemia  -Cont metoprolol      Chronic Medical Issues:   #HTN: home BP meds including hydrochlorothiazide and lisinopril given soft bp. Reassess tomorrow  #DMII: SSI  and hypoglycemia protocol added. Holding home   #Chronic LE swelling: R>L  #GERD: Continue home PPI     Fluids: PO  Electrolytes: replete as needed  Nutrition: Cardiac   GI Prophylaxis: PPI  DVT Prophylaxis: Eliquis       Dispo: Patient admitted for AFIB with RVR. Cardio following. ELOS<48hours

## 2024-07-11 NOTE — ED PROVIDER NOTES
"Debbie Rees  74 y.o.    HPI  Presents to the ED with chest pain.  Patient states she woke up around 4:30 AM and walked to the bathroom.  States she did not feel right.  Had some mild shortness of breath.  States last night she developed a \"heartburn\" sensation in the middle of her chest.  States that she underwent a cardiac ablation at [facility] on Monday, 4 days ago.  Reports that on the way home from that ablation she was not feeling well so she stopped in the [facility] emergency department and was found to have an elevated heart rate at that time.  States that they tried to give her metoprolol and it did not work.  States that her heart rate improved when she got diltiazem.  Patient states that she had elevated troponins at that time which they thought were due to the ablation.  Patient has been her usual self over the past 24 hours or so.  No fevers or vomiting.  She has been eating and drinking per usual.  She has some swelling in the right leg which is at baseline for her.  She has been able to take her medications as prescribed.  She has a history of hypertrophic cardiomyopathy and atrial fibrillation.       Patient History   Past Medical History:   Diagnosis Date    Abdominal pain of multiple sites 06/22/2023    Acute sinusitis 06/22/2023    Acute upper respiratory infection, unspecified 03/14/2017    Acute URI    Anxiety disorder, unspecified     Anxiety    Biceps tendinitis of left upper extremity 06/22/2023    Burning with urination 06/22/2023    Cervical pain 06/22/2023    Cervicalgia     Cervical pain (neck)    Encounter for screening for other viral diseases 12/14/2017    Need for hepatitis C screening test    Hand numbness 12/26/2023    Morbid (severe) obesity due to excess calories (Multi)     Morbid obesity    Pain in left knee     Acute pain of left knee    Paroxysmal SVT (supraventricular tachycardia) (CMS-HCC) 12/26/2023    Personal history of other diseases of the circulatory " system     History of hypertension    Personal history of other diseases of the musculoskeletal system and connective tissue     History of arthritis    Personal history of other drug therapy 2019    History of influenza vaccination    Personal history of other endocrine, nutritional and metabolic disease     History of hyperlipidemia    Personal history of other endocrine, nutritional and metabolic disease     History of diabetes mellitus    Personal history of other specified conditions 2017    History of dysuria    Pyogenic granuloma of skin and subcutaneous tissue 2023    Streptococcal pharyngitis 2017    Strep sore throat    Thoracic spine pain 2023    Unspecified abdominal hernia without obstruction or gangrene     Abdominal hernia without obstruction and without gangrene    Unspecified asthma, uncomplicated (HHS-HCC) 2017    Asthmatic bronchitis    Urinary tract infection, site not specified 2017    Acute UTI     Past Surgical History:   Procedure Laterality Date    CARDIAC ELECTROPHYSIOLOGY PROCEDURE N/A 2024    Procedure: Ablation A-Fib Persistant;  Surgeon: Ney Sommers MD;  Location: [place] [address] Cardiac Cath Lab;  Service: Electrophysiology;  Laterality: N/A;  EPS 3D W CARTO GA WHOLE CASE.    PT [facility] - NEEDS TIME OF PROCEDURE AHEAD OF TIME TO ARRANGE TRANSPORT.     SECTION, CLASSIC  2016     Section    CHOLECYSTECTOMY  2016    Cholecystectomy    OTHER SURGICAL HISTORY  2016    Sigmoidoscopy (Fiberoptic, Therapeutic )    OTHER SURGICAL HISTORY  2022    Lower back surgery    OTHER SURGICAL HISTORY  2021    Carpal tunnel surgery    TOTAL KNEE ARTHROPLASTY  2016    Knee Replacement     No family history on file.  Social History     Tobacco Use    Smoking status: Never    Smokeless tobacco: Never   Vaping Use    Vaping status: Never Used   Substance Use Topics    Alcohol use: Never    Drug use: Never  "      Physical Exam   Vitals:    07/11/24 0650   BP: 134/87   BP Location: Right arm   Patient Position: Sitting   Pulse: (!) 140   Resp: 19   Temp: 36 °C (96.8 °F)   TempSrc: Temporal   SpO2: 98%   Weight: 112 kg (247 lb)   Height: 1.676 m (5' 6\")        Constitutional: Appears mildly uncomfortable, non-toxic  Eyes: PERRL, Conjunctiva normal, No discharge  HEENT: Atraumatic. External ears appear normal, Nose is without drainage, MMM, no intraoral lesions  Neck: Normal ROM, No lymphadenopathy, No stridor  Cardiac: Tachycardic, regular rhythm  Pulmonary/Chest: No respiratory distress, Normal breath sounds, No chest tenderness  Abdomen: BS present, Soft, Non-tender without rebound/guarding  Back: No tenderness, No contusions  Extremities: Normal ROM, 1+ edema of the right lower extremity, no tenderness, intact distal pulses  Skin: Warm and dry, No rashes, No erythema  Neurologic: Alert and oriented x 3, Speech clear/fluent. Normal motor function, Normal sensation, No focal neurologic deficits  Psychiatric: Normal affect, Normal judgement, Normal mood      EKG interpreted by me: SVT at a rate of 140.  There are some ST segment depressions in lead I and aVL.  No ST segment elevation concerning for acute MI.  Could be a flutter.    Second EKG.  Interpreted by me.  7:56 AM.  Sinus rhythm at a rate of 74.  No ST segment elevation concerning for acute MI.  NJ is 210.  QTc is 428.    30 EKG.  1043.  Interpreted by me.  Atrial fibrillation with a rapid ventricular response rate of 132.  No ST segment elevation concerning for acute MI.  There are some ST segment depressions in lead I and aVL similar to the first EKG from 653.  QTc is 450.    ED Course & MDM     This is a pleasant 74-year-old Sabianist female with a history of hypertrophic cardiomyopathy, diabetes, atrial fibrillation, 4 days status post ablation at [facility] comes the emergency department with shortness of breath and elevated heart rate.  Symptoms " started this morning.  On exam she is alert and conversant.  Appears mildly uncomfortable.  Heart rate is in the 140s with a stable blood pressure and she is mentating well.  EKG looks like SVT.  Patient was given a small fluid bolus.  We discussed metoprolol but the patient states she had a similar episode a few days ago and metoprolol did not help.  Reports that she got diltiazem and that did help.  Patient and daughter at bedside confirms this and states that they would prefer to try diltiazem first today.    Patient did do well with the diltiazem.  Converted to sinus rhythm.  Blood work shows an initial troponin of 500.  Repeat is 450.  Patient felt better but still was not feeling quite right.  Heart rate did start to trend back up to the point it got to the 120s and concern for the patient being back in atrial fibrillation.  I did order her home meds.  I spoke with her cardiology team.  They recommend hospitalization for an echo and further cardiac monitoring.  Case was discussed with the hospitalist.  Patient was given a second dose of Cardizem.  Heart rate improved to 96.  Blood pressure 102/70 on reassessment.    40 minutes of critical care time was spent with this patient, exclusive of billable procedures.      Impression   Diagnoses as of 07/11/24 1039   SVT (supraventricular tachycardia) (CMS-[facility])        Disposition  Hospitalize              Joyce Larson MD  07/11/24 1109

## 2024-07-11 NOTE — CONSULTS
Inpatient consult to Cardiology  Consult performed by: CONSTANCE Guadarrama  Consult ordered by: CONSTANCE Guadarrama  Reason for consult: afib rvr          History Of Present Illness  Debbie Rees is a 74 y.o. female presenting with palpitations and shortness of breath. She was at [facility] on Monday for an ablation. The procedure went well and she was then discharged. After she was discharged, she felt palpitations and had shortness of breath and went to [facility] on her way home. She was given Diltiazem which helped convert her and was discharged on PRN metoprolol in addition to her scheduled metoprolol. She states she was at home and felt the palpitations. She was checking her HR on her pulse ox and noted her HR to be in the 30's so she didn't want to take the metoprolol. When she checked her BP, she noted her BP to be normal but HR elevated so she called EMS. On EMS arrival, she was noted to be tachycardic. She also reports a burning sensation in the middle of her chest that radiates to her back.     Lab work in the ER showed glucose 214, sodium 135, potassium 4.1, BUN/Cr 19/1.05, magnesium 1.62, lactate 1.3, troponin 500, WBC 10.2, H&H 12.7/40.3, CXR negative.     EKG showed SVT. She was given diltiazem IV and her HR improved. Her HR then increased again and BP dropped. She was given another IV diltiazem.         Past Medical History  Past Medical History:   Diagnosis Date    Abdominal pain of multiple sites 06/22/2023    Acute sinusitis 06/22/2023    Acute upper respiratory infection, unspecified 03/14/2017    Acute URI    Anxiety disorder, unspecified     Anxiety    Biceps tendinitis of left upper extremity 06/22/2023    Burning with urination 06/22/2023    Cervical pain 06/22/2023    Cervicalgia     Cervical pain (neck)    Encounter for screening for other viral diseases 12/14/2017    Need for hepatitis C screening test    Hand numbness 12/26/2023    Morbid (severe) obesity due to  excess calories (Multi)     Morbid obesity    Pain in left knee     Acute pain of left knee    Paroxysmal SVT (supraventricular tachycardia) (CMS-[facility]) 2023    Personal history of other diseases of the circulatory system     History of hypertension    Personal history of other diseases of the musculoskeletal system and connective tissue     History of arthritis    Personal history of other drug therapy 2019    History of influenza vaccination    Personal history of other endocrine, nutritional and metabolic disease     History of hyperlipidemia    Personal history of other endocrine, nutritional and metabolic disease     History of diabetes mellitus    Personal history of other specified conditions 2017    History of dysuria    Pyogenic granuloma of skin and subcutaneous tissue 2023    Streptococcal pharyngitis 2017    Strep sore throat    Thoracic spine pain 2023    Unspecified abdominal hernia without obstruction or gangrene     Abdominal hernia without obstruction and without gangrene    Unspecified asthma, uncomplicated ([facility]) 2017    Asthmatic bronchitis    Urinary tract infection, site not specified 2017    Acute UTI   Hypertrophic CMO, HTN, venous insufficiency s/p EVLA, PVD, OA, diabetes mellitus type 2, paroxysmal atrial fibrillation     Surgical History  Past Surgical History:   Procedure Laterality Date    CARDIAC ELECTROPHYSIOLOGY PROCEDURE N/A 2024    Procedure: Ablation A-Fib Persistant;  Surgeon: Ney Sommers MD;  Location: [place] [address] Cardiac Cath Lab;  Service: Electrophysiology;  Laterality: N/A;  EPS 3D W CARTO GA WHOLE CASE.    PT [facility] - NEEDS TIME OF PROCEDURE AHEAD OF TIME TO ARRANGE TRANSPORT.     SECTION, CLASSIC  2016     Section    CHOLECYSTECTOMY  2016    Cholecystectomy    OTHER SURGICAL HISTORY  2016    Sigmoidoscopy (Fiberoptic, Therapeutic )    OTHER SURGICAL HISTORY  2022    Lower back  surgery    OTHER SURGICAL HISTORY  03/22/2021    Carpal tunnel surgery    TOTAL KNEE ARTHROPLASTY  11/29/2016    Knee Replacement        Social History  She reports that she has never smoked. She has never used smokeless tobacco. She reports that she does not drink alcohol and does not use drugs.    Family History  No family history on file.     Allergies  Procaine and Empagliflozin    Review of Systems  Review of Systems   Constitutional:  Negative for chills and fever.   Respiratory:  Positive for shortness of breath. Negative for cough.    Cardiovascular:  Positive for chest pain, palpitations and leg swelling.   Gastrointestinal:  Negative for abdominal distention and abdominal pain.   Neurological:  Negative for dizziness and weakness.   All other systems reviewed and are negative.         Physical Exam  Constitutional: Well developed, awake/alert/oriented x3, no distress, alert and cooperative  Eyes: PERRL, EOMI, clear sclera  ENMT: mucous membranes moist, no apparent injury, no lesions seen  Head/Neck: Neck supple, no apparent injury, thyroid without mass or tenderness, No JVD, trachea midline, no bruits  Respiratory/Thorax: Patent airways, CTAB, normal breath sounds with good chest expansion, thorax symmetric  Cardiovascular: tachycardic, irregular rhythm, no murmurs, 2+ equal pulses of the extremities, normal S 1and S 2  Gastrointestinal: Nondistended, soft, non-tender, no rebound tenderness or guarding, no masses palpable, no organomegaly, +BS, no bruits  Musculoskeletal: ROM intact, no joint swelling, normal strength  Extremities: swelling to RLE, normal according to pt  Neurological: alert and oriented x3, intact senses, motor, response and reflexes, normal strength  Lymphatic: No significant lymphadenopathy  Psychological: Appropriate mood and behavior  Skin: Warm and dry, no lesions, no rashes       Last Recorded Vitals  Blood pressure 97/65, pulse (!) 109, temperature 36 °C (96.8 °F), temperature  "source Temporal, resp. rate 20, height 1.676 m (5' 6\"), weight 112 kg (247 lb), SpO2 99%.    Relevant Results    Scheduled medications  magnesium sulfate, 2 g, intravenous, Once  ondansetron, 4 mg, intravenous, Once      Continuous medications     PRN medications      Results for orders placed or performed during the hospital encounter of 07/11/24 (from the past 24 hour(s))   CBC and Auto Differential   Result Value Ref Range    WBC 10.2 4.4 - 11.3 x10*3/uL    nRBC 0.0 0.0 - 0.0 /100 WBCs    RBC 4.14 4.00 - 5.20 x10*6/uL    Hemoglobin 12.7 12.0 - 16.0 g/dL    Hematocrit 40.3 36.0 - 46.0 %    MCV 97 80 - 100 fL    MCH 30.7 26.0 - 34.0 pg    MCHC 31.5 (L) 32.0 - 36.0 g/dL    RDW 13.2 11.5 - 14.5 %    Platelets 252 150 - 450 x10*3/uL    Neutrophils % 71.2 40.0 - 80.0 %    Immature Granulocytes %, Automated 0.4 0.0 - 0.9 %    Lymphocytes % 20.7 13.0 - 44.0 %    Monocytes % 5.8 2.0 - 10.0 %    Eosinophils % 1.4 0.0 - 6.0 %    Basophils % 0.5 0.0 - 2.0 %    Neutrophils Absolute 7.30 (H) 1.60 - 5.50 x10*3/uL    Immature Granulocytes Absolute, Automated 0.04 0.00 - 0.50 x10*3/uL    Lymphocytes Absolute 2.12 0.80 - 3.00 x10*3/uL    Monocytes Absolute 0.59 0.05 - 0.80 x10*3/uL    Eosinophils Absolute 0.14 0.00 - 0.40 x10*3/uL    Basophils Absolute 0.05 0.00 - 0.10 x10*3/uL   Basic metabolic panel   Result Value Ref Range    Glucose 214 (H) 74 - 99 mg/dL    Sodium 135 (L) 136 - 145 mmol/L    Potassium 4.1 3.5 - 5.3 mmol/L    Chloride 102 98 - 107 mmol/L    Bicarbonate 24 21 - 32 mmol/L    Anion Gap 13 10 - 20 mmol/L    Urea Nitrogen 19 6 - 23 mg/dL    Creatinine 1.05 0.50 - 1.05 mg/dL    eGFR 56 (L) >60 mL/min/1.73m*2    Calcium 9.4 8.6 - 10.3 mg/dL   Magnesium   Result Value Ref Range    Magnesium 1.62 1.60 - 2.40 mg/dL   Lactate   Result Value Ref Range    Lactate 1.3 0.4 - 2.0 mmol/L   Troponin I, High Sensitivity, Initial   Result Value Ref Range    Troponin I, High Sensitivity 500 (HH) 0 - 13 ng/L   Light Blue Top "   Result Value Ref Range    Extra Tube Hold for add-ons.    Red Top   Result Value Ref Range    Extra Tube Hold for add-ons.    Green Top   Result Value Ref Range    Extra Tube Hold for add-ons.    Lavender Top   Result Value Ref Range    Extra Tube Hold for add-ons.    ECG 12 lead   Result Value Ref Range    Ventricular Rate 74 BPM    Atrial Rate 74 BPM    MN Interval 210 ms    QRS Duration 76 ms    QT Interval 386 ms    QTC Calculation(Bazett) 428 ms    P Axis 70 degrees    R Axis -10 degrees    T Axis 46 degrees    QRS Count 13 beats    Q Onset 220 ms    P Onset 115 ms    P Offset 165 ms    T Offset 413 ms    QTC Fredericia 414 ms   Troponin, High Sensitivity, 1 Hour   Result Value Ref Range    Troponin I, High Sensitivity 455 (HH) 0 - 13 ng/L   ECG 12 lead   Result Value Ref Range    Ventricular Rate 140 BPM    QRS Duration 78 ms    QT Interval 294 ms    QTC Calculation(Bazett) 448 ms    R Axis -20 degrees    T Axis 68 degrees    QRS Count 23 beats    Q Onset 219 ms    T Offset 366 ms    QTC Fredericia 390 ms   ECG 12 lead   Result Value Ref Range    Ventricular Rate 132 BPM    QRS Duration 76 ms    QT Interval 304 ms    QTC Calculation(Bazett) 450 ms    R Axis -20 degrees    T Axis 88 degrees    QRS Count 21 beats    Q Onset 218 ms    T Offset 370 ms    QTC Fredericia 395 ms       XR chest 1 view   Final Result   1.  No radiographic change.                  MACRO:   None        Signed by: Bj Erwin 7/11/2024 11:08 AM   Dictation workstation:   [id number]      Transthoracic Echo (TTE) Complete    (Results Pending)       Transthoracic Echo (TTE) Complete    Result Date: 3/29/2024   [facility], [address] [place] [address]               Tel [phone] and Fax [phone] TRANSTHORACIC ECHOCARDIOGRAM REPORT  Patient Name:      VIVIAN KIM      Reading Physician:    [id number] Dania Gutierrez MD Study Date:        3/29/2024             Ordering Provider:    [id number] RANULFO MELGOZA MRN/PID:           [id number]             Fellow: Accession#:        [id number]         Nurse:                Nina Dickerson RN Date of Birth/Age: 1950 / 74 years  Sonographer:          Janneth Collins                                                               RDCS Gender:            F                    Additional Staff: Height:            167.64 cm            Admit Date:           3/28/2024 Weight:            113.40 kg            Admission Status:     Inpatient -                                                               Routine BSA / BMI:         2.20 m2 / 40.35      Encounter#:           [id number]                    kg/m2                                         Department Location:  [facility] Non                                                               Invasive Blood Pressure: 128 /85 mmHg Study Type:    TRANSTHORACIC ECHO (TTE) COMPLETE Diagnosis/ICD: Unspecified atrial fibrillation-I48.91 Indication:    Atrial Fibrillation CPT Code:      Echo Complete w Full Doppler-[id number] Patient History: Diabetes:          Yes Pertinent History: HTN and A-Fib. HCM. Study Detail: The following Echo studies were performed: 2D, M-Mode, Doppler and               color flow. Technically challenging study due to poor acoustic               windows. Definity used as a contrast agent for endocardial border               definition. Total contrast used for this procedure was 1 mL via IV               push.  PHYSICIAN INTERPRETATION: Left Ventricle: The left ventricular systolic function is hyperdynamic, with an estimated ejection fraction of 65-70%. There are no regional wall motion abnormalities. The left ventricular cavity size is normal. Left ventricular diastolic filling was indeterminate. Left Atrium: The left atrium is mildly dilated. Right Ventricle: The right ventricle is normal in size. There  is normal right ventricular global systolic function. Right Atrium: The right atrium is normal in size. Aortic Valve: The aortic valve is trileaflet. There is no evidence of aortic valve regurgitation. The peak instantaneous gradient of the aortic valve is 8.3 mmHg. The mean gradient of the aortic valve is 4.9 mmHg. Mitral Valve: The mitral valve is normal in structure. There is trace to mild mitral valve regurgitation. Tricuspid Valve: The tricuspid valve is structurally normal. There is trace to mild tricuspid regurgitation. Pulmonic Valve: The pulmonic valve is not well visualized. The pulmonic valve regurgitation was not well visualized. Pericardium: There is no pericardial effusion noted. Aorta: The aortic root is normal. Pulmonary Artery: The tricuspid regurgitant velocity is 2.25 m/s, and with an estimated right atrial pressure of 3 mmHg, the estimated pulmonary artery pressure is normal with the RVSP at 23.3 mmHg.  CONCLUSIONS:  1. Left ventricular systolic function is hyperdynamic with a 65-70% estimated ejection fraction. QUANTITATIVE DATA SUMMARY: 2D MEASUREMENTS:                           Normal Ranges: IVSd:          1.84 cm    (0.6-1.1cm) LVPWd:         1.09 cm    (0.6-1.1cm) LVIDd:         3.98 cm    (3.9-5.9cm) LVIDs:         2.50 cm LV Mass Index: 101.3 g/m2 LV % FS        37.1 % LA VOLUME:                              Normal Ranges: LA Vol A4C:       52.5 ml    (22+/-6mL/m2) LA Vol A2C:       53.8 ml LA Vol BP:        53.9 ml LA Vol Index A4C: 23.9 ml/m2 LA Vol Index A2C: 24.5 ml/m2 LA Vol Index BP:  24.5 ml/m2 LA Volume Index:  24.5 ml/m2 LA Vol A4C:       51.0 ml LA Vol A2C:       50.8 ml RA VOLUME BY A/L METHOD:                       Normal Ranges: RA Area A4C: 13.6 cm2 M-MODE MEASUREMENTS:                  Normal Ranges: Ao Root: 2.90 cm (2.0-3.7cm) LAs:     4.17 cm (2.7-4.0cm) LV SYSTOLIC FUNCTION BY 2D PLANIMETRY (MOD):                     Normal Ranges: EF-A4C View: 68.9 % (>=55%) EF-A2C  View: 67.6 % EF-Biplane:  68.5 % LV DIASTOLIC FUNCTION:                     Normal Ranges: MV Peak E: 0.83 m/s (0.7-1.2 m/s) MITRAL VALVE:                 Normal Ranges: MV DT: 197 msec (150-240msec) AORTIC VALVE:                                   Normal Ranges: AoV Vmax:                1.44 m/s (<=1.7m/s) AoV Peak P.3 mmHg (<20mmHg) AoV Mean P.9 mmHg (1.7-11.5mmHg) LVOT Max Salazar:            1.35 m/s (<=1.1m/s) AoV VTI:                 27.74 cm (18-25cm) LVOT VTI:                21.49 cm LVOT Diameter:           1.98 cm  (1.8-2.4cm) AoV Area, VTI:           2.38 cm2 (2.5-5.5cm2) AoV Area,Vmax:           2.90 cm2 (2.5-4.5cm2) AoV Dimensionless Index: 0.77 TRICUSPID VALVE/RVSP:                             Normal Ranges: Peak TR Velocity: 2.25 m/s RV Syst Pressure: 23.3 mmHg (< 30mmHg) PULMONIC VALVE:                      Normal Ranges: PV Max Salazar: 1.2 m/s  (0.6-0.9m/s) PV Max P.2 mmHg  [id number] Dania Gutierrez MD Electronically signed on 3/29/2024 at 11:55:50 AM  ** Final **         Assessment/Plan   Paroxysmal atrial fibrillation with RVR  -s/p ablation 24 with Dr. Sommers  -EKG reviewed, SVT, repeat EKG showed atrial fibrillation with RVR  -Given Diltiazem IV x2, BP dropped  -Magnesium 1.6->supplemented  -IVF boluses given  -Give Digoxin 0.5mg IV x1 now and repeat in 4 hours  -Had been on amiodarone in the past but had diarrhea and changed to Multaq  -If HR conts to be elevated, start amiodarone gtt and stop Multaq  -Cont eliquis  -Cont Toprol XL 100mg BID  -Repeat echo  -Monitor on tele     2. Elevated troponin-Non MI elevation 2/2 AF RVR and recent ablation  -Does report CP and SOB   -Troponin 500->455  -CXR negative  -I reviewed the EKG, no acute changes, ST elevation, or depression  -Stress test in 2024 negative for ischemia  -Cont metoprolol     3. Hypertension  -stable  -2gm na diet  -cont meds  -monitor     4. Diabetes Mellitus type 2  -ISS  -Accuchecks  -hgb A1C 7.4%       Kathryn Swan, APRN-CNP

## 2024-07-12 ENCOUNTER — PHARMACY VISIT (OUTPATIENT)
Dept: PHARMACY | Facility: CLINIC | Age: 74
End: 2024-07-12
Payer: MEDICARE

## 2024-07-12 ENCOUNTER — HOSPITAL ENCOUNTER (INPATIENT)
Facility: HOSPITAL | Age: 74
DRG: 309 | End: 2024-07-12
Attending: STUDENT IN AN ORGANIZED HEALTH CARE EDUCATION/TRAINING PROGRAM | Admitting: STUDENT IN AN ORGANIZED HEALTH CARE EDUCATION/TRAINING PROGRAM
Payer: MEDICARE

## 2024-07-12 VITALS
WEIGHT: 254.85 LBS | HEART RATE: 75 BPM | BODY MASS INDEX: 40.96 KG/M2 | TEMPERATURE: 96.8 F | DIASTOLIC BLOOD PRESSURE: 83 MMHG | HEIGHT: 66 IN | RESPIRATION RATE: 16 BRPM | SYSTOLIC BLOOD PRESSURE: 136 MMHG | OXYGEN SATURATION: 100 %

## 2024-07-12 DIAGNOSIS — I48.92 ATRIAL FLUTTER WITH RAPID VENTRICULAR RESPONSE (MULTI): ICD-10-CM

## 2024-07-12 DIAGNOSIS — I48.19 PERSISTENT ATRIAL FIBRILLATION (MULTI): ICD-10-CM

## 2024-07-12 DIAGNOSIS — I48.91 ATRIAL FIBRILLATION WITH RVR (MULTI): Primary | ICD-10-CM

## 2024-07-12 PROBLEM — I47.10 SVT (SUPRAVENTRICULAR TACHYCARDIA) (CMS-HCC): Status: RESOLVED | Noted: 2024-07-11 | Resolved: 2024-07-12

## 2024-07-12 LAB
ALBUMIN SERPL BCP-MCNC: 3.3 G/DL (ref 3.4–5)
ANION GAP SERPL CALC-SCNC: 11 MMOL/L (ref 10–20)
BUN SERPL-MCNC: 21 MG/DL (ref 6–23)
CALCIUM SERPL-MCNC: 9.1 MG/DL (ref 8.6–10.3)
CHLORIDE SERPL-SCNC: 106 MMOL/L (ref 98–107)
CO2 SERPL-SCNC: 25 MMOL/L (ref 21–32)
CREAT SERPL-MCNC: 1.02 MG/DL (ref 0.5–1.05)
EGFRCR SERPLBLD CKD-EPI 2021: 58 ML/MIN/1.73M*2
ERYTHROCYTE [DISTWIDTH] IN BLOOD BY AUTOMATED COUNT: 13 % (ref 11.5–14.5)
GLUCOSE BLD MANUAL STRIP-MCNC: 111 MG/DL (ref 74–99)
GLUCOSE BLD MANUAL STRIP-MCNC: 213 MG/DL (ref 74–99)
GLUCOSE SERPL-MCNC: 133 MG/DL (ref 74–99)
HCT VFR BLD AUTO: 38 % (ref 36–46)
HGB BLD-MCNC: 12.3 G/DL (ref 12–16)
MAGNESIUM SERPL-MCNC: 1.76 MG/DL (ref 1.6–2.4)
MCH RBC QN AUTO: 31.5 PG (ref 26–34)
MCHC RBC AUTO-ENTMCNC: 32.4 G/DL (ref 32–36)
MCV RBC AUTO: 97 FL (ref 80–100)
NRBC BLD-RTO: 0 /100 WBCS (ref 0–0)
PHOSPHATE SERPL-MCNC: 2.7 MG/DL (ref 2.5–4.9)
PLATELET # BLD AUTO: 214 X10*3/UL (ref 150–450)
POTASSIUM SERPL-SCNC: 3.8 MMOL/L (ref 3.5–5.3)
RBC # BLD AUTO: 3.9 X10*6/UL (ref 4–5.2)
SODIUM SERPL-SCNC: 138 MMOL/L (ref 136–145)
WBC # BLD AUTO: 7.6 X10*3/UL (ref 4.4–11.3)

## 2024-07-12 PROCEDURE — 82947 ASSAY GLUCOSE BLOOD QUANT: CPT

## 2024-07-12 PROCEDURE — 97165 OT EVAL LOW COMPLEX 30 MIN: CPT | Mod: GO

## 2024-07-12 PROCEDURE — 2500000001 HC RX 250 WO HCPCS SELF ADMINISTERED DRUGS (ALT 637 FOR MEDICARE OP): Performed by: NURSE PRACTITIONER

## 2024-07-12 PROCEDURE — 2500000001 HC RX 250 WO HCPCS SELF ADMINISTERED DRUGS (ALT 637 FOR MEDICARE OP)

## 2024-07-12 PROCEDURE — 36415 COLL VENOUS BLD VENIPUNCTURE: CPT

## 2024-07-12 PROCEDURE — 83735 ASSAY OF MAGNESIUM: CPT

## 2024-07-12 PROCEDURE — 85027 COMPLETE CBC AUTOMATED: CPT

## 2024-07-12 PROCEDURE — 97161 PT EVAL LOW COMPLEX 20 MIN: CPT | Mod: GP

## 2024-07-12 PROCEDURE — 99239 HOSP IP/OBS DSCHRG MGMT >30: CPT

## 2024-07-12 PROCEDURE — RXMED WILLOW AMBULATORY MEDICATION CHARGE

## 2024-07-12 PROCEDURE — 80069 RENAL FUNCTION PANEL: CPT

## 2024-07-12 PROCEDURE — 99291 CRITICAL CARE FIRST HOUR: CPT | Performed by: STUDENT IN AN ORGANIZED HEALTH CARE EDUCATION/TRAINING PROGRAM

## 2024-07-12 PROCEDURE — 2500000002 HC RX 250 W HCPCS SELF ADMINISTERED DRUGS (ALT 637 FOR MEDICARE OP, ALT 636 FOR OP/ED)

## 2024-07-12 PROCEDURE — 84443 ASSAY THYROID STIM HORMONE: CPT | Performed by: STUDENT IN AN ORGANIZED HEALTH CARE EDUCATION/TRAINING PROGRAM

## 2024-07-12 PROCEDURE — 2500000004 HC RX 250 GENERAL PHARMACY W/ HCPCS (ALT 636 FOR OP/ED)

## 2024-07-12 RX ORDER — DIGOXIN 125 MCG
125 TABLET ORAL DAILY
Qty: 30 TABLET | Refills: 11 | Status: SHIPPED | OUTPATIENT
Start: 2024-07-13 | End: 2024-07-15 | Stop reason: HOSPADM

## 2024-07-12 ASSESSMENT — COGNITIVE AND FUNCTIONAL STATUS - GENERAL
DAILY ACTIVITIY SCORE: 20
WALKING IN HOSPITAL ROOM: A LITTLE
STANDING UP FROM CHAIR USING ARMS: A LITTLE
MOVING TO AND FROM BED TO CHAIR: A LITTLE
HELP NEEDED FOR BATHING: A LITTLE
CLIMB 3 TO 5 STEPS WITH RAILING: A LITTLE
TOILETING: A LITTLE
PERSONAL GROOMING: A LITTLE
DRESSING REGULAR LOWER BODY CLOTHING: A LITTLE
MOBILITY SCORE: 20

## 2024-07-12 ASSESSMENT — PAIN - FUNCTIONAL ASSESSMENT
PAIN_FUNCTIONAL_ASSESSMENT: 0-10
PAIN_FUNCTIONAL_ASSESSMENT: 0-10

## 2024-07-12 ASSESSMENT — ACTIVITIES OF DAILY LIVING (ADL)
ADL_ASSISTANCE: INDEPENDENT
LACK_OF_TRANSPORTATION: NO
ADLS_ADDRESSED: YES
BATHING_ASSISTANCE: STAND BY
ADL_ASSISTANCE: INDEPENDENT

## 2024-07-12 ASSESSMENT — PAIN SCALES - GENERAL
PAINLEVEL_OUTOF10: 0 - NO PAIN

## 2024-07-12 NOTE — DISCHARGE SUMMARY
Discharge Diagnosis  SVT (supraventricular tachycardia) (CMS-[facility])        Discharge Meds     Your medication list        START taking these medications        Instructions Last Dose Given Next Dose Due   digoxin 125 MCG tablet  Commonly known as: Lanoxin  Start taking on: July 13, 2024      Take 1 tablet (125 mcg) by mouth once daily.              CONTINUE taking these medications        Instructions Last Dose Given Next Dose Due   apixaban 5 mg tablet  Commonly known as: Eliquis      Take 1 tablet (5 mg) by mouth 2 times a day.       hydroCHLOROthiazide 25 mg tablet  Commonly known as: HYDRODiuril           lisinopril 20 mg tablet      TAKE ONE TABLET BY MOUTH TWO TIMES A DAY       magnesium oxide 400 mg tablet  Commonly known as: Mag-Ox           metoprolol succinate  mg 24 hr tablet  Commonly known as: Toprol-XL      Take 1 tablet (100 mg) by mouth 2 times a day. Do not crush or chew.       metoprolol tartrate 25 mg tablet  Commonly known as: Lopressor      Take 1 tablet (25 mg) by mouth if needed (for recurrent palpitations) for up to 30 doses.       Multaq 400 mg tablet  Generic drug: dronedarone           pantoprazole 40 mg EC tablet  Commonly known as: ProtoNix      Take 1 tablet (40 mg) by mouth 2 times a day before meals. Do not crush, chew, or split.       semaglutide 1 mg/dose (4 mg/3 mL) pen injector  Commonly known as: OZEMPIC      Inject 1 mg under the skin 1 (one) time per week.                 Where to Get Your Medications        These medications were sent to [facility]  [address], [facility] [address]      Hours: 9 AM to 5 PM Mon-Fri Phone: [phone]   digoxin 125 MCG tablet         Test Results Pending At Discharge  Pending Labs       No current pending labs.            Hospital Course  HPI   Debbie Rees is a 74 y.o. female with past medical history of hypertrophic cardiomyopathy, hypertension, venous insufficiency status post EVLA, PVD, OA, type 2 diabetes, and  paroxysmal A-fib presenting with palpitations and shortness of breath. She was at [facility] on Monday for an ablation postdischarge the patient felt palpitations and was complaining of shortness of breath.  She went to [facility] on her way home.  She was given diltiazem and was discharged on as needed metoprolol.  Upon arrival to her home she checked her heart rate which was elevated at which point she called EMS and was taken to [facility].    ED course  EKG showed SVT.  She was given diltiazem IV twice.  Patient was noted to have non-MI troponin elevation likely secondary to A-fib with RVR and recent ablation.  EKG did not demonstrate any acute changes ST elevation or depression.  CT angio did not demonstrate any evidence of acute pulm embolism or pneumonia.  Cardiology was consulted in the ED who recommended admission for further management and close observation.    Hospital course  The patient was treated for paroxysmal A-fib with RVR.  Hypertensive medications were held due to soft blood pressures.  Echo completed showed left ventricular systolic function within normal limits with a visually estimated ejection fraction of 70%.  The left ventricular cavity was decreased, increased left ventricular septal thickness consistent with a known history of ventricular hypertrophy.  cardiology recommended the addition of digoxin 125 mcg daily in addition to her metoprolol succinate 100 mg twice daily, and her Multaq 400 mg 2 times daily.  As of 7/12/2024 the patient remained on sinus rhythm and was hemodynamically stable medically ready for discharge.    Discharge   Please take your medications as instructed at time of hospital discharge.     Instructions at Discharge:  -Take digoxin 125 mcg once daily  -Please follow-up with cardiology.    Please follow-up with your primary care provider within 5-7 days from time of discharge.   If you experience any worsening symptoms or any new acute concerns arise,  please contact your primary care provider to discuss and possibly arrange an appointment. If you cannot get in touch with provider or severe symptoms are present, please return to nearest emergency room/urgent care for evaluation and treatment.     Pertinent Physical Exam At Time of Discharge  Physical Exam  Constitutional:       General: She is not in acute distress.     Appearance: She is obese.   HENT:      Head: Normocephalic and atraumatic.      Nose: Nose normal.   Cardiovascular:      Heart sounds: Normal heart sounds.   Pulmonary:      Effort: Pulmonary effort is normal.      Breath sounds: Normal breath sounds.   Abdominal:      General: Bowel sounds are normal.      Palpations: Abdomen is soft.   Musculoskeletal:         General: Normal range of motion.      Comments: Right lower extremity edema     Skin:     General: Skin is warm.   Neurological:      General: No focal deficit present.      Mental Status: She is alert and oriented to person, place, and time. Mental status is at baseline.   Psychiatric:         Mood and Affect: Mood normal.         Behavior: Behavior normal.         Thought Content: Thought content normal.         Judgment: Judgment normal.         Outpatient Follow-Up  Future Appointments   Date Time Provider Department Center   8/2/2024 10:00 AM Jarrell MACHADO DO [id number]         Alex Bolanos MD

## 2024-07-12 NOTE — PROGRESS NOTES
Debbie Rees is a 74 y.o. female on day 1 of admission presenting with SVT (supraventricular tachycardia) (CMS-[facility]).      Subjective   She is sitting up in the chair, states she feels good today, no further palpitations.       Review of systems:  Constitutional: negative for fever, chills, or malaise  Neuro: negative for dizziness, headache, numbness, tingling  ENT: Negative for nasal congestion or sore throat  Resp: negative for shortness of breath, cough, or wheezing  CV: negative for chest pain, palpitations  GI: negative for abd pain, nausea, vomiting or diarrhea  : negative for dysuria, frequency, or urgency  Skin: negative for lesions, wounds, or rash  Musculoskeletal: Negative for weakness, myalgia, or arthralgia  Endocrine: Negative for polyuria or polydipsia         Objective   Constitutional: Well developed, awake/alert/oriented x3, no distress, alert and cooperative  Eyes: PERRL, EOMI, clear sclera  ENMT: mucous membranes moist, no apparent injury, no lesions seen  Head/Neck: Neck supple, no apparent injury, thyroid without mass or tenderness, No JVD, trachea midline, no bruits  Respiratory/Thorax: Patent airways, CTAB, normal breath sounds with good chest expansion, thorax symmetric  Cardiovascular: Regular, rate and rhythm, no murmurs, 2+ equal pulses of the extremities, normal S 1and S 2  Gastrointestinal: Nondistended, soft, non-tender, no rebound tenderness or guarding, no masses palpable, no organomegaly, +BS, no bruits  Musculoskeletal: ROM intact, no joint swelling, normal strength  Extremities: normal extremities, no cyanosis edema, contusions or wounds, no clubbing  Neurological: alert and oriented x3, intact senses, motor, response and reflexes, normal strength  Lymphatic: No significant lymphadenopathy  Psychological: Appropriate mood and behavior  Skin: Warm and dry, no lesions, no rashes      Last Recorded Vitals  /83 (BP Location: Left arm, Patient Position: Lying)   Pulse 75    "Temp 36 °C (96.8 °F) (Temporal)   Resp 16   Ht 1.676 m (5' 6\")   Wt 116 kg (254 lb 13.6 oz)   SpO2 100%   BMI 41.13 kg/m²     Intake/Output last 3 Shifts:  I/O last 3 completed shifts:  In: 1250 (10.8 mL/kg) [P.O.:700; I.V.:50 (0.4 mL/kg); IV Piggyback:500]  Out: - (0 mL/kg)   Weight: 115.6 kg   I/O this shift:  In: 480 [P.O.:480]  Out: -     Relevant Results  Scheduled medications  apixaban, 5 mg, oral, BID  digoxin, 250 mcg, intravenous, Once  digoxin, 125 mcg, oral, Daily  dronedarone, 400 mg, oral, BID  [Held by provider] hydroCHLOROthiazide, 25 mg, oral, q AM  insulin lispro, 0-10 Units, subcutaneous, Before meals & nightly  [Held by provider] lisinopril, 20 mg, oral, BID  magnesium oxide, 400 mg, oral, Daily  metoprolol succinate XL, 100 mg, oral, BID  pantoprazole, 40 mg, oral, BID AC      Continuous medications     PRN medications  PRN medications: acetaminophen **OR** acetaminophen, dextrose, dextrose, glucagon, glucagon    Results for orders placed or performed during the hospital encounter of 07/11/24 (from the past 24 hour(s))   Transthoracic Echo (TTE) Complete   Result Value Ref Range    AV pk mike 1.63 m/s    AV mn grad 7.0 mmHg    LVOT diam 2.00 cm    MV E/A ratio 1.07     LA vol index A/L 32.3 ml/m2    Tricuspid annular plane systolic excursion 1.5 cm    LV EF 70 %    RV free wall pk S' 17.10 cm/s    LVIDd 3.32 cm    RVSP 34.8 mmHg    Aortic Valve Area by Continuity of VTI 2.79 cm2    Aortic Valve Area by Continuity of Peak Velocity 2.62 cm2    AV pk grad 10.6 mmHg    LV A4C EF 61.2    POCT GLUCOSE   Result Value Ref Range    POCT Glucose 122 (H) 74 - 99 mg/dL   POCT GLUCOSE   Result Value Ref Range    POCT Glucose 124 (H) 74 - 99 mg/dL   CBC   Result Value Ref Range    WBC 7.6 4.4 - 11.3 x10*3/uL    nRBC 0.0 0.0 - 0.0 /100 WBCs    RBC 3.90 (L) 4.00 - 5.20 x10*6/uL    Hemoglobin 12.3 12.0 - 16.0 g/dL    Hematocrit 38.0 36.0 - 46.0 %    MCV 97 80 - 100 fL    MCH 31.5 26.0 - 34.0 pg    MCHC 32.4 " 32.0 - 36.0 g/dL    RDW 13.0 11.5 - 14.5 %    Platelets 214 150 - 450 x10*3/uL   Magnesium   Result Value Ref Range    Magnesium 1.76 1.60 - 2.40 mg/dL   Renal Function Panel   Result Value Ref Range    Glucose 133 (H) 74 - 99 mg/dL    Sodium 138 136 - 145 mmol/L    Potassium 3.8 3.5 - 5.3 mmol/L    Chloride 106 98 - 107 mmol/L    Bicarbonate 25 21 - 32 mmol/L    Anion Gap 11 10 - 20 mmol/L    Urea Nitrogen 21 6 - 23 mg/dL    Creatinine 1.02 0.50 - 1.05 mg/dL    eGFR 58 (L) >60 mL/min/1.73m*2    Calcium 9.1 8.6 - 10.3 mg/dL    Phosphorus 2.7 2.5 - 4.9 mg/dL    Albumin 3.3 (L) 3.4 - 5.0 g/dL   POCT GLUCOSE   Result Value Ref Range    POCT Glucose 111 (H) 74 - 99 mg/dL   POCT GLUCOSE   Result Value Ref Range    POCT Glucose 213 (H) 74 - 99 mg/dL       Transthoracic Echo (TTE) Complete   Final Result      XR chest 1 view   Final Result   1.  No radiographic change.                  MACRO:   None        Signed by: Bj Erwin 7/11/2024 11:08 AM   Dictation workstation:   [id number]          Transthoracic Echo (TTE) Complete    Result Date: 7/11/2024   [facility], [address] [place] [address]               Tel [phone] and Fax [phone] TRANSTHORACIC ECHOCARDIOGRAM REPORT  Patient Name:      VIVIAN JACOBSEN JUDY      Reading Physician:    [id number] Michael Lozano MD Study Date:        7/11/2024            Ordering Provider:    [id number] RANULFO MELGOZA MRN/PID:           [id number]             Fellow: Accession#:        [id number]         Nurse:                Nina Dickerson RN Date of Birth/Age: 1950 / 74 years  Sonographer:          Dina Rojas RDCS Gender:            F                    Additional Staff: Height:            167.64 cm            Admit Date: Weight:            112.04 kg             Admission Status:     Outpatient BSA / BMI:         2.19 m2 / 39.87      Encounter#:           [id number]                    kg/m2 Blood Pressure:    97/65 mmHg           Department Location:  [facility]                                                               Invasive Study Type:    TRANSTHORACIC ECHO (TTE) COMPLETE Diagnosis/ICD: Unspecified atrial fibrillation-I48.91 Indication:    Atrial Fibrillation CPT Code:      Echo Complete w Full Doppler-12662 Patient History: Diabetes:          Yes Pertinent History: HTN, HCM, Chest Pain and A-Fib. S/P cardiac ablation                    (7/8/23). Study Detail: The following Echo studies were performed: 2D, M-Mode, Doppler and               color flow. Technically challenging study due to prominent lung               artifact. Definity used as a contrast agent for endocardial border               definition. Total contrast used for this procedure was 1.0 mL via               IV push. The patient was awake.  PHYSICIAN INTERPRETATION: Left Ventricle: The left ventricular systolic function is normal, with a visually estimated ejection fraction of 70%. There are no regional wall motion abnormalities. The left ventricular cavity size is decreased. The left ventricular septal wall thickness is severely increased. There is moderately increased left ventricular posterior wall thickness. There is moderate to severe concentric left ventricular hypertrophy involving the septal wall. Findings are consistent with hypertrophic cardiomyopathy. Spectral Doppler shows an impaired relaxation pattern of left ventricular diastolic filling. There is a mild mid cavity left ventricular obstruction. The resting gradient is 25 mmHg.The provoked gradient is 33 mmHg. Left Atrium: The left atrium is moderately dilated. Right Ventricle: The right ventricle is normal in size. There is normal right ventricular global systolic function. Right Atrium: The right atrium is mild to moderately  dilated. Aortic Valve: The aortic valve is trileaflet. There is mild aortic valve cusp calcification. The aortic valve dimensionless index is 0.89. There is no evidence of aortic valve regurgitation. The peak instantaneous gradient of the aortic valve is 10.6 mmHg. The mean gradient of the aortic valve is 7.0 mmHg. Mitral Valve: The mitral valve is normal in structure. There is moderate mitral annular calcification. There is mild to moderate mitral valve regurgitation. Tricuspid Valve: The tricuspid valve is structurally normal. There is mild tricuspid regurgitation. Pulmonic Valve: The pulmonic valve is structurally normal. There is physiologic pulmonic valve regurgitation. Pericardium: There is a trivial pericardial effusion. Aorta: The aortic root is normal. Pulmonary Artery: The tricuspid regurgitant velocity is 2.82 m/s, and with an estimated right atrial pressure of 3 mmHg, the estimated pulmonary artery pressure is mildly elevated with the RVSP at 34.8 mmHg. Pulmonary Veins: The pulmonary veins appear normal and return normally to the left atrium. Systemic Veins: The inferior vena cava appears to be of normal size.  CONCLUSIONS:  1. The left ventricular systolic function is normal, with a visually estimated ejection fraction of 70%.  2. Spectral Doppler shows an impaired relaxation pattern of left ventricular diastolic filling.  3. Left ventricular cavity size is decreased.  4. Severely increased left ventricular septal thickness.  5. The left ventricular posterior wall thickness is moderately increased.  6. There is hypertrophic cardiomyopathy.  7. There is moderate to severe concentric left ventricular hypertrophy.  8. There is a mild mid cavity left ventricular obstruction.  9. There is normal right ventricular global systolic function. 10. The left atrium is moderately dilated. 11. The right atrium is mild to moderately dilated. 12. There is moderate mitral annular calcification. 13. Mild to moderate  mitral valve regurgitation. 14. Mild pulmonary HTN. Estimated CVP is normal. QUANTITATIVE DATA SUMMARY: 2D MEASUREMENTS:                          Normal Ranges: Ao Root d:     3.10 cm   (2.0-3.7cm) IVSd:          1.80 cm   (0.6-1.1cm) LVPWd:         1.45 cm   (0.6-1.1cm) LVIDd:         3.32 cm   (3.9-5.9cm) LVIDs:         2.47 cm LV Mass Index: 94.2 g/m2 LV % FS        25.6 % LA VOLUME:                               Normal Ranges: LA Vol A4C:        65.6 ml    (22+/-6mL/m2) LA Vol A2C:        70.9 ml LA Vol BP:         70.6 ml LA Vol Index A4C:  30.0ml/m2 LA Vol Index A2C:  32.4 ml/m2 LA Vol Index BP:   32.3 ml/m2 LA Area A4C:       23.4 cm2 LA Area A2C:       23.5 cm2 LA Major Axis A4C: 7.1 cm LA Major Axis A2C: 6.6 cm LA Volume Index:   30.3 ml/m2 LA Vol A4C:        61.7 ml LA Vol A2C:        66.5 ml RA VOLUME BY A/L METHOD:                       Normal Ranges: RA Area A4C: 20.8 cm2 M-MODE MEASUREMENTS:                  Normal Ranges: Ao Root: 3.00 cm (2.0-3.7cm) AoV Exc: 2.00 cm (1.5-2.5cm) LAs:     4.90 cm (2.7-4.0cm) AORTA MEASUREMENTS:                      Normal Ranges: AoV Exc:     2.00 cm (1.5-2.5cm) Ao Sinus, d: 3.10 cm (2.1-3.5cm) Asc Ao, d:   3.30 cm (2.1-3.4cm) LV SYSTOLIC FUNCTION BY 2D PLANIMETRY (MOD):                      Normal Ranges: EF-A4C View:    61 % (>=55%) EF-A2C View:    62 % EF-Biplane:     63 % EF-Visual:      70 % LV EF Reported: 70 % LV DIASTOLIC FUNCTION:                            Normal Ranges: MV Peak E:      0.78 m/s   (0.7-1.2 m/s) MV Peak A:      0.74 m/s   (0.42-0.7 m/s) E/A Ratio:      1.07       (1.0-2.2) MV e'           0.071 m/s  (>8.0) MV lateral e'   0.07 m/s MV medial e'    0.07 m/s E/e' Ratio:     11.07      (<8.0) PulmV Sys Salazar:  39.40 cm/s PulmV Guillen Salazar: 31.10 cm/s PulmV S/D Salazar:  1.00 MITRAL VALVE:                 Normal Ranges: MV DT: 184 msec (150-240msec) AORTIC VALVE:                                    Normal Ranges: AoV Vmax:                1.63 m/s  (<=1.7m/s)  AoV Peak PG:             10.6 mmHg (<20mmHg) AoV Mean P.0 mmHg  (1.7-11.5mmHg) LVOT Max Salazar:            1.36 m/s  (<=1.1m/s) AoV VTI:                 34.40 cm  (18-25cm) LVOT VTI:                30.60 cm LVOT Diameter:           2.00 cm   (1.8-2.4cm) AoV Area, VTI:           2.79 cm2  (2.5-5.5cm2) AoV Area,Vmax:           2.62 cm2  (2.5-4.5cm2) AoV Dimensionless Index: 0.89  RIGHT VENTRICLE: RV Basal 2.67 cm RV Mid   2.16 cm RV Major 6.5 cm TAPSE:   14.6 mm RV s'    0.17 m/s TRICUSPID VALVE/RVSP:                             Normal Ranges: Peak TR Velocity: 2.82 m/s RV Syst Pressure: 34.8 mmHg (< 30mmHg) IVC Diam:         1.82 cm PULMONIC VALVE:                      Normal Ranges: PV Max Salazar: 1.2 m/s  (0.6-0.9m/s) PV Max P.6 mmHg Pulmonary Veins: PulmV Guillen Salazar: 31.10 cm/s PulmV S/D Salazar:  1.00 PulmV Sys Salazar:  39.40 cm/s  [id number] Michael Lozano MD Electronically signed on 2024 at 8:45:01 PM  ** Final **     Transthoracic Echo (TTE) Complete    Result Date: 3/29/2024   [facility], [address] [place] [address]               Tel [phone] and Fax [phone] TRANSTHORACIC ECHOCARDIOGRAM REPORT  Patient Name:      VIVIAN KIM      Akhil Physician:    [id number] Dania Gutierrez MD Study Date:        3/29/2024            Ordering Provider:    [id number] RANULFO MELGOZA MRN/PID:           [id number]             Fellow: Accession#:        [id number]         Nurse:                Nina Dickerson RN Date of Birth/Age: 1950 / 74 years  Sonographer:          Janneth Collins RDCS Gender:            F                    Additional Staff: Height:            167.64 cm            Admit Date:           3/28/2024 Weight:            113.40 kg            Admission Status:     Inpatient -                                                                Routine BSA / BMI:         2.20 m2 / 40.35      Encounter#:           [id number]                    kg/m2                                         Department Location:  [facility] Non                                                               Invasive Blood Pressure: 128 /85 mmHg Study Type:    TRANSTHORACIC ECHO (TTE) COMPLETE Diagnosis/ICD: Unspecified atrial fibrillation-I48.91 Indication:    Atrial Fibrillation CPT Code:      Echo Complete w Full Doppler-[id number] Patient History: Diabetes:          Yes Pertinent History: HTN and A-Fib. HCM. Study Detail: The following Echo studies were performed: 2D, M-Mode, Doppler and               color flow. Technically challenging study due to poor acoustic               windows. Definity used as a contrast agent for endocardial border               definition. Total contrast used for this procedure was 1 mL via IV               push.  PHYSICIAN INTERPRETATION: Left Ventricle: The left ventricular systolic function is hyperdynamic, with an estimated ejection fraction of 65-70%. There are no regional wall motion abnormalities. The left ventricular cavity size is normal. Left ventricular diastolic filling was indeterminate. Left Atrium: The left atrium is mildly dilated. Right Ventricle: The right ventricle is normal in size. There is normal right ventricular global systolic function. Right Atrium: The right atrium is normal in size. Aortic Valve: The aortic valve is trileaflet. There is no evidence of aortic valve regurgitation. The peak instantaneous gradient of the aortic valve is 8.3 mmHg. The mean gradient of the aortic valve is 4.9 mmHg. Mitral Valve: The mitral valve is normal in structure. There is trace to mild mitral valve regurgitation. Tricuspid Valve: The tricuspid valve is structurally normal. There is trace to mild tricuspid regurgitation. Pulmonic Valve: The pulmonic valve is not well visualized. The pulmonic  valve regurgitation was not well visualized. Pericardium: There is no pericardial effusion noted. Aorta: The aortic root is normal. Pulmonary Artery: The tricuspid regurgitant velocity is 2.25 m/s, and with an estimated right atrial pressure of 3 mmHg, the estimated pulmonary artery pressure is normal with the RVSP at 23.3 mmHg.  CONCLUSIONS:  1. Left ventricular systolic function is hyperdynamic with a 65-70% estimated ejection fraction. QUANTITATIVE DATA SUMMARY: 2D MEASUREMENTS:                           Normal Ranges: IVSd:          1.84 cm    (0.6-1.1cm) LVPWd:         1.09 cm    (0.6-1.1cm) LVIDd:         3.98 cm    (3.9-5.9cm) LVIDs:         2.50 cm LV Mass Index: 101.3 g/m2 LV % FS        37.1 % LA VOLUME:                              Normal Ranges: LA Vol A4C:       52.5 ml    (22+/-6mL/m2) LA Vol A2C:       53.8 ml LA Vol BP:        53.9 ml LA Vol Index A4C: 23.9 ml/m2 LA Vol Index A2C: 24.5 ml/m2 LA Vol Index BP:  24.5 ml/m2 LA Volume Index:  24.5 ml/m2 LA Vol A4C:       51.0 ml LA Vol A2C:       50.8 ml RA VOLUME BY A/L METHOD:                       Normal Ranges: RA Area A4C: 13.6 cm2 M-MODE MEASUREMENTS:                  Normal Ranges: Ao Root: 2.90 cm (2.0-3.7cm) LAs:     4.17 cm (2.7-4.0cm) LV SYSTOLIC FUNCTION BY 2D PLANIMETRY (MOD):                     Normal Ranges: EF-A4C View: 68.9 % (>=55%) EF-A2C View: 67.6 % EF-Biplane:  68.5 % LV DIASTOLIC FUNCTION:                     Normal Ranges: MV Peak E: 0.83 m/s (0.7-1.2 m/s) MITRAL VALVE:                 Normal Ranges: MV DT: 197 msec (150-240msec) AORTIC VALVE:                                   Normal Ranges: AoV Vmax:                1.44 m/s (<=1.7m/s) AoV Peak P.3 mmHg (<20mmHg) AoV Mean P.9 mmHg (1.7-11.5mmHg) LVOT Max Salazar:            1.35 m/s (<=1.1m/s) AoV VTI:                 27.74 cm (18-25cm) LVOT VTI:                21.49 cm LVOT Diameter:           1.98 cm  (1.8-2.4cm) AoV Area, VTI:           2.38 cm2  (2.5-5.5cm2) AoV Area,Vmax:           2.90 cm2 (2.5-4.5cm2) AoV Dimensionless Index: 0.77 TRICUSPID VALVE/RVSP:                             Normal Ranges: Peak TR Velocity: 2.25 m/s RV Syst Pressure: 23.3 mmHg (< 30mmHg) PULMONIC VALVE:                      Normal Ranges: PV Max Salazar: 1.2 m/s  (0.6-0.9m/s) PV Max P.2 mmHg  [id number] Dania Gutierrez MD Electronically signed on 3/29/2024 at 11:55:50 AM  ** Final **           Assessment/Plan   Active Problems:  There are no active Hospital Problems.    Paroxysmal atrial fibrillation with RVR  -s/p ablation 24 with Dr. Sommers  -EKG reviewed, SVT, repeat EKG showed atrial fibrillation with RVR  -Given Diltiazem IV x2, BP dropped  -Magnesium 1.6->supplemented  -IVF boluses given  -Given Digoxin 0.5mg IV x1->converted to SR  -Start Digoxin 0.125mcg PO daily  -Had been on amiodarone in the past but had diarrhea and changed to Multaq  -Cont eliquis  -Cont Toprol XL 100mg BID  -Repeat echo unchanged, trivial pericardial effusion  -Monitor on tele     2. Elevated troponin-Non MI elevation 2/2 AF RVR and recent ablation  -Does report CP and SOB   -Troponin 500->455  -CXR negative  -I reviewed the EKG, no acute changes, ST elevation, or depression  -Stress test in 2024 negative for ischemia  -Cont metoprolol     3. Hypertension  -stable  -2gm na diet  -cont meds  -monitor     4. Diabetes Mellitus type 2  -ISS  -Accuchecks  -hgb A1C 7.4%         Kathryn Swan, LEAH-CNP

## 2024-07-12 NOTE — HOSPITAL COURSE
HPI   Debbie Rees is a 74 y.o. female with past medical history of hypertrophic cardiomyopathy, hypertension, venous insufficiency status post EVLA, PVD, OA, type 2 diabetes, and paroxysmal A-fib presenting with palpitations and shortness of breath. She was at [facility] on Monday for an ablation postdischarge the patient felt palpitations and was complaining of shortness of breath.  She went to [facility] on her way home.  She was given diltiazem and was discharged on as needed metoprolol.  Upon arrival to her home she checked her heart rate which was elevated at which point she called EMS and was taken to [facility].    ED course  EKG showed SVT.  She was given diltiazem IV twice.  Patient was noted to have non-MI troponin elevation likely secondary to A-fib with RVR and recent ablation.  EKG did not demonstrate any acute changes ST elevation or depression.  CT angio did not demonstrate any evidence of acute pulm embolism or pneumonia.  Cardiology was consulted in the ED who recommended admission for further management and close observation.    Hospital course  The patient was treated for paroxysmal A-fib with RVR.  Hypertensive medications were held due to soft blood pressures.  Echo completed showed left ventricular systolic function within normal limits with a visually estimated ejection fraction of 70%.  The left ventricular cavity was decreased, increased left ventricular septal thickness consistent with a known history of ventricular hypertrophy.  cardiology recommended the addition of digoxin 125 mcg daily in addition to her metoprolol succinate 100 mg twice daily, and her Multaq 400 mg 2 times daily.  As of 7/12/2024 the patient remained on sinus rhythm and was hemodynamically stable medically ready for discharge.    Discharge   Please take your medications as instructed at time of hospital discharge.     Instructions at Discharge:  -Take digoxin 125 mcg once daily  -Please follow-up with  cardiology.    Please follow-up with your primary care provider within 5-7 days from time of discharge.   If you experience any worsening symptoms or any new acute concerns arise, please contact your primary care provider to discuss and possibly arrange an appointment. If you cannot get in touch with provider or severe symptoms are present, please return to nearest emergency room/urgent care for evaluation and treatment.

## 2024-07-12 NOTE — PROGRESS NOTES
07/12/24 1143   Discharge Planning   Living Arrangements Spouse/significant other   Support Systems Spouse/significant other   Assistance Needed Patient is A&OX3, independent in ADLS, ambulates with a cane outside the home, No O2, CPAP or Bipap at baseline, no active HHC   Type of Residence Private residence   Number of Stairs to Enter Residence 5   Number of Stairs Within Residence   (1 flight to basement, not needed by patient)   Do you have animals or pets at home? No   Who is requesting discharge planning? Provider   Home or Post Acute Services None   Expected Discharge Disposition Home  (Home with spouse, No needs-denied need for HHC)   Does the patient need discharge transport arranged? No   Financial Resource Strain   How hard is it for you to pay for the very basics like food, housing, medical care, and heating? Not hard   Housing Stability   In the last 12 months, was there a time when you were not able to pay the mortgage or rent on time? N   In the past 12 months, how many times have you moved where you were living? 1   At any time in the past 12 months, were you homeless or living in a shelter (including now)? N   Transportation Needs   In the past 12 months, has lack of transportation kept you from medical appointments or from getting medications? no   In the past 12 months, has lack of transportation kept you from meetings, work, or from getting things needed for daily living? No

## 2024-07-12 NOTE — PROGRESS NOTES
Occupational Therapy    Evaluation    Patient Name: Debbie Rees  MRN: [id number]  Today's Date: 7/12/2024  Time Calculation  Start Time: 1027  Stop Time: 1039  Time Calculation (min): 12 min    Assessment  IP OT Assessment  OT Assessment: Pt presents wth decreased enurance impacting performance with ADLs and functional mobility. Continued skilled OT recommnded to maximze pt safety and independence with home-going  Prognosis: Good  Barriers to Discharge: None  Evaluation/Treatment Tolerance: Patient limited by fatigue  Medical Staff Made Aware: Yes  End of Session Communication: Bedside nurse  End of Session Patient Position: Up in chair, Alarm on  Plan:  Treatment Interventions: ADL retraining, Functional transfer training, UE strengthening/ROM, Endurance training  OT Frequency: 2 times per week  OT Discharge Recommendations: Low intensity level of continued care  Equipment Recommended upon Discharge: Straight cane  OT Recommended Transfer Status: Stand by assist  OT - OK to Discharge: Yes (Per OT POC)    Subjective   Current Problem:  1. SVT (supraventricular tachycardia) (CMS-HCC)        2. Atrial fibrillation with RVR (Multi)  Transthoracic Echo (TTE) Complete    Transthoracic Echo (TTE) Complete      3. Paroxysmal atrial fibrillation (Multi)  Transthoracic Echo (TTE) Complete    Transthoracic Echo (TTE) Complete        General:  General  Reason for Referral: 73 yo female referred to OT for tachycardia, impaired ADL/mobility  Referred By: Mykel Sweet  Past Medical History Relevant to Rehab: hypertrophic cardiomyopathy, hypertension, venous insufficiency status post EVLA, PVD, OA, type 2 diabetes, and paroxysmal A-fib presenting with palpitations and shortness of breath  Co-Treatment: PT  Co-Treatment Reason: Maximize pt care and safety  Prior to Session Communication: Bedside nurse  Patient Position Received: Bed, 3 rail up, Alarm on  General Comment: Pt pleasant and cooperative with OT  eval  Precautions:  Medical Precautions: Fall precautions (tele)  Vital Signs:  Heart Rate: 75  Heart Rate Source: Monitor  SpO2: 100 %  Patient Position: Sitting (after mobility)  Pain:  Pain Assessment  Pain Assessment: 0-10  0-10 (Numeric) Pain Score: 0 - No pain    Objective   Cognition:  Overall Cognitive Status: Within Functional Limits  Orientation Level: Oriented X4     Home Living:  Type of Home: House  Lives With: Spouse  Home Adaptive Equipment: Walker rolling or standard, Cane  Home Layout: One level  Home Access: Stairs to enter with rails  Entrance Stairs-Rails: Right  Entrance Stairs-Number of Steps: 4  Bathroom Shower/Tub: Tub/shower unit  Bathroom Equipment: Grab bars in shower   Prior Function:  Level of [place]: Independent with ADLs and functional transfers, Independent with homemaking with ambulation  Receives Help From: Family  ADL Assistance: Independent  Homemaking Assistance: Independent  Ambulatory Assistance: Independent (cane for distances)     ADL:  Eating Assistance: Independent  Grooming Assistance: Independent  Bathing Assistance: Stand by  UE Dressing Assistance: Independent  LE Dressing Assistance: Stand by  Toileting Assistance with Device: Stand by  Functional Assistance: Stand by  ADL Comments: Pt demonstrated ability to don pants with forward flexion and stand to arrange over hips with supervision for safety. Other ADL perfromance anticipated d/t current clinical presentation  Activity Tolerance:  Endurance: Tolerates less than 10 min exercise, no significant change in vital signs  Bed Mobility/Transfers: Bed Mobility  Bed Mobility: Yes  Bed Mobility 1  Bed Mobility 1: Supine to sitting  Level of Assistance 1: Modified independent  Bed Mobility Comments 1: HOB elevated, increased    Transfers  Transfer: Yes  Transfer 1  Transfer From 1: Sit to  Transfer to 1: Stand  Technique 1: Sit to stand, Stand to sit  Transfer Device 1: Cane  Transfer Level of Assistance 1: Close  supervision  Trials/Comments 1: 2 trials      Functional Mobility:  Functional Mobility  Functional Mobility Performed: Yes  Functional Mobility 1  Surface 1: Level tile  Device 1: Single point cane  Functional Mobility Support Devices: Gait belt  Assistance 1: Close supervision  Comments 1: Ambulated household distance in hallway with good balance, good safety awreness, limited endurance  Sitting Balance:  Static Sitting Balance  Static Sitting-Balance Support: Feet supported  Static Sitting-Level of Assistance: Independent  Standing Balance:  Static Standing Balance  Static Standing-Balance Support: Right upper extremity supported  Static Standing-Level of Assistance: Close supervision    Strength:  Strength Comments: RUE 3/5, LUE 3+/5  Perception:  Inattention/Neglect: Appears intact  Coordination:  Movements are Fluid and Coordinated: Yes   Hand Function:  Hand Function  Gross Grasp: Functional  Coordination: Functional  Extremities: RUE   RUE : Within Functional Limits and LUE   LUE: Within Functional Limits    Outcome Measures: [facility] Daily Activity  Putting on and taking off regular lower body clothing: A little  Bathing (including washing, rinsing, drying): A little  Putting on and taking off regular upper body clothing: None  Toileting, which includes using toilet, bedpan or urinal: A little  Taking care of personal grooming such as brushing teeth: A little  Eating Meals: None  Daily Activity - Total Score: 20      Education Documentation  Body Mechanics, taught by Gilbert Castillo OT at 7/12/2024 11:39 AM.  Learner: Patient  Readiness: Acceptance  Method: Explanation  Response: Verbalizes Understanding    Precautions, taught by Gilbert Castillo OT at 7/12/2024 11:39 AM.  Learner: Patient  Readiness: Acceptance  Method: Explanation  Response: Verbalizes Understanding    ADL Training, taught by Gilbert Castillo OT at 7/12/2024 11:39 AM.  Learner: Patient  Readiness: Acceptance  Method:  Explanation  Response: Verbalizes Understanding    Education Comments  No comments found.      Goals:   Encounter Problems       Encounter Problems (Active)       OT Goals       Pt will complete [id number] transfers using LRD in preparation for ADLs with Mod [place]       Start:  07/12/24    Expected End:  07/26/24            Pt will increase endurance to tolerate 15min of OOB activity with no more than 1 rest break in order to increase ability to engage in ADL completion.        Start:  07/12/24    Expected End:  07/26/24            Pt will tolerate 10min stand during functional task completion with no more than 1 rest break in order to increase endurance for functional task completion.        Start:  07/12/24    Expected End:  07/26/24            Pt will demo and/or verbalize 2-3 energy conservation techniques to incorporate into functional mobility or ADL to improve performance and increase independence.        Start:  07/12/24    Expected End:  07/26/24

## 2024-07-12 NOTE — PROGRESS NOTES
Physical Therapy    Physical Therapy Evaluation    Patient Name: Debbie Rees  MRN: [id number]  Today's Date: 7/12/2024   Time Calculation  Start Time: 1026  Stop Time: 1039  Time Calculation (min): 13 min    Assessment/Plan   PT Assessment  PT Assessment Results: Decreased mobility  Rehab Prognosis: Good  Barriers to Discharge: None  Evaluation/Treatment Tolerance: Patient tolerated treatment well  Medical Staff Made Aware: Yes  Strengths: Ability to acquire knowledge, Access to adaptive/assistive products, Capable of completing ADLs semi/independent, Support of Caregivers  Barriers to Participation: Comorbidities  End of Session Communication: Bedside nurse  Assessment Comment: Pt tolerated PT eval well. Demonstrates impaired mobility due to symptom presentation. Pt able to independently don lower body dressing sitting EOB. Ambulated 50' with dec richa, R torso lean, and step through gait pattern, with stable vitals. Recommend low intensity therapy.  End of Session Patient Position: Up in chair, Alarm on  IP OR SWING BED PT PLAN  Inpatient or Swing Bed: Inpatient  PT Plan  Treatment/Interventions: Transfer training, Gait training, Stair training  PT Plan: Ongoing PT  PT Frequency: 2 times per week  PT Discharge Recommendations: Low intensity level of continued care  Equipment Recommended upon Discharge: Straight cane (Owns)  PT Recommended Transfer Status: Assist x1  PT - OK to Discharge: Yes (Per PT poc)      Subjective   General Visit Information:  General  Reason for Referral: Impaired mobility, SVT  Referred By: Mykel Sweet  Past Medical History Relevant to Rehab: hypertrophic cardiomyopathy, hypertension, venous insufficiency status post EVLA, PVD, OA, type 2 diabetes, and paroxysmal A-fib presenting with palpitations and shortness of breath  Co-Treatment: OT  Co-Treatment Reason: Maximize pt care and safety  Prior to Session Communication: Bedside nurse  Patient Position Received: Bed, 3 rail up, Alarm  on  General Comment: Pt pleasant and cooperative with PT eval  Home Living:  Home Living  Type of Home: House  Lives With: Spouse  Home Adaptive Equipment: Walker rolling or standard, Cane  Home Layout: One level  Home Access: Stairs to enter with rails  Entrance Stairs-Rails: Right  Entrance Stairs-Number of Steps: 4  Bathroom Shower/Tub: Tub/shower unit  Bathroom Equipment: Grab bars in shower  Prior Level of Function:  Prior Function Per Pt/Caregiver Report  Level of [place]: Independent with ADLs and functional transfers, Independent with homemaking with ambulation  Receives Help From: Family  ADL Assistance: Independent  Homemaking Assistance: Independent  Ambulatory Assistance: Independent (Use cane outside)  Precautions:  Precautions  Medical Precautions: Fall precautions (Tele)  Vital Signs:  Vital Signs  Heart Rate: 75  Heart Rate Source: Monitor  SpO2: 100 %  Patient Position: Sitting (Following ambulation)    Objective   Pain:  Pain Assessment  Pain Assessment: 0-10  0-10 (Numeric) Pain Score: 0 - No pain  Cognition:  Cognition  Overall Cognitive Status: Within Functional Limits  Orientation Level: Oriented X4    General Assessments:     Activity Tolerance  Endurance: Tolerates less than 10 min exercise, no significant change in vital signs    Sensation  Light Touch: No apparent deficits    Strength  Strength Comments: R hip 4/5, R knee 4+/5, L LE 5/5  Coordination  Movements are Fluid and Coordinated: Yes  Heel to Shin: Intact    Postural Control  Postural Control: Within Functional Limits    Static Sitting Balance  Static Sitting-Balance Support: No upper extremity supported, Feet supported  Static Sitting-Level of Assistance: Close supervision    Static Standing Balance  Static Standing-Balance Support: Right upper extremity supported  Static Standing-Level of Assistance: Close supervision  Functional Assessments:  ADL  ADL's Addressed: Yes  ADL Comments: Pt able to independently don lower body  dressing sitting EOB    Bed Mobility  Bed Mobility: Yes  Bed Mobility 1  Bed Mobility 1: Supine to sitting  Level of Assistance 1: Modified independent  Bed Mobility Comments 1: HOB elevated, use of rails    Transfers  Transfer: Yes  Transfer 1  Transfer From 1: Sit to, Stand to  Transfer to 1: Stand, Sit  Technique 1: Sit to stand, Stand to sit  Transfer Device 1: Cane  Transfer Level of Assistance 1: Close supervision  Trials/Comments 1: x2    Ambulation/Gait Training  Ambulation/Gait Training Performed: Yes  Ambulation/Gait Training 1  Surface 1: Level tile  Device 1: Single point cane  Gait Support Devices: Gait belt  Assistance 1: Contact guard  Quality of Gait 1:  (Dec richa, R torso lean, step through gait pattern)  Comments/Distance (ft) 1: 50'    Stairs  Stairs: No    Outcome Measures:  [facility] Basic Mobility  Turning from your back to your side while in a flat bed without using bedrails: None  Moving from lying on your back to sitting on the side of a flat bed without using bedrails: None  Moving to and from bed to chair (including a wheelchair): A little  Standing up from a chair using your arms (e.g. wheelchair or bedside chair): A little  To walk in hospital room: A little  Climbing 3-5 steps with railing: A little  Basic Mobility - Total Score: 20    Encounter Problems       Encounter Problems (Active)       Balance       STG - Maintains dynamic standing balance with dual task for 5 minutes without upper extremity support independently  (Progressing)       Start:  07/12/24    Expected End:  07/26/24               Mobility       STG - Patient will ambulate 150' with SPC MOD I  (Progressing)       Start:  07/12/24    Expected End:  07/26/24            STG - Patient will ascend and descend four stairs with use of R rail MOD I  (Progressing)       Start:  07/12/24    Expected End:  07/26/24               PT Transfers       STG - Patient will transfer sit to and from stand independently  (Progressing)        Start:  07/12/24    Expected End:  07/26/24               Pain - Adult              Education Documentation  Precautions, taught by GERMAN Franco at 7/12/2024 11:08 AM.  Learner: Patient  Readiness: Eager  Method: Explanation, Demonstration  Response: Verbalizes Understanding    Body Mechanics, taught by GERMAN Franco at 7/12/2024 11:08 AM.  Learner: Patient  Readiness: Eager  Method: Explanation, Demonstration  Response: Verbalizes Understanding    Mobility Training, taught by GERMAN Franco at 7/12/2024 11:08 AM.  Learner: Patient  Readiness: Eager  Method: Explanation, Demonstration  Response: Verbalizes Understanding    Education Comments  No comments found.

## 2024-07-13 ENCOUNTER — APPOINTMENT (OUTPATIENT)
Dept: CARDIOLOGY | Facility: HOSPITAL | Age: 74
DRG: 309 | End: 2024-07-13
Payer: MEDICARE

## 2024-07-13 ENCOUNTER — APPOINTMENT (OUTPATIENT)
Dept: RADIOLOGY | Facility: HOSPITAL | Age: 74
DRG: 309 | End: 2024-07-13
Payer: MEDICARE

## 2024-07-13 PROBLEM — I48.92 ATRIAL FLUTTER WITH RAPID VENTRICULAR RESPONSE (MULTI): Status: ACTIVE | Noted: 2024-07-13

## 2024-07-13 LAB
ALBUMIN SERPL BCP-MCNC: 3.2 G/DL (ref 3.4–5)
ALBUMIN SERPL BCP-MCNC: 3.7 G/DL (ref 3.4–5)
ALP SERPL-CCNC: 62 U/L (ref 33–136)
ALP SERPL-CCNC: 73 U/L (ref 33–136)
ALT SERPL W P-5'-P-CCNC: 27 U/L (ref 7–45)
ALT SERPL W P-5'-P-CCNC: 34 U/L (ref 7–45)
ANION GAP SERPL CALC-SCNC: 12 MMOL/L (ref 10–20)
ANION GAP SERPL CALC-SCNC: 14 MMOL/L (ref 10–20)
APPEARANCE UR: CLEAR
AST SERPL W P-5'-P-CCNC: 21 U/L (ref 9–39)
AST SERPL W P-5'-P-CCNC: 27 U/L (ref 9–39)
BASOPHILS # BLD AUTO: 0.07 X10*3/UL (ref 0–0.1)
BASOPHILS NFR BLD AUTO: 0.5 %
BILIRUB SERPL-MCNC: 0.2 MG/DL (ref 0–1.2)
BILIRUB SERPL-MCNC: 0.3 MG/DL (ref 0–1.2)
BILIRUB UR STRIP.AUTO-MCNC: NEGATIVE MG/DL
BUN SERPL-MCNC: 27 MG/DL (ref 6–23)
BUN SERPL-MCNC: 30 MG/DL (ref 6–23)
CALCIUM SERPL-MCNC: 9.2 MG/DL (ref 8.6–10.3)
CALCIUM SERPL-MCNC: 9.8 MG/DL (ref 8.6–10.3)
CARDIAC TROPONIN I PNL SERPL HS: 161 NG/L (ref 0–13)
CARDIAC TROPONIN I PNL SERPL HS: 197 NG/L (ref 0–13)
CHLORIDE SERPL-SCNC: 101 MMOL/L (ref 98–107)
CHLORIDE SERPL-SCNC: 103 MMOL/L (ref 98–107)
CO2 SERPL-SCNC: 23 MMOL/L (ref 21–32)
CO2 SERPL-SCNC: 25 MMOL/L (ref 21–32)
COLOR UR: ABNORMAL
CREAT SERPL-MCNC: 1.29 MG/DL (ref 0.5–1.05)
CREAT SERPL-MCNC: 1.51 MG/DL (ref 0.5–1.05)
EGFRCR SERPLBLD CKD-EPI 2021: 36 ML/MIN/1.73M*2
EGFRCR SERPLBLD CKD-EPI 2021: 44 ML/MIN/1.73M*2
EOSINOPHIL # BLD AUTO: 0.19 X10*3/UL (ref 0–0.4)
EOSINOPHIL NFR BLD AUTO: 1.5 %
ERYTHROCYTE [DISTWIDTH] IN BLOOD BY AUTOMATED COUNT: 13 % (ref 11.5–14.5)
ERYTHROCYTE [DISTWIDTH] IN BLOOD BY AUTOMATED COUNT: 13.2 % (ref 11.5–14.5)
GLUCOSE BLD MANUAL STRIP-MCNC: 108 MG/DL (ref 74–99)
GLUCOSE BLD MANUAL STRIP-MCNC: 118 MG/DL (ref 74–99)
GLUCOSE BLD MANUAL STRIP-MCNC: 135 MG/DL (ref 74–99)
GLUCOSE BLD MANUAL STRIP-MCNC: 162 MG/DL (ref 74–99)
GLUCOSE SERPL-MCNC: 151 MG/DL (ref 74–99)
GLUCOSE SERPL-MCNC: 182 MG/DL (ref 74–99)
GLUCOSE UR STRIP.AUTO-MCNC: NORMAL MG/DL
HCT VFR BLD AUTO: 36.5 % (ref 36–46)
HCT VFR BLD AUTO: 39.8 % (ref 36–46)
HGB BLD-MCNC: 11.9 G/DL (ref 12–16)
HGB BLD-MCNC: 12.9 G/DL (ref 12–16)
HOLD SPECIMEN: NORMAL
HYALINE CASTS #/AREA URNS AUTO: ABNORMAL /LPF
IMM GRANULOCYTES # BLD AUTO: 0.04 X10*3/UL (ref 0–0.5)
IMM GRANULOCYTES NFR BLD AUTO: 0.3 % (ref 0–0.9)
INR PPP: 1.3 (ref 0.9–1.1)
KETONES UR STRIP.AUTO-MCNC: NEGATIVE MG/DL
LEUKOCYTE ESTERASE UR QL STRIP.AUTO: ABNORMAL
LYMPHOCYTES # BLD AUTO: 3.78 X10*3/UL (ref 0.8–3)
LYMPHOCYTES NFR BLD AUTO: 28.9 %
MAGNESIUM SERPL-MCNC: 1.57 MG/DL (ref 1.6–2.4)
MAGNESIUM SERPL-MCNC: 1.63 MG/DL (ref 1.6–2.4)
MCH RBC QN AUTO: 30.9 PG (ref 26–34)
MCH RBC QN AUTO: 31.5 PG (ref 26–34)
MCHC RBC AUTO-ENTMCNC: 32.4 G/DL (ref 32–36)
MCHC RBC AUTO-ENTMCNC: 32.6 G/DL (ref 32–36)
MCV RBC AUTO: 95 FL (ref 80–100)
MCV RBC AUTO: 97 FL (ref 80–100)
MONOCYTES # BLD AUTO: 0.81 X10*3/UL (ref 0.05–0.8)
MONOCYTES NFR BLD AUTO: 6.2 %
MUCOUS THREADS #/AREA URNS AUTO: ABNORMAL /LPF
NEUTROPHILS # BLD AUTO: 8.21 X10*3/UL (ref 1.6–5.5)
NEUTROPHILS NFR BLD AUTO: 62.6 %
NITRITE UR QL STRIP.AUTO: NEGATIVE
NRBC BLD-RTO: 0 /100 WBCS (ref 0–0)
NRBC BLD-RTO: 0 /100 WBCS (ref 0–0)
PH UR STRIP.AUTO: 5.5 [PH]
PHOSPHATE SERPL-MCNC: 2.8 MG/DL (ref 2.5–4.9)
PLATELET # BLD AUTO: 246 X10*3/UL (ref 150–450)
PLATELET # BLD AUTO: 300 X10*3/UL (ref 150–450)
POTASSIUM SERPL-SCNC: 4.2 MMOL/L (ref 3.5–5.3)
POTASSIUM SERPL-SCNC: 4.2 MMOL/L (ref 3.5–5.3)
PROT SERPL-MCNC: 6.4 G/DL (ref 6.4–8.2)
PROT SERPL-MCNC: 7.4 G/DL (ref 6.4–8.2)
PROT UR STRIP.AUTO-MCNC: NEGATIVE MG/DL
PROTHROMBIN TIME: 14.6 SECONDS (ref 9.8–12.8)
RBC # BLD AUTO: 3.78 X10*6/UL (ref 4–5.2)
RBC # BLD AUTO: 4.17 X10*6/UL (ref 4–5.2)
RBC # UR STRIP.AUTO: NEGATIVE /UL
RBC #/AREA URNS AUTO: ABNORMAL /HPF
SODIUM SERPL-SCNC: 134 MMOL/L (ref 136–145)
SODIUM SERPL-SCNC: 136 MMOL/L (ref 136–145)
SP GR UR STRIP.AUTO: 1.02
SQUAMOUS #/AREA URNS AUTO: ABNORMAL /HPF
TSH SERPL-ACNC: 1.55 MIU/L (ref 0.44–3.98)
UROBILINOGEN UR STRIP.AUTO-MCNC: NORMAL MG/DL
WBC # BLD AUTO: 13.1 X10*3/UL (ref 4.4–11.3)
WBC # BLD AUTO: 9.9 X10*3/UL (ref 4.4–11.3)
WBC #/AREA URNS AUTO: ABNORMAL /HPF

## 2024-07-13 PROCEDURE — 2500000001 HC RX 250 WO HCPCS SELF ADMINISTERED DRUGS (ALT 637 FOR MEDICARE OP)

## 2024-07-13 PROCEDURE — 84484 ASSAY OF TROPONIN QUANT: CPT

## 2024-07-13 PROCEDURE — 84484 ASSAY OF TROPONIN QUANT: CPT | Performed by: STUDENT IN AN ORGANIZED HEALTH CARE EDUCATION/TRAINING PROGRAM

## 2024-07-13 PROCEDURE — 96375 TX/PRO/DX INJ NEW DRUG ADDON: CPT

## 2024-07-13 PROCEDURE — 2500000004 HC RX 250 GENERAL PHARMACY W/ HCPCS (ALT 636 FOR OP/ED)

## 2024-07-13 PROCEDURE — 99223 1ST HOSP IP/OBS HIGH 75: CPT

## 2024-07-13 PROCEDURE — 87086 URINE CULTURE/COLONY COUNT: CPT | Mod: GEALAB | Performed by: HEALTH CARE PROVIDER

## 2024-07-13 PROCEDURE — 85027 COMPLETE CBC AUTOMATED: CPT

## 2024-07-13 PROCEDURE — 2500000002 HC RX 250 W HCPCS SELF ADMINISTERED DRUGS (ALT 637 FOR MEDICARE OP, ALT 636 FOR OP/ED)

## 2024-07-13 PROCEDURE — 85610 PROTHROMBIN TIME: CPT | Performed by: HEALTH CARE PROVIDER

## 2024-07-13 PROCEDURE — 36415 COLL VENOUS BLD VENIPUNCTURE: CPT | Performed by: HEALTH CARE PROVIDER

## 2024-07-13 PROCEDURE — 93005 ELECTROCARDIOGRAM TRACING: CPT

## 2024-07-13 PROCEDURE — 36415 COLL VENOUS BLD VENIPUNCTURE: CPT

## 2024-07-13 PROCEDURE — 96361 HYDRATE IV INFUSION ADD-ON: CPT

## 2024-07-13 PROCEDURE — 85025 COMPLETE CBC W/AUTO DIFF WBC: CPT | Performed by: HEALTH CARE PROVIDER

## 2024-07-13 PROCEDURE — 96374 THER/PROPH/DIAG INJ IV PUSH: CPT

## 2024-07-13 PROCEDURE — 71045 X-RAY EXAM CHEST 1 VIEW: CPT

## 2024-07-13 PROCEDURE — 80053 COMPREHEN METABOLIC PANEL: CPT

## 2024-07-13 PROCEDURE — 80053 COMPREHEN METABOLIC PANEL: CPT | Performed by: HEALTH CARE PROVIDER

## 2024-07-13 PROCEDURE — 2500000004 HC RX 250 GENERAL PHARMACY W/ HCPCS (ALT 636 FOR OP/ED): Performed by: HEALTH CARE PROVIDER

## 2024-07-13 PROCEDURE — 84100 ASSAY OF PHOSPHORUS: CPT

## 2024-07-13 PROCEDURE — 81001 URINALYSIS AUTO W/SCOPE: CPT | Performed by: HEALTH CARE PROVIDER

## 2024-07-13 PROCEDURE — 82947 ASSAY GLUCOSE BLOOD QUANT: CPT

## 2024-07-13 PROCEDURE — 71045 X-RAY EXAM CHEST 1 VIEW: CPT | Performed by: RADIOLOGY

## 2024-07-13 PROCEDURE — 83735 ASSAY OF MAGNESIUM: CPT | Performed by: HEALTH CARE PROVIDER

## 2024-07-13 PROCEDURE — 2060000001 HC INTERMEDIATE ICU ROOM DAILY

## 2024-07-13 PROCEDURE — 83735 ASSAY OF MAGNESIUM: CPT

## 2024-07-13 RX ORDER — AMOXICILLIN 250 MG
1 CAPSULE ORAL 2 TIMES DAILY PRN
Status: DISCONTINUED | OUTPATIENT
Start: 2024-07-13 | End: 2024-07-15 | Stop reason: HOSPADM

## 2024-07-13 RX ORDER — ACETAMINOPHEN 325 MG/1
975 TABLET ORAL EVERY 8 HOURS PRN
Status: DISCONTINUED | OUTPATIENT
Start: 2024-07-13 | End: 2024-07-15 | Stop reason: HOSPADM

## 2024-07-13 RX ORDER — PANTOPRAZOLE SODIUM 40 MG/1
40 TABLET, DELAYED RELEASE ORAL
Status: DISCONTINUED | OUTPATIENT
Start: 2024-07-13 | End: 2024-07-15 | Stop reason: HOSPADM

## 2024-07-13 RX ORDER — TALC
3 POWDER (GRAM) TOPICAL NIGHTLY PRN
Status: DISCONTINUED | OUTPATIENT
Start: 2024-07-13 | End: 2024-07-15 | Stop reason: HOSPADM

## 2024-07-13 RX ORDER — INSULIN LISPRO 100 [IU]/ML
0-10 INJECTION, SOLUTION INTRAVENOUS; SUBCUTANEOUS
Status: DISCONTINUED | OUTPATIENT
Start: 2024-07-13 | End: 2024-07-15 | Stop reason: HOSPADM

## 2024-07-13 RX ORDER — HYDROCHLOROTHIAZIDE 25 MG/1
25 TABLET ORAL EVERY MORNING
Status: DISCONTINUED | OUTPATIENT
Start: 2024-07-13 | End: 2024-07-15 | Stop reason: HOSPADM

## 2024-07-13 RX ORDER — TRAZODONE HYDROCHLORIDE 50 MG/1
50 TABLET ORAL ONCE
Status: COMPLETED | OUTPATIENT
Start: 2024-07-13 | End: 2024-07-13

## 2024-07-13 RX ORDER — INSULIN LISPRO 100 [IU]/ML
0-5 INJECTION, SOLUTION INTRAVENOUS; SUBCUTANEOUS
Status: DISCONTINUED | OUTPATIENT
Start: 2024-07-13 | End: 2024-07-13

## 2024-07-13 RX ORDER — DEXTROSE 50 % IN WATER (D50W) INTRAVENOUS SYRINGE
12.5
Status: DISCONTINUED | OUTPATIENT
Start: 2024-07-13 | End: 2024-07-15 | Stop reason: HOSPADM

## 2024-07-13 RX ORDER — ONDANSETRON HYDROCHLORIDE 2 MG/ML
INJECTION, SOLUTION INTRAVENOUS
Status: COMPLETED
Start: 2024-07-13 | End: 2024-07-13

## 2024-07-13 RX ORDER — LANOLIN ALCOHOL/MO/W.PET/CERES
400 CREAM (GRAM) TOPICAL DAILY
Status: DISCONTINUED | OUTPATIENT
Start: 2024-07-13 | End: 2024-07-15 | Stop reason: HOSPADM

## 2024-07-13 RX ORDER — METOPROLOL SUCCINATE 50 MG/1
100 TABLET, EXTENDED RELEASE ORAL 2 TIMES DAILY
Status: DISCONTINUED | OUTPATIENT
Start: 2024-07-13 | End: 2024-07-15 | Stop reason: HOSPADM

## 2024-07-13 RX ORDER — ONDANSETRON HYDROCHLORIDE 2 MG/ML
4 INJECTION, SOLUTION INTRAVENOUS ONCE
Status: COMPLETED | OUTPATIENT
Start: 2024-07-13 | End: 2024-07-13

## 2024-07-13 RX ORDER — LISINOPRIL 20 MG/1
20 TABLET ORAL 2 TIMES DAILY
Status: DISCONTINUED | OUTPATIENT
Start: 2024-07-13 | End: 2024-07-15 | Stop reason: HOSPADM

## 2024-07-13 RX ORDER — MAGNESIUM SULFATE HEPTAHYDRATE 40 MG/ML
2 INJECTION, SOLUTION INTRAVENOUS ONCE
Status: COMPLETED | OUTPATIENT
Start: 2024-07-13 | End: 2024-07-13

## 2024-07-13 RX ORDER — DEXTROSE 50 % IN WATER (D50W) INTRAVENOUS SYRINGE
25
Status: DISCONTINUED | OUTPATIENT
Start: 2024-07-13 | End: 2024-07-15 | Stop reason: HOSPADM

## 2024-07-13 RX ADMIN — AMIODARONE HYDROCHLORIDE 0.5 MG/MIN: 1.8 INJECTION, SOLUTION INTRAVENOUS at 07:26

## 2024-07-13 RX ADMIN — METOPROLOL SUCCINATE 100 MG: 50 TABLET, EXTENDED RELEASE ORAL at 20:49

## 2024-07-13 RX ADMIN — Medication 400 MG: at 09:01

## 2024-07-13 RX ADMIN — ACETAMINOPHEN 975 MG: 325 TABLET ORAL at 20:49

## 2024-07-13 RX ADMIN — METOPROLOL SUCCINATE 100 MG: 50 TABLET, EXTENDED RELEASE ORAL at 09:01

## 2024-07-13 RX ADMIN — ONDANSETRON 4 MG: 2 INJECTION INTRAMUSCULAR; INTRAVENOUS at 01:07

## 2024-07-13 RX ADMIN — APIXABAN 5 MG: 5 TABLET, FILM COATED ORAL at 09:01

## 2024-07-13 RX ADMIN — AMIODARONE HYDROCHLORIDE 0.5 MG/MIN: 1.8 INJECTION, SOLUTION INTRAVENOUS at 19:06

## 2024-07-13 RX ADMIN — ONDANSETRON HYDROCHLORIDE 4 MG: 2 INJECTION, SOLUTION INTRAVENOUS at 01:07

## 2024-07-13 RX ADMIN — AMIODARONE HYDROCHLORIDE 150 MG: 1.5 INJECTION, SOLUTION INTRAVENOUS at 01:07

## 2024-07-13 RX ADMIN — INSULIN LISPRO 1 UNITS: 100 INJECTION, SOLUTION INTRAVENOUS; SUBCUTANEOUS at 12:11

## 2024-07-13 RX ADMIN — APIXABAN 5 MG: 5 TABLET, FILM COATED ORAL at 20:49

## 2024-07-13 RX ADMIN — TRAZODONE HYDROCHLORIDE 50 MG: 50 TABLET ORAL at 22:12

## 2024-07-13 RX ADMIN — ACETAMINOPHEN 975 MG: 325 TABLET ORAL at 09:17

## 2024-07-13 RX ADMIN — SODIUM CHLORIDE, POTASSIUM CHLORIDE, SODIUM LACTATE AND CALCIUM CHLORIDE 500 ML: 600; 310; 30; 20 INJECTION, SOLUTION INTRAVENOUS at 01:24

## 2024-07-13 RX ADMIN — MAGNESIUM SULFATE HEPTAHYDRATE 2 G: 2 INJECTION, SOLUTION INTRAVENOUS at 09:01

## 2024-07-13 RX ADMIN — PANTOPRAZOLE SODIUM 40 MG: 40 TABLET, DELAYED RELEASE ORAL at 09:01

## 2024-07-13 RX ADMIN — PANTOPRAZOLE SODIUM 40 MG: 40 TABLET, DELAYED RELEASE ORAL at 16:46

## 2024-07-13 SDOH — SOCIAL STABILITY: SOCIAL INSECURITY: DOES ANYONE TRY TO KEEP YOU FROM HAVING/CONTACTING OTHER FRIENDS OR DOING THINGS OUTSIDE YOUR HOME?: NO

## 2024-07-13 SDOH — SOCIAL STABILITY: SOCIAL INSECURITY: ARE THERE ANY APPARENT SIGNS OF INJURIES/BEHAVIORS THAT COULD BE RELATED TO ABUSE/NEGLECT?: NO

## 2024-07-13 SDOH — SOCIAL STABILITY: SOCIAL INSECURITY: DO YOU FEEL ANYONE HAS EXPLOITED OR TAKEN ADVANTAGE OF YOU FINANCIALLY OR OF YOUR PERSONAL PROPERTY?: NO

## 2024-07-13 SDOH — SOCIAL STABILITY: SOCIAL INSECURITY: DO YOU FEEL UNSAFE GOING BACK TO THE PLACE WHERE YOU ARE LIVING?: NO

## 2024-07-13 SDOH — SOCIAL STABILITY: SOCIAL INSECURITY: ABUSE: ADULT

## 2024-07-13 SDOH — SOCIAL STABILITY: SOCIAL INSECURITY: ARE YOU OR HAVE YOU BEEN THREATENED OR ABUSED PHYSICALLY, EMOTIONALLY, OR SEXUALLY BY ANYONE?: NO

## 2024-07-13 SDOH — SOCIAL STABILITY: SOCIAL INSECURITY: WERE YOU ABLE TO COMPLETE ALL THE BEHAVIORAL HEALTH SCREENINGS?: YES

## 2024-07-13 SDOH — SOCIAL STABILITY: SOCIAL INSECURITY: HAVE YOU HAD ANY THOUGHTS OF HARMING ANYONE ELSE?: NO

## 2024-07-13 SDOH — SOCIAL STABILITY: SOCIAL INSECURITY: HAVE YOU HAD THOUGHTS OF HARMING ANYONE ELSE?: NO

## 2024-07-13 SDOH — SOCIAL STABILITY: SOCIAL INSECURITY: HAS ANYONE EVER THREATENED TO HURT YOUR FAMILY OR YOUR PETS?: NO

## 2024-07-13 ASSESSMENT — COGNITIVE AND FUNCTIONAL STATUS - GENERAL
DRESSING REGULAR UPPER BODY CLOTHING: A LITTLE
TOILETING: A LITTLE
WALKING IN HOSPITAL ROOM: A LITTLE
WALKING IN HOSPITAL ROOM: A LITTLE
CLIMB 3 TO 5 STEPS WITH RAILING: A LITTLE
DAILY ACTIVITIY SCORE: 21
DRESSING REGULAR LOWER BODY CLOTHING: A LITTLE
MOBILITY SCORE: 18
DRESSING REGULAR LOWER BODY CLOTHING: A LITTLE
MOBILITY SCORE: 20
MOVING TO AND FROM BED TO CHAIR: A LITTLE
STANDING UP FROM CHAIR USING ARMS: A LITTLE
HELP NEEDED FOR BATHING: A LITTLE
MOVING TO AND FROM BED TO CHAIR: A LITTLE
TURNING FROM BACK TO SIDE WHILE IN FLAT BAD: A LITTLE
DAILY ACTIVITIY SCORE: 20
HELP NEEDED FOR BATHING: A LITTLE
MOVING FROM LYING ON BACK TO SITTING ON SIDE OF FLAT BED WITH BEDRAILS: A LITTLE
PATIENT BASELINE BEDBOUND: NO
CLIMB 3 TO 5 STEPS WITH RAILING: A LITTLE
DRESSING REGULAR UPPER BODY CLOTHING: A LITTLE
STANDING UP FROM CHAIR USING ARMS: A LITTLE

## 2024-07-13 ASSESSMENT — PATIENT HEALTH QUESTIONNAIRE - PHQ9
SUM OF ALL RESPONSES TO PHQ9 QUESTIONS 1 & 2: 0
2. FEELING DOWN, DEPRESSED OR HOPELESS: NOT AT ALL
1. LITTLE INTEREST OR PLEASURE IN DOING THINGS: NOT AT ALL

## 2024-07-13 ASSESSMENT — PAIN - FUNCTIONAL ASSESSMENT
PAIN_FUNCTIONAL_ASSESSMENT: 0-10

## 2024-07-13 ASSESSMENT — PAIN SCALES - GENERAL
PAINLEVEL_OUTOF10: 0 - NO PAIN
PAINLEVEL_OUTOF10: 3
PAINLEVEL_OUTOF10: 5 - MODERATE PAIN
PAINLEVEL_OUTOF10: 4

## 2024-07-13 ASSESSMENT — ENCOUNTER SYMPTOMS
ABDOMINAL DISTENTION: 0
MYALGIAS: 0
CHILLS: 1
DIFFICULTY URINATING: 0
LIGHT-HEADEDNESS: 0
NECK PAIN: 0
VOMITING: 0
STRIDOR: 0
ABDOMINAL PAIN: 0
WOUND: 0
APPETITE CHANGE: 0
NECK STIFFNESS: 0
BACK PAIN: 0
POLYDIPSIA: 0
LIGHT-HEADEDNESS: 1
COUGH: 0
CONFUSION: 0
NAUSEA: 0
COLOR CHANGE: 0
WHEEZING: 0
ARTHRALGIAS: 0
FEVER: 0
CHEST TIGHTNESS: 1
JOINT SWELLING: 0
BLOOD IN STOOL: 0
DIARRHEA: 0
NUMBNESS: 0
FREQUENCY: 0
EYE DISCHARGE: 0
DIZZINESS: 0
DIAPHORESIS: 1
FATIGUE: 1
DYSURIA: 0
NAUSEA: 1
ABDOMINAL PAIN: 1
FLANK PAIN: 0
PALPITATIONS: 1
CHILLS: 0
APNEA: 0
ACTIVITY CHANGE: 0
DIZZINESS: 1
POLYPHAGIA: 0
SHORTNESS OF BREATH: 1

## 2024-07-13 ASSESSMENT — LIFESTYLE VARIABLES
HOW OFTEN DO YOU HAVE A DRINK CONTAINING ALCOHOL: NEVER
AUDIT-C TOTAL SCORE: 0
EVER HAD A DRINK FIRST THING IN THE MORNING TO STEADY YOUR NERVES TO GET RID OF A HANGOVER: NO
HAVE YOU EVER FELT YOU SHOULD CUT DOWN ON YOUR DRINKING: NO
HAVE PEOPLE ANNOYED YOU BY CRITICIZING YOUR DRINKING: NO
TOTAL SCORE: 0
SKIP TO QUESTIONS 9-10: 1
HOW MANY STANDARD DRINKS CONTAINING ALCOHOL DO YOU HAVE ON A TYPICAL DAY: PATIENT DOES NOT DRINK
EVER FELT BAD OR GUILTY ABOUT YOUR DRINKING: NO
HOW OFTEN DO YOU HAVE 6 OR MORE DRINKS ON ONE OCCASION: NEVER
AUDIT-C TOTAL SCORE: 0

## 2024-07-13 ASSESSMENT — ACTIVITIES OF DAILY LIVING (ADL)
WALKS IN HOME: INDEPENDENT
GROOMING: INDEPENDENT
ADEQUATE_TO_COMPLETE_ADL: YES
PATIENT'S MEMORY ADEQUATE TO SAFELY COMPLETE DAILY ACTIVITIES?: YES
TOILETING: INDEPENDENT
DRESSING YOURSELF: INDEPENDENT
HEARING - RIGHT EAR: FUNCTIONAL
JUDGMENT_ADEQUATE_SAFELY_COMPLETE_DAILY_ACTIVITIES: YES
HEARING - LEFT EAR: FUNCTIONAL
LACK_OF_TRANSPORTATION: NO
BATHING: INDEPENDENT
ASSISTIVE_DEVICE: CANE;DENTURES UPPER;EYEGLASSES
LACK_OF_TRANSPORTATION: NO
FEEDING YOURSELF: INDEPENDENT

## 2024-07-13 ASSESSMENT — PAIN DESCRIPTION - PAIN TYPE: TYPE: ACUTE PAIN

## 2024-07-13 ASSESSMENT — PAIN DESCRIPTION - LOCATION
LOCATION: CHEST
LOCATION: HEAD
LOCATION: BACK

## 2024-07-13 ASSESSMENT — PAIN DESCRIPTION - ORIENTATION
ORIENTATION: MID
ORIENTATION: MID

## 2024-07-13 ASSESSMENT — COLUMBIA-SUICIDE SEVERITY RATING SCALE - C-SSRS
1. IN THE PAST MONTH, HAVE YOU WISHED YOU WERE DEAD OR WISHED YOU COULD GO TO SLEEP AND NOT WAKE UP?: NO
2. HAVE YOU ACTUALLY HAD ANY THOUGHTS OF KILLING YOURSELF?: NO
6. HAVE YOU EVER DONE ANYTHING, STARTED TO DO ANYTHING, OR PREPARED TO DO ANYTHING TO END YOUR LIFE?: NO

## 2024-07-13 NOTE — PROGRESS NOTES
07/13/24 1610   Discharge Planning   Living Arrangements Spouse/significant other   Support Systems Spouse/significant other;Children;Family members   Assistance Needed Alert and oriented x 3, Independent with ADL's, Does not drive([place]), Cane when outside   Type of Residence Private residence   Number of Stairs to Enter Residence 4   Number of Stairs Within Residence 0   Do you have animals or pets at home? No   Who is requesting discharge planning? Provider   Home or Post Acute Services None   Expected Discharge Disposition Home   Does the patient need discharge transport arranged? No   Financial Resource Strain   How hard is it for you to pay for the very basics like food, housing, medical care, and heating? Not hard   Housing Stability   In the last 12 months, was there a time when you were not able to pay the mortgage or rent on time? N   In the past 12 months, how many times have you moved where you were living? 1   At any time in the past 12 months, were you homeless or living in a shelter (including now)? N   Transportation Needs   In the past 12 months, has lack of transportation kept you from medical appointments or from getting medications? no   In the past 12 months, has lack of transportation kept you from meetings, work, or from getting things needed for daily living? No   Patient Choice   Provider Choice list and CMS website (https://medicare.gov/care-compare#search) for post-acute Quality and Resource Measure Data were provided and reviewed with: Patient   Patient / Family choosing to utilize agency / facility established prior to hospitalization No

## 2024-07-13 NOTE — HOSPITAL COURSE
Debbie presented to the ED with with c/o recurrent symptomatic palpitations associated with SOB, dizziness, nausea, and chest pain radiating into her back. Her chest pain resolved after receiving an amiodarone drip in the ED. Her vitals on presentation were significant for a HR of 145. An EKG in the ED indicated Atrial flutter with 2:1 AV conduction, , and marked ST abnormality. The patient's labs were significant for a WBC 13.1, and a Cr of 1.51. Troponin levels were elevated but down trendin, 161. The patient had recently undergone an ablation on 2024 for her recurrent atrial fibrillation and had been prescribed PRN Metoprolol and PRN Digoxin. An echo done on  showed left ventricular systolic function is normal, with a visually estimated ejection fraction of 70%, hypertrophic cardiomyopathy, and severely increased left ventricular posterior wall thickness. Elevated troponins were likely brought about by demand ischemia secondary to increased cardiac stress from her episodes of atrial fibrillation/flutter and decreased cardiac output due to the chronic hypertrophc cardiomyopathy. Her PRN metoprolol and digoxin were held once the patient was started on her amidarone drip. Cardiology was consulted and the patient was transferred to the general floor with telemetry monitoring.    On the general floor, the patient reported to have improved symptoms. Her heart rate decreased to be in the 60s and she retained sinus rhythm. Her creatinine decreased from 1.51 to 1.29. Cardiology evaluated the patient and recommended continuing the amiodarone drip for one full day then switch to amiodarone 400 mg twice a day for 3 days, then 400 mg for 14 days, then 200 mg daily.  The patient was stable and ready for discharge on ***

## 2024-07-13 NOTE — SIGNIFICANT EVENT
UPDATED ASSESSMENT AND PLAN:    Brief HPI   Debbie Rees is a 74 y.o. female with a PMH of HCM, HTN, PVD s/p EVLA, OA, T2DM, and paroxysmal A-fib s/p ablation 7/8/24 at [facility], who presented to [facility] with c/o recurrent symptomatic palpitations associated with SOB, dizziness, nausea, and chest pain radiating into her back that was resolved after starting Amio drip in the ED.  Patient has been receiving amiodarone drip, metoprolol  mg BID, and Eliquis. She reported improvement of symptoms and currently endorses no dyspnea, chest pain, or palpitations. Her vitals have stabilized with HR in the 60s.        Vitals     Vitals:    07/13/24 0901   BP: 112/63   Pulse: 68   Resp:    Temp:    SpO2:         On Examination     Constitutional: Pleasant, Awake/Alert/Oriented to person place and time. No distress  Head: Atraumatic, Normocephalic  Eyes: EOMI. VELMA  Cardiovascular: Regular rate and rhythm, S1, S2. No extra heart sounds or murmurs  Respiratory: Clear to auscultation bilaterally. No wheezing, rales or rhonchi. Good chest wall expansion  Abdomen: Soft, Nontender, Obese. Bowel sounds appreciated  Musculoskeletal: ROM intact. Muscle strength grossly intact upper and lower extremities 5/5.   Neurological: CNII-XII intact. Sensation grossly intact  Extremities: Warm and dry. No acute rashes and lesions  Psychiatric: Appropriate mood and affect       Labs     Results for orders placed or performed during the hospital encounter of 07/12/24 (from the past 24 hour(s))   CBC and Auto Differential   Result Value Ref Range    WBC 13.1 (H) 4.4 - 11.3 x10*3/uL    nRBC 0.0 0.0 - 0.0 /100 WBCs    RBC 4.17 4.00 - 5.20 x10*6/uL    Hemoglobin 12.9 12.0 - 16.0 g/dL    Hematocrit 39.8 36.0 - 46.0 %    MCV 95 80 - 100 fL    MCH 30.9 26.0 - 34.0 pg    MCHC 32.4 32.0 - 36.0 g/dL    RDW 13.2 11.5 - 14.5 %    Platelets 300 150 - 450 x10*3/uL    Neutrophils % 62.6 40.0 - 80.0 %    Immature Granulocytes %, Automated 0.3 0.0 - 0.9 %     Lymphocytes % 28.9 13.0 - 44.0 %    Monocytes % 6.2 2.0 - 10.0 %    Eosinophils % 1.5 0.0 - 6.0 %    Basophils % 0.5 0.0 - 2.0 %    Neutrophils Absolute 8.21 (H) 1.60 - 5.50 x10*3/uL    Immature Granulocytes Absolute, Automated 0.04 0.00 - 0.50 x10*3/uL    Lymphocytes Absolute 3.78 (H) 0.80 - 3.00 x10*3/uL    Monocytes Absolute 0.81 (H) 0.05 - 0.80 x10*3/uL    Eosinophils Absolute 0.19 0.00 - 0.40 x10*3/uL    Basophils Absolute 0.07 0.00 - 0.10 x10*3/uL   Comprehensive metabolic panel   Result Value Ref Range    Glucose 182 (H) 74 - 99 mg/dL    Sodium 134 (L) 136 - 145 mmol/L    Potassium 4.2 3.5 - 5.3 mmol/L    Chloride 101 98 - 107 mmol/L    Bicarbonate 23 21 - 32 mmol/L    Anion Gap 14 10 - 20 mmol/L    Urea Nitrogen 30 (H) 6 - 23 mg/dL    Creatinine 1.51 (H) 0.50 - 1.05 mg/dL    eGFR 36 (L) >60 mL/min/1.73m*2    Calcium 9.8 8.6 - 10.3 mg/dL    Albumin 3.7 3.4 - 5.0 g/dL    Alkaline Phosphatase 73 33 - 136 U/L    Total Protein 7.4 6.4 - 8.2 g/dL    AST 27 9 - 39 U/L    Bilirubin, Total 0.3 0.0 - 1.2 mg/dL    ALT 34 7 - 45 U/L   Magnesium   Result Value Ref Range    Magnesium 1.63 1.60 - 2.40 mg/dL   Protime-INR   Result Value Ref Range    Protime 14.6 (H) 9.8 - 12.8 seconds    INR 1.3 (H) 0.9 - 1.1   Troponin I, High Sensitivity, Initial   Result Value Ref Range    Troponin I, High Sensitivity 197 (HH) 0 - 13 ng/L   Urinalysis with Reflex Culture and Microscopic   Result Value Ref Range    Color, Urine Light-Yellow Light-Yellow, Yellow, Dark-Yellow    Appearance, Urine Clear Clear    Specific Gravity, Urine 1.018 1.005 - 1.035    pH, Urine 5.5 5.0, 5.5, 6.0, 6.5, 7.0, 7.5, 8.0    Protein, Urine NEGATIVE NEGATIVE, 10 (TRACE), 20 (TRACE) mg/dL    Glucose, Urine Normal Normal mg/dL    Blood, Urine NEGATIVE NEGATIVE    Ketones, Urine NEGATIVE NEGATIVE mg/dL    Bilirubin, Urine NEGATIVE NEGATIVE    Urobilinogen, Urine Normal Normal mg/dL    Nitrite, Urine NEGATIVE NEGATIVE    Leukocyte Esterase, Urine 250 Bren/µL (A)  NEGATIVE   Extra Urine Gray Tube   Result Value Ref Range    Extra Tube Hold for add-ons.    Microscopic Only, Urine   Result Value Ref Range    WBC, Urine 6-10 (A) 1-5, NONE /HPF    RBC, Urine 1-2 NONE, 1-2, 3-5 /HPF    Squamous Epithelial Cells, Urine 1-9 (SPARSE) Reference range not established. /HPF    Mucus, Urine FEW Reference range not established. /LPF    Hyaline Casts, Urine OCCASIONAL (A) NONE /LPF   CBC   Result Value Ref Range    WBC 9.9 4.4 - 11.3 x10*3/uL    nRBC 0.0 0.0 - 0.0 /100 WBCs    RBC 3.78 (L) 4.00 - 5.20 x10*6/uL    Hemoglobin 11.9 (L) 12.0 - 16.0 g/dL    Hematocrit 36.5 36.0 - 46.0 %    MCV 97 80 - 100 fL    MCH 31.5 26.0 - 34.0 pg    MCHC 32.6 32.0 - 36.0 g/dL    RDW 13.0 11.5 - 14.5 %    Platelets 246 150 - 450 x10*3/uL   Comprehensive Metabolic Panel   Result Value Ref Range    Glucose 151 (H) 74 - 99 mg/dL    Sodium 136 136 - 145 mmol/L    Potassium 4.2 3.5 - 5.3 mmol/L    Chloride 103 98 - 107 mmol/L    Bicarbonate 25 21 - 32 mmol/L    Anion Gap 12 10 - 20 mmol/L    Urea Nitrogen 27 (H) 6 - 23 mg/dL    Creatinine 1.29 (H) 0.50 - 1.05 mg/dL    eGFR 44 (L) >60 mL/min/1.73m*2    Calcium 9.2 8.6 - 10.3 mg/dL    Albumin 3.2 (L) 3.4 - 5.0 g/dL    Alkaline Phosphatase 62 33 - 136 U/L    Total Protein 6.4 6.4 - 8.2 g/dL    AST 21 9 - 39 U/L    Bilirubin, Total 0.2 0.0 - 1.2 mg/dL    ALT 27 7 - 45 U/L   Magnesium   Result Value Ref Range    Magnesium 1.57 (L) 1.60 - 2.40 mg/dL   Phosphorus   Result Value Ref Range    Phosphorus 2.8 2.5 - 4.9 mg/dL   Troponin I, High Sensitivity   Result Value Ref Range    Troponin I, High Sensitivity 161 (HH) 0 - 13 ng/L   POCT GLUCOSE   Result Value Ref Range    POCT Glucose 135 (H) 74 - 99 mg/dL   POCT GLUCOSE   Result Value Ref Range    POCT Glucose 162 (H) 74 - 99 mg/dL        Imaging     XR chest 1 view    Result Date: 7/13/2024  Interpreted By:  Jose Bustillo, STUDY: XR CHEST 1 VIEW;  7/13/2024 12:39 am   INDICATION: Signs/Symptoms:chest pain.    COMPARISON: 7/11/2024   ACCESSION NUMBER(S): [id number]   ORDERING CLINICIAN: LEV AYALA   FINDINGS: There is stable cardiomegaly. No focal airspace consolidation or pleural effusion. No pneumothorax.       No airspace consolidation or pleural effusion.   MACRO: None   Signed by: Jose Bustillo 7/13/2024 1:13 AM Dictation workstation:   [id number]    Transthoracic Echo (TTE) Complete    Result Date: 7/11/2024   [facility], [address] [place] [address]               Tel [phone] and Fax [phone] TRANSTHORACIC ECHOCARDIOGRAM REPORT  Patient Name:      VIVIAN KIM      Reading Physician:    [id number] Michael Lozano MD Study Date:        7/11/2024            Ordering Provider:    [id number] RANULFO MELGOZA MRN/PID:           [id number]             Fellow: Accession#:        [id number]         Nurse:                Nina Dickerson RN Date of Birth/Age: 1950 / 74 years  Sonographer:          Dina Rojas RDCS Gender:            F                    Additional Staff: Height:            167.64 cm            Admit Date: Weight:            112.04 kg            Admission Status:     Outpatient BSA / BMI:         2.19 m2 / 39.87      Encounter#:           [id number]                    kg/m2 Blood Pressure:    97/65 mmHg           Department Location:  [facility]                                                               Invasive Study Type:    TRANSTHORACIC ECHO (TTE) COMPLETE Diagnosis/ICD: Unspecified atrial fibrillation-I48.91 Indication:    Atrial Fibrillation CPT Code:      Echo Complete w Full Doppler-[id number] Patient History: Diabetes:          Yes Pertinent History: HTN, HCM, Chest Pain and A-Fib. S/P cardiac ablation                    (7/8/23). Study Detail: The following Echo studies  were performed: 2D, M-Mode, Doppler and               color flow. Technically challenging study due to prominent lung               artifact. Definity used as a contrast agent for endocardial border               definition. Total contrast used for this procedure was 1.0 mL via               IV push. The patient was awake.  PHYSICIAN INTERPRETATION: Left Ventricle: The left ventricular systolic function is normal, with a visually estimated ejection fraction of 70%. There are no regional wall motion abnormalities. The left ventricular cavity size is decreased. The left ventricular septal wall thickness is severely increased. There is moderately increased left ventricular posterior wall thickness. There is moderate to severe concentric left ventricular hypertrophy involving the septal wall. Findings are consistent with hypertrophic cardiomyopathy. Spectral Doppler shows an impaired relaxation pattern of left ventricular diastolic filling. There is a mild mid cavity left ventricular obstruction. The resting gradient is 25 mmHg.The provoked gradient is 33 mmHg. Left Atrium: The left atrium is moderately dilated. Right Ventricle: The right ventricle is normal in size. There is normal right ventricular global systolic function. Right Atrium: The right atrium is mild to moderately dilated. Aortic Valve: The aortic valve is trileaflet. There is mild aortic valve cusp calcification. The aortic valve dimensionless index is 0.89. There is no evidence of aortic valve regurgitation. The peak instantaneous gradient of the aortic valve is 10.6 mmHg. The mean gradient of the aortic valve is 7.0 mmHg. Mitral Valve: The mitral valve is normal in structure. There is moderate mitral annular calcification. There is mild to moderate mitral valve regurgitation. Tricuspid Valve: The tricuspid valve is structurally normal. There is mild tricuspid regurgitation. Pulmonic Valve: The pulmonic valve is structurally normal. There is physiologic  pulmonic valve regurgitation. Pericardium: There is a trivial pericardial effusion. Aorta: The aortic root is normal. Pulmonary Artery: The tricuspid regurgitant velocity is 2.82 m/s, and with an estimated right atrial pressure of 3 mmHg, the estimated pulmonary artery pressure is mildly elevated with the RVSP at 34.8 mmHg. Pulmonary Veins: The pulmonary veins appear normal and return normally to the left atrium. Systemic Veins: The inferior vena cava appears to be of normal size.  CONCLUSIONS:  1. The left ventricular systolic function is normal, with a visually estimated ejection fraction of 70%.  2. Spectral Doppler shows an impaired relaxation pattern of left ventricular diastolic filling.  3. Left ventricular cavity size is decreased.  4. Severely increased left ventricular septal thickness.  5. The left ventricular posterior wall thickness is moderately increased.  6. There is hypertrophic cardiomyopathy.  7. There is moderate to severe concentric left ventricular hypertrophy.  8. There is a mild mid cavity left ventricular obstruction.  9. There is normal right ventricular global systolic function. 10. The left atrium is moderately dilated. 11. The right atrium is mild to moderately dilated. 12. There is moderate mitral annular calcification. 13. Mild to moderate mitral valve regurgitation. 14. Mild pulmonary HTN. Estimated CVP is normal. QUANTITATIVE DATA SUMMARY: 2D MEASUREMENTS:                          Normal Ranges: Ao Root d:     3.10 cm   (2.0-3.7cm) IVSd:          1.80 cm   (0.6-1.1cm) LVPWd:         1.45 cm   (0.6-1.1cm) LVIDd:         3.32 cm   (3.9-5.9cm) LVIDs:         2.47 cm LV Mass Index: 94.2 g/m2 LV % FS        25.6 % LA VOLUME:                               Normal Ranges: LA Vol A4C:        65.6 ml    (22+/-6mL/m2) LA Vol A2C:        70.9 ml LA Vol BP:         70.6 ml LA Vol Index A4C:  30.0ml/m2 LA Vol Index A2C:  32.4 ml/m2 LA Vol Index BP:   32.3 ml/m2 LA Area A4C:       23.4 cm2 LA Area  A2C:       23.5 cm2 LA Major Axis A4C: 7.1 cm LA Major Axis A2C: 6.6 cm LA Volume Index:   30.3 ml/m2 LA Vol A4C:        61.7 ml LA Vol A2C:        66.5 ml RA VOLUME BY A/L METHOD:                       Normal Ranges: RA Area A4C: 20.8 cm2 M-MODE MEASUREMENTS:                  Normal Ranges: Ao Root: 3.00 cm (2.0-3.7cm) AoV Exc: 2.00 cm (1.5-2.5cm) LAs:     4.90 cm (2.7-4.0cm) AORTA MEASUREMENTS:                      Normal Ranges: AoV Exc:     2.00 cm (1.5-2.5cm) Ao Sinus, d: 3.10 cm (2.1-3.5cm) Asc Ao, d:   3.30 cm (2.1-3.4cm) LV SYSTOLIC FUNCTION BY 2D PLANIMETRY (MOD):                      Normal Ranges: EF-A4C View:    61 % (>=55%) EF-A2C View:    62 % EF-Biplane:     63 % EF-Visual:      70 % LV EF Reported: 70 % LV DIASTOLIC FUNCTION:                            Normal Ranges: MV Peak E:      0.78 m/s   (0.7-1.2 m/s) MV Peak A:      0.74 m/s   (0.42-0.7 m/s) E/A Ratio:      1.07       (1.0-2.2) MV e'           0.071 m/s  (>8.0) MV lateral e'   0.07 m/s MV medial e'    0.07 m/s E/e' Ratio:     11.07      (<8.0) PulmV Sys Salazar:  39.40 cm/s PulmV Guillen Salazar: 31.10 cm/s PulmV S/D Salazar:  1.00 MITRAL VALVE:                 Normal Ranges: MV DT: 184 msec (150-240msec) AORTIC VALVE:                                    Normal Ranges: AoV Vmax:                1.63 m/s  (<=1.7m/s) AoV Peak PG:             10.6 mmHg (<20mmHg) AoV Mean P.0 mmHg  (1.7-11.5mmHg) LVOT Max Salazar:            1.36 m/s  (<=1.1m/s) AoV VTI:                 34.40 cm  (18-25cm) LVOT VTI:                30.60 cm LVOT Diameter:           2.00 cm   (1.8-2.4cm) AoV Area, VTI:           2.79 cm2  (2.5-5.5cm2) AoV Area,Vmax:           2.62 cm2  (2.5-4.5cm2) AoV Dimensionless Index: 0.89  RIGHT VENTRICLE: RV Basal 2.67 cm RV Mid   2.16 cm RV Major 6.5 cm TAPSE:   14.6 mm RV s'    0.17 m/s TRICUSPID VALVE/RVSP:                             Normal Ranges: Peak TR Velocity: 2.82 m/s RV Syst Pressure: 34.8 mmHg (< 30mmHg) IVC Diam:         1.82 cm  PULMONIC VALVE:                      Normal Ranges: PV Max Salazar: 1.2 m/s  (0.6-0.9m/s) PV Max P.6 mmHg Pulmonary Veins: PulmV Guillen Salazar: 31.10 cm/s PulmV S/D Salazar:  1.00 PulmV Sys Salazar:  39.40 cm/s  [id number] Michael Lozano MD Electronically signed on 2024 at 8:45:01 PM  ** Final **     XR chest 1 view    Result Date: 2024  Interpreted By:  Bj Erwin, STUDY: XR CHEST 1 VIEW;  2024 10:12 am   INDICATION: Signs/Symptoms:tachycardia; SOB.   COMPARISON: 2024   ACCESSION NUMBER(S): [id number]   ORDERING CLINICIAN: SUJATHA HUGHES   FINDINGS: Calcified right paratracheal lymph nodes stable.   Widening of the mediastinum is likely related to the portable projection of the film.   CARDIOMEDIASTINAL SILHOUETTE: Stable. Prominent pericardial fat pad.   LUNGS: Lungs are clear.   ABDOMEN: No remarkable upper abdominal findings.   BONES: No acute osseous changes.       1.  No radiographic change.       MACRO: None   Signed by: Bj Erwin 2024 11:08 AM Dictation workstation:   [id number]    ECG 12 lead    Result Date: 2024  Atrial fibrillation with rapid ventricular response Nonspecific ST and T wave abnormality Abnormal ECG When compared with ECG of 2024 07:56, (unconfirmed) Atrial fibrillation has replaced Sinus rhythm Vent. rate has increased BY  58 BPM Non-specific change in ST segment in Inferior leads T wave inversion now evident in Lateral leads    ECG 12 lead    Result Date: 2024  Supraventricular tachycardia Nonspecific ST abnormality Abnormal ECG When compared with ECG of 2024 23:17, (unconfirmed) Sinus rhythm has replaced Atrial fibrillation    ECG 12 lead    Result Date: 2024  Sinus rhythm with marked sinus arrhythmia with 1st degree AV block Otherwise normal ECG When compared with ECG of 2024 06:53, (unconfirmed) [place] interval has increased Vent. rate has decreased BY  66 BPM    ECG 12 lead    Result Date: 2024  Atrial fibrillation with  rapid ventricular response Marked ST abnormality, possible lateral subendocardial injury Abnormal ECG When compared with ECG of 08-JUL-2024 21:51, (unconfirmed) Atrial fibrillation has replaced Sinus rhythm ST now depressed in Lateral leads T wave inversion now evident in Lateral leads    Electrophysiology procedure    Result Date: 7/9/2024  Images from the original result were not included. Atrial Fibrillation ablation Procedures Atrial Fibrillation ablation (67227), LA Pacing and recording (55706), 3D Mapping (05964), Intracardiac Echocardiogram (21565), Transseptal Catheterization (13088), Ultrasound Guided vascular access (58002),Additional Afib Ablation (72429) 3D Mapping (33226) Transseptal Catheterization (11946) Ultrasound Guided vascular access (19632) Atrial Fibrillation ablation (13031) Patient history: Please refer to the detailed history and physical on the patient's medical chart. Procedure narrative: The patient was in the fasting state. A baseline ECG was recorded and showed Sinus rhythm. The patient was set up for continuous monitoring of surface 12 lead ECG and pulse oximetry. Blood pressure was monitored with automatic cuff measurements. Bilateral groins were clipped, prepped with chlorhexidine, and draped in the usual sterile fashion. The procedure was performed under general anesthesia (administered and monitored by anesthesia attending and CRNA). Anesthesia sedated and intubated the patient without any acute complications.  Radial arterial line was placed for continuous hemodynamic monitoring. The right femoral vein was accessed x 3 using the modified Seldinger technique. 3 sheaths were inserted. The right common femoral vein was evaluated with ultrasound, it was normal in size and anatomy.  The vein was accessed using ultrasound guidance and a 18G Cook needle, with direct visualization of the needle at all times. Over the guidewire an 8F, 8.5F and 8.5F sheaths were inserted into the right  femoral vein.   Through right femoral venous access a Octapolar catheter was introduced and placed into the distal coronary sinus. The right femoral 8.5F vein access was then switched to a deflectable sheath Medium [place] deflectable sheath Under fluoroscopic guidance a intracardiac echocardiogram catheter was introduced into the right atrium.  The right atrium, left atrium, left atrial appendage, RV were evaluated.  No obvious structural abnormalities were noted.  Initial imaging revealed No pericardial effusion.  15,000 units of heparin were given and a drip at 1,500 units/hr was initiated.  ACT was monitored and recorded at regular intervals throughout the case. Please see case log for ACT details.  Transseptal catheterization: Under fluoroscopic and intracardiac echo guidance [place] needle was introduced via the Medium [place] deflectable sheath and  single transseptal catheterization was performed. The successful single-septostomy site was dilated to allow the sheath to be placed into the left atrium.   The deflectable sheath was connected to a pressurized bag of heparinized saline with slow continuous infusion. 3D mapping: A 3D shell using the 3D electroanatomic mapping was made with the mapping catheter ([employer]).  Areas of fractionated signals, low voltage and Anatomy/OS of pulmonary veins were marked. There were low voltages (scar) at the anterior wall. Posterior wall was normal. The voltage was assessed in sinus rhythm. At this point the deflectable sheath was brought back into the left atrium and the high-definition mapping catheter was removed.  The ablation catheter ST/SF RF was then introduced via the sheath into the left atrium.  Ablation: During ablation esophageal temperature monitoring was utilized throughout the procedure. No significant change in esophageal temperature was noted. For majority of the lesions high power short duration lesions were performed with power of 30-40W and 10-15s lesions  along posterior wall and roof, and 10-20s lesions along the anterior wall. Lesions were tagged using Impedance change and target impedance drop for a abation lesion was atleast 10ohms. Distal local EGMs were also noted to be attenuated after each lesion application. First pass isolation of PVs: Yes Entrance and exit block was confirmed by pacing from CS catheter and ablation catheter within the veins. Catheter and sheath were pulled back to the right atrium. RFA Atrial Fibrillation lesions performed: Left Pulmonary Veins: WACA with Amira line Right Pulmonary Veins: WACA with Amira line Roof Line: Olga's bundle was isolated with an ablation line from the middle of the first anterior line up to the roof. Posterior Wall Isolation: Not indicated. Anterior Mitral Line: Scar at the anterior wall was isolated with several anterior lines. The first anterior line started at the right superior pulmonary vein and extended down to the anterior mitral valve annulus. The second anterior line started at the left superior pulmonary vein and extended down to the anterior mitral valve annulus.  Between the abovementioned two anterior lines, additional ablation lines were performed to homogenize anterior wall scar tissue. Pacing confirmed exit block and complete isolation of the anterior wall. Lateral Mitral Line: Not indicated. At this time all catheters were pulled back into the IVC. 30 mg protamine was given.  ACT was maintained with a combination of heparin boluses +/- infusion. End-of-case: We made a final evaluation for pericardial effusion using the intracardiac echocardiogram catheter.  No pericardial effusion was noted at the end of the procedure, which was noted to be unchanged from initial imaging. Sheaths were removed and hemostasis was obtained at the right femoral venous access sites with Vascade MVP. 10-15 mins of manual compression was applied to maintain hemostasis. At the end of the case pt was successfully  extubated. Pt was able to move all 4 extremities spontaneously and follow commands. Pt was moved to PACU/holding for recovery. Summary: Acutely successful radiofrequency ablation for Atrial fibrillation with PVI and adjunctive lines. Recommendation: Tentatively patient will be discharged Today, following bed rest and subsequent ambulation, provided the recovery parameters are appropriate. Resume AC Apixaban 5mg BID. Please DO NOT hold blood thinner unless emergency without asking your doctor. Bedrest for 3 hours post sheath removal Start Protonix 40mg BID x30 days Resume rest of home medications Patient Instructions: No driving, alcohol or making legal decisions for 24 hours. Remove band-aid/tegaderm in 1 day. No heavy lifting, strenuous exercise for 1 week Please call our office if you notice any groin discharge, swelling or fever. For cardiology electrophysiology emergencies (such as severe heart racing, bleeding, severe groin pain/swelling, high fever, wound discharge, stroke symptoms, or recent severe chest pain), please seek immediate medical attention. See complete procedural log and parameters.     ECG 12 lead    Result Date: 7/9/2024  Sinus tachycardia Otherwise normal ECG When compared with ECG of 08-JUL-2024 20:16, (unconfirmed) Sinus rhythm has replaced Atrial fibrillation    ECG 12 lead    Result Date: 7/9/2024  Atrial fibrillation Cannot rule out Anterior infarct (cited on or before 29-APR-2024) Abnormal ECG When compared with ECG of 29-APR-2024 07:33, Atrial fibrillation has replaced Sinus rhythm Questionable change in initial forces of Septal leads    CT angio chest for pulmonary embolism    Result Date: 7/9/2024  Interpreted By:  Jose Bustillo, STUDY: CT ANGIO CHEST FOR PULMONARY EMBOLISM;  7/8/2024 11:59 pm   INDICATION: Signs/Symptoms:ablation today off eliquis hx dvt.   COMPARISON: None.   ACCESSION NUMBER(S): [id number]   ORDERING CLINICIAN: ROBERTA CARTER   TECHNIQUE: Contiguous axial images  of the chest were obtained after the intravenous administration of  contrast. Coronal and sagittal reformatted images were obtained from the axial images.  In addition, dual energy reconstructions were also performed including low energy mono-energetic images and iodine density maps.   FINDINGS: The examination is limited secondary to patient body habitus and motion.   No axillary, mediastinal, or hilar lymphadenopathy.   The heart is normal in size. Coronary artery atherosclerotic calcifications. No significant pericardial effusion. No evidence of acute central, main, lobar or proximal segmental pulmonary embolism. Evaluation for more distal emboli is limited secondary to patient respiratory motion and discussed limitations of the examination.   Mild bilateral facet atelectasis. No significant pleural effusion. No pneumothorax.   Limited evaluation of the upper abdomen. Hepatic steatosis and postsurgical change of cholecystectomy.   Fatty atrophy of the pancreas.   Multilevel degenerative change of the thoracic spine.       No evidence of acute pulmonary embolism.   No evidence of pneumonia.   MACRO: None   Signed by: Jose Bustillo 7/9/2024 1:10 AM Dictation workstation:   [id number]    XR chest 1 view    Result Date: 7/8/2024  Interpreted By:  Roberto Lopez, STUDY: XR CHEST 1 VIEW;  7/8/2024 10:00 pm   INDICATION: Signs/Symptoms:Chest Pain.   COMPARISON: CXR 03/28/2024   ACCESSION NUMBER(S): [id number]   ORDERING CLINICIAN: ROBERTA CARTER   FINDINGS:     CARDIOMEDIASTINAL SILHOUETTE: Cardiomediastinal silhouette is normal in size and configuration.   LUNGS: No pulmonary consolidation, pleural effusion or pneumothorax.   ABDOMEN: No remarkable upper abdominal findings.   BONES: No acute osseous abnormality.       No acute cardiopulmonary process.   MACRO: None   Signed by: Roberto Lopez 7/8/2024 10:55 PM Dictation workstation:   [id number]       Assessment and Plan     - Patient was seen and examined at  bedside     ACUTE MEDICAL ISSUES:  #Recurrent A-fib RVR s/p ablation 07/08/2024  #Elevated troponin - likely demand ischemia:  #Leukocytosis - likely reactional:  - s/p ablation on 7/8/24  - pt was recently discharged from [facility] with new PRN Metoprolol and PRN Digoxin  - ECHO 7/11/24 showed EF 70% with severely increased left ventricular septal thickness  - EKG on presentation showed , A-flutter with 2:1 AV conduction with marked ST abnormality, QTc 450  - elevated trop 197, 161 Downtrending  - in the ED, started Amio bolus + drip  PLAN:  - Had been on amiodarone in the past but had diarrhea and changed to Multaq, willing to try amiodarone again   -cardiology consulted, Dr. Rodriguez is aware, recommended: Continue gtt until tomorrow, then switch to amiodarone 400 mg BID x 3 days, then 400 mg x 14 days, then 200 mg daily, cont eliquis, cont Toprol XL 100mg BID, appreciate reccs  -HOLDING newly prescribed PRN Metoprolol and PRN Digoxin   -Consider ischemic work up, may need [facility] to rule out cardiovascular ischemia, Dr. Rodriguez to see tomorrow     #JOSE:   - likely pre-renal in the setting of a-fib rvr   - Cr decreased from 1.51-1.29  - continue monitoring      CHRONIC MEDICAL ISSUES:  HTN- Holding hydrochlorothiazide and lisinopril in the setting of JOSE, Continue to monitor BP  #T2DM - continue home sliding scale inpatient, HOLDING Ozempic       Fluids: None   Electrolytes: Replete as needed   Nutrition: Adult Regular Diet  GI/Ulcer Prophylaxis: Protonix  Antibiotics: None  DVT Home Eliquis  IV access: PIV L anterior foream and upper arm  O2: RA    Disposition: Pending Cardiology Clearance    Mt Arvizu,   Internal Medicine, PGY- 1  07/13/24 at 1:18 PM

## 2024-07-13 NOTE — ED TRIAGE NOTES
Pt here for CP, palpitations, high HR. Ablation done Monday for hx afib. Sees dr tidwell. On eliquis.

## 2024-07-13 NOTE — CONSULTS
Inpatient consult to Cardiology  Consult performed by: Maria Isabel Brandt, APRN-CNP  Consult ordered by: Narayan Jack DO  Reason for consult: recurrent atrial fibrillation with RVR          Reason For Consult  Atrial fibrillation with RVR    History Of Present Illness  Debbie Rees is a 74 y.o. female presenting with palpitations associated with shortness of breath, dizziness and chest pain that is radiating to her back. She reports that her chest pain can also be mid sternal and feels like she has acid reflux.   She presented to the ER and was found to be in atrial fibrillation with RVR, HR was 145. She was started on an amiodarone gtt and now converted to SR. She reports that she continues to have palpitations, feels fluttering in chest, slight chest pain while continuing to be in SR.   She reports that she is feeling better after starting on the amiodarone gtt. She reports that she is willing to try the amiodarone again. She was admitted the hospital for medical management.      Past Medical History  Hypertrophyic cardiomyopathy, hypertension, venous insufficiency s/p EVLA, PVD, OA, diabetes mellitus type 2, paroxysmal atrial fibrillation s/p ablation.  She has a past medical history of Abdominal pain of multiple sites (06/22/2023), Acute sinusitis (06/22/2023), Acute upper respiratory infection, unspecified (03/14/2017), Anxiety disorder, unspecified, Biceps tendinitis of left upper extremity (06/22/2023), Burning with urination (06/22/2023), Cervical pain (06/22/2023), Cervicalgia, Encounter for screening for other viral diseases (12/14/2017), Hand numbness (12/26/2023), Morbid (severe) obesity due to excess calories (Multi), Pain in left knee, Paroxysmal SVT (supraventricular tachycardia) (CMS-HCC) (12/26/2023), Personal history of other diseases of the circulatory system, Personal history of other diseases of the musculoskeletal system and connective tissue, Personal history of other drug therapy  (2019), Personal history of other endocrine, nutritional and metabolic disease, Personal history of other endocrine, nutritional and metabolic disease, Personal history of other specified conditions (2017), Pyogenic granuloma of skin and subcutaneous tissue (2023), Streptococcal pharyngitis (2017), Thoracic spine pain (2023), Unspecified abdominal hernia without obstruction or gangrene, Unspecified asthma, uncomplicated (HHS-HCC) (2017), and Urinary tract infection, site not specified (2017).    Surgical History  She has a past surgical history that includes Cholecystectomy (2016);  section, classic (2016); Total knee arthroplasty (2016); Other surgical history (2016); Other surgical history (2022); Other surgical history (2021); and Cardiac electrophysiology procedure (N/A, 2024).     Social History  She reports that she has never smoked. She has never used smokeless tobacco. She reports that she does not drink alcohol and does not use drugs.    Family History  No family history on file.     Allergies  Procaine and Empagliflozin    Review of Systems  Review of Systems   Constitutional:  Positive for chills, diaphoresis and fatigue. Negative for activity change, appetite change and fever.   HENT:  Negative for congestion.    Respiratory:  Positive for chest tightness and shortness of breath. Negative for apnea, cough, wheezing and stridor.    Cardiovascular:  Positive for chest pain and palpitations. Negative for leg swelling.   Gastrointestinal:  Positive for abdominal pain. Negative for abdominal distention, blood in stool, diarrhea, nausea and vomiting.   Endocrine: Negative for cold intolerance, heat intolerance, polydipsia, polyphagia and polyuria.   Genitourinary:  Negative for frequency and urgency.   Musculoskeletal:  Negative for arthralgias, back pain, gait problem, joint swelling, myalgias, neck pain and neck  stiffness.   Skin:  Negative for color change and pallor.   Neurological:  Negative for dizziness and light-headedness.   Psychiatric/Behavioral:  Negative for behavioral problems.          Physical Exam  Physical Exam  Constitutional:       Appearance: Normal appearance.   HENT:      Head: Normocephalic and atraumatic.      Nose: Nose normal. No congestion.   Eyes:      Extraocular Movements: Extraocular movements intact.      Pupils: Pupils are equal, round, and reactive to light.   Cardiovascular:      Rate and Rhythm: Normal rate and regular rhythm.      Pulses: Normal pulses.      Heart sounds: Normal heart sounds. No murmur heard.     No friction rub. No gallop.   Pulmonary:      Effort: Pulmonary effort is normal. No tachypnea, bradypnea, accessory muscle usage or respiratory distress.      Breath sounds: Normal breath sounds. No stridor. No wheezing, rhonchi or rales.   Chest:      Chest wall: No tenderness.   Abdominal:      General: Bowel sounds are normal. There is no distension.      Palpations: Abdomen is soft.      Tenderness: There is no abdominal tenderness.   Musculoskeletal:         General: Normal range of motion.      Cervical back: Normal range of motion and neck supple.   Skin:     General: Skin is warm and dry.      Capillary Refill: Capillary refill takes less than 2 seconds.   Neurological:      General: No focal deficit present.      Mental Status: She is alert and oriented to person, place, and time. Mental status is at baseline.   Psychiatric:         Mood and Affect: Mood normal.         Behavior: Behavior normal.             Last Recorded Vitals  /63   Pulse 68   Temp 36.3 °C (97.3 °F) (Temporal)   Resp 15   Wt 112 kg (246 lb 11.1 oz)   SpO2 96%     Relevant Results  Results for orders placed or performed during the hospital encounter of 07/12/24 (from the past 24 hour(s))   CBC and Auto Differential   Result Value Ref Range    WBC 13.1 (H) 4.4 - 11.3 x10*3/uL    nRBC 0.0 0.0  - 0.0 /100 WBCs    RBC 4.17 4.00 - 5.20 x10*6/uL    Hemoglobin 12.9 12.0 - 16.0 g/dL    Hematocrit 39.8 36.0 - 46.0 %    MCV 95 80 - 100 fL    MCH 30.9 26.0 - 34.0 pg    MCHC 32.4 32.0 - 36.0 g/dL    RDW 13.2 11.5 - 14.5 %    Platelets 300 150 - 450 x10*3/uL    Neutrophils % 62.6 40.0 - 80.0 %    Immature Granulocytes %, Automated 0.3 0.0 - 0.9 %    Lymphocytes % 28.9 13.0 - 44.0 %    Monocytes % 6.2 2.0 - 10.0 %    Eosinophils % 1.5 0.0 - 6.0 %    Basophils % 0.5 0.0 - 2.0 %    Neutrophils Absolute 8.21 (H) 1.60 - 5.50 x10*3/uL    Immature Granulocytes Absolute, Automated 0.04 0.00 - 0.50 x10*3/uL    Lymphocytes Absolute 3.78 (H) 0.80 - 3.00 x10*3/uL    Monocytes Absolute 0.81 (H) 0.05 - 0.80 x10*3/uL    Eosinophils Absolute 0.19 0.00 - 0.40 x10*3/uL    Basophils Absolute 0.07 0.00 - 0.10 x10*3/uL   Comprehensive metabolic panel   Result Value Ref Range    Glucose 182 (H) 74 - 99 mg/dL    Sodium 134 (L) 136 - 145 mmol/L    Potassium 4.2 3.5 - 5.3 mmol/L    Chloride 101 98 - 107 mmol/L    Bicarbonate 23 21 - 32 mmol/L    Anion Gap 14 10 - 20 mmol/L    Urea Nitrogen 30 (H) 6 - 23 mg/dL    Creatinine 1.51 (H) 0.50 - 1.05 mg/dL    eGFR 36 (L) >60 mL/min/1.73m*2    Calcium 9.8 8.6 - 10.3 mg/dL    Albumin 3.7 3.4 - 5.0 g/dL    Alkaline Phosphatase 73 33 - 136 U/L    Total Protein 7.4 6.4 - 8.2 g/dL    AST 27 9 - 39 U/L    Bilirubin, Total 0.3 0.0 - 1.2 mg/dL    ALT 34 7 - 45 U/L   Magnesium   Result Value Ref Range    Magnesium 1.63 1.60 - 2.40 mg/dL   Protime-INR   Result Value Ref Range    Protime 14.6 (H) 9.8 - 12.8 seconds    INR 1.3 (H) 0.9 - 1.1   Troponin I, High Sensitivity, Initial   Result Value Ref Range    Troponin I, High Sensitivity 197 (HH) 0 - 13 ng/L   Urinalysis with Reflex Culture and Microscopic   Result Value Ref Range    Color, Urine Light-Yellow Light-Yellow, Yellow, Dark-Yellow    Appearance, Urine Clear Clear    Specific Gravity, Urine 1.018 1.005 - 1.035    pH, Urine 5.5 5.0, 5.5, 6.0, 6.5, 7.0,  7.5, 8.0    Protein, Urine NEGATIVE NEGATIVE, 10 (TRACE), 20 (TRACE) mg/dL    Glucose, Urine Normal Normal mg/dL    Blood, Urine NEGATIVE NEGATIVE    Ketones, Urine NEGATIVE NEGATIVE mg/dL    Bilirubin, Urine NEGATIVE NEGATIVE    Urobilinogen, Urine Normal Normal mg/dL    Nitrite, Urine NEGATIVE NEGATIVE    Leukocyte Esterase, Urine 250 Bren/µL (A) NEGATIVE   Microscopic Only, Urine   Result Value Ref Range    WBC, Urine 6-10 (A) 1-5, NONE /HPF    RBC, Urine 1-2 NONE, 1-2, 3-5 /HPF    Squamous Epithelial Cells, Urine 1-9 (SPARSE) Reference range not established. /HPF    Mucus, Urine FEW Reference range not established. /LPF    Hyaline Casts, Urine OCCASIONAL (A) NONE /LPF   CBC   Result Value Ref Range    WBC 9.9 4.4 - 11.3 x10*3/uL    nRBC 0.0 0.0 - 0.0 /100 WBCs    RBC 3.78 (L) 4.00 - 5.20 x10*6/uL    Hemoglobin 11.9 (L) 12.0 - 16.0 g/dL    Hematocrit 36.5 36.0 - 46.0 %    MCV 97 80 - 100 fL    MCH 31.5 26.0 - 34.0 pg    MCHC 32.6 32.0 - 36.0 g/dL    RDW 13.0 11.5 - 14.5 %    Platelets 246 150 - 450 x10*3/uL   Comprehensive Metabolic Panel   Result Value Ref Range    Glucose 151 (H) 74 - 99 mg/dL    Sodium 136 136 - 145 mmol/L    Potassium 4.2 3.5 - 5.3 mmol/L    Chloride 103 98 - 107 mmol/L    Bicarbonate 25 21 - 32 mmol/L    Anion Gap 12 10 - 20 mmol/L    Urea Nitrogen 27 (H) 6 - 23 mg/dL    Creatinine 1.29 (H) 0.50 - 1.05 mg/dL    eGFR 44 (L) >60 mL/min/1.73m*2    Calcium 9.2 8.6 - 10.3 mg/dL    Albumin 3.2 (L) 3.4 - 5.0 g/dL    Alkaline Phosphatase 62 33 - 136 U/L    Total Protein 6.4 6.4 - 8.2 g/dL    AST 21 9 - 39 U/L    Bilirubin, Total 0.2 0.0 - 1.2 mg/dL    ALT 27 7 - 45 U/L   Magnesium   Result Value Ref Range    Magnesium 1.57 (L) 1.60 - 2.40 mg/dL   Phosphorus   Result Value Ref Range    Phosphorus 2.8 2.5 - 4.9 mg/dL   Troponin I, High Sensitivity   Result Value Ref Range    Troponin I, High Sensitivity 161 (HH) 0 - 13 ng/L   POCT GLUCOSE   Result Value Ref Range    POCT Glucose 135 (H) 74 - 99 mg/dL         === 07/12/24 ===    XR CHEST 1 VIEW    - Impression -  No airspace consolidation or pleural effusion.    MACRO:  None    Signed by: Jose Bustillo 7/13/2024 1:13 AM  Dictation workstation:   [id number]     Transthoracic Echo (TTE) Complete    Result Date: 7/11/2024   [facility], [address] [place] [address]               Tel [phone] and Fax [phone] TRANSTHORACIC ECHOCARDIOGRAM REPORT  Patient Name:      VIVIAN KIM      Reading Physician:    [id number] Michael Lozano MD Study Date:        7/11/2024            Ordering Provider:    [id number] RANULFO MELGOZA MRN/PID:           [id number]             Fellow: Accession#:        [id number]         Nurse:                Nina Dickerson RN Date of Birth/Age: 1950 / 74 years  Sonographer:          Dina Rojas RDCS Gender:            F                    Additional Staff: Height:            167.64 cm            Admit Date: Weight:            112.04 kg            Admission Status:     Outpatient BSA / BMI:         2.19 m2 / 39.87      Encounter#:           [id number]                    kg/m2 Blood Pressure:    97/65 mmHg           Department Location:  [facility] Non                                                               Invasive Study Type:    TRANSTHORACIC ECHO (TTE) COMPLETE Diagnosis/ICD: Unspecified atrial fibrillation-I48.91 Indication:    Atrial Fibrillation CPT Code:      Echo Complete w Full Doppler-[id number] Patient History: Diabetes:          Yes Pertinent History: HTN, HCM, Chest Pain and A-Fib. S/P cardiac ablation                    (7/8/23). Study Detail: The following Echo studies were performed: 2D, M-Mode, Doppler and               color flow. Technically challenging study due to prominent lung               artifact.  Definity used as a contrast agent for endocardial border               definition. Total contrast used for this procedure was 1.0 mL via               IV push. The patient was awake.  PHYSICIAN INTERPRETATION: Left Ventricle: The left ventricular systolic function is normal, with a visually estimated ejection fraction of 70%. There are no regional wall motion abnormalities. The left ventricular cavity size is decreased. The left ventricular septal wall thickness is severely increased. There is moderately increased left ventricular posterior wall thickness. There is moderate to severe concentric left ventricular hypertrophy involving the septal wall. Findings are consistent with hypertrophic cardiomyopathy. Spectral Doppler shows an impaired relaxation pattern of left ventricular diastolic filling. There is a mild mid cavity left ventricular obstruction. The resting gradient is 25 mmHg.The provoked gradient is 33 mmHg. Left Atrium: The left atrium is moderately dilated. Right Ventricle: The right ventricle is normal in size. There is normal right ventricular global systolic function. Right Atrium: The right atrium is mild to moderately dilated. Aortic Valve: The aortic valve is trileaflet. There is mild aortic valve cusp calcification. The aortic valve dimensionless index is 0.89. There is no evidence of aortic valve regurgitation. The peak instantaneous gradient of the aortic valve is 10.6 mmHg. The mean gradient of the aortic valve is 7.0 mmHg. Mitral Valve: The mitral valve is normal in structure. There is moderate mitral annular calcification. There is mild to moderate mitral valve regurgitation. Tricuspid Valve: The tricuspid valve is structurally normal. There is mild tricuspid regurgitation. Pulmonic Valve: The pulmonic valve is structurally normal. There is physiologic pulmonic valve regurgitation. Pericardium: There is a trivial pericardial effusion. Aorta: The aortic root is normal. Pulmonary Artery: The  tricuspid regurgitant velocity is 2.82 m/s, and with an estimated right atrial pressure of 3 mmHg, the estimated pulmonary artery pressure is mildly elevated with the RVSP at 34.8 mmHg. Pulmonary Veins: The pulmonary veins appear normal and return normally to the left atrium. Systemic Veins: The inferior vena cava appears to be of normal size.  CONCLUSIONS:  1. The left ventricular systolic function is normal, with a visually estimated ejection fraction of 70%.  2. Spectral Doppler shows an impaired relaxation pattern of left ventricular diastolic filling.  3. Left ventricular cavity size is decreased.  4. Severely increased left ventricular septal thickness.  5. The left ventricular posterior wall thickness is moderately increased.  6. There is hypertrophic cardiomyopathy.  7. There is moderate to severe concentric left ventricular hypertrophy.  8. There is a mild mid cavity left ventricular obstruction.  9. There is normal right ventricular global systolic function. 10. The left atrium is moderately dilated. 11. The right atrium is mild to moderately dilated. 12. There is moderate mitral annular calcification. 13. Mild to moderate mitral valve regurgitation. 14. Mild pulmonary HTN. Estimated CVP is normal. QUANTITATIVE DATA SUMMARY: 2D MEASUREMENTS:                          Normal Ranges: Ao Root d:     3.10 cm   (2.0-3.7cm) IVSd:          1.80 cm   (0.6-1.1cm) LVPWd:         1.45 cm   (0.6-1.1cm) LVIDd:         3.32 cm   (3.9-5.9cm) LVIDs:         2.47 cm LV Mass Index: 94.2 g/m2 LV % FS        25.6 % LA VOLUME:                               Normal Ranges: LA Vol A4C:        65.6 ml    (22+/-6mL/m2) LA Vol A2C:        70.9 ml LA Vol BP:         70.6 ml LA Vol Index A4C:  30.0ml/m2 LA Vol Index A2C:  32.4 ml/m2 LA Vol Index BP:   32.3 ml/m2 LA Area A4C:       23.4 cm2 LA Area A2C:       23.5 cm2 LA Major Axis A4C: 7.1 cm LA Major Axis A2C: 6.6 cm LA Volume Index:   30.3 ml/m2 LA Vol A4C:        61.7 ml LA Vol A2C:         66.5 ml RA VOLUME BY A/L METHOD:                       Normal Ranges: RA Area A4C: 20.8 cm2 M-MODE MEASUREMENTS:                  Normal Ranges: Ao Root: 3.00 cm (2.0-3.7cm) AoV Exc: 2.00 cm (1.5-2.5cm) LAs:     4.90 cm (2.7-4.0cm) AORTA MEASUREMENTS:                      Normal Ranges: AoV Exc:     2.00 cm (1.5-2.5cm) Ao Sinus, d: 3.10 cm (2.1-3.5cm) Asc Ao, d:   3.30 cm (2.1-3.4cm) LV SYSTOLIC FUNCTION BY 2D PLANIMETRY (MOD):                      Normal Ranges: EF-A4C View:    61 % (>=55%) EF-A2C View:    62 % EF-Biplane:     63 % EF-Visual:      70 % LV EF Reported: 70 % LV DIASTOLIC FUNCTION:                            Normal Ranges: MV Peak E:      0.78 m/s   (0.7-1.2 m/s) MV Peak A:      0.74 m/s   (0.42-0.7 m/s) E/A Ratio:      1.07       (1.0-2.2) MV e'           0.071 m/s  (>8.0) MV lateral e'   0.07 m/s MV medial e'    0.07 m/s E/e' Ratio:     11.07      (<8.0) PulmV Sys Salazar:  39.40 cm/s PulmV Guillen Salazar: 31.10 cm/s PulmV S/D Salazar:  1.00 MITRAL VALVE:                 Normal Ranges: MV DT: 184 msec (150-240msec) AORTIC VALVE:                                    Normal Ranges: AoV Vmax:                1.63 m/s  (<=1.7m/s) AoV Peak PG:             10.6 mmHg (<20mmHg) AoV Mean P.0 mmHg  (1.7-11.5mmHg) LVOT Max Salazar:            1.36 m/s  (<=1.1m/s) AoV VTI:                 34.40 cm  (18-25cm) LVOT VTI:                30.60 cm LVOT Diameter:           2.00 cm   (1.8-2.4cm) AoV Area, VTI:           2.79 cm2  (2.5-5.5cm2) AoV Area,Vmax:           2.62 cm2  (2.5-4.5cm2) AoV Dimensionless Index: 0.89  RIGHT VENTRICLE: RV Basal 2.67 cm RV Mid   2.16 cm RV Major 6.5 cm TAPSE:   14.6 mm RV s'    0.17 m/s TRICUSPID VALVE/RVSP:                             Normal Ranges: Peak TR Velocity: 2.82 m/s RV Syst Pressure: 34.8 mmHg (< 30mmHg) IVC Diam:         1.82 cm PULMONIC VALVE:                      Normal Ranges: PV Max Salazar: 1.2 m/s  (0.6-0.9m/s) PV Max P.6 mmHg Pulmonary Veins: PulmV Guillen Salazar: 31.10  cm/s PulmV S/D Salazar:  1.00 PulmV Sys Salazar:  39.40 cm/s  [id number] Michael Lozano MD Electronically signed on 7/11/2024 at 8:45:01 PM  ** Final **     Transthoracic Echo (TTE) Complete    Result Date: 3/29/2024   [facility], [address] [place] [address]               Tel [phone] and Fax [phone] TRANSTHORACIC ECHOCARDIOGRAM REPORT  Patient Name:      VIVIAN KIM      Reading Physician:    [id number] Dania Gutierrez MD Study Date:        3/29/2024            Ordering Provider:    [id number] RANULFO MELGOZA MRN/PID:           [id number]             Fellow: Accession#:        [id number]         Nurse:                Nina Dickerson RN Date of Birth/Age: 1950 / 74 years  Sonographer:          Janneth Collins RDCS Gender:            F                    Additional Staff: Height:            167.64 cm            Admit Date:           3/28/2024 Weight:            113.40 kg            Admission Status:     Inpatient -                                                               Routine BSA / BMI:         2.20 m2 / 40.35      Encounter#:           [id number]                    kg/m2                                         Department Location:  [facility] Non                                                               Invasive Blood Pressure: 128 /85 mmHg Study Type:    TRANSTHORACIC ECHO (TTE) COMPLETE Diagnosis/ICD: Unspecified atrial fibrillation-I48.91 Indication:    Atrial Fibrillation CPT Code:      Echo Complete w Full Doppler-[id number] Patient History: Diabetes:          Yes Pertinent History: HTN and A-Fib. HCM. Study Detail: The following Echo studies were performed: 2D, M-Mode, Doppler and               color flow. Technically challenging study due to poor acoustic               windows. Definity used as a  contrast agent for endocardial border               definition. Total contrast used for this procedure was 1 mL via IV               push.  PHYSICIAN INTERPRETATION: Left Ventricle: The left ventricular systolic function is hyperdynamic, with an estimated ejection fraction of 65-70%. There are no regional wall motion abnormalities. The left ventricular cavity size is normal. Left ventricular diastolic filling was indeterminate. Left Atrium: The left atrium is mildly dilated. Right Ventricle: The right ventricle is normal in size. There is normal right ventricular global systolic function. Right Atrium: The right atrium is normal in size. Aortic Valve: The aortic valve is trileaflet. There is no evidence of aortic valve regurgitation. The peak instantaneous gradient of the aortic valve is 8.3 mmHg. The mean gradient of the aortic valve is 4.9 mmHg. Mitral Valve: The mitral valve is normal in structure. There is trace to mild mitral valve regurgitation. Tricuspid Valve: The tricuspid valve is structurally normal. There is trace to mild tricuspid regurgitation. Pulmonic Valve: The pulmonic valve is not well visualized. The pulmonic valve regurgitation was not well visualized. Pericardium: There is no pericardial effusion noted. Aorta: The aortic root is normal. Pulmonary Artery: The tricuspid regurgitant velocity is 2.25 m/s, and with an estimated right atrial pressure of 3 mmHg, the estimated pulmonary artery pressure is normal with the RVSP at 23.3 mmHg.  CONCLUSIONS:  1. Left ventricular systolic function is hyperdynamic with a 65-70% estimated ejection fraction. QUANTITATIVE DATA SUMMARY: 2D MEASUREMENTS:                           Normal Ranges: IVSd:          1.84 cm    (0.6-1.1cm) LVPWd:         1.09 cm    (0.6-1.1cm) LVIDd:         3.98 cm    (3.9-5.9cm) LVIDs:         2.50 cm LV Mass Index: 101.3 g/m2 LV % FS        37.1 % LA VOLUME:                              Normal Ranges: LA Vol A4C:       52.5 ml     (22+/-6mL/m2) LA Vol A2C:       53.8 ml LA Vol BP:        53.9 ml LA Vol Index A4C: 23.9 ml/m2 LA Vol Index A2C: 24.5 ml/m2 LA Vol Index BP:  24.5 ml/m2 LA Volume Index:  24.5 ml/m2 LA Vol A4C:       51.0 ml LA Vol A2C:       50.8 ml RA VOLUME BY A/L METHOD:                       Normal Ranges: RA Area A4C: 13.6 cm2 M-MODE MEASUREMENTS:                  Normal Ranges: Ao Root: 2.90 cm (2.0-3.7cm) LAs:     4.17 cm (2.7-4.0cm) LV SYSTOLIC FUNCTION BY 2D PLANIMETRY (MOD):                     Normal Ranges: EF-A4C View: 68.9 % (>=55%) EF-A2C View: 67.6 % EF-Biplane:  68.5 % LV DIASTOLIC FUNCTION:                     Normal Ranges: MV Peak E: 0.83 m/s (0.7-1.2 m/s) MITRAL VALVE:                 Normal Ranges: MV DT: 197 msec (150-240msec) AORTIC VALVE:                                   Normal Ranges: AoV Vmax:                1.44 m/s (<=1.7m/s) AoV Peak P.3 mmHg (<20mmHg) AoV Mean P.9 mmHg (1.7-11.5mmHg) LVOT Max Salazar:            1.35 m/s (<=1.1m/s) AoV VTI:                 27.74 cm (18-25cm) LVOT VTI:                21.49 cm LVOT Diameter:           1.98 cm  (1.8-2.4cm) AoV Area, VTI:           2.38 cm2 (2.5-5.5cm2) AoV Area,Vmax:           2.90 cm2 (2.5-4.5cm2) AoV Dimensionless Index: 0.77 TRICUSPID VALVE/RVSP:                             Normal Ranges: Peak TR Velocity: 2.25 m/s RV Syst Pressure: 23.3 mmHg (< 30mmHg) PULMONIC VALVE:                      Normal Ranges: PV Max Salazar: 1.2 m/s  (0.6-0.9m/s) PV Max P.2 mmHg  [id number] Dania Gutierrez MD Electronically signed on 3/29/2024 at 11:55:50 AM  ** Final **       Patient Active Problem List   Diagnosis    Actinic keratosis    Essential hypertension    Carpal tunnel syndrome of left wrist    Carpal tunnel syndrome of right wrist    Controlled type 2 diabetes mellitus without complication, without long-term current use of insulin (Multi)    Anxiety    Elevated transaminase level    Generalized anxiety disorder    Hiatal hernia     Hypertrophic cardiomyopathy (Multi)    Palpitations    Shortness of breath    Swelling of lower extremity    Ulcer of right calf (Multi)    Chest pain    Persistent atrial fibrillation (Multi)    Atrial fibrillation with RVR (Multi)    Atrial flutter with rapid ventricular response (Multi)          Assessment/Plan     Paroxysmal atrial fibrillation with RVR  -s/p ablation 7-8-24 with Dr. Sommers  -EKG reviewed, atrial flutter, , repeat EKG showed NSR  -Started on amiodarone gtt 7/13 @ 0015  - Continue gtt until tomorrow, then switch to amiodarone 400 mg BID x 3 days, then 400 mg x 14 days, then 200 mg daily.  -Had been on amiodarone in the past but had diarrhea and changed to Multaq, willing to try amiodarone again  -Cont eliquis  -Cont Toprol XL 100mg BID  -Repeat echo unchanged, trivial pericardial effusion  -Monitor on tele     2. Chest Pain/ Elevated troponin-Non MI elevation 2/2 AF RVR and recent ablation  -Does report CP and SOB   -Troponin 500->455 ->197 ->161  -CXR negative  -I reviewed the EKG, no acute changes, ST elevation, or depression  -Stress test in Jan 2024 negative for ischemia  -Cont metoprolol  -Consider ischemic work up, may need [facility] to rule out cardiovascular ischemia, Dr. Rodriguez to see tomorrow     3. Hypertension  -stable  -2gm na diet  -cont meds  -monitor     4. Diabetes Mellitus type 2  -ISS  -Accuchecks  -hgb A1C 7.4%       Maria Isabel Brandt, APRN-CNP

## 2024-07-13 NOTE — H&P
Subjective   Subjective:   HPI:  Debbie Rees is a 74 y.o. female with a PMH of HCM, HTN, PVD s/p EVLA, OA, T2DM, and paroxysmal A-fib s/p ablation 7/8/24 at [facility], [facility] c/o recurrent symptomatic palpitations associated with SOB, dizziness, nausea, and chest pain radiating into her back that was resolved after starting Amio drip in the ED.  Patient reports experiencing worsening palpitations starting around 2200 on 7/12/24 which made feel sick and experience above-mentioned symptoms that could be controlled with PRM Metoprolol and PRN Digoxin that were prescribed for her on recent discharge yesterday.  Otherwise denies fever, chills, vomiting, headaches, difficulty urinating or passing bowel movements.     Follows Dr. Rodriguez as her primary cardiologist who recently switched her PO Amiodarone to PO Multaq due to experiencing diarrhea.  Per patient's conversions with her cardiology team, apparently she was told that if Multaq does not control her A-fib in combination with PRN Metoprolol and PRN Digoxin, then she will likely have to go back to PO Amio.     ED COURSE:  Vitals:  - /62 ,  , RR 16 , SpO2 93% RA, T 35.7C  Labs:  - WBC 13.1, HGB 12.9,   - Na 134, Cl 101, K 4.2, bicarb 23, BUN 30, Cr 1.51  - troponin 197  Imaging:  - CXR showed no cardiopulmonary changes  Interventions:  - Amio bolus + drip    PMH: as per HPI.  PSH: ablation   FHX: non-pertinent  SHX: denies tobacco, alcohol, and illicit use  Allergies: Empagliflozin, Procaine  Medications: reviewed.    Code Status: Full code.    ED Course:   Vitals:  /62   Pulse 67   Temp 35.7 °C (96.3 °F) (Temporal)   Resp 12   Wt 115 kg (254 lb)   SpO2 (!) 93%     Labs:  Lab Results   Component Value Date    WBC 13.1 (H) 07/13/2024    HGB 12.9 07/13/2024    HCT 39.8 07/13/2024    MCV 95 07/13/2024     07/13/2024     Lab Results   Component Value Date    GLUCOSE 182 (H) 07/13/2024    CALCIUM 9.8 07/13/2024      (L) 07/13/2024    K 4.2 07/13/2024    CO2 23 07/13/2024     07/13/2024    BUN 30 (H) 07/13/2024    CREATININE 1.51 (H) 07/13/2024     Lab Results   Component Value Date    TROPHS 197 (HH) 07/13/2024     Lab Results   Component Value Date     (H) 07/08/2024     Lab Results   Component Value Date    DDIMERVTE 637 (H) 12/12/2023     Imaging:  XR chest 1 view   Final Result   No airspace consolidation or pleural effusion.        MACRO:   None        Signed by: Jose Bustillo 7/13/2024 1:13 AM   Dictation workstation:   [id number]         Interventions:  Medications   amiodarone (Nexterone) 360 mg in dextrose,iso-osm 200 mL (1.8 mg/mL) infusion (premix) (1 mg/min intravenous New Bag 7/13/24 0127)   apixaban (Eliquis) tablet 5 mg (has no administration in time range)   hydroCHLOROthiazide (HYDRODiuril) tablet 25 mg (has no administration in time range)   lisinopril tablet 20 mg (has no administration in time range)   magnesium oxide (Mag-Ox) tablet 400 mg (has no administration in time range)   metoprolol succinate XL (Toprol-XL) 24 hr tablet 100 mg (has no administration in time range)   pantoprazole (ProtoNix) EC tablet 40 mg (has no administration in time range)   glucagon (Glucagen) injection 1 mg (has no administration in time range)   dextrose 50 % injection 25 g (has no administration in time range)   glucagon (Glucagen) injection 1 mg (has no administration in time range)   dextrose 50 % injection 12.5 g (has no administration in time range)   insulin lispro (HumaLOG) injection 0-5 Units (has no administration in time range)   melatonin tablet 3 mg (has no administration in time range)     And   acetaminophen (Tylenol) tablet 975 mg (has no administration in time range)     And   sennosides-docusate sodium (Airam-Colace) 8.6-50 mg per tablet 1 tablet (has no administration in time range)   lactated Ringer's bolus 500 mL (0 mL intravenous Paused 7/13/24 0128)   amiodarone (Nexterone) 150 mg in  dextrose (iso)  mL (0 mg intravenous Stopped 24 0120)   ondansetron (Zofran) injection 4 mg (4 mg intravenous Given 24 0107)       Past Medical History:  She has a past medical history of Abdominal pain of multiple sites (2023), Acute sinusitis (2023), Acute upper respiratory infection, unspecified (2017), Anxiety disorder, unspecified, Biceps tendinitis of left upper extremity (2023), Burning with urination (2023), Cervical pain (2023), Cervicalgia, Encounter for screening for other viral diseases (2017), Hand numbness (2023), Morbid (severe) obesity due to excess calories (Multi), Pain in left knee, Paroxysmal SVT (supraventricular tachycardia) (CMS-HCC) (2023), Personal history of other diseases of the circulatory system, Personal history of other diseases of the musculoskeletal system and connective tissue, Personal history of other drug therapy (2019), Personal history of other endocrine, nutritional and metabolic disease, Personal history of other endocrine, nutritional and metabolic disease, Personal history of other specified conditions (2017), Pyogenic granuloma of skin and subcutaneous tissue (2023), Streptococcal pharyngitis (2017), Thoracic spine pain (2023), Unspecified abdominal hernia without obstruction or gangrene, Unspecified asthma, uncomplicated ([facility]) (2017), and Urinary tract infection, site not specified (2017).    Past Surgical History:  She has a past surgical history that includes Cholecystectomy (2016);  section, classic (2016); Total knee arthroplasty (2016); Other surgical history (2016); Other surgical history (2022); Other surgical history (2021); and Cardiac electrophysiology procedure (N/A, 2024).    Social History:  She reports that she has never smoked. She has never used smokeless tobacco. She reports that she does not  "drink alcohol and does not use drugs.    Family History:  No family history on file.  Allergies:  Procaine and Empagliflozin    Home Medications:  (Not in a hospital admission)    Review Of Systems:  11-point ROS was performed and is negative except as noted below and in the HPI.     Review of Systems   Constitutional:  Negative for chills and fever.   Eyes:  Negative for discharge.   Respiratory:  Positive for shortness of breath. Negative for cough and wheezing.    Cardiovascular:  Positive for chest pain and leg swelling.   Gastrointestinal:  Positive for nausea. Negative for abdominal pain, diarrhea and vomiting.   Genitourinary:  Negative for difficulty urinating, dysuria and flank pain.   Musculoskeletal:  Negative for myalgias.   Skin:  Negative for wound.   Neurological:  Positive for dizziness and light-headedness. Negative for numbness.   Psychiatric/Behavioral:  Negative for confusion.         Objective   Objective:     /62   Pulse 67   Temp 35.7 °C (96.3 °F) (Temporal)   Resp 12   Ht 1.651 m (5' 5\")   Wt 115 kg (254 lb)   SpO2 (!) 93%   BMI 42.27 kg/m²     Physical Exam  Constitutional:       General: She is not in acute distress.     Appearance: Normal appearance. She is obese.   HENT:      Head: Normocephalic and atraumatic.      Mouth/Throat:      Mouth: Mucous membranes are moist.   Eyes:      Conjunctiva/sclera: Conjunctivae normal.      Pupils: Pupils are equal, round, and reactive to light.   Cardiovascular:      Rate and Rhythm: Normal rate and regular rhythm.      Heart sounds: Normal heart sounds.   Pulmonary:      Breath sounds: Normal breath sounds. No wheezing or rhonchi.   Abdominal:      General: Bowel sounds are normal.      Palpations: Abdomen is soft.      Tenderness: There is no abdominal tenderness.   Musculoskeletal:         General: Swelling present.      Cervical back: Neck supple.      Right lower leg: Edema present.      Left lower leg: Edema present.   Skin:     " General: Skin is warm and dry.   Neurological:      General: No focal deficit present.      Mental Status: She is alert and oriented to person, place, and time. Mental status is at baseline.       Lab Work:     Lab Results   Component Value Date    WBC 13.1 (H) 07/13/2024    HGB 12.9 07/13/2024    HCT 39.8 07/13/2024    MCV 95 07/13/2024     07/13/2024     Lab Results   Component Value Date    GLUCOSE 182 (H) 07/13/2024    CALCIUM 9.8 07/13/2024     (L) 07/13/2024    K 4.2 07/13/2024    CO2 23 07/13/2024     07/13/2024    BUN 30 (H) 07/13/2024    CREATININE 1.51 (H) 07/13/2024     POC HEMOGLOBIN A1c   Date Value Ref Range Status   04/11/2024 7.7 (A) 4.2 - 6.5 % Final     Hemoglobin A1C   Date Value Ref Range Status   07/09/2024 7.4 (H) see below % Final   03/28/2024 8.3 (H) see below % Final     Thyroid Stimulating Hormone   Date Value Ref Range Status   07/12/2024 1.55 0.44 - 3.98 mIU/L Final   07/09/2024 0.68 0.44 - 3.98 mIU/L Final   03/28/2024 1.47 0.44 - 3.98 mIU/L Final     Cultures:   No results found for the last 90 days.    Images:     XR chest 1 view   Final Result   No airspace consolidation or pleural effusion.        MACRO:   None        Signed by: Jose Bustillo 7/13/2024 1:13 AM   Dictation workstation:   [id number]         Medications:     Scheduled Meds:  apixaban, 5 mg, oral, BID  hydroCHLOROthiazide, 25 mg, oral, q AM  insulin lispro, 0-5 Units, subcutaneous, TID  lisinopril, 20 mg, oral, BID  magnesium oxide, 400 mg, oral, Daily  metoprolol succinate XL, 100 mg, oral, BID  pantoprazole, 40 mg, oral, BID AC    Continuous Meds:  amiodarone, 0.5-1 mg/min, Last Rate: 1 mg/min (07/13/24 0127)      PRN Meds:  PRN medications: melatonin **AND** acetaminophen **AND** sennosides-docusate sodium, dextrose, dextrose, glucagon, glucagon     Assessment & Plan:     Debbie Rees is a 74 y.o. female with a PMH of HCM, HTN, PVD s/p EVLA, OA, T2DM, and paroxysmal A-fib s/p ablation 7/8/24 at  [facility], who presented to [facility] with c/o recurrent symptomatic palpitations associated with SOB, dizziness, nausea, and chest pain radiating into her back that was resolved after starting Amio drip in the ED. Admitted for symptomatic recurrent A-fib RVR requiring Amio drip. Cardiology consulted in the ED and Dr. Rodriguez agreed to admit the patient.     ACUTE MEDICAL ISSUES:  #Recurrent A-fib RVR:  #Elevated troponin - likely demand ischemia:  #Leukocytosis - likely reactional:  - s/p ablation on 7/8/24  - pt was recently discharged from [facility] with new PRN Metoprolol and PRN Digoxin  - ECHO 7/11/24 showed EF 70% with severely increased left ventricular septal thickness  - EKG showed , A-flutter with 2:1 AV conduction with marked ST abnormality, QTc 450  - elevated trop 197, WBC 13.1  - in the ED, started Amio bolus + drip  PLAN:  - cardiology consulted, Dr. Rodriguez is aware  - continue Amio drip + Eliquis, Metoprolol  mg BID  - HOLDING newly prescribed PRN Metoprolol and PRN Digoxin  - trend troponin  - telemetry  - NPO until cardiology sees the patient    #JOSE:   - likely pre-renal in the setting of a-fib rvr   - continue monitoring     CHRONIC MEDICAL ISSUES:  #HTN - continue home Metoprolol  mg BID, Lisinopril 20 mg   #T2DM - continue home sliding scale inpatient, HOLDING Ozempic  #Chronic LE edema - continue Hydrochlorothiazide 25 mg    Fluid: Replete PRN  Electrolytes: Replete PRN  Nutrition: Diabetic/Cardiology; NPO until cardiology clears  GI ppx: Pantoprazole  DVT/PE ppx: Eliquis  Abx: none   IV Lines: PIV  O2: RA    Dispo: Admitted for symptomatic recurrent A-fib RVR requiring Amio drip. Cardiology consulted in the ED. Estimated length of stay >2 days. Likely will discharge home once medically stable.     Cleveland Shoemaker, PGY-3  Internal Medicine    Disclaimer: Documentation completed with the information available at the time of input. The times in the chart may not be reflective of actual  patient care times, interventions, or procedures. Documentation occurs after the physical care of the   patient.

## 2024-07-13 NOTE — CARE PLAN
The patient's goals for the shift include fix my A fib    The clinical goals for the shift include pt will remain hemodynamically stable

## 2024-07-13 NOTE — ED PROVIDER NOTES
HPI   No chief complaint on file.      CC: lopez  HPI:   74 y.o. female with past medical history of hypertrophic cardiomyopathy, hypertension, venous insufficiency status post EVLA, PVD, OA, type 2 diabetes, and paroxysmal A-fib presenting with palpitations and shortness of breath. S/p ablation on Monday, patient was admitted in 7/11/2024 and discharged 24 hours later she was given diltiazem and was discharged on digoxin, and advised to continue her Toprol, Eliquis, patient stated feeling fine upon discharge, shortly after she returned home she started having the palpitations some minimal shortness of breath she denies having any chest pain no hemoptysis, hematemesis, hematochezia or melena stools.    additional Limitations to History:   External Records Reviewed: I reviewed recent and relevant outside records including   History Obtained From:     Past Medical History: Per HPI  Medications: Reviewed in EMR and with patient  Allergies:  Reviewed in EMR  Past Surgical History:   Social History:     ------------------------------------------------------------------------------------------------------  Physical Exam:  --Vital signs reviewed in nursing triage note, EMR flow sheets, and at patient's bedside  GEN:  A&Ox3, no acute distress, appears comfortable.  Conversational and appropriate.  No confusion or gross mental status changes.  EYES: EOMI, non-injected sclera.  ENT: Moist mucous membranes, no apparent injuries or lesions.   CARDIO: Atrial fibrillation. No murmurs, rubs, or gallops.  2+ equal pulses of the distal extremities.   PULM: Clear to auscultation bilaterally. No rales, rhonchi, or wheezes. Good symmetric chest expansion.  GI: Soft, non-tender, non-distended. No rebound tenderness or guarding.  SKIN: Warm and dry, no rashes or lesions.  MSK: ROM intact the extremities without contractures.   EXT: No peripheral edema, contusions, or wounds.   NEURO: Cranial nerves II-XII grossly intact. Sensation to  light touch intact and equal bilaterally in upper and lower extremities.  Symmetric 5/5 strength in upper and lower extremities.  PSYCH: Appropriate mood and behavior, converses and responds appropriately during exam.  -------------------------------------------------------------------------------------------------------      Differential Diagnoses Considered:   Chronic Medical Conditions Significantly Affecting Care:   Diagnostic testing considered: [PERC, D-Dimer, PECARN, etc.]    - EKG interpreted by myself atrial fibrillation, ventricular rate 145, QRS duration 80 no prolonged QT/QTc.  - I independently interpreted: [CXR, CT, POCUS, etc. including your interpretation]  - Labs notable for     Escalation of Care: Appropriate for  Social Determinants of Health Significantly Affecting Care: [Homelessness, lacking transportation, uninsured, unable to afford medications]  Prescription Drug Consideration: [Antibiotics, antivirals, pain medications, etc.]  Discussion of Management with Other Providers:  I discussed the patient/results with: [admitting team, consultant, radiologist, social work, EPAT, case management, PT/OT, RT, PCP, etc.]      Chemo Murry PA-C                          No data recorded                   Patient History   Past Medical History:   Diagnosis Date    Abdominal pain of multiple sites 06/22/2023    Acute sinusitis 06/22/2023    Acute upper respiratory infection, unspecified 03/14/2017    Acute URI    Anxiety disorder, unspecified     Anxiety    Biceps tendinitis of left upper extremity 06/22/2023    Burning with urination 06/22/2023    Cervical pain 06/22/2023    Cervicalgia     Cervical pain (neck)    Encounter for screening for other viral diseases 12/14/2017    Need for hepatitis C screening test    Hand numbness 12/26/2023    Morbid (severe) obesity due to excess calories (Multi)     Morbid obesity    Pain in left knee     Acute pain of left knee    Paroxysmal SVT (supraventricular  tachycardia) (CMS-[facility]) 2023    Personal history of other diseases of the circulatory system     History of hypertension    Personal history of other diseases of the musculoskeletal system and connective tissue     History of arthritis    Personal history of other drug therapy 2019    History of influenza vaccination    Personal history of other endocrine, nutritional and metabolic disease     History of hyperlipidemia    Personal history of other endocrine, nutritional and metabolic disease     History of diabetes mellitus    Personal history of other specified conditions 2017    History of dysuria    Pyogenic granuloma of skin and subcutaneous tissue 2023    Streptococcal pharyngitis 2017    Strep sore throat    Thoracic spine pain 2023    Unspecified abdominal hernia without obstruction or gangrene     Abdominal hernia without obstruction and without gangrene    Unspecified asthma, uncomplicated ([facility]) 2017    Asthmatic bronchitis    Urinary tract infection, site not specified 2017    Acute UTI     Past Surgical History:   Procedure Laterality Date    CARDIAC ELECTROPHYSIOLOGY PROCEDURE N/A 2024    Procedure: Ablation A-Fib Persistant;  Surgeon: Ney Sommers MD;  Location: [place] [address] Cardiac Cath Lab;  Service: Electrophysiology;  Laterality: N/A;  EPS 3D W CARTO GA WHOLE CASE.    PT [facility] - NEEDS TIME OF PROCEDURE AHEAD OF TIME TO ARRANGE TRANSPORT.     SECTION, CLASSIC  2016     Section    CHOLECYSTECTOMY  2016    Cholecystectomy    OTHER SURGICAL HISTORY  2016    Sigmoidoscopy (Fiberoptic, Therapeutic )    OTHER SURGICAL HISTORY  2022    Lower back surgery    OTHER SURGICAL HISTORY  2021    Carpal tunnel surgery    TOTAL KNEE ARTHROPLASTY  2016    Knee Replacement     No family history on file.  Social History     Tobacco Use    Smoking status: Never    Smokeless tobacco: Never   Vaping Use    Vaping  status: Never Used   Substance Use Topics    Alcohol use: Never    Drug use: Never       Physical Exam   ED Triage Vitals   Temp Pulse Resp BP   -- -- -- --      SpO2 Temp src Heart Rate Source Patient Position   -- -- -- --      BP Location FiO2 (%)     -- --       Physical Exam    ED Course & MDM   ED Course as of 07/13/24 0513   Sat Jul 13, 2024   0027 This is a 74-year-old female presenting chief complaint of chest pain, palpitations.  Was found to be in atrial flutter with rapid ventricular response.  Case discussed with cardiology on-call Dr. Rodriguez who recommended starting her on amiodarone admitting. [WL]   0209 Second EKG performed demonstrating atrial fibrillation.  Rate 91 bpm.  No STEMI.  Rate improving compared to prior EKG [WL]   0218 Lab work and imaging reviewed.  Case discussed with Bibiana erwin for hospitalist service who agrees to admit under Dr. Garcia. [WL]   0359 Patient converted to sinus rhythm.  Third EKG performed demonstrating sinus rhythm.  Compared to previous EKG significant changes have occurred.  No STEMI [WL]      ED Course User Index  [WL] Narayan Jack DO         Diagnoses as of 07/13/24 0513   Atrial flutter with rapid ventricular response (Multi)       Medical Decision Making  74-year-old female with history of A-fib, SVT, was discharged earlier today, recurrent, atrial fibrillation, I contacted patient's cardiologist he recommended amiodarone bolus and infusion        Procedure  Critical Care    Performed by: Narayan Jack DO  Authorized by: Narayan Jack DO    Critical care provider statement:     Critical care time (minutes):  31    Critical care time was exclusive of:  Teaching time and separately billable procedures and treating other patients    Critical care was necessary to treat or prevent imminent or life-threatening deterioration of the following conditions: Arrhythmia.    Critical care was time spent personally by me on the following activities:  Ordering and  performing treatments and interventions, ordering and review of laboratory studies, ordering and review of radiographic studies, pulse oximetry, re-evaluation of patient's condition, review of old charts, examination of patient, evaluation of patient's response to treatment, development of treatment plan with patient or surrogate, discussions with consultants and obtaining history from patient or surrogate    Care discussed with: admitting provider         Chemo Murry PA-C  07/13/24 0023       Chemo Murry PA-C  07/13/24 0035       Narayan Jack DO  07/13/24 0513

## 2024-07-14 VITALS
OXYGEN SATURATION: 97 % | WEIGHT: 246.69 LBS | DIASTOLIC BLOOD PRESSURE: 59 MMHG | HEART RATE: 65 BPM | SYSTOLIC BLOOD PRESSURE: 127 MMHG | TEMPERATURE: 97.3 F | BODY MASS INDEX: 41.1 KG/M2 | RESPIRATION RATE: 20 BRPM | HEIGHT: 65 IN

## 2024-07-14 LAB
ALBUMIN SERPL BCP-MCNC: 3.3 G/DL (ref 3.4–5)
ALP SERPL-CCNC: 58 U/L (ref 33–136)
ALT SERPL W P-5'-P-CCNC: 24 U/L (ref 7–45)
ANION GAP SERPL CALC-SCNC: 9 MMOL/L (ref 10–20)
AST SERPL W P-5'-P-CCNC: 20 U/L (ref 9–39)
ATRIAL RATE: 102 BPM
BACTERIA UR CULT: NORMAL
BILIRUB SERPL-MCNC: 0.3 MG/DL (ref 0–1.2)
BUN SERPL-MCNC: 20 MG/DL (ref 6–23)
CALCIUM SERPL-MCNC: 9.2 MG/DL (ref 8.6–10.3)
CHLORIDE SERPL-SCNC: 105 MMOL/L (ref 98–107)
CO2 SERPL-SCNC: 28 MMOL/L (ref 21–32)
CREAT SERPL-MCNC: 1.16 MG/DL (ref 0.5–1.05)
EGFRCR SERPLBLD CKD-EPI 2021: 50 ML/MIN/1.73M*2
ERYTHROCYTE [DISTWIDTH] IN BLOOD BY AUTOMATED COUNT: 13.3 % (ref 11.5–14.5)
GLUCOSE BLD MANUAL STRIP-MCNC: 123 MG/DL (ref 74–99)
GLUCOSE BLD MANUAL STRIP-MCNC: 165 MG/DL (ref 74–99)
GLUCOSE BLD MANUAL STRIP-MCNC: 167 MG/DL (ref 74–99)
GLUCOSE BLD MANUAL STRIP-MCNC: 169 MG/DL (ref 74–99)
GLUCOSE SERPL-MCNC: 142 MG/DL (ref 74–99)
HCT VFR BLD AUTO: 37.7 % (ref 36–46)
HGB BLD-MCNC: 12.1 G/DL (ref 12–16)
MAGNESIUM SERPL-MCNC: 1.8 MG/DL (ref 1.6–2.4)
MCH RBC QN AUTO: 31.3 PG (ref 26–34)
MCHC RBC AUTO-ENTMCNC: 32.1 G/DL (ref 32–36)
MCV RBC AUTO: 98 FL (ref 80–100)
NRBC BLD-RTO: 0 /100 WBCS (ref 0–0)
P AXIS: 60 DEGREES
P OFFSET: 168 MS
P ONSET: 119 MS
PHOSPHATE SERPL-MCNC: 3 MG/DL (ref 2.5–4.9)
PLATELET # BLD AUTO: 242 X10*3/UL (ref 150–450)
POTASSIUM SERPL-SCNC: 4 MMOL/L (ref 3.5–5.3)
PR INTERVAL: 208 MS
PROT SERPL-MCNC: 6.2 G/DL (ref 6.4–8.2)
Q ONSET: 218 MS
Q ONSET: 223 MS
Q ONSET: 223 MS
QRS COUNT: 16 BEATS
QRS COUNT: 17 BEATS
QRS COUNT: 22 BEATS
QRS DURATION: 74 MS
QRS DURATION: 74 MS
QRS DURATION: 78 MS
QT INTERVAL: 310 MS
QT INTERVAL: 362 MS
QT INTERVAL: 364 MS
QTC CALCULATION(BAZETT): 454 MS
QTC CALCULATION(BAZETT): 465 MS
QTC CALCULATION(BAZETT): 474 MS
QTC FREDERICIA: 406 MS
QTC FREDERICIA: 421 MS
QTC FREDERICIA: 434 MS
R AXIS: -11 DEGREES
R AXIS: -20 DEGREES
R AXIS: -5 DEGREES
RBC # BLD AUTO: 3.86 X10*6/UL (ref 4–5.2)
SODIUM SERPL-SCNC: 138 MMOL/L (ref 136–145)
T AXIS: 150 DEGREES
T AXIS: 40 DEGREES
T AXIS: 51 DEGREES
T OFFSET: 378 MS
T OFFSET: 399 MS
T OFFSET: 405 MS
VENTRICULAR RATE: 102 BPM
VENTRICULAR RATE: 135 BPM
VENTRICULAR RATE: 95 BPM
WBC # BLD AUTO: 6.6 X10*3/UL (ref 4.4–11.3)

## 2024-07-14 PROCEDURE — 2500000001 HC RX 250 WO HCPCS SELF ADMINISTERED DRUGS (ALT 637 FOR MEDICARE OP)

## 2024-07-14 PROCEDURE — 83735 ASSAY OF MAGNESIUM: CPT

## 2024-07-14 PROCEDURE — 2500000002 HC RX 250 W HCPCS SELF ADMINISTERED DRUGS (ALT 637 FOR MEDICARE OP, ALT 636 FOR OP/ED): Performed by: INTERNAL MEDICINE

## 2024-07-14 PROCEDURE — 85027 COMPLETE CBC AUTOMATED: CPT

## 2024-07-14 PROCEDURE — 84075 ASSAY ALKALINE PHOSPHATASE: CPT

## 2024-07-14 PROCEDURE — 2060000001 HC INTERMEDIATE ICU ROOM DAILY

## 2024-07-14 PROCEDURE — 99233 SBSQ HOSP IP/OBS HIGH 50: CPT

## 2024-07-14 PROCEDURE — 82947 ASSAY GLUCOSE BLOOD QUANT: CPT

## 2024-07-14 PROCEDURE — 84100 ASSAY OF PHOSPHORUS: CPT

## 2024-07-14 PROCEDURE — 2500000002 HC RX 250 W HCPCS SELF ADMINISTERED DRUGS (ALT 637 FOR MEDICARE OP, ALT 636 FOR OP/ED)

## 2024-07-14 PROCEDURE — 2500000004 HC RX 250 GENERAL PHARMACY W/ HCPCS (ALT 636 FOR OP/ED)

## 2024-07-14 PROCEDURE — 36415 COLL VENOUS BLD VENIPUNCTURE: CPT

## 2024-07-14 RX ORDER — TRAZODONE HYDROCHLORIDE 50 MG/1
50 TABLET ORAL NIGHTLY PRN
Status: DISCONTINUED | OUTPATIENT
Start: 2024-07-14 | End: 2024-07-15 | Stop reason: HOSPADM

## 2024-07-14 RX ORDER — AMIODARONE HYDROCHLORIDE 200 MG/1
400 TABLET ORAL 2 TIMES DAILY
Status: DISCONTINUED | OUTPATIENT
Start: 2024-07-14 | End: 2024-07-15 | Stop reason: HOSPADM

## 2024-07-14 RX ADMIN — ACETAMINOPHEN 975 MG: 325 TABLET ORAL at 12:18

## 2024-07-14 RX ADMIN — PANTOPRAZOLE SODIUM 40 MG: 40 TABLET, DELAYED RELEASE ORAL at 06:16

## 2024-07-14 RX ADMIN — Medication 400 MG: at 08:04

## 2024-07-14 RX ADMIN — METOPROLOL SUCCINATE 100 MG: 50 TABLET, EXTENDED RELEASE ORAL at 20:03

## 2024-07-14 RX ADMIN — AMIODARONE HYDROCHLORIDE 400 MG: 200 TABLET ORAL at 20:03

## 2024-07-14 RX ADMIN — AMIODARONE HYDROCHLORIDE 0.5 MG/MIN: 1.8 INJECTION, SOLUTION INTRAVENOUS at 06:16

## 2024-07-14 RX ADMIN — APIXABAN 5 MG: 5 TABLET, FILM COATED ORAL at 08:04

## 2024-07-14 RX ADMIN — PANTOPRAZOLE SODIUM 40 MG: 40 TABLET, DELAYED RELEASE ORAL at 16:31

## 2024-07-14 RX ADMIN — TRAZODONE HYDROCHLORIDE 50 MG: 50 TABLET ORAL at 22:56

## 2024-07-14 RX ADMIN — METOPROLOL SUCCINATE 100 MG: 50 TABLET, EXTENDED RELEASE ORAL at 08:04

## 2024-07-14 RX ADMIN — INSULIN LISPRO 2 UNITS: 100 INJECTION, SOLUTION INTRAVENOUS; SUBCUTANEOUS at 08:04

## 2024-07-14 RX ADMIN — APIXABAN 5 MG: 5 TABLET, FILM COATED ORAL at 20:03

## 2024-07-14 RX ADMIN — INSULIN LISPRO 2 UNITS: 100 INJECTION, SOLUTION INTRAVENOUS; SUBCUTANEOUS at 11:19

## 2024-07-14 RX ADMIN — AMIODARONE HYDROCHLORIDE 400 MG: 200 TABLET ORAL at 10:16

## 2024-07-14 RX ADMIN — INSULIN LISPRO 2 UNITS: 100 INJECTION, SOLUTION INTRAVENOUS; SUBCUTANEOUS at 20:05

## 2024-07-14 ASSESSMENT — COGNITIVE AND FUNCTIONAL STATUS - GENERAL
MOVING TO AND FROM BED TO CHAIR: A LITTLE
WALKING IN HOSPITAL ROOM: A LITTLE
DAILY ACTIVITIY SCORE: 20
MOBILITY SCORE: 20
DRESSING REGULAR LOWER BODY CLOTHING: A LITTLE
STANDING UP FROM CHAIR USING ARMS: A LITTLE
CLIMB 3 TO 5 STEPS WITH RAILING: A LITTLE
MOBILITY SCORE: 19
TURNING FROM BACK TO SIDE WHILE IN FLAT BAD: A LITTLE
TOILETING: A LITTLE
MOVING TO AND FROM BED TO CHAIR: A LITTLE
DRESSING REGULAR UPPER BODY CLOTHING: A LITTLE
HELP NEEDED FOR BATHING: A LITTLE
DAILY ACTIVITIY SCORE: 21
WALKING IN HOSPITAL ROOM: A LITTLE
DRESSING REGULAR LOWER BODY CLOTHING: A LITTLE
HELP NEEDED FOR BATHING: A LITTLE
STANDING UP FROM CHAIR USING ARMS: A LITTLE
CLIMB 3 TO 5 STEPS WITH RAILING: A LITTLE
TOILETING: A LITTLE

## 2024-07-14 ASSESSMENT — PAIN DESCRIPTION - LOCATION: LOCATION: BACK

## 2024-07-14 ASSESSMENT — PAIN - FUNCTIONAL ASSESSMENT: PAIN_FUNCTIONAL_ASSESSMENT: 0-10

## 2024-07-14 ASSESSMENT — PAIN SCALES - GENERAL
PAINLEVEL_OUTOF10: 0 - NO PAIN
PAINLEVEL_OUTOF10: 3
PAINLEVEL_OUTOF10: 0 - NO PAIN
PAINLEVEL_OUTOF10: 0 - NO PAIN

## 2024-07-14 ASSESSMENT — PAIN DESCRIPTION - ORIENTATION: ORIENTATION: MID

## 2024-07-14 NOTE — PROGRESS NOTES
Subjective Data:  No chest pain or dyspnea or palpitations    Comprehensive 10-point review of systems negative otherwise as noted above in HPI      Overnight Events:    NSR maintained     Objective Data:  Last Recorded Vitals:  Vitals:    07/13/24 2049 07/14/24 0509 07/14/24 0804 07/14/24 1016   BP: 136/72 118/62 126/59 108/54   BP Location: Left arm Left arm     Patient Position: Lying Lying     Pulse: 65 59 65 66   Resp: 15 12  17   Temp: 36.3 °C (97.3 °F) 35.9 °C (96.6 °F)  35.8 °C (96.4 °F)   TempSrc: Temporal Temporal     SpO2: 98% 98%  97%   Weight:       Height:           Last Labs:  CBC - 7/14/2024:  5:19 AM  6.6 12.1 242    37.7      CMP - 7/14/2024:  5:19 AM  9.2 6.2 20 --- 0.3   3.0 3.3 24 58      PTT - 7/8/2024:  9:41 PM  1.3   14.6 30     TROPHS   Date/Time Value Ref Range Status   07/13/2024 05:22  0 - 13 ng/L Final     Comment:     Previous result verified on 7/13/2024 0133 on specimen/case [id number] called with component [employer] for procedure Troponin I, High Sensitivity, Initial with value 197 ng/L.   07/13/2024 01:03  0 - 13 ng/L Final   07/11/2024 07:57  0 - 13 ng/L Final     Comment:     Previous result verified on 7/11/2024 0811 on specimen/case [id number] called with component TRPHS for procedure Troponin I, High Sensitivity, Initial with value 500 ng/L.     BNP   Date/Time Value Ref Range Status   07/08/2024 09:41  0 - 99 pg/mL Final   12/26/2023 03:00  0 - 99 pg/mL Final     HGBA1C   Date/Time Value Ref Range Status   07/09/2024 05:26 AM 7.4 see below % Final   04/11/2024 11:05 AM 7.7 4.2 - 6.5 % Final   03/28/2024 03:01 PM 8.3 see below % Final   09/22/2023 12:03 PM 8.0 4.2 - 6.5 % Final     VLDL   Date/Time Value Ref Range Status   09/22/2023 11:23 AM 18 0 - 40 mg/dL Final   03/02/2022 10:08 AM 32 0 - 40 mg/dL Final   03/22/2021 09:31 AM 27 0 - 40 mg/dL Final      Last I/O:  I/O last 3 completed shifts:  In: 1263 (11.3 mL/kg) [P.O.:840; I.V.:423 (3.8  mL/kg)]  Out: 0 (0 mL/kg)   Weight: 111.9 kg   Echo:  Transthoracic Echo (TTE) Complete 07/11/2024      Transthoracic Echo (TTE) Complete 03/29/2024    Ejection Fractions:  EF   Date/Time Value Ref Range Status   07/11/2024 03:23 PM 70 %    03/29/2024 11:05 AM 68 %      Inpatient Medications:  Scheduled medications   Medication Dose Route Frequency    amiodarone  400 mg oral BID    apixaban  5 mg oral BID    [Held by provider] hydroCHLOROthiazide  25 mg oral q AM    insulin lispro  0-10 Units subcutaneous Before meals & nightly    [Held by provider] lisinopril  20 mg oral BID    magnesium oxide  400 mg oral Daily    metoprolol succinate XL  100 mg oral BID    pantoprazole  40 mg oral BID AC     PRN medications   Medication    melatonin    And    acetaminophen    And    sennosides-docusate sodium    dextrose    dextrose    glucagon    glucagon     Continuous Medications   Medication Dose Last Rate    amiodarone  0.5-1 mg/min 0.5 mg/min (07/14/24 0616)       Physical Exam:  Constitutional: Well developed, awake/alert/oriented x3, no distress, alert and cooperative  Eyes: PERRL, EOMI, clear sclera  ENMT: mucous membranes moist, no apparent injury, no lesions seen  Head/Neck: Neck supple, no apparent injury, thyroid without mass or tenderness, No JVD, trachea midline, no bruits  Respiratory/Thorax: Patent airways, CTAB, normal breath sounds with good chest expansion, thorax symmetric  Cardiovascular: Regular, rate and rhythm, no murmurs, 2+ equal pulses of the extremities, normal S 1and S 2  Gastrointestinal: Nondistended, soft, non-tender, no rebound tenderness or guarding, no masses palpable, no organomegaly, +BS, no bruits  Musculoskeletal: ROM intact, no joint swelling, normal strength  Extremities: normal extremities, no cyanosis edema, contusions or wounds, no clubbing  Neurological: alert and oriented x3, intact senses, motor, response and reflexes, normal strength  Lymphatic: No significant  lymphadenopathy  Psychological: Appropriate mood and behavior  Skin: Warm and dry, no lesions, no rashes       Assessment/Plan   Paroxysmal atrial fibrillation with RVR  -s/p ablation 7-8-24 with Dr. Sommers  -EKG reviewed, atrial flutter, , repeat EKG showed NSR  -Started on amiodarone gtt 7/13 @ 0015  - Continue gtt until tomorrow, then switch to amiodarone 400 mg BID x 3 days, then 400 mg x 14 days, then 200 mg daily.  -Had been on amiodarone in the past but had diarrhea and changed to Multaq, willing to try amiodarone again  -Cont eliquis  -Cont Toprol XL 100mg BID  -Repeat echo unchanged, trivial pericardial effusion  -Monitor on tele  -If unable to tolerate Amiodarone will admit for Dofetilide load     2. Chest Pain/ Elevated troponin-Non MI elevation 2/2 AF RVR and recent ablation  -Does report CP and SOB   -Troponin 500->455 ->197 ->161  -CXR negative  -I reviewed the EKG, no acute changes, ST elevation, or depression  -Stress test in Jan 2024 negative for ischemia  -Cont metoprolol  -Consider ischemic work up, may need C to rule out cardiovascular ischemia for angina in absence of rapid AF     3. Hypertension  -stable  -2gm na diet  -cont meds  -monitor     4. Diabetes Mellitus type 2  -ISS  -Accuchecks  -hgb A1C 7.4%      Peripheral IV 07/13/24 20 G Left;Anterior Forearm (Active)   Site Assessment Clean;Dry;Intact 07/14/24 0800   Dressing Type Transparent 07/14/24 0800   Line Status Flushed;Infusing 07/14/24 0800   Dressing Status Clean;Dry;Occlusive 07/14/24 0800   Number of days: 1       Peripheral IV 07/13/24 20 G Left;Upper Arm (Active)   Site Assessment Clean;Dry;Intact 07/14/24 0800   Dressing Type Transparent 07/14/24 0800   Line Status Flushed 07/14/24 0800   Dressing Status Clean;Dry;Occlusive 07/14/24 0800   Number of days: 1       Code Status:  Full Code    I spent 30 minutes in the professional and overall care of this patient.        Michael Lozano MD

## 2024-07-14 NOTE — PROGRESS NOTES
Internal Medicine - Daily Progress Note   Hospital Day: 3       Name:Debbie Rees, AGE: 74 y.o., GENDER: female, MRN: [id number], ROOM: 237/[facility]   CODE STATUS: Full Code  Attending Physician: Lm Garcia MD  Resident: Mt Arvizu DO        Chief Complaint     Chief Complaint   Patient presents with    Chest Pain        Subjective    Debbie Rees is a 74 y.o. year old female patient on Hospital Day: 3 PMH of HCM, HTN, PVD s/p EVLA, OA, T2DM, and paroxysmal A-fib s/p ablation 7/8/24 at [facility] [place] who presented to [facility] with c/o recurrent symptomatic palpitations associated with SOB, dizziness, nausea, and chest pain radiating into her back that was resolved after starting Amio drip in the ED.  Patient has been receiving amiodarone drip, metoprolol  mg BID, and Eliquis. She continues to report improvement of symptoms and currently endorses no dyspnea, chest pain, or palpitations. Her vitals have stabilized with HR in the 60s    Overnight events:  NAOE    Meds    Scheduled medications  amiodarone, 400 mg, oral, BID  apixaban, 5 mg, oral, BID  [Held by provider] hydroCHLOROthiazide, 25 mg, oral, q AM  insulin lispro, 0-10 Units, subcutaneous, Before meals & nightly  [Held by provider] lisinopril, 20 mg, oral, BID  magnesium oxide, 400 mg, oral, Daily  metoprolol succinate XL, 100 mg, oral, BID  pantoprazole, 40 mg, oral, BID AC      Continuous medications  amiodarone, 0.5-1 mg/min, Last Rate: 0.5 mg/min (07/14/24 0616)      PRN medications  PRN medications: melatonin **AND** acetaminophen **AND** sennosides-docusate sodium, dextrose, dextrose, glucagon, glucagon     Objective    Physical Exam  Constitutional:       General: She is not in acute distress.     Appearance: Normal appearance. She is not ill-appearing.   HENT:      Head: Normocephalic.      Right Ear: External ear normal.      Left Ear: External ear normal.   Eyes:      Extraocular Movements: Extraocular movements intact.       "Conjunctiva/sclera: Conjunctivae normal.   Cardiovascular:      Rate and Rhythm: Normal rate and regular rhythm.      Pulses: Normal pulses.      Heart sounds: Normal heart sounds. No murmur heard.     No friction rub.   Pulmonary:      Effort: Pulmonary effort is normal. No respiratory distress.      Breath sounds: Normal breath sounds. No stridor. No wheezing or rhonchi.   Abdominal:      General: Abdomen is flat. There is no distension.      Palpations: Abdomen is soft.      Tenderness: There is no abdominal tenderness.   Musculoskeletal:         General: No swelling or tenderness. Normal range of motion.      Cervical back: Normal range of motion and neck supple. No rigidity or tenderness.   Skin:     General: Skin is warm.   Neurological:      General: No focal deficit present.      Mental Status: She is alert and oriented to person, place, and time. Mental status is at baseline.      Cranial Nerves: No cranial nerve deficit.      Sensory: No sensory deficit.   Psychiatric:         Mood and Affect: Mood normal.        Visit Vitals  /54   Pulse 66   Temp 35.8 °C (96.4 °F)   Resp 17   Ht 1.651 m (5' 5\")   Wt 112 kg (246 lb 11.1 oz)   SpO2 97%   BMI 41.05 kg/m²   Smoking Status Never   BSA 2.27 m²        Intake/Output Summary (Last 24 hours) at 7/14/2024 1229  Last data filed at 7/14/2024 0945  Gross per 24 hour   Intake 722.8 ml   Output 0 ml   Net 722.8 ml       Labs:   Results from last 72 hours   Lab Units 07/14/24  0519 07/13/24  0522 07/13/24  0103 07/12/24  0533   SODIUM mmol/L 138 136 134* 138   POTASSIUM mmol/L 4.0 4.2 4.2 3.8   CHLORIDE mmol/L 105 103 101 106   CO2 mmol/L 28 25 23 25   BUN mg/dL 20 27* 30* 21   CREATININE mg/dL 1.16* 1.29* 1.51* 1.02   GLUCOSE mg/dL 142* 151* 182* 133*   CALCIUM mg/dL 9.2 9.2 9.8 9.1   ANION GAP mmol/L 9* 12 14 11   EGFR mL/min/1.73m*2 50* 44* 36* 58*   PHOSPHORUS mg/dL 3.0 2.8  --  2.7      Results from last 72 hours   Lab Units 07/14/24  0519 07/13/24  0522 " "07/13/24  0103   WBC AUTO x10*3/uL 6.6 9.9 13.1*   HEMOGLOBIN g/dL 12.1 11.9* 12.9   HEMATOCRIT % 37.7 36.5 39.8   PLATELETS AUTO x10*3/uL 242 246 300   NEUTROS PCT AUTO %  --   --  62.6   LYMPHS PCT AUTO %  --   --  28.9   MONOS PCT AUTO %  --   --  6.2   EOS PCT AUTO %  --   --  1.5      Lab Results   Component Value Date    CALCIUM 9.2 07/14/2024    PHOS 3.0 07/14/2024      Lab Results   Component Value Date    CRP 0.51 12/12/2023       [unfilled]     Micro/ID:   No results found for the last 90 days.                   No lab exists for component: \"AGALPCRNB\"     Lab Results   Component Value Date    URINECULTURE No significant growth 07/13/2024    BLOODCULT No growth at 4 days -  FINAL REPORT 12/12/2023    BLOODCULT No growth at 4 days -  FINAL REPORT 12/12/2023       Images    ECG 12 lead    Result Date: 7/14/2024  Atrial fibrillation with rapid ventricular response Marked ST abnormality, possible lateral subendocardial injury Abnormal ECG When compared with ECG of 08-JUL-2024 21:51, (unconfirmed) Atrial fibrillation has replaced Sinus rhythm ST now depressed in Lateral leads T wave inversion now evident in Lateral leads See ED provider note for full interpretation and clinical correlation Confirmed by Larissa Caicedo ([id number]) on 7/14/2024 12:15:19 PM    ECG 12 lead    Result Date: 7/14/2024  Sinus tachycardia Otherwise normal ECG When compared with ECG of 08-JUL-2024 20:16, (unconfirmed) Sinus rhythm has replaced Atrial fibrillation See ED provider note for full interpretation and clinical correlation Confirmed by Larissa Caicedo ([id number]) on 7/14/2024 12:09:05 PM    ECG 12 lead    Result Date: 7/14/2024  Atrial fibrillation Cannot rule out Anterior infarct (cited on or before 29-APR-2024) Abnormal ECG When compared with ECG of 29-APR-2024 07:33, Atrial fibrillation has replaced Sinus rhythm Questionable change in initial forces of Septal leads See ED provider note for full interpretation and " clinical correlation Confirmed by Larissa Caicedo ([id number]) on 7/14/2024 12:04:09 PM    XR chest 1 view    Result Date: 7/13/2024  Interpreted By:  Jose Bustillo, STUDY: XR CHEST 1 VIEW;  7/13/2024 12:39 am   INDICATION: Signs/Symptoms:chest pain.   COMPARISON: 7/11/2024   ACCESSION NUMBER(S): [id number]   ORDERING CLINICIAN: LEV AYALA   FINDINGS: There is stable cardiomegaly. No focal airspace consolidation or pleural effusion. No pneumothorax.       No airspace consolidation or pleural effusion.   MACRO: None   Signed by: Jose Bustillo 7/13/2024 1:13 AM Dictation workstation:   [id number]    Transthoracic Echo (TTE) Complete    Result Date: 7/11/2024   [facility], [address] [place] [address]               Tel [phone] and Fax [phone] TRANSTHORACIC ECHOCARDIOGRAM REPORT  Patient Name:      VIVIAN JACOBSEN JUDY      Reading Physician:    [id number] Michael Lozano MD Study Date:        7/11/2024            Ordering Provider:    [id number] RANULFO MELGOZA MRN/PID:           [id number]             Fellow: Accession#:        [id number]         Nurse:                Nina Dickerson RN Date of Birth/Age: 1950 / 74 years  Sonographer:          Dina Rojas RDCS Gender:            F                    Additional Staff: Height:            167.64 cm            Admit Date: Weight:            112.04 kg            Admission Status:     Outpatient BSA / BMI:         2.19 m2 / 39.87      Encounter#:           [id number]                    kg/m2 Blood Pressure:    97/65 mmHg           Department Location:  [facility] Non                                                               Invasive Study Type:    TRANSTHORACIC ECHO (TTE) COMPLETE Diagnosis/ICD: Unspecified atrial fibrillation-I48.91 Indication:     Atrial Fibrillation CPT Code:      Echo Complete w Full Doppler-45063 Patient History: Diabetes:          Yes Pertinent History: HTN, HCM, Chest Pain and A-Fib. S/P cardiac ablation                    (7/8/23). Study Detail: The following Echo studies were performed: 2D, M-Mode, Doppler and               color flow. Technically challenging study due to prominent lung               artifact. Definity used as a contrast agent for endocardial border               definition. Total contrast used for this procedure was 1.0 mL via               IV push. The patient was awake.  PHYSICIAN INTERPRETATION: Left Ventricle: The left ventricular systolic function is normal, with a visually estimated ejection fraction of 70%. There are no regional wall motion abnormalities. The left ventricular cavity size is decreased. The left ventricular septal wall thickness is severely increased. There is moderately increased left ventricular posterior wall thickness. There is moderate to severe concentric left ventricular hypertrophy involving the septal wall. Findings are consistent with hypertrophic cardiomyopathy. Spectral Doppler shows an impaired relaxation pattern of left ventricular diastolic filling. There is a mild mid cavity left ventricular obstruction. The resting gradient is 25 mmHg.The provoked gradient is 33 mmHg. Left Atrium: The left atrium is moderately dilated. Right Ventricle: The right ventricle is normal in size. There is normal right ventricular global systolic function. Right Atrium: The right atrium is mild to moderately dilated. Aortic Valve: The aortic valve is trileaflet. There is mild aortic valve cusp calcification. The aortic valve dimensionless index is 0.89. There is no evidence of aortic valve regurgitation. The peak instantaneous gradient of the aortic valve is 10.6 mmHg. The mean gradient of the aortic valve is 7.0 mmHg. Mitral Valve: The mitral valve is normal in structure. There is moderate mitral  annular calcification. There is mild to moderate mitral valve regurgitation. Tricuspid Valve: The tricuspid valve is structurally normal. There is mild tricuspid regurgitation. Pulmonic Valve: The pulmonic valve is structurally normal. There is physiologic pulmonic valve regurgitation. Pericardium: There is a trivial pericardial effusion. Aorta: The aortic root is normal. Pulmonary Artery: The tricuspid regurgitant velocity is 2.82 m/s, and with an estimated right atrial pressure of 3 mmHg, the estimated pulmonary artery pressure is mildly elevated with the RVSP at 34.8 mmHg. Pulmonary Veins: The pulmonary veins appear normal and return normally to the left atrium. Systemic Veins: The inferior vena cava appears to be of normal size.  CONCLUSIONS:  1. The left ventricular systolic function is normal, with a visually estimated ejection fraction of 70%.  2. Spectral Doppler shows an impaired relaxation pattern of left ventricular diastolic filling.  3. Left ventricular cavity size is decreased.  4. Severely increased left ventricular septal thickness.  5. The left ventricular posterior wall thickness is moderately increased.  6. There is hypertrophic cardiomyopathy.  7. There is moderate to severe concentric left ventricular hypertrophy.  8. There is a mild mid cavity left ventricular obstruction.  9. There is normal right ventricular global systolic function. 10. The left atrium is moderately dilated. 11. The right atrium is mild to moderately dilated. 12. There is moderate mitral annular calcification. 13. Mild to moderate mitral valve regurgitation. 14. Mild pulmonary HTN. Estimated CVP is normal. QUANTITATIVE DATA SUMMARY: 2D MEASUREMENTS:                          Normal Ranges: Ao Root d:     3.10 cm   (2.0-3.7cm) IVSd:          1.80 cm   (0.6-1.1cm) LVPWd:         1.45 cm   (0.6-1.1cm) LVIDd:         3.32 cm   (3.9-5.9cm) LVIDs:         2.47 cm LV Mass Index: 94.2 g/m2 LV % FS        25.6 % LA VOLUME:                                Normal Ranges: LA Vol A4C:        65.6 ml    (22+/-6mL/m2) LA Vol A2C:        70.9 ml LA Vol BP:         70.6 ml LA Vol Index A4C:  30.0ml/m2 LA Vol Index A2C:  32.4 ml/m2 LA Vol Index BP:   32.3 ml/m2 LA Area A4C:       23.4 cm2 LA Area A2C:       23.5 cm2 LA Major Axis A4C: 7.1 cm LA Major Axis A2C: 6.6 cm LA Volume Index:   30.3 ml/m2 LA Vol A4C:        61.7 ml LA Vol A2C:        66.5 ml RA VOLUME BY A/L METHOD:                       Normal Ranges: RA Area A4C: 20.8 cm2 M-MODE MEASUREMENTS:                  Normal Ranges: Ao Root: 3.00 cm (2.0-3.7cm) AoV Exc: 2.00 cm (1.5-2.5cm) LAs:     4.90 cm (2.7-4.0cm) AORTA MEASUREMENTS:                      Normal Ranges: AoV Exc:     2.00 cm (1.5-2.5cm) Ao Sinus, d: 3.10 cm (2.1-3.5cm) Asc Ao, d:   3.30 cm (2.1-3.4cm) LV SYSTOLIC FUNCTION BY 2D PLANIMETRY (MOD):                      Normal Ranges: EF-A4C View:    61 % (>=55%) EF-A2C View:    62 % EF-Biplane:     63 % EF-Visual:      70 % LV EF Reported: 70 % LV DIASTOLIC FUNCTION:                            Normal Ranges: MV Peak E:      0.78 m/s   (0.7-1.2 m/s) MV Peak A:      0.74 m/s   (0.42-0.7 m/s) E/A Ratio:      1.07       (1.0-2.2) MV e'           0.071 m/s  (>8.0) MV lateral e'   0.07 m/s MV medial e'    0.07 m/s E/e' Ratio:     11.07      (<8.0) PulmV Sys Salazar:  39.40 cm/s PulmV Guillen Salazar: 31.10 cm/s PulmV S/D Salazar:  1.00 MITRAL VALVE:                 Normal Ranges: MV DT: 184 msec (150-240msec) AORTIC VALVE:                                    Normal Ranges: AoV Vmax:                1.63 m/s  (<=1.7m/s) AoV Peak PG:             10.6 mmHg (<20mmHg) AoV Mean P.0 mmHg  (1.7-11.5mmHg) LVOT Max Salazar:            1.36 m/s  (<=1.1m/s) AoV VTI:                 34.40 cm  (18-25cm) LVOT VTI:                30.60 cm LVOT Diameter:           2.00 cm   (1.8-2.4cm) AoV Area, VTI:           2.79 cm2  (2.5-5.5cm2) AoV Area,Vmax:           2.62 cm2  (2.5-4.5cm2) AoV Dimensionless Index: 0.89  RIGHT  VENTRICLE: RV Basal 2.67 cm RV Mid   2.16 cm RV Major 6.5 cm TAPSE:   14.6 mm RV s'    0.17 m/s TRICUSPID VALVE/RVSP:                             Normal Ranges: Peak TR Velocity: 2.82 m/s RV Syst Pressure: 34.8 mmHg (< 30mmHg) IVC Diam:         1.82 cm PULMONIC VALVE:                      Normal Ranges: PV Max Salazar: 1.2 m/s  (0.6-0.9m/s) PV Max P.6 mmHg Pulmonary Veins: PulmV Guillen Salazar: 31.10 cm/s PulmV S/D Salazar:  1.00 PulmV Sys Salazar:  39.40 cm/s  [id number] Michael Lozano MD Electronically signed on 2024 at 8:45:01 PM  ** Final **     XR chest 1 view    Result Date: 2024  Interpreted By:  Bj Erwin, STUDY: XR CHEST 1 VIEW;  2024 10:12 am   INDICATION: Signs/Symptoms:tachycardia; SOB.   COMPARISON: 2024   ACCESSION NUMBER(S): [id number]   ORDERING CLINICIAN: SUJATHA HUGHES   FINDINGS: Calcified right paratracheal lymph nodes stable.   Widening of the mediastinum is likely related to the portable projection of the film.   CARDIOMEDIASTINAL SILHOUETTE: Stable. Prominent pericardial fat pad.   LUNGS: Lungs are clear.   ABDOMEN: No remarkable upper abdominal findings.   BONES: No acute osseous changes.       1.  No radiographic change.       MACRO: None   Signed by: Bj Erwin 2024 11:08 AM Dictation workstation:   [id number]    ECG 12 lead    Result Date: 2024  Atrial fibrillation with rapid ventricular response Nonspecific ST and T wave abnormality Abnormal ECG When compared with ECG of 2024 07:56, (unconfirmed) Atrial fibrillation has replaced Sinus rhythm Vent. rate has increased BY  58 BPM Non-specific change in ST segment in Inferior leads T wave inversion now evident in Lateral leads    ECG 12 lead    Result Date: 2024  Supraventricular tachycardia Nonspecific ST abnormality Abnormal ECG When compared with ECG of 2024 23:17, (unconfirmed) Sinus rhythm has replaced Atrial fibrillation    ECG 12 lead    Result Date: 2024  Sinus rhythm with  marked sinus arrhythmia with 1st degree AV block Otherwise normal ECG When compared with ECG of 11-JUL-2024 06:53, (unconfirmed) PA interval has increased Vent. rate has decreased BY  66 BPM    Electrophysiology procedure    Result Date: 7/9/2024  Images from the original result were not included. Atrial Fibrillation ablation Procedures Atrial Fibrillation ablation (24068), LA Pacing and recording (98784), 3D Mapping (73249), Intracardiac Echocardiogram (67649), Transseptal Catheterization (62164), Ultrasound Guided vascular access (48551),Additional Afib Ablation (82203) 3D Mapping (11007) Transseptal Catheterization (50250) Ultrasound Guided vascular access (66933) Atrial Fibrillation ablation (26835) Patient history: Please refer to the detailed history and physical on the patient's medical chart. Procedure narrative: The patient was in the fasting state. A baseline ECG was recorded and showed Sinus rhythm. The patient was set up for continuous monitoring of surface 12 lead ECG and pulse oximetry. Blood pressure was monitored with automatic cuff measurements. Bilateral groins were clipped, prepped with chlorhexidine, and draped in the usual sterile fashion. The procedure was performed under general anesthesia (administered and monitored by anesthesia attending and CRNA). Anesthesia sedated and intubated the patient without any acute complications.  Radial arterial line was placed for continuous hemodynamic monitoring. The right femoral vein was accessed x 3 using the modified Seldinger technique. 3 sheaths were inserted. The right common femoral vein was evaluated with ultrasound, it was normal in size and anatomy.  The vein was accessed using ultrasound guidance and a 18G Cook needle, with direct visualization of the needle at all times. Over the guidewire an 8F, 8.5F and 8.5F sheaths were inserted into the right femoral vein.   Through right femoral venous access a Octapolar catheter was introduced and placed  into the distal coronary sinus. The right femoral 8.5F vein access was then switched to a deflectable sheath Medium Grand Marsh deflectable sheath Under fluoroscopic guidance a intracardiac echocardiogram catheter was introduced into the right atrium.  The right atrium, left atrium, left atrial appendage, RV were evaluated.  No obvious structural abnormalities were noted.  Initial imaging revealed No pericardial effusion.  15,000 units of heparin were given and a drip at 1,500 units/hr was initiated.  ACT was monitored and recorded at regular intervals throughout the case. Please see case log for ACT details.  Transseptal catheterization: Under fluoroscopic and intracardiac echo guidance Grand Marsh needle was introduced via the Medium Grand Marsh deflectable sheath and  single transseptal catheterization was performed. The successful single-septostomy site was dilated to allow the sheath to be placed into the left atrium.   The deflectable sheath was connected to a pressurized bag of heparinized saline with slow continuous infusion. 3D mapping: A 3D shell using the 3D electroanatomic mapping was made with the mapping catheter ([employer]).  Areas of fractionated signals, low voltage and Anatomy/OS of pulmonary veins were marked. There were low voltages (scar) at the anterior wall. Posterior wall was normal. The voltage was assessed in sinus rhythm. At this point the deflectable sheath was brought back into the left atrium and the high-definition mapping catheter was removed.  The ablation catheter ST/SF RF was then introduced via the sheath into the left atrium.  Ablation: During ablation esophageal temperature monitoring was utilized throughout the procedure. No significant change in esophageal temperature was noted. For majority of the lesions high power short duration lesions were performed with power of 30-40W and 10-15s lesions along posterior wall and roof, and 10-20s lesions along the anterior wall. Lesions were tagged using  Impedance change and target impedance drop for a abation lesion was atleast 10ohms. Distal local EGMs were also noted to be attenuated after each lesion application. First pass isolation of PVs: Yes Entrance and exit block was confirmed by pacing from CS catheter and ablation catheter within the veins. Catheter and sheath were pulled back to the right atrium. RFA Atrial Fibrillation lesions performed: Left Pulmonary Veins: WACA with Amira line Right Pulmonary Veins: WACA with Amira line Roof Line: Olga's bundle was isolated with an ablation line from the middle of the first anterior line up to the roof. Posterior Wall Isolation: Not indicated. Anterior Mitral Line: Scar at the anterior wall was isolated with several anterior lines. The first anterior line started at the right superior pulmonary vein and extended down to the anterior mitral valve annulus. The second anterior line started at the left superior pulmonary vein and extended down to the anterior mitral valve annulus.  Between the abovementioned two anterior lines, additional ablation lines were performed to homogenize anterior wall scar tissue. Pacing confirmed exit block and complete isolation of the anterior wall. Lateral Mitral Line: Not indicated. At this time all catheters were pulled back into the IVC. 30 mg protamine was given.  ACT was maintained with a combination of heparin boluses +/- infusion. End-of-case: We made a final evaluation for pericardial effusion using the intracardiac echocardiogram catheter.  No pericardial effusion was noted at the end of the procedure, which was noted to be unchanged from initial imaging. Sheaths were removed and hemostasis was obtained at the right femoral venous access sites with Vascade MVP. 10-15 mins of manual compression was applied to maintain hemostasis. At the end of the case pt was successfully extubated. Pt was able to move all 4 extremities spontaneously and follow commands. Pt was moved to  PACU/holding for recovery. Summary: Acutely successful radiofrequency ablation for Atrial fibrillation with PVI and adjunctive lines. Recommendation: Tentatively patient will be discharged Today, following bed rest and subsequent ambulation, provided the recovery parameters are appropriate. Resume AC Apixaban 5mg BID. Please DO NOT hold blood thinner unless emergency without asking your doctor. Bedrest for 3 hours post sheath removal Start Protonix 40mg BID x30 days Resume rest of home medications Patient Instructions: No driving, alcohol or making legal decisions for 24 hours. Remove band-aid/tegaderm in 1 day. No heavy lifting, strenuous exercise for 1 week Please call our office if you notice any groin discharge, swelling or fever. For cardiology electrophysiology emergencies (such as severe heart racing, bleeding, severe groin pain/swelling, high fever, wound discharge, stroke symptoms, or recent severe chest pain), please seek immediate medical attention. See complete procedural log and parameters.     CT angio chest for pulmonary embolism    Result Date: 7/9/2024  Interpreted By:  Jose Bustillo, STUDY: CT ANGIO CHEST FOR PULMONARY EMBOLISM;  7/8/2024 11:59 pm   INDICATION: Signs/Symptoms:ablation today off eliquis hx dvt.   COMPARISON: None.   ACCESSION NUMBER(S): [id number]   ORDERING CLINICIAN: ROBERTA CARTER   TECHNIQUE: Contiguous axial images of the chest were obtained after the intravenous administration of  contrast. Coronal and sagittal reformatted images were obtained from the axial images.  In addition, dual energy reconstructions were also performed including low energy mono-energetic images and iodine density maps.   FINDINGS: The examination is limited secondary to patient body habitus and motion.   No axillary, mediastinal, or hilar lymphadenopathy.   The heart is normal in size. Coronary artery atherosclerotic calcifications. No significant pericardial effusion. No evidence of acute central,  main, lobar or proximal segmental pulmonary embolism. Evaluation for more distal emboli is limited secondary to patient respiratory motion and discussed limitations of the examination.   Mild bilateral facet atelectasis. No significant pleural effusion. No pneumothorax.   Limited evaluation of the upper abdomen. Hepatic steatosis and postsurgical change of cholecystectomy.   Fatty atrophy of the pancreas.   Multilevel degenerative change of the thoracic spine.       No evidence of acute pulmonary embolism.   No evidence of pneumonia.   MACRO: None   Signed by: Jose Bustillo 7/9/2024 1:10 AM Dictation workstation:   [id number]    XR chest 1 view    Result Date: 7/8/2024  Interpreted By:  Roberto Lopez, STUDY: XR CHEST 1 VIEW;  7/8/2024 10:00 pm   INDICATION: Signs/Symptoms:Chest Pain.   COMPARISON: CXR 03/28/2024   ACCESSION NUMBER(S): [id number]   ORDERING CLINICIAN: ROBERTA CARTER   FINDINGS:     CARDIOMEDIASTINAL SILHOUETTE: Cardiomediastinal silhouette is normal in size and configuration.   LUNGS: No pulmonary consolidation, pleural effusion or pneumothorax.   ABDOMEN: No remarkable upper abdominal findings.   BONES: No acute osseous abnormality.       No acute cardiopulmonary process.   MACRO: None   Signed by: Roberto Lopez 7/8/2024 10:55 PM Dictation workstation:   [id number]      Assessment and Plan    Debbie Rees is a 74 y.o. female admitted on 7/12/2024 PMH of HCM, HTN, PVD s/p EVLA, OA, T2DM, and paroxysmal A-fib s/p ablation 7/8/24 at [facility] [place] who presented to [facility] with c/o recurrent symptomatic palpitations associated with SOB, dizziness, nausea, and chest pain radiating into her back that was resolved after starting Amio drip in the ED.        ACUTE MEDICAL ISSUES:  #Recurrent A-fib RVR s/p ablation 07/08/2024  #Elevated troponin - likely demand ischemia:  #Leukocytosis - likely reactional:  - s/p ablation on 7/8/24  - pt was recently discharged from [facility] with new PRN Metoprolol and PRN  Digoxin  - ECHO 7/11/24 showed EF 70% with severely increased left ventricular septal thickness  - EKG on presentation showed , A-flutter with 2:1 AV conduction with marked ST abnormality, QTc 450  - elevated trop 197, 161 Downtrending  - in the ED, started Amio bolus + drip  PLAN:  - Had been on amiodarone in the past but had diarrhea and changed to Multaq, willing to try amiodarone again   -cardiology consulted, Dr. Rodriguez evaluated, recommended: Continue gtt until tomorrow at 1mg/min for 6 hours then 0.5 mg/min, initiated on amiodarone 400 mg BID x 3 days, then 400 mg x 14 days, then 200 mg daily, cont eliquis, cont Toprol XL 100mg BID, If unable to tolerate Amiodarone will admit for Dofetilide load, appreciate reccs  -HOLDING newly prescribed PRN Metoprolol and PRN Digoxin  -Monitor on telemetry     #JOSE:   - likely pre-renal in the setting of a-fib rvr   - Cr decreased from 1.29--> 1.16  - continue monitoring      CHRONIC MEDICAL ISSUES:  HTN- Holding hydrochlorothiazide and lisinopril in the setting of JOSE, Continue to monitor BP  #T2DM - continue home sliding scale inpatient, HOLDING Ozempic       Fluids: None  Electrolytes: Replete prn  Nutrition: Adult Regular  Antimicrobials: None  DVT ppx: Home Eliquis  GI ppx: Protonix  Lines: PIV L anterior forearm and upper arm  Supplemental Oxygen: RA    Emergency Contact: Extended Emergency Contact Information  Primary Emergency Contact: VIRA KIM  Address: [address] [place] [address] [facility]  Home Phone: [phone]  Mobile Phone: [phone]  Relation: Spouse  Secondary Emergency Contact: VITALIY FERNANDEZ  Address: [address] [place] [address] [facility]  Home Phone: [phone]  Mobile Phone: [phone]  Relation: Daughter   needed? No   Code: Full Code     Disposition: Pending Cardio Clearance      Mt Arvizu DO  Internal Medicine, PGY- 1  07/14/24 at  12:29 PM

## 2024-07-15 ENCOUNTER — APPOINTMENT (OUTPATIENT)
Dept: CARDIOLOGY | Facility: CLINIC | Age: 74
End: 2024-07-15
Payer: MEDICARE

## 2024-07-15 ENCOUNTER — PHARMACY VISIT (OUTPATIENT)
Dept: PHARMACY | Facility: CLINIC | Age: 74
End: 2024-07-15
Payer: MEDICARE

## 2024-07-15 ENCOUNTER — HOSPITAL ENCOUNTER (INPATIENT)
Facility: HOSPITAL | Age: 74
LOS: 4 days | Discharge: HOME | End: 2024-07-20
Attending: EMERGENCY MEDICINE | Admitting: INTERNAL MEDICINE
Payer: MEDICARE

## 2024-07-15 ENCOUNTER — APPOINTMENT (OUTPATIENT)
Dept: CARDIOLOGY | Facility: HOSPITAL | Age: 74
End: 2024-07-15
Payer: MEDICARE

## 2024-07-15 VITALS
HEART RATE: 71 BPM | OXYGEN SATURATION: 96 % | RESPIRATION RATE: 14 BRPM | TEMPERATURE: 97 F | DIASTOLIC BLOOD PRESSURE: 66 MMHG | HEIGHT: 65 IN | BODY MASS INDEX: 41.1 KG/M2 | SYSTOLIC BLOOD PRESSURE: 105 MMHG | WEIGHT: 246.69 LBS

## 2024-07-15 DIAGNOSIS — I48.91 ATRIAL FIBRILLATION WITH RVR (MULTI): ICD-10-CM

## 2024-07-15 DIAGNOSIS — F41.9 ANXIETY: ICD-10-CM

## 2024-07-15 DIAGNOSIS — I10 ESSENTIAL HYPERTENSION: ICD-10-CM

## 2024-07-15 DIAGNOSIS — I48.92 ATRIAL FLUTTER WITH RAPID VENTRICULAR RESPONSE (MULTI): ICD-10-CM

## 2024-07-15 DIAGNOSIS — I48.11 LONGSTANDING PERSISTENT ATRIAL FIBRILLATION (MULTI): Primary | ICD-10-CM

## 2024-07-15 DIAGNOSIS — R06.02 SHORTNESS OF BREATH: ICD-10-CM

## 2024-07-15 DIAGNOSIS — R00.2 PALPITATIONS: ICD-10-CM

## 2024-07-15 DIAGNOSIS — I47.10 SVT (SUPRAVENTRICULAR TACHYCARDIA) (CMS-HCC): ICD-10-CM

## 2024-07-15 LAB
ALBUMIN SERPL BCP-MCNC: 3.2 G/DL (ref 3.4–5)
ALBUMIN SERPL BCP-MCNC: 3.7 G/DL (ref 3.4–5)
ALP SERPL-CCNC: 58 U/L (ref 33–136)
ALP SERPL-CCNC: 69 U/L (ref 33–136)
ALT SERPL W P-5'-P-CCNC: 24 U/L (ref 7–45)
ALT SERPL W P-5'-P-CCNC: 29 U/L (ref 7–45)
ANION GAP SERPL CALC-SCNC: 12 MMOL/L (ref 10–20)
ANION GAP SERPL CALC-SCNC: 12 MMOL/L (ref 10–20)
AST SERPL W P-5'-P-CCNC: 19 U/L (ref 9–39)
AST SERPL W P-5'-P-CCNC: 22 U/L (ref 9–39)
ATRIAL RATE: 63 BPM
BASOPHILS # BLD AUTO: 0.04 X10*3/UL (ref 0–0.1)
BASOPHILS NFR BLD AUTO: 0.4 %
BILIRUB SERPL-MCNC: 0.4 MG/DL (ref 0–1.2)
BILIRUB SERPL-MCNC: 0.4 MG/DL (ref 0–1.2)
BNP SERPL-MCNC: 178 PG/ML (ref 0–99)
BUN SERPL-MCNC: 18 MG/DL (ref 6–23)
BUN SERPL-MCNC: 20 MG/DL (ref 6–23)
CALCIUM SERPL-MCNC: 9 MG/DL (ref 8.6–10.3)
CALCIUM SERPL-MCNC: 9.4 MG/DL (ref 8.6–10.3)
CARDIAC TROPONIN I PNL SERPL HS: 51 NG/L (ref 0–13)
CARDIAC TROPONIN I PNL SERPL HS: 56 NG/L (ref 0–13)
CHLORIDE SERPL-SCNC: 102 MMOL/L (ref 98–107)
CHLORIDE SERPL-SCNC: 105 MMOL/L (ref 98–107)
CO2 SERPL-SCNC: 26 MMOL/L (ref 21–32)
CO2 SERPL-SCNC: 26 MMOL/L (ref 21–32)
CREAT SERPL-MCNC: 1.13 MG/DL (ref 0.5–1.05)
CREAT SERPL-MCNC: 1.16 MG/DL (ref 0.5–1.05)
EGFRCR SERPLBLD CKD-EPI 2021: 50 ML/MIN/1.73M*2
EGFRCR SERPLBLD CKD-EPI 2021: 51 ML/MIN/1.73M*2
EOSINOPHIL # BLD AUTO: 0.17 X10*3/UL (ref 0–0.4)
EOSINOPHIL NFR BLD AUTO: 1.7 %
ERYTHROCYTE [DISTWIDTH] IN BLOOD BY AUTOMATED COUNT: 13.2 % (ref 11.5–14.5)
ERYTHROCYTE [DISTWIDTH] IN BLOOD BY AUTOMATED COUNT: 13.3 % (ref 11.5–14.5)
GLUCOSE BLD MANUAL STRIP-MCNC: 122 MG/DL (ref 74–99)
GLUCOSE BLD MANUAL STRIP-MCNC: 242 MG/DL (ref 74–99)
GLUCOSE SERPL-MCNC: 115 MG/DL (ref 74–99)
GLUCOSE SERPL-MCNC: 151 MG/DL (ref 74–99)
HCT VFR BLD AUTO: 38.2 % (ref 36–46)
HCT VFR BLD AUTO: 41.4 % (ref 36–46)
HGB BLD-MCNC: 12.3 G/DL (ref 12–16)
HGB BLD-MCNC: 13.3 G/DL (ref 12–16)
IMM GRANULOCYTES # BLD AUTO: 0.03 X10*3/UL (ref 0–0.5)
IMM GRANULOCYTES NFR BLD AUTO: 0.3 % (ref 0–0.9)
LYMPHOCYTES # BLD AUTO: 2.96 X10*3/UL (ref 0.8–3)
LYMPHOCYTES NFR BLD AUTO: 29 %
MAGNESIUM SERPL-MCNC: 1.67 MG/DL (ref 1.6–2.4)
MAGNESIUM SERPL-MCNC: 1.77 MG/DL (ref 1.6–2.4)
MCH RBC QN AUTO: 30.9 PG (ref 26–34)
MCH RBC QN AUTO: 31.7 PG (ref 26–34)
MCHC RBC AUTO-ENTMCNC: 32.1 G/DL (ref 32–36)
MCHC RBC AUTO-ENTMCNC: 32.2 G/DL (ref 32–36)
MCV RBC AUTO: 96 FL (ref 80–100)
MCV RBC AUTO: 99 FL (ref 80–100)
MONOCYTES # BLD AUTO: 0.65 X10*3/UL (ref 0.05–0.8)
MONOCYTES NFR BLD AUTO: 6.4 %
NEUTROPHILS # BLD AUTO: 6.35 X10*3/UL (ref 1.6–5.5)
NEUTROPHILS NFR BLD AUTO: 62.2 %
NRBC BLD-RTO: 0 /100 WBCS (ref 0–0)
NRBC BLD-RTO: 0 /100 WBCS (ref 0–0)
P AXIS: 73 DEGREES
P OFFSET: 157 MS
P ONSET: 116 MS
PHOSPHATE SERPL-MCNC: 3 MG/DL (ref 2.5–4.9)
PLATELET # BLD AUTO: 237 X10*3/UL (ref 150–450)
PLATELET # BLD AUTO: 311 X10*3/UL (ref 150–450)
POTASSIUM SERPL-SCNC: 4 MMOL/L (ref 3.5–5.3)
POTASSIUM SERPL-SCNC: 4.3 MMOL/L (ref 3.5–5.3)
PR INTERVAL: 208 MS
PROT SERPL-MCNC: 6.4 G/DL (ref 6.4–8.2)
PROT SERPL-MCNC: 7.3 G/DL (ref 6.4–8.2)
Q ONSET: 220 MS
QRS COUNT: 11 BEATS
QRS DURATION: 80 MS
QT INTERVAL: 442 MS
QTC CALCULATION(BAZETT): 452 MS
QTC FREDERICIA: 449 MS
R AXIS: -3 DEGREES
RBC # BLD AUTO: 3.88 X10*6/UL (ref 4–5.2)
RBC # BLD AUTO: 4.31 X10*6/UL (ref 4–5.2)
SODIUM SERPL-SCNC: 136 MMOL/L (ref 136–145)
SODIUM SERPL-SCNC: 139 MMOL/L (ref 136–145)
T AXIS: 44 DEGREES
T OFFSET: 441 MS
VENTRICULAR RATE: 63 BPM
WBC # BLD AUTO: 10.2 X10*3/UL (ref 4.4–11.3)
WBC # BLD AUTO: 7.9 X10*3/UL (ref 4.4–11.3)

## 2024-07-15 PROCEDURE — 96361 HYDRATE IV INFUSION ADD-ON: CPT

## 2024-07-15 PROCEDURE — 36415 COLL VENOUS BLD VENIPUNCTURE: CPT | Performed by: EMERGENCY MEDICINE

## 2024-07-15 PROCEDURE — 2500000002 HC RX 250 W HCPCS SELF ADMINISTERED DRUGS (ALT 637 FOR MEDICARE OP, ALT 636 FOR OP/ED): Performed by: INTERNAL MEDICINE

## 2024-07-15 PROCEDURE — 2500000001 HC RX 250 WO HCPCS SELF ADMINISTERED DRUGS (ALT 637 FOR MEDICARE OP): Performed by: EMERGENCY MEDICINE

## 2024-07-15 PROCEDURE — 2500000004 HC RX 250 GENERAL PHARMACY W/ HCPCS (ALT 636 FOR OP/ED): Performed by: EMERGENCY MEDICINE

## 2024-07-15 PROCEDURE — 2500000001 HC RX 250 WO HCPCS SELF ADMINISTERED DRUGS (ALT 637 FOR MEDICARE OP)

## 2024-07-15 PROCEDURE — 96374 THER/PROPH/DIAG INJ IV PUSH: CPT

## 2024-07-15 PROCEDURE — 93005 ELECTROCARDIOGRAM TRACING: CPT

## 2024-07-15 PROCEDURE — 83880 ASSAY OF NATRIURETIC PEPTIDE: CPT | Performed by: EMERGENCY MEDICINE

## 2024-07-15 PROCEDURE — 2500000004 HC RX 250 GENERAL PHARMACY W/ HCPCS (ALT 636 FOR OP/ED)

## 2024-07-15 PROCEDURE — 36415 COLL VENOUS BLD VENIPUNCTURE: CPT

## 2024-07-15 PROCEDURE — 99291 CRITICAL CARE FIRST HOUR: CPT | Mod: 25 | Performed by: EMERGENCY MEDICINE

## 2024-07-15 PROCEDURE — 2500000005 HC RX 250 GENERAL PHARMACY W/O HCPCS: Performed by: EMERGENCY MEDICINE

## 2024-07-15 PROCEDURE — 82947 ASSAY GLUCOSE BLOOD QUANT: CPT

## 2024-07-15 PROCEDURE — 96375 TX/PRO/DX INJ NEW DRUG ADDON: CPT

## 2024-07-15 PROCEDURE — RXMED WILLOW AMBULATORY MEDICATION CHARGE

## 2024-07-15 PROCEDURE — 84484 ASSAY OF TROPONIN QUANT: CPT | Performed by: EMERGENCY MEDICINE

## 2024-07-15 PROCEDURE — 84100 ASSAY OF PHOSPHORUS: CPT

## 2024-07-15 PROCEDURE — 80053 COMPREHEN METABOLIC PANEL: CPT

## 2024-07-15 PROCEDURE — 85027 COMPLETE CBC AUTOMATED: CPT

## 2024-07-15 PROCEDURE — RXOTC WILLOW AMBULATORY OTC CHARGE

## 2024-07-15 PROCEDURE — 99239 HOSP IP/OBS DSCHRG MGMT >30: CPT

## 2024-07-15 PROCEDURE — 83735 ASSAY OF MAGNESIUM: CPT

## 2024-07-15 PROCEDURE — 85025 COMPLETE CBC W/AUTO DIFF WBC: CPT | Performed by: EMERGENCY MEDICINE

## 2024-07-15 PROCEDURE — 83735 ASSAY OF MAGNESIUM: CPT | Performed by: EMERGENCY MEDICINE

## 2024-07-15 PROCEDURE — 80053 COMPREHEN METABOLIC PANEL: CPT | Performed by: EMERGENCY MEDICINE

## 2024-07-15 RX ORDER — AMIODARONE HYDROCHLORIDE 200 MG/1
200 TABLET ORAL DAILY
Qty: 30 TABLET | Refills: 0 | Status: ON HOLD | OUTPATIENT
Start: 2024-07-31 | End: 2024-07-20

## 2024-07-15 RX ORDER — AMIODARONE HYDROCHLORIDE 400 MG/1
400 TABLET ORAL 2 TIMES DAILY
Qty: 3 TABLET | Refills: 0 | Status: SHIPPED | OUTPATIENT
Start: 2024-07-15 | End: 2024-07-20 | Stop reason: HOSPADM

## 2024-07-15 RX ORDER — METOPROLOL TARTRATE 1 MG/ML
5 INJECTION, SOLUTION INTRAVENOUS ONCE
Status: COMPLETED | OUTPATIENT
Start: 2024-07-15 | End: 2024-07-15

## 2024-07-15 RX ORDER — DIGOXIN 0.25 MG/ML
500 INJECTION INTRAMUSCULAR; INTRAVENOUS ONCE
Status: COMPLETED | OUTPATIENT
Start: 2024-07-15 | End: 2024-07-15

## 2024-07-15 RX ORDER — AMIODARONE HYDROCHLORIDE 200 MG/1
400 TABLET ORAL DAILY
Qty: 45 TABLET | Refills: 0 | Status: ON HOLD | OUTPATIENT
Start: 2024-07-17 | End: 2024-07-20

## 2024-07-15 RX ADMIN — INSULIN LISPRO 4 UNITS: 100 INJECTION, SOLUTION INTRAVENOUS; SUBCUTANEOUS at 11:34

## 2024-07-15 RX ADMIN — METOPROLOL SUCCINATE 100 MG: 50 TABLET, EXTENDED RELEASE ORAL at 08:36

## 2024-07-15 RX ADMIN — ACETAMINOPHEN 975 MG: 325 TABLET ORAL at 00:19

## 2024-07-15 RX ADMIN — AMIODARONE HYDROCHLORIDE 400 MG: 200 TABLET ORAL at 08:36

## 2024-07-15 RX ADMIN — Medication 400 MG: at 08:36

## 2024-07-15 RX ADMIN — PANTOPRAZOLE SODIUM 40 MG: 40 TABLET, DELAYED RELEASE ORAL at 06:23

## 2024-07-15 RX ADMIN — APIXABAN 5 MG: 5 TABLET, FILM COATED ORAL at 08:36

## 2024-07-15 ASSESSMENT — PAIN SCALES - GENERAL
PAINLEVEL_OUTOF10: 7
PAINLEVEL_OUTOF10: 0 - NO PAIN

## 2024-07-15 ASSESSMENT — COGNITIVE AND FUNCTIONAL STATUS - GENERAL
MOBILITY SCORE: 19
MOVING TO AND FROM BED TO CHAIR: A LITTLE
HELP NEEDED FOR BATHING: A LITTLE
CLIMB 3 TO 5 STEPS WITH RAILING: A LITTLE
DRESSING REGULAR LOWER BODY CLOTHING: A LITTLE
WALKING IN HOSPITAL ROOM: A LITTLE
TOILETING: A LITTLE
TURNING FROM BACK TO SIDE WHILE IN FLAT BAD: A LITTLE
STANDING UP FROM CHAIR USING ARMS: A LITTLE
DAILY ACTIVITIY SCORE: 21

## 2024-07-15 ASSESSMENT — PAIN - FUNCTIONAL ASSESSMENT
PAIN_FUNCTIONAL_ASSESSMENT: 0-10
PAIN_FUNCTIONAL_ASSESSMENT: 0-10

## 2024-07-15 ASSESSMENT — PAIN DESCRIPTION - LOCATION: LOCATION: NECK

## 2024-07-15 NOTE — PROGRESS NOTES
Pharmacy Medication History Review    Debbie Rees is a 74 y.o. female admitted for Atrial flutter with rapid ventricular response (Multi). Pharmacy reviewed the patient's [id number] medications and allergies for accuracy.    The list below reflectives the updated PTA list. Please review each medication in order reconciliation for additional clarification and justification.  Medications Prior to Admission   Medication Sig Dispense Refill Last Dose    apixaban (Eliquis) 5 mg tablet Take 1 tablet (5 mg) by mouth 2 times a day. 180 tablet 1 Past Week    digoxin (Lanoxin) 125 MCG tablet Take 1 tablet (125 mcg) by mouth once daily. 30 tablet 11     dronedarone (Multaq) 400 mg tablet Take 1 tablet (400 mg) by mouth 2 times daily (morning and late afternoon).       hydroCHLOROthiazide (HYDRODiuril) 25 mg tablet Take 1 tablet (25 mg) by mouth once daily in the morning.       lisinopril 20 mg tablet TAKE ONE TABLET BY MOUTH TWO TIMES A  tablet 0     magnesium oxide (Mag-Ox) 400 mg tablet Take 1 tablet (400 mg) by mouth 4 times a week. Take every Monday, Tuesday, Wednesday, and Friday       metoprolol succinate XL (Toprol-XL) 100 mg 24 hr tablet Take 1 tablet (100 mg) by mouth 2 times a day. Do not crush or chew. 60 tablet 2     metoprolol tartrate (Lopressor) 25 mg tablet Take 1 tablet (25 mg) by mouth if needed (for recurrent palpitations) for up to 30 doses. 30 tablet 0     pantoprazole (ProtoNix) 40 mg EC tablet Take 1 tablet (40 mg) by mouth 2 times a day before meals. Do not crush, chew, or split. 60 tablet 0     semaglutide (OZEMPIC) 1 mg/dose (4 mg/3 mL) pen injector Inject 1 mg under the skin 1 (one) time per week. 9 mL 1         The list below reflectives the updated allergy list. Please review each documented allergy for additional clarification and justification.  Allergies  Reviewed by Martha Headley RN on 7/13/2024        Severity Reactions Comments    Procaine Medium Other,  Palpitations palpitations    Empagliflozin Low Unknown             Below are additional concerns with the patient's PTA list.  Confirmed medications with patient.     Yousif Davis [facility]

## 2024-07-15 NOTE — PROGRESS NOTES
07/15/24 0756   Discharge Planning   Living Arrangements Spouse/significant other   Support Systems Spouse/significant other;Children;Family members   Assistance Needed Alert and oriented x 3, Independent with ADL's, Does not drive([place]), Cane when outside, On Eliquis at home   Type of Residence Private residence   Number of Stairs to Enter Residence 4   Number of Stairs Within Residence 0   Do you have animals or pets at home? No   Who is requesting discharge planning? Provider   Home or Post Acute Services None  (Home, No needs)   Expected Discharge Disposition Home   Does the patient need discharge transport arranged? No

## 2024-07-15 NOTE — PROGRESS NOTES
Debbie Rees is a 74 y.o. female on day 2 of admission presenting with Atrial flutter with rapid ventricular response (Multi).      Subjective   She is sitting up in the bed, feels good, not further AF.      Review of systems:  Constitutional: negative for fever, chills, or malaise  Neuro: negative for dizziness, headache, numbness, tingling  ENT: Negative for nasal congestion or sore throat  Resp: negative for shortness of breath, cough, or wheezing  CV: negative for chest pain, palpitations  GI: negative for abd pain, nausea, vomiting or diarrhea  : negative for dysuria, frequency, or urgency  Skin: negative for lesions, wounds, or rash  Musculoskeletal: Negative for weakness, myalgia, or arthralgia  Endocrine: Negative for polyuria or polydipsia         Objective   Constitutional: Well developed, awake/alert/oriented x3, no distress, alert and cooperative  Eyes: PERRL, EOMI, clear sclera  ENMT: mucous membranes moist, no apparent injury, no lesions seen  Head/Neck: Neck supple, no apparent injury, thyroid without mass or tenderness, No JVD, trachea midline, no bruits  Respiratory/Thorax: Patent airways, CTAB, normal breath sounds with good chest expansion, thorax symmetric  Cardiovascular: Regular, rate and rhythm, no murmurs, 2+ equal pulses of the extremities, normal S 1and S 2  Gastrointestinal: Nondistended, soft, non-tender, no rebound tenderness or guarding, no masses palpable, no organomegaly, +BS, no bruits  Musculoskeletal: ROM intact, no joint swelling, normal strength  Extremities: normal extremities, no cyanosis edema, contusions or wounds, no clubbing  Neurological: alert and oriented x3, intact senses, motor, response and reflexes, normal strength  Lymphatic: No significant lymphadenopathy  Psychological: Appropriate mood and behavior  Skin: Warm and dry, no lesions, no rashes      Last Recorded Vitals  /66 (BP Location: Right arm, Patient Position: Sitting)   Pulse 71   Temp 36.1 °C  "(97 °F) (Temporal)   Resp 14   Ht 1.651 m (5' 5\")   Wt 112 kg (246 lb 11.1 oz)   SpO2 96%   BMI 41.05 kg/m²     Intake/Output last 3 Shifts:  I/O last 3 completed shifts:  In: 360 (3.2 mL/kg) [P.O.:360]  Out: 0 (0 mL/kg)   Weight: 111.9 kg   I/O this shift:  In: 240 [P.O.:240]  Out: -     Relevant Results  Scheduled medications    Continuous medications    PRN medications      Results for orders placed or performed during the hospital encounter of 07/12/24 (from the past 24 hour(s))   POCT GLUCOSE   Result Value Ref Range    POCT Glucose 167 (H) 74 - 99 mg/dL   CBC   Result Value Ref Range    WBC 7.9 4.4 - 11.3 x10*3/uL    nRBC 0.0 0.0 - 0.0 /100 WBCs    RBC 3.88 (L) 4.00 - 5.20 x10*6/uL    Hemoglobin 12.3 12.0 - 16.0 g/dL    Hematocrit 38.2 36.0 - 46.0 %    MCV 99 80 - 100 fL    MCH 31.7 26.0 - 34.0 pg    MCHC 32.2 32.0 - 36.0 g/dL    RDW 13.3 11.5 - 14.5 %    Platelets 237 150 - 450 x10*3/uL   Comprehensive Metabolic Panel   Result Value Ref Range    Glucose 115 (H) 74 - 99 mg/dL    Sodium 139 136 - 145 mmol/L    Potassium 4.0 3.5 - 5.3 mmol/L    Chloride 105 98 - 107 mmol/L    Bicarbonate 26 21 - 32 mmol/L    Anion Gap 12 10 - 20 mmol/L    Urea Nitrogen 20 6 - 23 mg/dL    Creatinine 1.13 (H) 0.50 - 1.05 mg/dL    eGFR 51 (L) >60 mL/min/1.73m*2    Calcium 9.0 8.6 - 10.3 mg/dL    Albumin 3.2 (L) 3.4 - 5.0 g/dL    Alkaline Phosphatase 58 33 - 136 U/L    Total Protein 6.4 6.4 - 8.2 g/dL    AST 19 9 - 39 U/L    Bilirubin, Total 0.4 0.0 - 1.2 mg/dL    ALT 24 7 - 45 U/L   Magnesium   Result Value Ref Range    Magnesium 1.77 1.60 - 2.40 mg/dL   Phosphorus   Result Value Ref Range    Phosphorus 3.0 2.5 - 4.9 mg/dL   POCT GLUCOSE   Result Value Ref Range    POCT Glucose 122 (H) 74 - 99 mg/dL   POCT GLUCOSE   Result Value Ref Range    POCT Glucose 242 (H) 74 - 99 mg/dL       XR chest 1 view   Final Result   No airspace consolidation or pleural effusion.        MACRO:   None        Signed by: Jose Bustillo 7/13/2024 1:13 " AM   Dictation workstation:   [id number]          Transthoracic Echo (TTE) Complete    Result Date: 7/11/2024   [facility], [address] [address]               Tel [phone] and Fax [phone] TRANSTHORACIC ECHOCARDIOGRAM REPORT  Patient Name:      VIVIAN KIM      Reading Physician:    [id number] Michael Lozano MD Study Date:        7/11/2024            Ordering Provider:    [id number] RANULFO MELGOZA MRN/PID:           [id number]             Fellow: Accession#:        [id number]         Nurse:                Nina Dickerson RN Date of Birth/Age: 1950 / 74 years  Sonographer:          Dina Rojas RDCS Gender:            F                    Additional Staff: Height:            167.64 cm            Admit Date: Weight:            112.04 kg            Admission Status:     Outpatient BSA / BMI:         2.19 m2 / 39.87      Encounter#:           [id number]                    kg/m2 Blood Pressure:    97/65 mmHg           Department Location:  [facility] Non                                                               Invasive Study Type:    TRANSTHORACIC ECHO (TTE) COMPLETE Diagnosis/ICD: Unspecified atrial fibrillation-I48.91 Indication:    Atrial Fibrillation CPT Code:      Echo Complete w Full [id number] Patient History: Diabetes:          Yes Pertinent History: HTN, HCM, Chest Pain and A-Fib. S/P cardiac ablation                    (7/8/23). Study Detail: The following Echo studies were performed: 2D, M-Mode, Doppler and               color flow. Technically challenging study due to prominent lung               artifact. Definity used as a contrast agent for endocardial border               definition. Total contrast used for this procedure was 1.0 mL via               IV push. The  patient was awake.  PHYSICIAN INTERPRETATION: Left Ventricle: The left ventricular systolic function is normal, with a visually estimated ejection fraction of 70%. There are no regional wall motion abnormalities. The left ventricular cavity size is decreased. The left ventricular septal wall thickness is severely increased. There is moderately increased left ventricular posterior wall thickness. There is moderate to severe concentric left ventricular hypertrophy involving the septal wall. Findings are consistent with hypertrophic cardiomyopathy. Spectral Doppler shows an impaired relaxation pattern of left ventricular diastolic filling. There is a mild mid cavity left ventricular obstruction. The resting gradient is 25 mmHg.The provoked gradient is 33 mmHg. Left Atrium: The left atrium is moderately dilated. Right Ventricle: The right ventricle is normal in size. There is normal right ventricular global systolic function. Right Atrium: The right atrium is mild to moderately dilated. Aortic Valve: The aortic valve is trileaflet. There is mild aortic valve cusp calcification. The aortic valve dimensionless index is 0.89. There is no evidence of aortic valve regurgitation. The peak instantaneous gradient of the aortic valve is 10.6 mmHg. The mean gradient of the aortic valve is 7.0 mmHg. Mitral Valve: The mitral valve is normal in structure. There is moderate mitral annular calcification. There is mild to moderate mitral valve regurgitation. Tricuspid Valve: The tricuspid valve is structurally normal. There is mild tricuspid regurgitation. Pulmonic Valve: The pulmonic valve is structurally normal. There is physiologic pulmonic valve regurgitation. Pericardium: There is a trivial pericardial effusion. Aorta: The aortic root is normal. Pulmonary Artery: The tricuspid regurgitant velocity is 2.82 m/s, and with an estimated right atrial pressure of 3 mmHg, the estimated pulmonary artery pressure is mildly elevated with the  RVSP at 34.8 mmHg. Pulmonary Veins: The pulmonary veins appear normal and return normally to the left atrium. Systemic Veins: The inferior vena cava appears to be of normal size.  CONCLUSIONS:  1. The left ventricular systolic function is normal, with a visually estimated ejection fraction of 70%.  2. Spectral Doppler shows an impaired relaxation pattern of left ventricular diastolic filling.  3. Left ventricular cavity size is decreased.  4. Severely increased left ventricular septal thickness.  5. The left ventricular posterior wall thickness is moderately increased.  6. There is hypertrophic cardiomyopathy.  7. There is moderate to severe concentric left ventricular hypertrophy.  8. There is a mild mid cavity left ventricular obstruction.  9. There is normal right ventricular global systolic function. 10. The left atrium is moderately dilated. 11. The right atrium is mild to moderately dilated. 12. There is moderate mitral annular calcification. 13. Mild to moderate mitral valve regurgitation. 14. Mild pulmonary HTN. Estimated CVP is normal. QUANTITATIVE DATA SUMMARY: 2D MEASUREMENTS:                          Normal Ranges: Ao Root d:     3.10 cm   (2.0-3.7cm) IVSd:          1.80 cm   (0.6-1.1cm) LVPWd:         1.45 cm   (0.6-1.1cm) LVIDd:         3.32 cm   (3.9-5.9cm) LVIDs:         2.47 cm LV Mass Index: 94.2 g/m2 LV % FS        25.6 % LA VOLUME:                               Normal Ranges: LA Vol A4C:        65.6 ml    (22+/-6mL/m2) LA Vol A2C:        70.9 ml LA Vol BP:         70.6 ml LA Vol Index A4C:  30.0ml/m2 LA Vol Index A2C:  32.4 ml/m2 LA Vol Index BP:   32.3 ml/m2 LA Area A4C:       23.4 cm2 LA Area A2C:       23.5 cm2 LA Major Axis A4C: 7.1 cm LA Major Axis A2C: 6.6 cm LA Volume Index:   30.3 ml/m2 LA Vol A4C:        61.7 ml LA Vol A2C:        66.5 ml RA VOLUME BY A/L METHOD:                       Normal Ranges: RA Area A4C: 20.8 cm2 M-MODE MEASUREMENTS:                  Normal Ranges: Ao Root: 3.00  cm (2.0-3.7cm) AoV Exc: 2.00 cm (1.5-2.5cm) LAs:     4.90 cm (2.7-4.0cm) AORTA MEASUREMENTS:                      Normal Ranges: AoV Exc:     2.00 cm (1.5-2.5cm) Ao Sinus, d: 3.10 cm (2.1-3.5cm) Asc Ao, d:   3.30 cm (2.1-3.4cm) LV SYSTOLIC FUNCTION BY 2D PLANIMETRY (MOD):                      Normal Ranges: EF-A4C View:    61 % (>=55%) EF-A2C View:    62 % EF-Biplane:     63 % EF-Visual:      70 % LV EF Reported: 70 % LV DIASTOLIC FUNCTION:                            Normal Ranges: MV Peak E:      0.78 m/s   (0.7-1.2 m/s) MV Peak A:      0.74 m/s   (0.42-0.7 m/s) E/A Ratio:      1.07       (1.0-2.2) MV e'           0.071 m/s  (>8.0) MV lateral e'   0.07 m/s MV medial e'    0.07 m/s E/e' Ratio:     11.07      (<8.0) PulmV Sys Salazar:  39.40 cm/s PulmV Guillen Salazar: 31.10 cm/s PulmV S/D Salazar:  1.00 MITRAL VALVE:                 Normal Ranges: MV DT: 184 msec (150-240msec) AORTIC VALVE:                                    Normal Ranges: AoV Vmax:                1.63 m/s  (<=1.7m/s) AoV Peak PG:             10.6 mmHg (<20mmHg) AoV Mean P.0 mmHg  (1.7-11.5mmHg) LVOT Max Salazar:            1.36 m/s  (<=1.1m/s) AoV VTI:                 34.40 cm  (18-25cm) LVOT VTI:                30.60 cm LVOT Diameter:           2.00 cm   (1.8-2.4cm) AoV Area, VTI:           2.79 cm2  (2.5-5.5cm2) AoV Area,Vmax:           2.62 cm2  (2.5-4.5cm2) AoV Dimensionless Index: 0.89  RIGHT VENTRICLE: RV Basal 2.67 cm RV Mid   2.16 cm RV Major 6.5 cm TAPSE:   14.6 mm RV s'    0.17 m/s TRICUSPID VALVE/RVSP:                             Normal Ranges: Peak TR Velocity: 2.82 m/s RV Syst Pressure: 34.8 mmHg (< 30mmHg) IVC Diam:         1.82 cm PULMONIC VALVE:                      Normal Ranges: PV Max Salazar: 1.2 m/s  (0.6-0.9m/s) PV Max P.6 mmHg Pulmonary Veins: PulmV Guillen Salazar: 31.10 cm/s PulmV S/D Salazar:  1.00 PulmV Sys Salazar:  39.40 cm/s  [id number] Michael Lozano MD Electronically signed on 2024 at 8:45:01 PM  ** Final **     Transthoracic  Echo (TTE) Complete    Result Date: 3/29/2024   [facility], [address] [address]               Tel [phone] and Fax [phone] TRANSTHORACIC ECHOCARDIOGRAM REPORT  Patient Name:      VIVIAN KIM      Reading Physician:    [id number] Dania Gutierrez MD Study Date:        3/29/2024            Ordering Provider:    [id number] RANULFO MELGOZA MRN/PID:           [id number]             Fellow: Accession#:        [id number]         Nurse:                Nina Dickerson RN Date of Birth/Age: 1950 / 74 years  Sonographer:          Janneth Collins                                                               RDCS Gender:            F                    Additional Staff: Height:            167.64 cm            Admit Date:           3/28/2024 Weight:            113.40 kg            Admission Status:     Inpatient -                                                               Routine BSA / BMI:         2.20 m2 / 40.35      Encounter#:           [id number]                    kg/m2                                         Department Location:  [facility] Non                                                               Invasive Blood Pressure: 128 /85 mmHg Study Type:    TRANSTHORACIC ECHO (TTE) COMPLETE Diagnosis/ICD: Unspecified atrial fibrillation-I48.91 Indication:    Atrial Fibrillation CPT Code:      Echo Complete w Full Doppler-[id number] Patient History: Diabetes:          Yes Pertinent History: HTN and A-Fib. HCM. Study Detail: The following Echo studies were performed: 2D, M-Mode, Doppler and               color flow. Technically challenging study due to poor acoustic               windows. Definity used as a contrast agent for endocardial border               definition. Total contrast used for this procedure was 1 mL via IV               push.  PHYSICIAN  INTERPRETATION: Left Ventricle: The left ventricular systolic function is hyperdynamic, with an estimated ejection fraction of 65-70%. There are no regional wall motion abnormalities. The left ventricular cavity size is normal. Left ventricular diastolic filling was indeterminate. Left Atrium: The left atrium is mildly dilated. Right Ventricle: The right ventricle is normal in size. There is normal right ventricular global systolic function. Right Atrium: The right atrium is normal in size. Aortic Valve: The aortic valve is trileaflet. There is no evidence of aortic valve regurgitation. The peak instantaneous gradient of the aortic valve is 8.3 mmHg. The mean gradient of the aortic valve is 4.9 mmHg. Mitral Valve: The mitral valve is normal in structure. There is trace to mild mitral valve regurgitation. Tricuspid Valve: The tricuspid valve is structurally normal. There is trace to mild tricuspid regurgitation. Pulmonic Valve: The pulmonic valve is not well visualized. The pulmonic valve regurgitation was not well visualized. Pericardium: There is no pericardial effusion noted. Aorta: The aortic root is normal. Pulmonary Artery: The tricuspid regurgitant velocity is 2.25 m/s, and with an estimated right atrial pressure of 3 mmHg, the estimated pulmonary artery pressure is normal with the RVSP at 23.3 mmHg.  CONCLUSIONS:  1. Left ventricular systolic function is hyperdynamic with a 65-70% estimated ejection fraction. QUANTITATIVE DATA SUMMARY: 2D MEASUREMENTS:                           Normal Ranges: IVSd:          1.84 cm    (0.6-1.1cm) LVPWd:         1.09 cm    (0.6-1.1cm) LVIDd:         3.98 cm    (3.9-5.9cm) LVIDs:         2.50 cm LV Mass Index: 101.3 g/m2 LV % FS        37.1 % LA VOLUME:                              Normal Ranges: LA Vol A4C:       52.5 ml    (22+/-6mL/m2) LA Vol A2C:       53.8 ml LA Vol BP:        53.9 ml LA Vol Index A4C: 23.9 ml/m2 LA Vol Index A2C: 24.5 ml/m2 LA Vol Index BP:  24.5 ml/m2 LA  Volume Index:  24.5 ml/m2 LA Vol A4C:       51.0 ml LA Vol A2C:       50.8 ml RA VOLUME BY A/L METHOD:                       Normal Ranges: RA Area A4C: 13.6 cm2 M-MODE MEASUREMENTS:                  Normal Ranges: Ao Root: 2.90 cm (2.0-3.7cm) LAs:     4.17 cm (2.7-4.0cm) LV SYSTOLIC FUNCTION BY 2D PLANIMETRY (MOD):                     Normal Ranges: EF-A4C View: 68.9 % (>=55%) EF-A2C View: 67.6 % EF-Biplane:  68.5 % LV DIASTOLIC FUNCTION:                     Normal Ranges: MV Peak E: 0.83 m/s (0.7-1.2 m/s) MITRAL VALVE:                 Normal Ranges: MV DT: 197 msec (150-240msec) AORTIC VALVE:                                   Normal Ranges: AoV Vmax:                1.44 m/s (<=1.7m/s) AoV Peak P.3 mmHg (<20mmHg) AoV Mean P.9 mmHg (1.7-11.5mmHg) LVOT Max Salazar:            1.35 m/s (<=1.1m/s) AoV VTI:                 27.74 cm (18-25cm) LVOT VTI:                21.49 cm LVOT Diameter:           1.98 cm  (1.8-2.4cm) AoV Area, VTI:           2.38 cm2 (2.5-5.5cm2) AoV Area,Vmax:           2.90 cm2 (2.5-4.5cm2) AoV Dimensionless Index: 0.77 TRICUSPID VALVE/RVSP:                             Normal Ranges: Peak TR Velocity: 2.25 m/s RV Syst Pressure: 23.3 mmHg (< 30mmHg) PULMONIC VALVE:                      Normal Ranges: PV Max Salazar: 1.2 m/s  (0.6-0.9m/s) PV Max P.2 mmHg  [id number] Dania Gutierrez MD Electronically signed on 3/29/2024 at 11:55:50 AM  ** Final **           Assessment/Plan   Principal Problem:    Atrial flutter with rapid ventricular response (Multi)    Paroxysmal atrial fibrillation with RVR  -s/p ablation 24 with Dr. Sommers  -EKG reviewed, atrial flutter, , repeat EKG showed NSR  -Started on amiodarone gtt  @ 0015  - Continue amiodarone 400 mg BID x 3 days, then 400 mg x 14 days, then 200 mg daily.  -Had been on amiodarone in the past but had diarrhea and changed to Multaq, willing to try amiodarone again  -Cont eliquis  -Cont Toprol XL 100mg BID  -Repeat echo  unchanged, trivial pericardial effusion  -Monitor on tele  -If unable to tolerate Amiodarone will admit for Dofetilide load     2. Chest Pain/ Elevated troponin-Non MI elevation 2/2 AF RVR and recent ablation  -Does report CP and SOB   -Troponin 500->455 ->197 ->161  -CXR negative  -I reviewed the EKG, no acute changes, ST elevation, or depression  -Stress test in Jan 2024 negative for ischemia  -Cont metoprolol  -Consider ischemic work up, may need C to rule out cardiovascular ischemia for angina in absence of rapid AF     3. Hypertension  -stable  -2gm na diet  -cont meds  -monitor     4. Diabetes Mellitus type 2  -ISS  -Accuchecks  -hgb A1C 7.4%        LEAH Guadarrama-CNP

## 2024-07-15 NOTE — DISCHARGE INSTRUCTIONS
1) Following recommendations were made on your discharge day:    Continue amiodarone 400 mg twice a day until 7/16.     Continue amiodarone 400 mg daily from 7/17 until 7/31.     Continue amiodarone 200 mg daily from 8/1 until follow up with cardiologist.     Continue eliquis and Toprol  mg twice a day.    Discontinue taking lisinopril, hydrochlorothiazide, Digoxin, and Multaq.    2) Please, follow-up with your primary care provider within 7 to 14 days after leaving the hospital. /appointment services has been requested to make an appointment for you, however if you do not hear back from them within 1 to 2 days, please call your primary physician's office to schedule an appointment. Bring your photo ID and insurance card to your appointment.   [facility]  services can be reached at [phone] and appointment services can be reached at [phone].    - Please, call   or appointment services and schedule a follow-up with your PCP within 1-2 weeks after you leave the hospital.    Also, please follow up with Cardiology (Dr. Rodriguez) after leaving the hospital.    3) If you experience any worsening symptoms or have any concerns, please contact your primary care provider to schedule an appointment. If you cannot get in touch with your primary care physician, please return to the nearest emergency room or urgent care clinic for an evaluation and treatment.    Thank you for choosing [facility] and allowing us to partake in your medical care!    - Your primary care inpatient team.

## 2024-07-15 NOTE — DISCHARGE SUMMARY
Discharge Diagnosis  Atrial flutter with rapid ventricular response (Multi)  Acute kidney injury.    Issues Requiring Follow-Up  Atrial flutter with rapid ventricular response - follow up with cardiology outpatient and primary care.    Continue amiodarone 400 mg twice a day until 7/16.     Continue amiodarone 400 mg daily from 7/17 until 7/31.     Continue amiodarone 200 mg daily from 8/1 until follow up with cardiologist.     Continue eliquis and Toprol  mg twice a day.    Discontinue taking lisinopril, hydrochlorothiazide, Digoxin, and Multaq.    Discharge Meds     Your medication list        START taking these medications        Instructions Last Dose Given Next Dose Due   amiodarone 400 mg tablet  Commonly known as: Pacerone      Take 1 tablet (400 mg) by mouth 2 times a day for 3 doses.       amiodarone 200 mg tablet  Commonly known as: Pacerone  Start taking on: July 17, 2024      Take 2 tablets (400 mg) by mouth once daily for 14 days. Do not fill before July 17, 2024.       amiodarone 200 mg tablet  Commonly known as: Pacerone  Start taking on: July 31, 2024      Take 1 tablet (200 mg) by mouth once daily. Do not fill before July 31, 2024.              CONTINUE taking these medications        Instructions Last Dose Given Next Dose Due   apixaban 5 mg tablet  Commonly known as: Eliquis      Take 1 tablet (5 mg) by mouth 2 times a day.       magnesium oxide 400 mg tablet  Commonly known as: Mag-Ox           metoprolol succinate  mg 24 hr tablet  Commonly known as: Toprol-XL      Take 1 tablet (100 mg) by mouth 2 times a day. Do not crush or chew.       metoprolol tartrate 25 mg tablet  Commonly known as: Lopressor      Take 1 tablet (25 mg) by mouth if needed (for recurrent palpitations) for up to 30 doses.       pantoprazole 40 mg EC tablet  Commonly known as: ProtoNix      Take 1 tablet (40 mg) by mouth 2 times a day before meals. Do not crush, chew, or split.       semaglutide 1 mg/dose (4 mg/3  mL) pen injector  Commonly known as: OZEMPIC      Inject 1 mg under the skin 1 (one) time per week.              STOP taking these medications      digoxin 125 MCG tablet  Commonly known as: Lanoxin        hydroCHLOROthiazide 25 mg tablet  Commonly known as: HYDRODiuril        lisinopril 20 mg tablet        Multaq 400 mg tablet  Generic drug: dronedarone                  Where to Get Your Medications        These medications were sent to [facility]  [address] [facility] [place] [address]      Hours: 9 AM to 5 PM Mon-Fri Phone: [phone]   amiodarone 200 mg tablet  amiodarone 200 mg tablet  amiodarone 400 mg tablet         Test Results Pending At Discharge  Pending Labs       No current pending labs.            Hospital Course  Debbie presented to the ED with with c/o recurrent symptomatic palpitations associated with SOB, dizziness, nausea, and chest pain radiating into her back. Her chest pain resolved after receiving an amiodarone drip in the ED. Her vitals on presentation were significant for a HR of 145. An EKG in the ED indicated Atrial flutter with 2:1 AV conduction, , and marked ST abnormality. The patient's labs were significant for a WBC 13.1, and a Cr of 1.51. Troponin levels were elevated but down trendin, 161. The patient had recently undergone an ablation on 2024 for her recurrent atrial fibrillation and had been prescribed PRN Metoprolol and PRN Digoxin. An echo done on  showed left ventricular systolic function is normal, with a visually estimated ejection fraction of 70%, hypertrophic cardiomyopathy, and severely increased left ventricular posterior wall thickness. Elevated troponins were likely brought about by demand ischemia secondary to increased cardiac stress from her episodes of atrial fibrillation/flutter and decreased cardiac output due to the chronic hypertrophc cardiomyopathy. Her PRN metoprolol and digoxin were held once the patient was started on her  amidarone drip. Cardiology was consulted and the patient was transferred to the general floor with telemetry monitoring.    On the general floor, the patient reported to have improved symptoms. Her heart rate decreased to be in the 60s and she retained sinus rhythm. Her creatinine decreased from 1.51 to 1.29. Cardiology evaluated the patient and recommended continuing the amiodarone drip for one full day then switch to amiodarone 400 mg twice a day for 3 days, then 400 mg for 14 days, then 200 mg daily.  The patient was stable and ready for discharge on 7/15/24.    Pertinent Physical Exam At Time of Discharge  Physical Exam  Constitutional:       General: She is not in acute distress.     Appearance: Normal appearance. She is not ill-appearing.   HENT:      Head: Normocephalic.      Right Ear: External ear normal.      Left Ear: External ear normal.   Eyes:      Extraocular Movements: Extraocular movements intact.      Conjunctiva/sclera: Conjunctivae normal.   Cardiovascular:      Rate and Rhythm: Normal rate and regular rhythm.      Pulses: Normal pulses.      Heart sounds: Normal heart sounds. No murmur heard.     No friction rub.   Pulmonary:      Effort: Pulmonary effort is normal. No respiratory distress.      Breath sounds: Normal breath sounds. No stridor. No wheezing or rhonchi.   Abdominal:      General: Abdomen is flat. There is no distension.      Palpations: Abdomen is soft.      Tenderness: There is no abdominal tenderness.   Musculoskeletal:         General: No swelling or tenderness. Normal range of motion.      Cervical back: Normal range of motion and neck supple. No rigidity or tenderness.   Skin:     General: Skin is warm.   Neurological:      General: No focal deficit present.      Mental Status: She is alert and oriented to person, place, and time. Mental status is at baseline.      Cranial Nerves: No cranial nerve deficit.      Sensory: No sensory deficit.   Psychiatric:         Mood and  Affect: Mood normal.     Outpatient Follow-Up  Future Appointments   Date Time Provider Department Center   8/2/2024 10:00 AM DO [id number]         Ignacio Dickerson MD

## 2024-07-16 LAB
ALBUMIN SERPL BCP-MCNC: 3.2 G/DL (ref 3.4–5)
ALP SERPL-CCNC: 59 U/L (ref 33–136)
ALT SERPL W P-5'-P-CCNC: 22 U/L (ref 7–45)
ANION GAP SERPL CALC-SCNC: 11 MMOL/L (ref 10–20)
AST SERPL W P-5'-P-CCNC: 17 U/L (ref 9–39)
ATRIAL RATE: 290 BPM
ATRIAL RATE: 71 BPM
ATRIAL RATE: 74 BPM
BILIRUB SERPL-MCNC: 0.3 MG/DL (ref 0–1.2)
BUN SERPL-MCNC: 16 MG/DL (ref 6–23)
CALCIUM SERPL-MCNC: 9 MG/DL (ref 8.6–10.3)
CHLORIDE SERPL-SCNC: 105 MMOL/L (ref 98–107)
CO2 SERPL-SCNC: 27 MMOL/L (ref 21–32)
CREAT SERPL-MCNC: 1.01 MG/DL (ref 0.5–1.05)
EGFRCR SERPLBLD CKD-EPI 2021: 59 ML/MIN/1.73M*2
ERYTHROCYTE [DISTWIDTH] IN BLOOD BY AUTOMATED COUNT: 13.2 % (ref 11.5–14.5)
GLUCOSE BLD MANUAL STRIP-MCNC: 131 MG/DL (ref 74–99)
GLUCOSE BLD MANUAL STRIP-MCNC: 145 MG/DL (ref 74–99)
GLUCOSE BLD MANUAL STRIP-MCNC: 163 MG/DL (ref 74–99)
GLUCOSE SERPL-MCNC: 116 MG/DL (ref 74–99)
HCT VFR BLD AUTO: 37.9 % (ref 36–46)
HGB BLD-MCNC: 11.9 G/DL (ref 12–16)
MAGNESIUM SERPL-MCNC: 1.63 MG/DL (ref 1.6–2.4)
MCH RBC QN AUTO: 30.8 PG (ref 26–34)
MCHC RBC AUTO-ENTMCNC: 31.4 G/DL (ref 32–36)
MCV RBC AUTO: 98 FL (ref 80–100)
NRBC BLD-RTO: 0 /100 WBCS (ref 0–0)
P AXIS: 70 DEGREES
P AXIS: 86 DEGREES
P OFFSET: 163 MS
P OFFSET: 165 MS
P ONSET: 115 MS
P ONSET: 122 MS
PHOSPHATE SERPL-MCNC: 2.7 MG/DL (ref 2.5–4.9)
PHOSPHATE SERPL-MCNC: 2.9 MG/DL (ref 2.5–4.9)
PLATELET # BLD AUTO: 241 X10*3/UL (ref 150–450)
POTASSIUM SERPL-SCNC: 3.9 MMOL/L (ref 3.5–5.3)
PR INTERVAL: 192 MS
PR INTERVAL: 210 MS
PROT SERPL-MCNC: 6.1 G/DL (ref 6.4–8.2)
Q ONSET: 216 MS
Q ONSET: 218 MS
Q ONSET: 219 MS
Q ONSET: 219 MS
Q ONSET: 220 MS
QRS COUNT: 12 BEATS
QRS COUNT: 13 BEATS
QRS COUNT: 15 BEATS
QRS COUNT: 21 BEATS
QRS COUNT: 23 BEATS
QRS COUNT: 23 BEATS
QRS COUNT: 24 BEATS
QRS DURATION: 76 MS
QRS DURATION: 76 MS
QRS DURATION: 78 MS
QRS DURATION: 78 MS
QRS DURATION: 80 MS
QT INTERVAL: 290 MS
QT INTERVAL: 294 MS
QT INTERVAL: 296 MS
QT INTERVAL: 304 MS
QT INTERVAL: 360 MS
QT INTERVAL: 386 MS
QT INTERVAL: 398 MS
QTC CALCULATION(BAZETT): 428 MS
QTC CALCULATION(BAZETT): 432 MS
QTC CALCULATION(BAZETT): 442 MS
QTC CALCULATION(BAZETT): 448 MS
QTC CALCULATION(BAZETT): 450 MS
QTC FREDERICIA: 389 MS
QTC FREDERICIA: 390 MS
QTC FREDERICIA: 391 MS
QTC FREDERICIA: 395 MS
QTC FREDERICIA: 414 MS
QTC FREDERICIA: 414 MS
QTC FREDERICIA: 421 MS
R AXIS: -10 DEGREES
R AXIS: -10 DEGREES
R AXIS: -12 DEGREES
R AXIS: -20 DEGREES
R AXIS: -27 DEGREES
RBC # BLD AUTO: 3.86 X10*6/UL (ref 4–5.2)
SODIUM SERPL-SCNC: 139 MMOL/L (ref 136–145)
T AXIS: 117 DEGREES
T AXIS: 39 DEGREES
T AXIS: 46 DEGREES
T AXIS: 52 DEGREES
T AXIS: 68 DEGREES
T AXIS: 73 DEGREES
T AXIS: 88 DEGREES
T OFFSET: 363 MS
T OFFSET: 364 MS
T OFFSET: 366 MS
T OFFSET: 370 MS
T OFFSET: 399 MS
T OFFSET: 413 MS
T OFFSET: 417 MS
VENTRICULAR RATE: 132 BPM
VENTRICULAR RATE: 139 BPM
VENTRICULAR RATE: 140 BPM
VENTRICULAR RATE: 145 BPM
VENTRICULAR RATE: 71 BPM
VENTRICULAR RATE: 74 BPM
VENTRICULAR RATE: 91 BPM
WBC # BLD AUTO: 8.3 X10*3/UL (ref 4.4–11.3)

## 2024-07-16 PROCEDURE — 2500000002 HC RX 250 W HCPCS SELF ADMINISTERED DRUGS (ALT 637 FOR MEDICARE OP, ALT 636 FOR OP/ED)

## 2024-07-16 PROCEDURE — 99223 1ST HOSP IP/OBS HIGH 75: CPT

## 2024-07-16 PROCEDURE — 2500000001 HC RX 250 WO HCPCS SELF ADMINISTERED DRUGS (ALT 637 FOR MEDICARE OP): Performed by: NURSE PRACTITIONER

## 2024-07-16 PROCEDURE — 83735 ASSAY OF MAGNESIUM: CPT

## 2024-07-16 PROCEDURE — 2500000001 HC RX 250 WO HCPCS SELF ADMINISTERED DRUGS (ALT 637 FOR MEDICARE OP)

## 2024-07-16 PROCEDURE — 80053 COMPREHEN METABOLIC PANEL: CPT

## 2024-07-16 PROCEDURE — 2060000001 HC INTERMEDIATE ICU ROOM DAILY

## 2024-07-16 PROCEDURE — 82947 ASSAY GLUCOSE BLOOD QUANT: CPT

## 2024-07-16 PROCEDURE — 85027 COMPLETE CBC AUTOMATED: CPT

## 2024-07-16 PROCEDURE — 84100 ASSAY OF PHOSPHORUS: CPT

## 2024-07-16 PROCEDURE — 2500000004 HC RX 250 GENERAL PHARMACY W/ HCPCS (ALT 636 FOR OP/ED)

## 2024-07-16 PROCEDURE — 36415 COLL VENOUS BLD VENIPUNCTURE: CPT

## 2024-07-16 PROCEDURE — 2500000004 HC RX 250 GENERAL PHARMACY W/ HCPCS (ALT 636 FOR OP/ED): Performed by: NURSE PRACTITIONER

## 2024-07-16 RX ORDER — ACETAMINOPHEN 325 MG/1
975 TABLET ORAL EVERY 8 HOURS PRN
Status: DISCONTINUED | OUTPATIENT
Start: 2024-07-16 | End: 2024-07-20 | Stop reason: HOSPADM

## 2024-07-16 RX ORDER — TALC
3 POWDER (GRAM) TOPICAL NIGHTLY PRN
Status: DISCONTINUED | OUTPATIENT
Start: 2024-07-16 | End: 2024-07-20 | Stop reason: HOSPADM

## 2024-07-16 RX ORDER — METOPROLOL SUCCINATE 50 MG/1
100 TABLET, EXTENDED RELEASE ORAL 2 TIMES DAILY
Status: DISCONTINUED | OUTPATIENT
Start: 2024-07-16 | End: 2024-07-20 | Stop reason: HOSPADM

## 2024-07-16 RX ORDER — DEXTROSE 50 % IN WATER (D50W) INTRAVENOUS SYRINGE
25
Status: DISCONTINUED | OUTPATIENT
Start: 2024-07-16 | End: 2024-07-20 | Stop reason: HOSPADM

## 2024-07-16 RX ORDER — MAGNESIUM SULFATE HEPTAHYDRATE 40 MG/ML
4 INJECTION, SOLUTION INTRAVENOUS ONCE
Status: COMPLETED | OUTPATIENT
Start: 2024-07-16 | End: 2024-07-16

## 2024-07-16 RX ORDER — PANTOPRAZOLE SODIUM 40 MG/1
40 TABLET, DELAYED RELEASE ORAL
Status: DISCONTINUED | OUTPATIENT
Start: 2024-07-16 | End: 2024-07-20 | Stop reason: HOSPADM

## 2024-07-16 RX ORDER — INSULIN LISPRO 100 [IU]/ML
0-5 INJECTION, SOLUTION INTRAVENOUS; SUBCUTANEOUS
Status: DISCONTINUED | OUTPATIENT
Start: 2024-07-16 | End: 2024-07-20 | Stop reason: HOSPADM

## 2024-07-16 RX ORDER — LANOLIN ALCOHOL/MO/W.PET/CERES
400 CREAM (GRAM) TOPICAL
Status: DISCONTINUED | OUTPATIENT
Start: 2024-07-16 | End: 2024-07-16

## 2024-07-16 RX ORDER — DEXTROSE 50 % IN WATER (D50W) INTRAVENOUS SYRINGE
12.5
Status: DISCONTINUED | OUTPATIENT
Start: 2024-07-16 | End: 2024-07-20 | Stop reason: HOSPADM

## 2024-07-16 RX ORDER — BUSPIRONE HYDROCHLORIDE 5 MG/1
5 TABLET ORAL 2 TIMES DAILY
Status: DISCONTINUED | OUTPATIENT
Start: 2024-07-16 | End: 2024-07-20 | Stop reason: HOSPADM

## 2024-07-16 RX ORDER — AMOXICILLIN 250 MG
1 CAPSULE ORAL 2 TIMES DAILY PRN
Status: DISCONTINUED | OUTPATIENT
Start: 2024-07-16 | End: 2024-07-20 | Stop reason: HOSPADM

## 2024-07-16 SDOH — SOCIAL STABILITY: SOCIAL INSECURITY: HAVE YOU HAD THOUGHTS OF HARMING ANYONE ELSE?: NO

## 2024-07-16 SDOH — SOCIAL STABILITY: SOCIAL INSECURITY: HAVE YOU HAD ANY THOUGHTS OF HARMING ANYONE ELSE?: NO

## 2024-07-16 SDOH — SOCIAL STABILITY: SOCIAL INSECURITY: DO YOU FEEL ANYONE HAS EXPLOITED OR TAKEN ADVANTAGE OF YOU FINANCIALLY OR OF YOUR PERSONAL PROPERTY?: NO

## 2024-07-16 SDOH — SOCIAL STABILITY: SOCIAL INSECURITY: DOES ANYONE TRY TO KEEP YOU FROM HAVING/CONTACTING OTHER FRIENDS OR DOING THINGS OUTSIDE YOUR HOME?: NO

## 2024-07-16 SDOH — SOCIAL STABILITY: SOCIAL INSECURITY: WERE YOU ABLE TO COMPLETE ALL THE BEHAVIORAL HEALTH SCREENINGS?: YES

## 2024-07-16 SDOH — SOCIAL STABILITY: SOCIAL INSECURITY: ARE YOU OR HAVE YOU BEEN THREATENED OR ABUSED PHYSICALLY, EMOTIONALLY, OR SEXUALLY BY ANYONE?: NO

## 2024-07-16 SDOH — SOCIAL STABILITY: SOCIAL INSECURITY: DO YOU FEEL UNSAFE GOING BACK TO THE PLACE WHERE YOU ARE LIVING?: NO

## 2024-07-16 SDOH — SOCIAL STABILITY: SOCIAL INSECURITY: ABUSE: ADULT

## 2024-07-16 SDOH — SOCIAL STABILITY: SOCIAL INSECURITY: HAS ANYONE EVER THREATENED TO HURT YOUR FAMILY OR YOUR PETS?: NO

## 2024-07-16 SDOH — SOCIAL STABILITY: SOCIAL INSECURITY: ARE THERE ANY APPARENT SIGNS OF INJURIES/BEHAVIORS THAT COULD BE RELATED TO ABUSE/NEGLECT?: NO

## 2024-07-16 ASSESSMENT — COGNITIVE AND FUNCTIONAL STATUS - GENERAL
DAILY ACTIVITIY SCORE: 23
MOBILITY SCORE: 19
MOBILITY SCORE: 19
MOVING TO AND FROM BED TO CHAIR: A LITTLE
STANDING UP FROM CHAIR USING ARMS: A LITTLE
HELP NEEDED FOR BATHING: A LITTLE
DAILY ACTIVITIY SCORE: 21
MOBILITY SCORE: 20
CLIMB 3 TO 5 STEPS WITH RAILING: A LITTLE
CLIMB 3 TO 5 STEPS WITH RAILING: A LITTLE
WALKING IN HOSPITAL ROOM: A LITTLE
HELP NEEDED FOR BATHING: A LITTLE
TOILETING: A LITTLE
WALKING IN HOSPITAL ROOM: A LITTLE
PATIENT BASELINE BEDBOUND: NO
WALKING IN HOSPITAL ROOM: A LITTLE
CLIMB 3 TO 5 STEPS WITH RAILING: A LITTLE
DRESSING REGULAR LOWER BODY CLOTHING: A LITTLE
STANDING UP FROM CHAIR USING ARMS: A LITTLE
TURNING FROM BACK TO SIDE WHILE IN FLAT BAD: A LITTLE
TOILETING: A LITTLE
DRESSING REGULAR UPPER BODY CLOTHING: A LITTLE
TOILETING: A LITTLE
DAILY ACTIVITIY SCORE: 20
CLIMB 3 TO 5 STEPS WITH RAILING: A LITTLE
MOBILITY SCORE: 23
MOVING TO AND FROM BED TO CHAIR: A LITTLE
TURNING FROM BACK TO SIDE WHILE IN FLAT BAD: A LITTLE
STANDING UP FROM CHAIR USING ARMS: A LITTLE
MOVING TO AND FROM BED TO CHAIR: A LITTLE
DRESSING REGULAR LOWER BODY CLOTHING: A LITTLE

## 2024-07-16 ASSESSMENT — ENCOUNTER SYMPTOMS
VOMITING: 0
WOUND: 0
NAUSEA: 0
CONFUSION: 0
FEVER: 0
FLANK PAIN: 0
DIARRHEA: 0
ABDOMINAL PAIN: 0
WEAKNESS: 0
FATIGUE: 0
ABDOMINAL DISTENTION: 0
WHEEZING: 0
DIAPHORESIS: 1
CONSTIPATION: 0
SHORTNESS OF BREATH: 0
CHEST TIGHTNESS: 0
EYE DISCHARGE: 0
LIGHT-HEADEDNESS: 1
DYSURIA: 0
PALPITATIONS: 0
PALPITATIONS: 1
MYALGIAS: 0
APNEA: 0
DIZZINESS: 0
SHORTNESS OF BREATH: 1
DIZZINESS: 1
COUGH: 0
DIAPHORESIS: 0
NAUSEA: 1
NUMBNESS: 0
CHILLS: 0
DIFFICULTY URINATING: 0

## 2024-07-16 ASSESSMENT — PAIN SCALES - GENERAL
PAINLEVEL_OUTOF10: 3
PAINLEVEL_OUTOF10: 0 - NO PAIN
PAINLEVEL_OUTOF10: 4
PAINLEVEL_OUTOF10: 0 - NO PAIN
PAINLEVEL_OUTOF10: 0 - NO PAIN

## 2024-07-16 ASSESSMENT — PAIN DESCRIPTION - LOCATION
LOCATION: BACK
LOCATION: HEAD

## 2024-07-16 ASSESSMENT — PAIN DESCRIPTION - ORIENTATION: ORIENTATION: MID

## 2024-07-16 ASSESSMENT — ACTIVITIES OF DAILY LIVING (ADL)
PATIENT'S MEMORY ADEQUATE TO SAFELY COMPLETE DAILY ACTIVITIES?: YES
JUDGMENT_ADEQUATE_SAFELY_COMPLETE_DAILY_ACTIVITIES: YES
LACK_OF_TRANSPORTATION: NO
ASSISTIVE_DEVICE: CANE;DENTURES UPPER;EYEGLASSES
HEARING - LEFT EAR: FUNCTIONAL
ADEQUATE_TO_COMPLETE_ADL: YES
BATHING: INDEPENDENT
GROOMING: INDEPENDENT
TOILETING: INDEPENDENT
LACK_OF_TRANSPORTATION: NO
DRESSING YOURSELF: INDEPENDENT
FEEDING YOURSELF: INDEPENDENT
HEARING - RIGHT EAR: FUNCTIONAL
WALKS IN HOME: INDEPENDENT

## 2024-07-16 ASSESSMENT — LIFESTYLE VARIABLES
HOW OFTEN DO YOU HAVE 6 OR MORE DRINKS ON ONE OCCASION: NEVER
HOW MANY STANDARD DRINKS CONTAINING ALCOHOL DO YOU HAVE ON A TYPICAL DAY: PATIENT DOES NOT DRINK
AUDIT-C TOTAL SCORE: 0
HOW OFTEN DO YOU HAVE A DRINK CONTAINING ALCOHOL: NEVER
SKIP TO QUESTIONS 9-10: 1
AUDIT-C TOTAL SCORE: 0

## 2024-07-16 ASSESSMENT — PAIN - FUNCTIONAL ASSESSMENT
PAIN_FUNCTIONAL_ASSESSMENT: 0-10
PAIN_FUNCTIONAL_ASSESSMENT: 0-10

## 2024-07-16 NOTE — ED PROVIDER NOTES
HPI   Chief Complaint   Patient presents with    Palpitations     Pt reports sudden onset of palpitations around 3 hours ago. Pt has extensive recent hx with A-fib. Pt recently had an ablation 1 week ago. Pt reports she has been in the ED 3 times since the ablation. Pt denies any current CP. Pt reports slight current dyspnea.       74-year-old female here from home for chief complaint of rapid heart rate.  This is her fourth visit in the last week for SVT and rapid A-fib.  She had an ablation done a few days ago she sees Dr. Rodriguez.  She took an extra 200 mg of amiodarone prior to coming here with no relief.  Upon arrival heart rate is 140.  Patient is not short of breath and no chest pain.  No dizziness or lightheadedness as well.                          [place] Coma Scale Score: 15                     Patient History   Past Medical History:   Diagnosis Date    Abdominal pain of multiple sites 06/22/2023    Acute sinusitis 06/22/2023    Acute upper respiratory infection, unspecified 03/14/2017    Acute URI    Anxiety disorder, unspecified     Anxiety    Biceps tendinitis of left upper extremity 06/22/2023    Burning with urination 06/22/2023    Cervical pain 06/22/2023    Cervicalgia     Cervical pain (neck)    Encounter for screening for other viral diseases 12/14/2017    Need for hepatitis C screening test    Hand numbness 12/26/2023    Morbid (severe) obesity due to excess calories (Multi)     Morbid obesity    Pain in left knee     Acute pain of left knee    Paroxysmal SVT (supraventricular tachycardia) (CMS-HCC) 12/26/2023    Personal history of other diseases of the circulatory system     History of hypertension    Personal history of other diseases of the musculoskeletal system and connective tissue     History of arthritis    Personal history of other drug therapy 11/21/2019    History of influenza vaccination    Personal history of other endocrine, nutritional and metabolic disease     History of  hyperlipidemia    Personal history of other endocrine, nutritional and metabolic disease     History of diabetes mellitus    Personal history of other specified conditions 2017    History of dysuria    Pyogenic granuloma of skin and subcutaneous tissue 2023    Streptococcal pharyngitis 2017    Strep sore throat    Thoracic spine pain 2023    Unspecified abdominal hernia without obstruction or gangrene     Abdominal hernia without obstruction and without gangrene    Unspecified asthma, uncomplicated (HHS-HCC) 2017    Asthmatic bronchitis    Urinary tract infection, site not specified 2017    Acute UTI     Past Surgical History:   Procedure Laterality Date    CARDIAC ELECTROPHYSIOLOGY PROCEDURE N/A 2024    Procedure: Ablation A-Fib Persistant;  Surgeon: Ney Sommers MD;  Location: [place] [address] Cardiac Cath Lab;  Service: Electrophysiology;  Laterality: N/A;  EPS 3D W CARTO GA WHOLE CASE.    PT [facility] - NEEDS TIME OF PROCEDURE AHEAD OF TIME TO ARRANGE TRANSPORT.     SECTION, CLASSIC  2016     Section    CHOLECYSTECTOMY  2016    Cholecystectomy    OTHER SURGICAL HISTORY  2016    Sigmoidoscopy (Fiberoptic, Therapeutic )    OTHER SURGICAL HISTORY  2022    Lower back surgery    OTHER SURGICAL HISTORY  2021    Carpal tunnel surgery    TOTAL KNEE ARTHROPLASTY  2016    Knee Replacement     No family history on file.  Social History     Tobacco Use    Smoking status: Never    Smokeless tobacco: Never   Vaping Use    Vaping status: Never Used   Substance Use Topics    Alcohol use: Never    Drug use: Never       Physical Exam   ED Triage Vitals [07/15/24 2143]   Temperature Heart Rate Respirations BP   36.3 °C (97.3 °F) (!) 140 18 131/87      Pulse Ox Temp Source Heart Rate Source Patient Position   97 % Temporal -- Sitting      BP Location FiO2 (%)     Right arm --       Physical Exam  Vitals and nursing note reviewed.    Constitutional:       Appearance: Normal appearance.   HENT:      Head: Normocephalic and atraumatic.      Nose: Nose normal.      Mouth/Throat:      Mouth: Mucous membranes are moist.   Eyes:      Extraocular Movements: Extraocular movements intact.      Pupils: Pupils are equal, round, and reactive to light.   Cardiovascular:      Rate and Rhythm: Tachycardia present. Rhythm irregular.   Pulmonary:      Effort: Pulmonary effort is normal.      Breath sounds: Normal breath sounds.   Abdominal:      General: Abdomen is flat.      Palpations: Abdomen is soft.   Musculoskeletal:         General: Normal range of motion.      Cervical back: Normal range of motion.   Skin:     General: Skin is warm and dry.      Capillary Refill: Capillary refill takes less than 2 seconds.   Neurological:      General: No focal deficit present.      Mental Status: She is alert.   Psychiatric:         Mood and Affect: Mood normal.         ED Course & MDM   Diagnoses as of 07/15/24 2351   Longstanding persistent atrial fibrillation (Multi)   SVT (supraventricular tachycardia) (CMS-HCC)       Medical Decision Making      Medical Decision Making: Patient was given 5 mg Lopressor IV x 1 pressure dropped to 70 systolic.  500 cc bolus was given pressure is now 126 systolic.  She was given digoxin IV as well as amiodarone bolus and drip was started.  Her heart rate did improve but she still in atrial fibrillation.  At this time she will be hospitalized for further management and treatment.  [unfilled]     EKG interpreted by myself (ED attending physician): Heart rate 139 SVT marked ST abnormalities subendocardial leads, left axis deviation normal intervals otherwise    Differential Diagnoses Considered: Refractory V. tach refractory A-fib    Chronic Medical Conditions Significantly Affecting Care: Longstanding history of this    External Records Reviewed: I reviewed recent and relevant outside records including: Previous lab work and chest  x-rays    Social Determinants of Health Significantly Affecting Care: Resides with family    Diagnostic testing considered: CT chest    Brooke Hardy D.O.  Emergency Medicine          Procedure  Procedures     Brooke Hardy DO  07/15/24 [id number]

## 2024-07-16 NOTE — PROGRESS NOTES
Pharmacy Medication History Review    Debbie Rees is a 74 y.o. female admitted for Longstanding persistent atrial fibrillation (Multi). Pharmacy reviewed the patient's [id number] medications and allergies for accuracy.    The list below reflectives the updated PTA list. Please review each medication in order reconciliation for additional clarification and justification.  Medications Prior to Admission   Medication Sig Dispense Refill Last Dose    [START ON 7/17/2024] amiodarone (Pacerone) 200 mg tablet Take 2 tablets (400 mg) by mouth once daily for 14 days.Then take 1 tablet (200mg) once daily thereafter 45 tablet 0 Unknown    amiodarone (Pacerone) 400 mg tablet Take 1 tablet (400 mg) by mouth 2 times a day for 3 doses. (Take first) 3 tablet 0 7/15/2024    apixaban (Eliquis) 5 mg tablet Take 1 tablet (5 mg) by mouth 2 times a day. (Patient taking differently: Take 1 tablet (5 mg) by mouth 2 times a day.) 180 tablet 1 7/15/2024    magnesium oxide (Mag-Ox) 400 mg tablet Take 1 tablet (400 mg) by mouth 4 times a week. Take every Monday, Tuesday, Wednesday, and Friday   7/15/2024    metoprolol succinate XL (Toprol-XL) 100 mg 24 hr tablet Take 1 tablet (100 mg) by mouth 2 times a day. Do not crush or chew. 60 tablet 2 7/15/2024    pantoprazole (ProtoNix) 40 mg EC tablet Take 1 tablet (40 mg) by mouth 2 times a day before meals. Do not crush, chew, or split. 60 tablet 0 7/15/2024    [START ON 7/31/2024] amiodarone (Pacerone) 200 mg tablet Take 1 tablet (200 mg) by mouth once daily. Do not fill before July 31, 2024. 30 tablet 0 Unknown    metoprolol tartrate (Lopressor) 25 mg tablet Take 1 tablet (25 mg) by mouth if needed (for recurrent palpitations) for up to 30 doses. 30 tablet 0 Unknown    semaglutide (OZEMPIC) 1 mg/dose (4 mg/3 mL) pen injector Inject 1 mg under the skin 1 (one) time per week. 9 mL 1 Unknown        The list below reflectives the updated allergy list. Please review each documented allergy  for additional clarification and justification.  Allergies  Reviewed by Gilson Kan, RN on 7/16/2024        Severity Reactions Comments    Procaine Medium Other, Palpitations palpitations    Empagliflozin Low Unknown             Below are additional concerns with the patient's PTA list.  Medications confirmed with patient. She was discharged yesterday and re-presented within a couple hours. No medication changes per patient other than amiodarone.     Yousif Davis [facility]

## 2024-07-16 NOTE — CARE PLAN
The patient's goals for the shift include  getting some sleep     The clinical goals for the shift include Pt. will remain HDS    Over the shift, the patient did not make progress toward the following goals. Barriers to progression include frequent checks. Recommendations to address these barriers include group nursing care.

## 2024-07-16 NOTE — HOSPITAL COURSE
Debbie Rees is a 74 y.o. female with a PMH of PMH of HCM, HTN, PVD s/p EVLA, OA, T2DM, and paroxysmal A-fib s/p ablation 7/8/24 at [facility] [place] [place], who presented to [facility] on 7/15 with c/o sudden palpitations associated with chest discomfort, SOB, nausea, and dizziness.  She took additional Amiodarone 200 mg prior to coming to the ED without resolution of symptoms.     Patient was recently admitted for the same medical issue and was just discharged earlier that day with instructions to continue Amiodarone 400 mg twice a day to finish total of 3 days, then start taking Amiodarone 400 mg for 14 days, then Amiodarone 200 mg daily but unfortunately had to come back for uncontrolled symptomatic palpitations.      Patient follows Dr. Rodriguez as her primary cardiologist who recommend that patient go the ED for treatment and potentially needing transition to Tikosyn since PO Amiodarone not helping. Patient was put on an amiodarone drip and monitored on telemetry. She was transitioned to oral amiodarone for ___ days with no recurrence of A-fib.    Patient was discharged on the following regimen ***.

## 2024-07-16 NOTE — CONSULTS
Inpatient consult to Cardiology  Consult performed by: LEAH Guadarrama-CNP  Consult ordered by: Lm Garcia MD  Reason for consult: afib rvr          History Of Present Illness  Debbie Rees is a 74 y.o. female presenting with complaints of palpitations, chest pain, diaphoresis, and shortness of breath. She was just discharged yesterday where she had been hospitalized for AF with RVR. She was started on an amiodarone gtt and transitioned to PO and advised to take an additional Amiodarone if she felt the palpitations. She did take amiodarone 200mg around 6pm when she started feeling the palpitations and by 8pm she was feeling worse so she came in the ER. She states her symptoms were not as bad this time as previous.     Lab work in the ER showed glucose 151, sodium 136, potassium 4.3, BUN/Cr 18/1.16, , troponin 56, WBC 10.2, H&H 13.3/41.4.     EKG showed atrial fibrillation with RVR. She was started on an amiodarone gtt and admitted for further care.       Past Medical History  Past Medical History:   Diagnosis Date    Abdominal pain of multiple sites 06/22/2023    Acute sinusitis 06/22/2023    Acute upper respiratory infection, unspecified 03/14/2017    Acute URI    Anxiety disorder, unspecified     Anxiety    Biceps tendinitis of left upper extremity 06/22/2023    Burning with urination 06/22/2023    Cervical pain 06/22/2023    Cervicalgia     Cervical pain (neck)    Encounter for screening for other viral diseases 12/14/2017    Need for hepatitis C screening test    Hand numbness 12/26/2023    Morbid (severe) obesity due to excess calories (Multi)     Morbid obesity    Pain in left knee     Acute pain of left knee    Paroxysmal SVT (supraventricular tachycardia) (CMS-HCC) 12/26/2023    Personal history of other diseases of the circulatory system     History of hypertension    Personal history of other diseases of the musculoskeletal system and connective tissue     History of arthritis     Personal history of other drug therapy 2019    History of influenza vaccination    Personal history of other endocrine, nutritional and metabolic disease     History of hyperlipidemia    Personal history of other endocrine, nutritional and metabolic disease     History of diabetes mellitus    Personal history of other specified conditions 2017    History of dysuria    Pyogenic granuloma of skin and subcutaneous tissue 2023    Streptococcal pharyngitis 2017    Strep sore throat    Thoracic spine pain 2023    Unspecified abdominal hernia without obstruction or gangrene     Abdominal hernia without obstruction and without gangrene    Unspecified asthma, uncomplicated (HHS-HCC) 2017    Asthmatic bronchitis    Urinary tract infection, site not specified 2017    Acute UTI   Hypertrophic CMO, HTN, venous insufficiency s/p EVLA, PVD, OA, diabetes mellitus type 2, paroxysmal atrial fibrillation     Surgical History  Past Surgical History:   Procedure Laterality Date    CARDIAC ELECTROPHYSIOLOGY PROCEDURE N/A 2024    Procedure: Ablation A-Fib Persistant;  Surgeon: Ney Sommers MD;  Location: [place] [address] [facility];  Service: Electrophysiology;  Laterality: N/A;  EPS 3D W CARTO GA WHOLE CASE.    PT [facility] - NEEDS TIME OF PROCEDURE AHEAD OF TIME TO ARRANGE TRANSPORT.     SECTION, CLASSIC  2016     Section    CHOLECYSTECTOMY  2016    Cholecystectomy    OTHER SURGICAL HISTORY  2016    Sigmoidoscopy (Fiberoptic, Therapeutic )    OTHER SURGICAL HISTORY  2022    Lower back surgery    OTHER SURGICAL HISTORY  2021    Carpal tunnel surgery    TOTAL KNEE ARTHROPLASTY  2016    Knee Replacement        Social History  She reports that she has never smoked. She has never used smokeless tobacco. She reports that she does not drink alcohol and does not use drugs.    Family History  No family history on file.     Allergies  Procaine and  "Empagliflozin    Review of Systems  Review of Systems   Constitutional:  Positive for diaphoresis. Negative for chills and fever.   Respiratory:  Positive for shortness of breath. Negative for cough.    Cardiovascular:  Positive for chest pain and palpitations. Negative for leg swelling.   Gastrointestinal:  Positive for nausea. Negative for abdominal distention and abdominal pain.   Neurological:  Negative for dizziness and weakness.   All other systems reviewed and are negative.         Physical Exam  Constitutional: Well developed, awake/alert/oriented x3, no distress, alert and cooperative  Eyes: PERRL, EOMI, clear sclera  ENMT: mucous membranes moist, no apparent injury, no lesions seen  Head/Neck: Neck supple, no apparent injury, thyroid without mass or tenderness, No JVD, trachea midline, no bruits  Respiratory/Thorax: Patent airways, CTAB, normal breath sounds with good chest expansion, thorax symmetric  Cardiovascular: Regular, rate and rhythm, no murmurs, 2+ equal pulses of the extremities, normal S 1and S 2  Gastrointestinal: Nondistended, soft, non-tender, no rebound tenderness or guarding, no masses palpable, no organomegaly, +BS, no bruits  Musculoskeletal: ROM intact, no joint swelling, normal strength  Extremities: normal extremities, no cyanosis edema, contusions or wounds, no clubbing  Neurological: alert and oriented x3, intact senses, motor, response and reflexes, normal strength  Lymphatic: No significant lymphadenopathy  Psychological: Appropriate mood and behavior  Skin: Warm and dry, no lesions, no rashes       Last Recorded Vitals  Blood pressure 113/63, pulse 62, temperature 36 °C (96.8 °F), temperature source Temporal, resp. rate 13, height 1.676 m (5' 6\"), weight 113 kg (248 lb 3.8 oz), SpO2 97%.    Relevant Results    Scheduled medications  apixaban, 5 mg, oral, BID  busPIRone, 5 mg, oral, BID  insulin lispro, 0-5 Units, subcutaneous, TID  metoprolol succinate XL, 100 mg, oral, " BID  pantoprazole, 40 mg, oral, BID AC      Continuous medications  amiodarone, 0.5 mg/min, Last Rate: 0.5 mg/min (07/16/24 1118)      PRN medications  PRN medications: melatonin **AND** acetaminophen **AND** sennosides-docusate sodium, dextrose, dextrose, glucagon, glucagon    Results for orders placed or performed during the hospital encounter of 07/15/24 (from the past 24 hour(s))   ECG 12 lead   Result Value Ref Range    Ventricular Rate 139 BPM    QRS Duration 80 ms    QT Interval 296 ms    QTC Calculation(Bazett) 450 ms    R Axis -27 degrees    T Axis 73 degrees    QRS Count 23 beats    Q Onset 216 ms    T Offset 364 ms    QTC Fredericia 391 ms   CBC and Auto Differential   Result Value Ref Range    WBC 10.2 4.4 - 11.3 x10*3/uL    nRBC 0.0 0.0 - 0.0 /100 WBCs    RBC 4.31 4.00 - 5.20 x10*6/uL    Hemoglobin 13.3 12.0 - 16.0 g/dL    Hematocrit 41.4 36.0 - 46.0 %    MCV 96 80 - 100 fL    MCH 30.9 26.0 - 34.0 pg    MCHC 32.1 32.0 - 36.0 g/dL    RDW 13.2 11.5 - 14.5 %    Platelets 311 150 - 450 x10*3/uL    Neutrophils % 62.2 40.0 - 80.0 %    Immature Granulocytes %, Automated 0.3 0.0 - 0.9 %    Lymphocytes % 29.0 13.0 - 44.0 %    Monocytes % 6.4 2.0 - 10.0 %    Eosinophils % 1.7 0.0 - 6.0 %    Basophils % 0.4 0.0 - 2.0 %    Neutrophils Absolute 6.35 (H) 1.60 - 5.50 x10*3/uL    Immature Granulocytes Absolute, Automated 0.03 0.00 - 0.50 x10*3/uL    Lymphocytes Absolute 2.96 0.80 - 3.00 x10*3/uL    Monocytes Absolute 0.65 0.05 - 0.80 x10*3/uL    Eosinophils Absolute 0.17 0.00 - 0.40 x10*3/uL    Basophils Absolute 0.04 0.00 - 0.10 x10*3/uL   Magnesium   Result Value Ref Range    Magnesium 1.67 1.60 - 2.40 mg/dL   Comprehensive metabolic panel   Result Value Ref Range    Glucose 151 (H) 74 - 99 mg/dL    Sodium 136 136 - 145 mmol/L    Potassium 4.3 3.5 - 5.3 mmol/L    Chloride 102 98 - 107 mmol/L    Bicarbonate 26 21 - 32 mmol/L    Anion Gap 12 10 - 20 mmol/L    Urea Nitrogen 18 6 - 23 mg/dL    Creatinine 1.16 (H) 0.50 -  1.05 mg/dL    eGFR 50 (L) >60 mL/min/1.73m*2    Calcium 9.4 8.6 - 10.3 mg/dL    Albumin 3.7 3.4 - 5.0 g/dL    Alkaline Phosphatase 69 33 - 136 U/L    Total Protein 7.3 6.4 - 8.2 g/dL    AST 22 9 - 39 U/L    Bilirubin, Total 0.4 0.0 - 1.2 mg/dL    ALT 29 7 - 45 U/L   B-Type Natriuretic Peptide   Result Value Ref Range     (H) 0 - 99 pg/mL   Troponin I, High Sensitivity, Initial   Result Value Ref Range    Troponin I, High Sensitivity 56 (HH) 0 - 13 ng/L   Troponin, High Sensitivity, 1 Hour   Result Value Ref Range    Troponin I, High Sensitivity 51 (HH) 0 - 13 ng/L   Phosphorus   Result Value Ref Range    Phosphorus 2.7 2.5 - 4.9 mg/dL   CBC   Result Value Ref Range    WBC 8.3 4.4 - 11.3 x10*3/uL    nRBC 0.0 0.0 - 0.0 /100 WBCs    RBC 3.86 (L) 4.00 - 5.20 x10*6/uL    Hemoglobin 11.9 (L) 12.0 - 16.0 g/dL    Hematocrit 37.9 36.0 - 46.0 %    MCV 98 80 - 100 fL    MCH 30.8 26.0 - 34.0 pg    MCHC 31.4 (L) 32.0 - 36.0 g/dL    RDW 13.2 11.5 - 14.5 %    Platelets 241 150 - 450 x10*3/uL   Comprehensive Metabolic Panel   Result Value Ref Range    Glucose 116 (H) 74 - 99 mg/dL    Sodium 139 136 - 145 mmol/L    Potassium 3.9 3.5 - 5.3 mmol/L    Chloride 105 98 - 107 mmol/L    Bicarbonate 27 21 - 32 mmol/L    Anion Gap 11 10 - 20 mmol/L    Urea Nitrogen 16 6 - 23 mg/dL    Creatinine 1.01 0.50 - 1.05 mg/dL    eGFR 59 (L) >60 mL/min/1.73m*2    Calcium 9.0 8.6 - 10.3 mg/dL    Albumin 3.2 (L) 3.4 - 5.0 g/dL    Alkaline Phosphatase 59 33 - 136 U/L    Total Protein 6.1 (L) 6.4 - 8.2 g/dL    AST 17 9 - 39 U/L    Bilirubin, Total 0.3 0.0 - 1.2 mg/dL    ALT 22 7 - 45 U/L   Magnesium   Result Value Ref Range    Magnesium 1.63 1.60 - 2.40 mg/dL   Phosphorus   Result Value Ref Range    Phosphorus 2.9 2.5 - 4.9 mg/dL   POCT GLUCOSE   Result Value Ref Range    POCT Glucose 163 (H) 74 - 99 mg/dL       No orders to display       Transthoracic Echo (TTE) Complete    Result Date: 7/11/2024   [facility], [address] [facility],  [place] [id number]               Tel [phone] and Fax [phone] TRANSTHORACIC ECHOCARDIOGRAM REPORT  Patient Name:      VIVIAN KIM      Reading Physician:    [id number] Michael Lozano MD Study Date:        7/11/2024            Ordering Provider:    [id number] RANULFO MELGOZA MRN/PID:           [id number]             Fellow: Accession#:        [id number]         Nurse:                Nina Dickerson RN Date of Birth/Age: 1950 / 74 years  Sonographer:          Dina Rojas RDCS Gender:            F                    Additional Staff: Height:            167.64 cm            Admit Date: Weight:            112.04 kg            Admission Status:     Outpatient BSA / BMI:         2.19 m2 / 39.87      Encounter#:           [id number]                    kg/m2 Blood Pressure:    97/65 mmHg           Department Location:  [facility] Non                                                               Invasive Study Type:    TRANSTHORACIC ECHO (TTE) COMPLETE Diagnosis/ICD: Unspecified atrial fibrillation-I48.91 Indication:    Atrial Fibrillation CPT Code:      Echo Complete w Full [id number] Patient History: Diabetes:          Yes Pertinent History: HTN, HCM, Chest Pain and A-Fib. S/P cardiac ablation                    (7/8/23). Study Detail: The following Echo studies were performed: 2D, M-Mode, Doppler and               color flow. Technically challenging study due to prominent lung               artifact. Definity used as a contrast agent for endocardial border               definition. Total contrast used for this procedure was 1.0 mL via               IV push. The patient was awake.  PHYSICIAN INTERPRETATION: Left Ventricle: The left ventricular systolic function is normal, with a visually estimated ejection fraction of  70%. There are no regional wall motion abnormalities. The left ventricular cavity size is decreased. The left ventricular septal wall thickness is severely increased. There is moderately increased left ventricular posterior wall thickness. There is moderate to severe concentric left ventricular hypertrophy involving the septal wall. Findings are consistent with hypertrophic cardiomyopathy. Spectral Doppler shows an impaired relaxation pattern of left ventricular diastolic filling. There is a mild mid cavity left ventricular obstruction. The resting gradient is 25 mmHg.The provoked gradient is 33 mmHg. Left Atrium: The left atrium is moderately dilated. Right Ventricle: The right ventricle is normal in size. There is normal right ventricular global systolic function. Right Atrium: The right atrium is mild to moderately dilated. Aortic Valve: The aortic valve is trileaflet. There is mild aortic valve cusp calcification. The aortic valve dimensionless index is 0.89. There is no evidence of aortic valve regurgitation. The peak instantaneous gradient of the aortic valve is 10.6 mmHg. The mean gradient of the aortic valve is 7.0 mmHg. Mitral Valve: The mitral valve is normal in structure. There is moderate mitral annular calcification. There is mild to moderate mitral valve regurgitation. Tricuspid Valve: The tricuspid valve is structurally normal. There is mild tricuspid regurgitation. Pulmonic Valve: The pulmonic valve is structurally normal. There is physiologic pulmonic valve regurgitation. Pericardium: There is a trivial pericardial effusion. Aorta: The aortic root is normal. Pulmonary Artery: The tricuspid regurgitant velocity is 2.82 m/s, and with an estimated right atrial pressure of 3 mmHg, the estimated pulmonary artery pressure is mildly elevated with the RVSP at 34.8 mmHg. Pulmonary Veins: The pulmonary veins appear normal and return normally to the left atrium. Systemic Veins: The inferior vena cava appears  to be of normal size.  CONCLUSIONS:  1. The left ventricular systolic function is normal, with a visually estimated ejection fraction of 70%.  2. Spectral Doppler shows an impaired relaxation pattern of left ventricular diastolic filling.  3. Left ventricular cavity size is decreased.  4. Severely increased left ventricular septal thickness.  5. The left ventricular posterior wall thickness is moderately increased.  6. There is hypertrophic cardiomyopathy.  7. There is moderate to severe concentric left ventricular hypertrophy.  8. There is a mild mid cavity left ventricular obstruction.  9. There is normal right ventricular global systolic function. 10. The left atrium is moderately dilated. 11. The right atrium is mild to moderately dilated. 12. There is moderate mitral annular calcification. 13. Mild to moderate mitral valve regurgitation. 14. Mild pulmonary HTN. Estimated CVP is normal. QUANTITATIVE DATA SUMMARY: 2D MEASUREMENTS:                          Normal Ranges: Ao Root d:     3.10 cm   (2.0-3.7cm) IVSd:          1.80 cm   (0.6-1.1cm) LVPWd:         1.45 cm   (0.6-1.1cm) LVIDd:         3.32 cm   (3.9-5.9cm) LVIDs:         2.47 cm LV Mass Index: 94.2 g/m2 LV % FS        25.6 % LA VOLUME:                               Normal Ranges: LA Vol A4C:        65.6 ml    (22+/-6mL/m2) LA Vol A2C:        70.9 ml LA Vol BP:         70.6 ml LA Vol Index A4C:  30.0ml/m2 LA Vol Index A2C:  32.4 ml/m2 LA Vol Index BP:   32.3 ml/m2 LA Area A4C:       23.4 cm2 LA Area A2C:       23.5 cm2 LA Major Axis A4C: 7.1 cm LA Major Axis A2C: 6.6 cm LA Volume Index:   30.3 ml/m2 LA Vol A4C:        61.7 ml LA Vol A2C:        66.5 ml RA VOLUME BY A/L METHOD:                       Normal Ranges: RA Area A4C: 20.8 cm2 M-MODE MEASUREMENTS:                  Normal Ranges: Ao Root: 3.00 cm (2.0-3.7cm) AoV Exc: 2.00 cm (1.5-2.5cm) LAs:     4.90 cm (2.7-4.0cm) AORTA MEASUREMENTS:                      Normal Ranges: AoV Exc:     2.00 cm  (1.5-2.5cm) Ao Sinus, d: 3.10 cm (2.1-3.5cm) Asc Ao, d:   3.30 cm (2.1-3.4cm) LV SYSTOLIC FUNCTION BY 2D PLANIMETRY (MOD):                      Normal Ranges: EF-A4C View:    61 % (>=55%) EF-A2C View:    62 % EF-Biplane:     63 % EF-Visual:      70 % LV EF Reported: 70 % LV DIASTOLIC FUNCTION:                            Normal Ranges: MV Peak E:      0.78 m/s   (0.7-1.2 m/s) MV Peak A:      0.74 m/s   (0.42-0.7 m/s) E/A Ratio:      1.07       (1.0-2.2) MV e'           0.071 m/s  (>8.0) MV lateral e'   0.07 m/s MV medial e'    0.07 m/s E/e' Ratio:     11.07      (<8.0) PulmV Sys Salazar:  39.40 cm/s PulmV Guillen Salazar: 31.10 cm/s PulmV S/D Salazar:  1.00 MITRAL VALVE:                 Normal Ranges: MV DT: 184 msec (150-240msec) AORTIC VALVE:                                    Normal Ranges: AoV Vmax:                1.63 m/s  (<=1.7m/s) AoV Peak PG:             10.6 mmHg (<20mmHg) AoV Mean P.0 mmHg  (1.7-11.5mmHg) LVOT Max Salazar:            1.36 m/s  (<=1.1m/s) AoV VTI:                 34.40 cm  (18-25cm) LVOT VTI:                30.60 cm LVOT Diameter:           2.00 cm   (1.8-2.4cm) AoV Area, VTI:           2.79 cm2  (2.5-5.5cm2) AoV Area,Vmax:           2.62 cm2  (2.5-4.5cm2) AoV Dimensionless Index: 0.89  RIGHT VENTRICLE: RV Basal 2.67 cm RV Mid   2.16 cm RV Major 6.5 cm TAPSE:   14.6 mm RV s'    0.17 m/s TRICUSPID VALVE/RVSP:                             Normal Ranges: Peak TR Velocity: 2.82 m/s RV Syst Pressure: 34.8 mmHg (< 30mmHg) IVC Diam:         1.82 cm PULMONIC VALVE:                      Normal Ranges: PV Max Salazar: 1.2 m/s  (0.6-0.9m/s) PV Max P.6 mmHg Pulmonary Veins: PulmV Guillen Salazar: 31.10 cm/s PulmV S/D Salazar:  1.00 PulmV Sys Salazar:  39.40 cm/s  [id number] Michael Lozano MD Electronically signed on 2024 at 8:45:01 PM  ** Final **     Transthoracic Echo (TTE) Complete    Result Date: 3/29/2024   [facility], [address] [place] [address]               Tel [phone] and  Fax [phone] TRANSTHORACIC ECHOCARDIOGRAM REPORT  Patient Name:      VIVIAN KIM      Reading Physician:    [id number] Dania Gutierrez MD Study Date:        3/29/2024            Ordering Provider:    [id number] RANULFO MELGOZA MRN/PID:           [id number]             Fellow: Accession#:        [id number]         Nurse:                Nina Dickerson RN Date of Birth/Age: 1950 / 74 years  Sonographer:          Janneth Collins                                                               RDCS Gender:            F                    Additional Staff: Height:            167.64 cm            Admit Date:           3/28/2024 Weight:            113.40 kg            Admission Status:     Inpatient -                                                               Routine BSA / BMI:         2.20 m2 / 40.35      Encounter#:           [id number]                    kg/m2                                         Department Location:  [facility] Non                                                               Invasive Blood Pressure: 128 /85 mmHg Study Type:    TRANSTHORACIC ECHO (TTE) COMPLETE Diagnosis/ICD: Unspecified atrial fibrillation-I48.91 Indication:    Atrial Fibrillation CPT Code:      Echo Complete w Full Doppler-[id number] Patient History: Diabetes:          Yes Pertinent History: HTN and A-Fib. HCM. Study Detail: The following Echo studies were performed: 2D, M-Mode, Doppler and               color flow. Technically challenging study due to poor acoustic               windows. Definity used as a contrast agent for endocardial border               definition. Total contrast used for this procedure was 1 mL via IV               push.  PHYSICIAN INTERPRETATION: Left Ventricle: The left ventricular systolic function is hyperdynamic, with an estimated ejection fraction of 65-70%. There are no regional wall  motion abnormalities. The left ventricular cavity size is normal. Left ventricular diastolic filling was indeterminate. Left Atrium: The left atrium is mildly dilated. Right Ventricle: The right ventricle is normal in size. There is normal right ventricular global systolic function. Right Atrium: The right atrium is normal in size. Aortic Valve: The aortic valve is trileaflet. There is no evidence of aortic valve regurgitation. The peak instantaneous gradient of the aortic valve is 8.3 mmHg. The mean gradient of the aortic valve is 4.9 mmHg. Mitral Valve: The mitral valve is normal in structure. There is trace to mild mitral valve regurgitation. Tricuspid Valve: The tricuspid valve is structurally normal. There is trace to mild tricuspid regurgitation. Pulmonic Valve: The pulmonic valve is not well visualized. The pulmonic valve regurgitation was not well visualized. Pericardium: There is no pericardial effusion noted. Aorta: The aortic root is normal. Pulmonary Artery: The tricuspid regurgitant velocity is 2.25 m/s, and with an estimated right atrial pressure of 3 mmHg, the estimated pulmonary artery pressure is normal with the RVSP at 23.3 mmHg.  CONCLUSIONS:  1. Left ventricular systolic function is hyperdynamic with a 65-70% estimated ejection fraction. QUANTITATIVE DATA SUMMARY: 2D MEASUREMENTS:                           Normal Ranges: IVSd:          1.84 cm    (0.6-1.1cm) LVPWd:         1.09 cm    (0.6-1.1cm) LVIDd:         3.98 cm    (3.9-5.9cm) LVIDs:         2.50 cm LV Mass Index: 101.3 g/m2 LV % FS        37.1 % LA VOLUME:                              Normal Ranges: LA Vol A4C:       52.5 ml    (22+/-6mL/m2) LA Vol A2C:       53.8 ml LA Vol BP:        53.9 ml LA Vol Index A4C: 23.9 ml/m2 LA Vol Index A2C: 24.5 ml/m2 LA Vol Index BP:  24.5 ml/m2 LA Volume Index:  24.5 ml/m2 LA Vol A4C:       51.0 ml LA Vol A2C:       50.8 ml RA VOLUME BY A/L METHOD:                       Normal Ranges: RA Area A4C: 13.6 cm2  M-MODE MEASUREMENTS:                  Normal Ranges: Ao Root: 2.90 cm (2.0-3.7cm) LAs:     4.17 cm (2.7-4.0cm) LV SYSTOLIC FUNCTION BY 2D PLANIMETRY (MOD):                     Normal Ranges: EF-A4C View: 68.9 % (>=55%) EF-A2C View: 67.6 % EF-Biplane:  68.5 % LV DIASTOLIC FUNCTION:                     Normal Ranges: MV Peak E: 0.83 m/s (0.7-1.2 m/s) MITRAL VALVE:                 Normal Ranges: MV DT: 197 msec (150-240msec) AORTIC VALVE:                                   Normal Ranges: AoV Vmax:                1.44 m/s (<=1.7m/s) AoV Peak P.3 mmHg (<20mmHg) AoV Mean P.9 mmHg (1.7-11.5mmHg) LVOT Max Salazar:            1.35 m/s (<=1.1m/s) AoV VTI:                 27.74 cm (18-25cm) LVOT VTI:                21.49 cm LVOT Diameter:           1.98 cm  (1.8-2.4cm) AoV Area, VTI:           2.38 cm2 (2.5-5.5cm2) AoV Area,Vmax:           2.90 cm2 (2.5-4.5cm2) AoV Dimensionless Index: 0.77 TRICUSPID VALVE/RVSP:                             Normal Ranges: Peak TR Velocity: 2.25 m/s RV Syst Pressure: 23.3 mmHg (< 30mmHg) PULMONIC VALVE:                      Normal Ranges: PV Max Salazar: 1.2 m/s  (0.6-0.9m/s) PV Max P.2 mmHg  [id number] Dania Gutierrez MD Electronically signed on 3/29/2024 at 11:55:50 AM  ** Final **         Assessment/Plan   Paroxysmal atrial fibrillation/flutter with RVR  -s/p ablation 24 with Dr. Sommers  -EKG reviewed, atrial flutter,   -Started on amiodarone gtt->continue  -Cont eliquis  -Cont Toprol XL 100mg BID  -Repeat echo unchanged, trivial pericardial effusion  -Monitor on tele     2. Chest Pain/ Elevated troponin-Non MI elevation 2/2 AF RVR and recent ablation  -Does report CP and SOB   -Troponin 56, 51  -CXR negative  -I reviewed the EKG, no acute changes, ST elevation, or depression  -Stress test in 2024 negative for ischemia  -Cont metoprolol  -Consider ischemic work up, may need LHC to rule out cardiovascular ischemia for angina in absence of rapid AF     3.  Hypertension  -stable  -2gm na diet  -cont meds  -monitor     4. Diabetes Mellitus type 2  -ISS  -Accuchecks  -hgb A1C 7.4%     LEAH Guadarrama-CNP

## 2024-07-16 NOTE — PROGRESS NOTES
"Subjective   Subjective:   History Of Present Illness:  Debbie Rees is a 74 y.o. female with a PMH of HTN, HCM, OA, T2DM,and paroxysmal A-fib s/p ablation 7/8/24 presenting with paroxysmal A-fib. She was seen and evaluated at bedside today. Overnight, no reported acute events. This morning, she was not in acute distress.   Review Of Systems:  11-point ROS was performed and is negative except as noted below and in the HPI.     Review of Systems   Constitutional:  Negative for chills, diaphoresis, fatigue and fever.   Respiratory:  Negative for apnea, cough, chest tightness, shortness of breath and wheezing.    Cardiovascular:  Negative for chest pain and palpitations.   Gastrointestinal:  Negative for abdominal distention, abdominal pain, constipation, diarrhea, nausea and vomiting.        Objective   Objective:     /63 (BP Location: Right arm, Patient Position: Sitting)   Pulse 62   Temp 36 °C (96.8 °F) (Temporal)   Resp 13   Ht 1.676 m (5' 6\")   Wt 113 kg (248 lb 3.8 oz)   SpO2 97%   BMI 40.07 kg/m²     Physical Exam  Constitutional:       Appearance: Normal appearance. She is normal weight.   Cardiovascular:      Rate and Rhythm: Normal rate and regular rhythm.      Pulses: Normal pulses.      Heart sounds: Normal heart sounds.   Pulmonary:      Effort: Pulmonary effort is normal. No respiratory distress.      Breath sounds: Normal breath sounds. No stridor. No wheezing, rhonchi or rales.   Abdominal:      General: Abdomen is flat. Bowel sounds are normal. There is no distension.      Palpations: Abdomen is soft.      Tenderness: There is no abdominal tenderness.   Neurological:      General: No focal deficit present.      Mental Status: She is oriented to person, place, and time.       Lab Work:     Lab Results   Component Value Date    WBC 8.3 07/16/2024    HGB 11.9 (L) 07/16/2024    HCT 37.9 07/16/2024    MCV 98 07/16/2024     07/16/2024     Lab Results   Component Value Date    GLUCOSE " 116 (H) 07/16/2024    CALCIUM 9.0 07/16/2024     07/16/2024    K 3.9 07/16/2024    CO2 27 07/16/2024     07/16/2024    BUN 16 07/16/2024    CREATININE 1.01 07/16/2024     POC HEMOGLOBIN A1c   Date Value Ref Range Status   04/11/2024 7.7 (A) 4.2 - 6.5 % Final     Hemoglobin A1C   Date Value Ref Range Status   07/09/2024 7.4 (H) see below % Final   03/28/2024 8.3 (H) see below % Final     Thyroid Stimulating Hormone   Date Value Ref Range Status   07/12/2024 1.55 0.44 - 3.98 mIU/L Final   07/09/2024 0.68 0.44 - 3.98 mIU/L Final   03/28/2024 1.47 0.44 - 3.98 mIU/L Final     Cultures:   No results found for the last 90 days.    Images:     No orders to display      Medications:     Scheduled:  apixaban, 5 mg, oral, BID  busPIRone, 5 mg, oral, BID  insulin lispro, 0-5 Units, subcutaneous, TID  magnesium sulfate, 4 g, intravenous, Once  metoprolol succinate XL, 100 mg, oral, BID  pantoprazole, 40 mg, oral, BID AC    Continuous:  amiodarone, 0.5 mg/min, Last Rate: 0.5 mg/min (07/16/24 1118)      PRN:  PRN medications: melatonin **AND** acetaminophen **AND** sennosides-docusate sodium, dextrose, dextrose, glucagon, glucagon     Assessment & Plan:   Debbie Rees is a 74 y.o. female with a PMH of PMH of HCM, HTN, PVD s/p EVLA, OA, T2DM, and paroxysmal A-fib [place] [facility] [place] [facility] with c/o sudden palpitations associated with chest discomfort.  Admitted for uncontrolled A-fib requiring Amio drip. Cardiology consulted and will consider starting Tikosyn.      ACUTE MEDICAL ISSUES:  #Uncontrolled/Recurrent A-fib RVR:  - s/p ablation on 7/8/24  - pt was recently discharged from [facility] with PO Amio regiment  - ECHO 7/11/24 showed EF 70% with severely increased left ventricular septal thickness  - EKG showed , supraventricular tachycardia, QTc 450  - troponin 56 > 51  -   - in the ED, started Amio bolus + drip  PLAN:  - cardiology consulted, Dr. Rodriguez is aware, may start  Tikosyn  - continue Amio drip + Eliquis, Metoprolol  mg BI  - telemetry  - NPO until cardiology sees the patient     CHRONIC MEDICAL ISSUES:  #HTN - continue home Metoprolol  mg BID; Lisinopril 20 mg and Hydrochlorothiazide 25 mg were discontinued on prior discharge due to JOSE and normal BP. Her BP is currently well controlled so will hold off on restarting these home meds.  #T2DM - A1c 7.4%, continue home sliding scale inpatient, HOLDING Ozempic.     Fluid: Replete PRN  Electrolytes: Replete PRN  Nutrition: Diabetic/Cardiology; NPO until cardiology clears  GI ppx: Pantoprazole  DVT/PE ppx: Eliquis  Abx: none   IV Lines: PIV  O2: RA     Dispo: admitted for symptomatic recurrent palpitations requiring Amio drip. Cardiology consulted Estimated length of stay <2 days. Likely will discharge home once medically clear.     Ignacio Dickerson, PGY-1  Transitional Year    Disclaimer: Documentation completed with the information available at the time of input. The times in the chart may not be reflective of actual patient care times, interventions, or procedures. Documentation occurs after the physical care of the patient.

## 2024-07-16 NOTE — ED PROCEDURE NOTE
Procedure  Critical Care    Performed by: Brooke Hardy DO  Authorized by: Brooke Hardy DO    Critical care provider statement:     Critical care time (minutes):  38    Critical care time was exclusive of:  Separately billable procedures and treating other patients    Critical care was necessary to treat or prevent imminent or life-threatening deterioration of the following conditions:  Circulatory failure    Critical care was time spent personally by me on the following activities:  Blood draw for specimens, development of treatment plan with patient or surrogate, discussions with consultants, discussions with primary provider, evaluation of patient's response to treatment, re-evaluation of patient's condition, pulse oximetry, ordering and review of radiographic studies, ordering and review of laboratory studies, ordering and performing treatments and interventions, examination of patient and obtaining history from patient or surrogate    Care discussed with: admitting provider                 Brooke Hardy DO  07/15/24 6276

## 2024-07-16 NOTE — PROGRESS NOTES
07/16/24 0952   Discharge Planning   Living Arrangements Spouse/significant other   Support Systems Spouse/significant other;Children;Family members   Assistance Needed Alert and oriented x 3, Independent with ADL's, Does not drive([place]), Cane when outside, On Eliquis at home   Type of Residence Private residence   Number of Stairs to Enter Residence 4   Number of Stairs Within Residence 0   Do you have animals or pets at home? No   Who is requesting discharge planning? Provider   Home or Post Acute Services None   Expected Discharge Disposition Home   Does the patient need discharge transport arranged? No   Financial Resource Strain   How hard is it for you to pay for the very basics like food, housing, medical care, and heating? Not hard   Housing Stability   In the last 12 months, was there a time when you were not able to pay the mortgage or rent on time? N   In the past 12 months, how many times have you moved where you were living? 1   At any time in the past 12 months, were you homeless or living in a shelter (including now)? N   Transportation Needs   In the past 12 months, has lack of transportation kept you from medical appointments or from getting medications? no   In the past 12 months, has lack of transportation kept you from meetings, work, or from getting things needed for daily living? No

## 2024-07-16 NOTE — H&P
Subjective   Subjective:   HPI:  Debbie Rees is a 74 y.o. female with a PMH of PMH of HCM, HTN, PVD s/p EVLA, OA, T2DM, and paroxysmal A-fib s/p [place] 7[place]8/24 [place] [facility] [place] who presented to [facility] with c/o sudden palpitations associated with chest discomfort, SOB, nausea, and dizziness starting around 1800 today.  She took additional Amiodarone 200 mg prior to coming to the ED without resolution of symptoms.     Patient was recently admitted for the same medical issue and was just discharged earlier today with instructions to continue Amiodarone 400 mg twice a day to finish total of 3 days, then start taking Amiodarone 400 mg for 14 days, then Amiodarone 200 mg daily but unfortunately had to come back for uncontrolled symptomatic palpitations.  On discharge, her Lisinopril 20 mg and Hydrochlorothiazide 25 mg daily were stopped (likely 2/2 JOSE).     Patient follows Dr. Rodriguez as her primary cardiologist who recommend that patient go the ED for treatment and potentially needing transition to Tikosyn since PO Amiodarone not helping.     ED COURSE:  Vitals:   - /87 ,  , RR 18 , SpO2 97 on RA, T 36.3C  Labs:  - WBC 10.2, HGB 13.3,   - Na 136, Cl 102, K 4.3, bicarb 26, BUN 18, Cr 1.16  - troponin 56 > 51  -   Imaging:  - none  Interventions:   - 500 ml NS bolus, digoxin 500 mcg once -> lopressor 5 mg once -> amio bolus + drip, eliquis 5 mg    PMH: as per HPI.  PSH: ablation   FHX: non-pertinent  SHX: denies tobacco, alcohol, and illicit use  Allergies: Empagliflozin, Procaine  Medications: reviewed.     Code Status: Full code.    ED Course:   Vitals:  /62   Pulse 72   Temp 35.9 °C (96.7 °F) (Temporal)   Resp 12   Wt 113 kg (248 lb 3.8 oz)   SpO2 97%     Labs:  Lab Results   Component Value Date    WBC 10.2 07/15/2024    HGB 13.3 07/15/2024    HCT 41.4 07/15/2024    MCV 96 07/15/2024     07/15/2024     Lab Results   Component Value Date    GLUCOSE 151 (H) 07/15/2024    CALCIUM  9.4 07/15/2024     07/15/2024    K 4.3 07/15/2024    CO2 26 07/15/2024     07/15/2024    BUN 18 07/15/2024    CREATININE 1.16 (H) 07/15/2024     Lab Results   Component Value Date    TROPHS 51 (HH) 07/15/2024     Lab Results   Component Value Date     (H) 07/15/2024     Lab Results   Component Value Date    DDIMERVTE 637 (H) 12/12/2023     Imaging:  No orders to display      Interventions:  Medications   amiodarone (Nexterone) 360 mg in dextrose,iso-osm 200 mL (1.8 mg/mL) infusion (premix) (0.5 mg/min intravenous New Bag 7/15/24 2249)   metoprolol succinate XL (Toprol-XL) 24 hr tablet 100 mg (has no administration in time range)   pantoprazole (ProtoNix) EC tablet 40 mg (has no administration in time range)   magnesium oxide (Mag-Ox) tablet 400 mg (has no administration in time range)   apixaban (Eliquis) tablet 5 mg (has no administration in time range)   melatonin tablet 3 mg (has no administration in time range)     And   acetaminophen (Tylenol) tablet 975 mg (has no administration in time range)     And   sennosides-docusate sodium (Airam-Colace) 8.6-50 mg per tablet 1 tablet (has no administration in time range)   metoprolol tartrate (Lopressor) injection 5 mg (5 mg intravenous Given 7/15/24 2248)   digoxin (Lanoxin) injection 500 mcg (500 mcg intravenous Given 7/15/24 2248)   amiodarone (Nexterone) 150 mg in dextrose (iso)  mL (0 mg intravenous Stopped 7/15/24 2258)   sodium chloride 0.9 % bolus 500 mL (0 mL intravenous Stopped 7/15/24 2310)   apixaban (Eliquis) tablet 5 mg (5 mg oral Given 7/15/24 2340)       Past Medical History:  She has a past medical history of Abdominal pain of multiple sites (06/22/2023), Acute sinusitis (06/22/2023), Acute upper respiratory infection, unspecified (03/14/2017), Anxiety disorder, unspecified, Biceps tendinitis of left upper extremity (06/22/2023), Burning with urination (06/22/2023), Cervical pain (06/22/2023), Cervicalgia, Encounter for screening  for other viral diseases (2017), Hand numbness (2023), Morbid (severe) obesity due to excess calories (Multi), Pain in left knee, Paroxysmal SVT (supraventricular tachycardia) (CMS-HCC) (2023), Personal history of other diseases of the circulatory system, Personal history of other diseases of the musculoskeletal system and connective tissue, Personal history of other drug therapy (2019), Personal history of other endocrine, nutritional and metabolic disease, Personal history of other endocrine, nutritional and metabolic disease, Personal history of other specified conditions (2017), Pyogenic granuloma of skin and subcutaneous tissue (2023), Streptococcal pharyngitis (2017), Thoracic spine pain (2023), Unspecified abdominal hernia without obstruction or gangrene, Unspecified asthma, uncomplicated ([facility]) (2017), and Urinary tract infection, site not specified (2017).    Past Surgical History:  She has a past surgical history that includes Cholecystectomy (2016);  section, classic (2016); Total knee arthroplasty (2016); Other surgical history (2016); Other surgical history (2022); Other surgical history (2021); and Cardiac electrophysiology procedure (N/A, 2024).    Social History:  She reports that she has never smoked. She has never used smokeless tobacco. She reports that she does not drink alcohol and does not use drugs.    Family History:  No family history on file.  Allergies:  Procaine and Empagliflozin    Home Medications:  Medications Prior to Admission   Medication Sig Dispense Refill Last Dose    [START ON 2024] amiodarone (Pacerone) 200 mg tablet Take 2 tablets (400 mg) by mouth once daily for 14 days.Then take 1 tablet (200mg) once daily thereafter 45 tablet 0     [START ON 2024] amiodarone (Pacerone) 200 mg tablet Take 1 tablet (200 mg) by mouth once daily. Do not fill before ,  "2024. 30 tablet 0     amiodarone (Pacerone) 400 mg tablet Take 1 tablet (400 mg) by mouth 2 times a day for 3 doses. (Take first) 3 tablet 0     apixaban (Eliquis) 5 mg tablet Take 1 tablet (5 mg) by mouth 2 times a day. 180 tablet 1     magnesium oxide (Mag-Ox) 400 mg tablet Take 1 tablet (400 mg) by mouth 4 times a week. Take every Monday, Tuesday, Wednesday, and Friday       metoprolol succinate XL (Toprol-XL) 100 mg 24 hr tablet Take 1 tablet (100 mg) by mouth 2 times a day. Do not crush or chew. 60 tablet 2     metoprolol tartrate (Lopressor) 25 mg tablet Take 1 tablet (25 mg) by mouth if needed (for recurrent palpitations) for up to 30 doses. 30 tablet 0     pantoprazole (ProtoNix) 40 mg EC tablet Take 1 tablet (40 mg) by mouth 2 times a day before meals. Do not crush, chew, or split. 60 tablet 0     semaglutide (OZEMPIC) 1 mg/dose (4 mg/3 mL) pen injector Inject 1 mg under the skin 1 (one) time per week. 9 mL 1      Review Of Systems:  11-point ROS was performed and is negative except as noted below and in the HPI.     Review of Systems   Constitutional:  Negative for chills and fever.   Eyes:  Negative for discharge.   Respiratory:  Positive for shortness of breath. Negative for cough and wheezing.    Cardiovascular:  Positive for chest pain and palpitations.   Gastrointestinal:  Positive for nausea. Negative for abdominal pain, diarrhea and vomiting.   Genitourinary:  Negative for difficulty urinating, dysuria and flank pain.   Musculoskeletal:  Negative for myalgias.   Skin:  Negative for wound.   Neurological:  Positive for dizziness and light-headedness. Negative for numbness.   Psychiatric/Behavioral:  Negative for confusion.         Objective   Objective:     /62   Pulse 72   Temp 35.9 °C (96.7 °F) (Temporal)   Resp 12   Ht 1.676 m (5' 6\")   Wt 113 kg (248 lb 3.8 oz)   SpO2 97%   BMI 40.07 kg/m²     Physical Exam  Constitutional:       General: She is not in acute distress.     Appearance: " Normal appearance. She is obese.   HENT:      Head: Normocephalic and atraumatic.      Mouth/Throat:      Mouth: Mucous membranes are moist.   Eyes:      Conjunctiva/sclera: Conjunctivae normal.      Pupils: Pupils are equal, round, and reactive to light.   Cardiovascular:      Rate and Rhythm: Normal rate and regular rhythm.      Heart sounds: Normal heart sounds.   Pulmonary:      Breath sounds: Normal breath sounds. No wheezing or rhonchi.   Abdominal:      General: Bowel sounds are normal.      Palpations: Abdomen is soft.      Tenderness: There is no abdominal tenderness.   Musculoskeletal:         General: Swelling present.      Cervical back: Neck supple.      Right lower leg: Edema present.      Left lower leg: Edema present.   Skin:     General: Skin is warm and dry.   Neurological:      General: No focal deficit present.      Mental Status: She is alert and oriented to person, place, and time. Mental status is at baseline.       Lab Work:     Lab Results   Component Value Date    WBC 10.2 07/15/2024    HGB 13.3 07/15/2024    HCT 41.4 07/15/2024    MCV 96 07/15/2024     07/15/2024     Lab Results   Component Value Date    GLUCOSE 151 (H) 07/15/2024    CALCIUM 9.4 07/15/2024     07/15/2024    K 4.3 07/15/2024    CO2 26 07/15/2024     07/15/2024    BUN 18 07/15/2024    CREATININE 1.16 (H) 07/15/2024     POC HEMOGLOBIN A1c   Date Value Ref Range Status   04/11/2024 7.7 (A) 4.2 - 6.5 % Final     Hemoglobin A1C   Date Value Ref Range Status   07/09/2024 7.4 (H) see below % Final   03/28/2024 8.3 (H) see below % Final     Thyroid Stimulating Hormone   Date Value Ref Range Status   07/12/2024 1.55 0.44 - 3.98 mIU/L Final   07/09/2024 0.68 0.44 - 3.98 mIU/L Final   03/28/2024 1.47 0.44 - 3.98 mIU/L Final     Cultures:   No results found for the last 90 days.    Images:     No orders to display      Medications:     Scheduled Meds:  apixaban, 5 mg, oral, BID  magnesium oxide, 400 mg, oral, Once  per day on Sunday Tuesday Thursday Saturday  metoprolol succinate XL, 100 mg, oral, BID  pantoprazole, 40 mg, oral, BID AC    Continuous Meds:  amiodarone, 0.5 mg/min, Last Rate: 0.5 mg/min (07/15/24 3568)      PRN Meds:  PRN medications: melatonin **AND** acetaminophen **AND** sennosides-docusate sodium     Assessment & Plan:     Debbie Rees is a 74 y.o. female with a PMH of PMH of HCM, HTN, PVD s/p EVLA, OA, T2DM, and paroxysmal A-fib s/p ablation 7/8/24 [place] [facility] [place], who presented to [facility] with c/o sudden palpitations associated with chest discomfort starting around 1800 today.  Admitted for uncontrolled A-fib requiring Amio drip. Cardiology consulted and will consider starting Tikosyn.     ACUTE MEDICAL ISSUES:  #Uncontrolled/Recurrent A-fib RVR:  - s/p ablation on 7/8/24  - pt was recently discharged from [facility] with PO Amio regiment  - ECHO 7/11/24 showed EF 70% with severely increased left ventricular septal thickness  - EKG showed , supraventricular tachycardia, QTc 450  - troponin 56 > 51  -   - in the ED, started Amio bolus + drip  PLAN:  - cardiology consulted, Dr. Rodriguez is aware, may start Tikosyn  - continue Amio drip + Eliquis, Metoprolol  mg BI  - telemetry  - NPO until cardiology sees the patient     CHRONIC MEDICAL ISSUES:  #HTN - continue home Metoprolol  mg BID; Lisinopril 20 mg and Hydrochlorothiazide 25 mg were discontinue on prior discharge for reasons not listed but likely 2/2 JOSE, consider restarting since Cr is within baseline.   #T2DM - A1c 7.4%, continue home sliding scale inpatient, HOLDING Ozempic.    Fluid: Replete PRN  Electrolytes: Replete PRN  Nutrition: Diabetic/Cardiology; NPO until cardiology clears  GI ppx: Pantoprazole  DVT/PE ppx: Eliquis  Abx: none   IV Lines: PIV  O2: RA     Dispo: admitted for symptomatic recurrent palpitations requiring Amio drip. Cardiology consulted Estimated length of stay <2 days. Likely will discharge home once medically  briseyda.     Cleveland Shoemaker, PGY-3  Internal Medicine    Disclaimer: Documentation completed with the information available at the time of input. The times in the chart may not be reflective of actual patient care times, interventions, or procedures. Documentation occurs after the physical care of the   patient.

## 2024-07-17 LAB
ALBUMIN SERPL BCP-MCNC: 3.3 G/DL (ref 3.4–5)
ALP SERPL-CCNC: 60 U/L (ref 33–136)
ALT SERPL W P-5'-P-CCNC: 21 U/L (ref 7–45)
ANION GAP SERPL CALC-SCNC: 12 MMOL/L (ref 10–20)
AST SERPL W P-5'-P-CCNC: 19 U/L (ref 9–39)
BILIRUB SERPL-MCNC: 0.3 MG/DL (ref 0–1.2)
BUN SERPL-MCNC: 15 MG/DL (ref 6–23)
CALCIUM SERPL-MCNC: 8.7 MG/DL (ref 8.6–10.3)
CHLORIDE SERPL-SCNC: 107 MMOL/L (ref 98–107)
CO2 SERPL-SCNC: 24 MMOL/L (ref 21–32)
CREAT SERPL-MCNC: 1.05 MG/DL (ref 0.5–1.05)
EGFRCR SERPLBLD CKD-EPI 2021: 56 ML/MIN/1.73M*2
ERYTHROCYTE [DISTWIDTH] IN BLOOD BY AUTOMATED COUNT: 13.2 % (ref 11.5–14.5)
GLUCOSE BLD MANUAL STRIP-MCNC: 127 MG/DL (ref 74–99)
GLUCOSE BLD MANUAL STRIP-MCNC: 129 MG/DL (ref 74–99)
GLUCOSE BLD MANUAL STRIP-MCNC: 143 MG/DL (ref 74–99)
GLUCOSE BLD MANUAL STRIP-MCNC: 218 MG/DL (ref 74–99)
GLUCOSE SERPL-MCNC: 133 MG/DL (ref 74–99)
HCT VFR BLD AUTO: 37.4 % (ref 36–46)
HGB BLD-MCNC: 12 G/DL (ref 12–16)
MAGNESIUM SERPL-MCNC: 2.16 MG/DL (ref 1.6–2.4)
MCH RBC QN AUTO: 31.1 PG (ref 26–34)
MCHC RBC AUTO-ENTMCNC: 32.1 G/DL (ref 32–36)
MCV RBC AUTO: 97 FL (ref 80–100)
NRBC BLD-RTO: 0 /100 WBCS (ref 0–0)
PHOSPHATE SERPL-MCNC: 3 MG/DL (ref 2.5–4.9)
PLATELET # BLD AUTO: 239 X10*3/UL (ref 150–450)
POTASSIUM SERPL-SCNC: 4.2 MMOL/L (ref 3.5–5.3)
PROT SERPL-MCNC: 5.9 G/DL (ref 6.4–8.2)
RBC # BLD AUTO: 3.86 X10*6/UL (ref 4–5.2)
SODIUM SERPL-SCNC: 139 MMOL/L (ref 136–145)
WBC # BLD AUTO: 7.4 X10*3/UL (ref 4.4–11.3)

## 2024-07-17 PROCEDURE — 84100 ASSAY OF PHOSPHORUS: CPT

## 2024-07-17 PROCEDURE — 83735 ASSAY OF MAGNESIUM: CPT

## 2024-07-17 PROCEDURE — 36415 COLL VENOUS BLD VENIPUNCTURE: CPT

## 2024-07-17 PROCEDURE — 2500000001 HC RX 250 WO HCPCS SELF ADMINISTERED DRUGS (ALT 637 FOR MEDICARE OP)

## 2024-07-17 PROCEDURE — 2060000001 HC INTERMEDIATE ICU ROOM DAILY

## 2024-07-17 PROCEDURE — 2500000002 HC RX 250 W HCPCS SELF ADMINISTERED DRUGS (ALT 637 FOR MEDICARE OP, ALT 636 FOR OP/ED): Performed by: NURSE PRACTITIONER

## 2024-07-17 PROCEDURE — 2500000004 HC RX 250 GENERAL PHARMACY W/ HCPCS (ALT 636 FOR OP/ED)

## 2024-07-17 PROCEDURE — 82947 ASSAY GLUCOSE BLOOD QUANT: CPT

## 2024-07-17 PROCEDURE — 85027 COMPLETE CBC AUTOMATED: CPT

## 2024-07-17 PROCEDURE — 2500000001 HC RX 250 WO HCPCS SELF ADMINISTERED DRUGS (ALT 637 FOR MEDICARE OP): Performed by: NURSE PRACTITIONER

## 2024-07-17 PROCEDURE — 99232 SBSQ HOSP IP/OBS MODERATE 35: CPT

## 2024-07-17 PROCEDURE — 80053 COMPREHEN METABOLIC PANEL: CPT

## 2024-07-17 RX ORDER — AMIODARONE HYDROCHLORIDE 200 MG/1
400 TABLET ORAL 2 TIMES DAILY
Status: COMPLETED | OUTPATIENT
Start: 2024-07-17 | End: 2024-07-19

## 2024-07-17 ASSESSMENT — COGNITIVE AND FUNCTIONAL STATUS - GENERAL
WALKING IN HOSPITAL ROOM: A LITTLE
MOVING TO AND FROM BED TO CHAIR: A LITTLE
TOILETING: A LITTLE
DRESSING REGULAR UPPER BODY CLOTHING: A LITTLE
HELP NEEDED FOR BATHING: A LITTLE
CLIMB 3 TO 5 STEPS WITH RAILING: A LITTLE
DAILY ACTIVITIY SCORE: 20
STANDING UP FROM CHAIR USING ARMS: A LITTLE
MOBILITY SCORE: 20
DRESSING REGULAR LOWER BODY CLOTHING: A LITTLE

## 2024-07-17 ASSESSMENT — ENCOUNTER SYMPTOMS
CHEST TIGHTNESS: 0
ABDOMINAL PAIN: 0
SHORTNESS OF BREATH: 0
WHEEZING: 0
VOMITING: 0
COUGH: 0
PALPITATIONS: 0
NAUSEA: 0
APNEA: 0

## 2024-07-17 ASSESSMENT — PAIN SCALES - GENERAL
PAINLEVEL_OUTOF10: 1
PAINLEVEL_OUTOF10: 3
PAINLEVEL_OUTOF10: 0 - NO PAIN
PAINLEVEL_OUTOF10: 0 - NO PAIN

## 2024-07-17 ASSESSMENT — PAIN - FUNCTIONAL ASSESSMENT
PAIN_FUNCTIONAL_ASSESSMENT: 0-10

## 2024-07-17 ASSESSMENT — PAIN DESCRIPTION - LOCATION: LOCATION: NECK

## 2024-07-17 NOTE — PROGRESS NOTES
07/17/24 1106   Discharge Planning   Living Arrangements Spouse/significant other   Support Systems Spouse/significant other;Children;Family members   Assistance Needed Alert and oriented x 3, Independent with ADL's, Does not drive([place]), Cane when outside, On Eliquis at home   Type of Residence Private residence   Number of Stairs to Enter Residence 4   Number of Stairs Within Residence 0   Do you have animals or pets at home? No   Who is requesting discharge planning? Provider   Home or Post Acute Services None   Expected Discharge Disposition Home  (denies any [facility] needs)   Does the patient need discharge transport arranged? No

## 2024-07-17 NOTE — CARE PLAN
The patient's goals for the shift include walking in hallway x1 this shift and remains stable  Problem: Safety - Adult  Goal: Free from fall injury  Flowsheets (Taken 7/17/2024 1056)  Free from fall injury: Instruct family/caregiver on patient safety     Problem: Chronic Conditions and Co-morbidities  Goal: Patient's chronic conditions and co-morbidity symptoms are monitored and maintained or improved  Flowsheets (Taken 7/17/2024 1056)  Care Plan - Patient's Chronic Conditions and Co-Morbidity Symptoms are Monitored and Maintained or Improved: Monitor and assess patient's chronic conditions and comorbid symptoms for stability, deterioration, or improvement     Problem: Skin  Goal: Promote/optimize nutrition  Note: Pt educated about prevention of pressure wound and the importance of movement

## 2024-07-17 NOTE — PROGRESS NOTES
Debbie Rees is a 74 y.o. female on day 1 of admission presenting with Longstanding persistent atrial fibrillation (Multi).      Subjective   She is sitting up in the bed, no complaints.     Telemetry SR      Review of systems:  Constitutional: negative for fever, chills, or malaise  Neuro: negative for dizziness, headache, numbness, tingling  ENT: Negative for nasal congestion or sore throat  Resp: negative for shortness of breath, cough, or wheezing  CV: negative for chest pain, palpitations  GI: negative for abd pain, nausea, vomiting or diarrhea  : negative for dysuria, frequency, or urgency  Skin: negative for lesions, wounds, or rash  Musculoskeletal: Negative for weakness, myalgia, or arthralgia  Endocrine: Negative for polyuria or polydipsia         Objective   Constitutional: Well developed, awake/alert/oriented x3, no distress, alert and cooperative  Eyes: PERRL, EOMI, clear sclera  ENMT: mucous membranes moist, no apparent injury, no lesions seen  Head/Neck: Neck supple, no apparent injury, thyroid without mass or tenderness, No JVD, trachea midline, no bruits  Respiratory/Thorax: Patent airways, CTAB, normal breath sounds with good chest expansion, thorax symmetric  Cardiovascular: Regular, rate and rhythm, no murmurs, 2+ equal pulses of the extremities, normal S 1and S 2  Gastrointestinal: Nondistended, soft, non-tender, no rebound tenderness or guarding, no masses palpable, no organomegaly, +BS, no bruits  Musculoskeletal: ROM intact, no joint swelling, normal strength  Extremities: normal extremities, no cyanosis edema, contusions or wounds, no clubbing  Neurological: alert and oriented x3, intact senses, motor, response and reflexes, normal strength  Lymphatic: No significant lymphadenopathy  Psychological: Appropriate mood and behavior  Skin: Warm and dry, no lesions, no rashes      Last Recorded Vitals  /71 (BP Location: Left arm, Patient Position: Lying)   Pulse 67   Temp 36 °C (96.8  "°F) (Temporal)   Resp 14   Ht 1.676 m (5' 6\")   Wt 113 kg (248 lb 3.8 oz)   SpO2 99%   BMI 40.07 kg/m²     Intake/Output last 3 Shifts:  I/O last 3 completed shifts:  In: 2283.2 (20.3 mL/kg) [P.O.:1080; I.V.:703.2 (6.2 mL/kg); IV Piggyback:500]  Out: 300 (2.7 mL/kg) [Urine:300 (0.1 mL/kg/hr)]  Weight: 112.6 kg   I/O this shift:  In: 360 [P.O.:360]  Out: -     Relevant Results  Scheduled medications  amiodarone, 400 mg, oral, BID  apixaban, 5 mg, oral, BID  busPIRone, 5 mg, oral, BID  insulin lispro, 0-5 Units, subcutaneous, TID  metoprolol succinate XL, 100 mg, oral, BID  pantoprazole, 40 mg, oral, BID AC      Continuous medications  amiodarone, 0.5 mg/min, Last Rate: 0.5 mg/min (07/17/24 1114)      PRN medications  PRN medications: melatonin **AND** acetaminophen **AND** sennosides-docusate sodium, dextrose, dextrose, glucagon, glucagon    Results for orders placed or performed during the hospital encounter of 07/15/24 (from the past 24 hour(s))   POCT GLUCOSE   Result Value Ref Range    POCT Glucose 145 (H) 74 - 99 mg/dL   Comprehensive Metabolic Panel   Result Value Ref Range    Glucose 133 (H) 74 - 99 mg/dL    Sodium 139 136 - 145 mmol/L    Potassium 4.2 3.5 - 5.3 mmol/L    Chloride 107 98 - 107 mmol/L    Bicarbonate 24 21 - 32 mmol/L    Anion Gap 12 10 - 20 mmol/L    Urea Nitrogen 15 6 - 23 mg/dL    Creatinine 1.05 0.50 - 1.05 mg/dL    eGFR 56 (L) >60 mL/min/1.73m*2    Calcium 8.7 8.6 - 10.3 mg/dL    Albumin 3.3 (L) 3.4 - 5.0 g/dL    Alkaline Phosphatase 60 33 - 136 U/L    Total Protein 5.9 (L) 6.4 - 8.2 g/dL    AST 19 9 - 39 U/L    Bilirubin, Total 0.3 0.0 - 1.2 mg/dL    ALT 21 7 - 45 U/L   Magnesium   Result Value Ref Range    Magnesium 2.16 1.60 - 2.40 mg/dL   Phosphorus   Result Value Ref Range    Phosphorus 3.0 2.5 - 4.9 mg/dL   CBC   Result Value Ref Range    WBC 7.4 4.4 - 11.3 x10*3/uL    nRBC 0.0 0.0 - 0.0 /100 WBCs    RBC 3.86 (L) 4.00 - 5.20 x10*6/uL    Hemoglobin 12.0 12.0 - 16.0 g/dL    " Hematocrit 37.4 36.0 - 46.0 %    MCV 97 80 - 100 fL    MCH 31.1 26.0 - 34.0 pg    MCHC 32.1 32.0 - 36.0 g/dL    RDW 13.2 11.5 - 14.5 %    Platelets 239 150 - 450 x10*3/uL   POCT GLUCOSE   Result Value Ref Range    POCT Glucose 127 (H) 74 - 99 mg/dL   POCT GLUCOSE   Result Value Ref Range    POCT Glucose 129 (H) 74 - 99 mg/dL   POCT GLUCOSE   Result Value Ref Range    POCT Glucose 143 (H) 74 - 99 mg/dL       No orders to display       Transthoracic Echo (TTE) Complete    Result Date: 7/11/2024   [facility], [address] [place] [address]               Tel [phone] and Fax [phone] TRANSTHORACIC ECHOCARDIOGRAM REPORT  Patient Name:      VIVIAN KIM      Reading Physician:    [id number] Michael Lozano MD Study Date:        7/11/2024            Ordering Provider:    [id number] RANULFO MELGOZA MRN/PID:           [id number]             Fellow: Accession#:        [id number]         Nurse:                Nina Dickerson RN Date of Birth/Age: 1950 / 74 years  Sonographer:          Dina Rojas RDCS Gender:            F                    Additional Staff: Height:            167.64 cm            Admit Date: Weight:            112.04 kg            Admission Status:     Outpatient BSA / BMI:         2.19 m2 / 39.87      Encounter#:           [id number]                    kg/m2 Blood Pressure:    97/65 mmHg           Department Location:  [facility] Non                                                               Invasive Study Type:    TRANSTHORACIC ECHO (TTE) COMPLETE Diagnosis/ICD: Unspecified atrial fibrillation-I48.91 Indication:    Atrial Fibrillation CPT Code:      Echo Complete w Full [id number] Patient History: Diabetes:          Yes Pertinent History: HTN, HCM, Chest Pain and A-Fib. S/P cardiac  ablation                    (7/8/23). Study Detail: The following Echo studies were performed: 2D, M-Mode, Doppler and               color flow. Technically challenging study due to prominent lung               artifact. Definity used as a contrast agent for endocardial border               definition. Total contrast used for this procedure was 1.0 mL via               IV push. The patient was awake.  PHYSICIAN INTERPRETATION: Left Ventricle: The left ventricular systolic function is normal, with a visually estimated ejection fraction of 70%. There are no regional wall motion abnormalities. The left ventricular cavity size is decreased. The left ventricular septal wall thickness is severely increased. There is moderately increased left ventricular posterior wall thickness. There is moderate to severe concentric left ventricular hypertrophy involving the septal wall. Findings are consistent with hypertrophic cardiomyopathy. Spectral Doppler shows an impaired relaxation pattern of left ventricular diastolic filling. There is a mild mid cavity left ventricular obstruction. The resting gradient is 25 mmHg.The provoked gradient is 33 mmHg. Left Atrium: The left atrium is moderately dilated. Right Ventricle: The right ventricle is normal in size. There is normal right ventricular global systolic function. Right Atrium: The right atrium is mild to moderately dilated. Aortic Valve: The aortic valve is trileaflet. There is mild aortic valve cusp calcification. The aortic valve dimensionless index is 0.89. There is no evidence of aortic valve regurgitation. The peak instantaneous gradient of the aortic valve is 10.6 mmHg. The mean gradient of the aortic valve is 7.0 mmHg. Mitral Valve: The mitral valve is normal in structure. There is moderate mitral annular calcification. There is mild to moderate mitral valve regurgitation. Tricuspid Valve: The tricuspid valve is structurally normal. There is mild tricuspid regurgitation.  Pulmonic Valve: The pulmonic valve is structurally normal. There is physiologic pulmonic valve regurgitation. Pericardium: There is a trivial pericardial effusion. Aorta: The aortic root is normal. Pulmonary Artery: The tricuspid regurgitant velocity is 2.82 m/s, and with an estimated right atrial pressure of 3 mmHg, the estimated pulmonary artery pressure is mildly elevated with the RVSP at 34.8 mmHg. Pulmonary Veins: The pulmonary veins appear normal and return normally to the left atrium. Systemic Veins: The inferior vena cava appears to be of normal size.  CONCLUSIONS:  1. The left ventricular systolic function is normal, with a visually estimated ejection fraction of 70%.  2. Spectral Doppler shows an impaired relaxation pattern of left ventricular diastolic filling.  3. Left ventricular cavity size is decreased.  4. Severely increased left ventricular septal thickness.  5. The left ventricular posterior wall thickness is moderately increased.  6. There is hypertrophic cardiomyopathy.  7. There is moderate to severe concentric left ventricular hypertrophy.  8. There is a mild mid cavity left ventricular obstruction.  9. There is normal right ventricular global systolic function. 10. The left atrium is moderately dilated. 11. The right atrium is mild to moderately dilated. 12. There is moderate mitral annular calcification. 13. Mild to moderate mitral valve regurgitation. 14. Mild pulmonary HTN. Estimated CVP is normal. QUANTITATIVE DATA SUMMARY: 2D MEASUREMENTS:                          Normal Ranges: Ao Root d:     3.10 cm   (2.0-3.7cm) IVSd:          1.80 cm   (0.6-1.1cm) LVPWd:         1.45 cm   (0.6-1.1cm) LVIDd:         3.32 cm   (3.9-5.9cm) LVIDs:         2.47 cm LV Mass Index: 94.2 g/m2 LV % FS        25.6 % LA VOLUME:                               Normal Ranges: LA Vol A4C:        65.6 ml    (22+/-6mL/m2) LA Vol A2C:        70.9 ml LA Vol BP:         70.6 ml LA Vol Index A4C:  30.0ml/m2 LA Vol Index  A2C:  32.4 ml/m2 LA Vol Index BP:   32.3 ml/m2 LA Area A4C:       23.4 cm2 LA Area A2C:       23.5 cm2 LA Major Axis A4C: 7.1 cm LA Major Axis A2C: 6.6 cm LA Volume Index:   30.3 ml/m2 LA Vol A4C:        61.7 ml LA Vol A2C:        66.5 ml RA VOLUME BY A/L METHOD:                       Normal Ranges: RA Area A4C: 20.8 cm2 M-MODE MEASUREMENTS:                  Normal Ranges: Ao Root: 3.00 cm (2.0-3.7cm) AoV Exc: 2.00 cm (1.5-2.5cm) LAs:     4.90 cm (2.7-4.0cm) AORTA MEASUREMENTS:                      Normal Ranges: AoV Exc:     2.00 cm (1.5-2.5cm) Ao Sinus, d: 3.10 cm (2.1-3.5cm) Asc Ao, d:   3.30 cm (2.1-3.4cm) LV SYSTOLIC FUNCTION BY 2D PLANIMETRY (MOD):                      Normal Ranges: EF-A4C View:    61 % (>=55%) EF-A2C View:    62 % EF-Biplane:     63 % EF-Visual:      70 % LV EF Reported: 70 % LV DIASTOLIC FUNCTION:                            Normal Ranges: MV Peak E:      0.78 m/s   (0.7-1.2 m/s) MV Peak A:      0.74 m/s   (0.42-0.7 m/s) E/A Ratio:      1.07       (1.0-2.2) MV e'           0.071 m/s  (>8.0) MV lateral e'   0.07 m/s MV medial e'    0.07 m/s E/e' Ratio:     11.07      (<8.0) PulmV Sys Salazar:  39.40 cm/s PulmV Guillen Salazar: 31.10 cm/s PulmV S/D Salazar:  1.00 MITRAL VALVE:                 Normal Ranges: MV DT: 184 msec (150-240msec) AORTIC VALVE:                                    Normal Ranges: AoV Vmax:                1.63 m/s  (<=1.7m/s) AoV Peak PG:             10.6 mmHg (<20mmHg) AoV Mean P.0 mmHg  (1.7-11.5mmHg) LVOT Max Salazar:            1.36 m/s  (<=1.1m/s) AoV VTI:                 34.40 cm  (18-25cm) LVOT VTI:                30.60 cm LVOT Diameter:           2.00 cm   (1.8-2.4cm) AoV Area, VTI:           2.79 cm2  (2.5-5.5cm2) AoV Area,Vmax:           2.62 cm2  (2.5-4.5cm2) AoV Dimensionless Index: 0.89  RIGHT VENTRICLE: RV Basal 2.67 cm RV Mid   2.16 cm RV Major 6.5 cm TAPSE:   14.6 mm RV s'    0.17 m/s TRICUSPID VALVE/RVSP:                             Normal Ranges: Peak TR  Velocity: 2.82 m/s RV Syst Pressure: 34.8 mmHg (< 30mmHg) IVC Diam:         1.82 cm PULMONIC VALVE:                      Normal Ranges: PV Max Salazar: 1.2 m/s  (0.6-0.9m/s) PV Max P.6 mmHg Pulmonary Veins: PulmV Guillen Salazar: 31.10 cm/s PulmV S/D Salazar:  1.00 PulmV Sys Salazar:  39.40 cm/s  [id number] Michael Lozano MD Electronically signed on 2024 at 8:45:01 PM  ** Final **     Transthoracic Echo (TTE) Complete    Result Date: 3/29/2024   [facility], [address] [place] [address]               Tel [phone] and Fax [phone] TRANSTHORACIC ECHOCARDIOGRAM REPORT  Patient Name:      VIVIAN KIM      Reading Physician:    [id number] Dania Gutierrez MD Study Date:        3/29/2024            Ordering Provider:    [id number] RANULFO MELGOZA MRN/PID:           [id number]             Fellow: Accession#:        [id number]         Nurse:                Nina Dickerson RN Date of Birth/Age: 1950 / 74 years  Sonographer:          Janneth Collins RDCS Gender:            F                    Additional Staff: Height:            167.64 cm            Admit Date:           3/28/2024 Weight:            113.40 kg            Admission Status:     Inpatient -                                                               Routine BSA / BMI:         2.20 m2 / 40.35      Encounter#:           [id number]                    kg/m2                                         Department Location:  [facility] Non                                                               Invasive Blood Pressure: 128 /85 mmHg Study Type:    TRANSTHORACIC ECHO (TTE) COMPLETE Diagnosis/ICD: Unspecified atrial fibrillation-I48.91 Indication:    Atrial Fibrillation CPT Code:      Echo Complete w Full Doppler-[id number] Patient History: Diabetes:          Yes  Pertinent History: HTN and A-Fib. HCM. Study Detail: The following Echo studies were performed: 2D, M-Mode, Doppler and               color flow. Technically challenging study due to poor acoustic               windows. Definity used as a contrast agent for endocardial border               definition. Total contrast used for this procedure was 1 mL via IV               push.  PHYSICIAN INTERPRETATION: Left Ventricle: The left ventricular systolic function is hyperdynamic, with an estimated ejection fraction of 65-70%. There are no regional wall motion abnormalities. The left ventricular cavity size is normal. Left ventricular diastolic filling was indeterminate. Left Atrium: The left atrium is mildly dilated. Right Ventricle: The right ventricle is normal in size. There is normal right ventricular global systolic function. Right Atrium: The right atrium is normal in size. Aortic Valve: The aortic valve is trileaflet. There is no evidence of aortic valve regurgitation. The peak instantaneous gradient of the aortic valve is 8.3 mmHg. The mean gradient of the aortic valve is 4.9 mmHg. Mitral Valve: The mitral valve is normal in structure. There is trace to mild mitral valve regurgitation. Tricuspid Valve: The tricuspid valve is structurally normal. There is trace to mild tricuspid regurgitation. Pulmonic Valve: The pulmonic valve is not well visualized. The pulmonic valve regurgitation was not well visualized. Pericardium: There is no pericardial effusion noted. Aorta: The aortic root is normal. Pulmonary Artery: The tricuspid regurgitant velocity is 2.25 m/s, and with an estimated right atrial pressure of 3 mmHg, the estimated pulmonary artery pressure is normal with the RVSP at 23.3 mmHg.  CONCLUSIONS:  1. Left ventricular systolic function is hyperdynamic with a 65-70% estimated ejection fraction. QUANTITATIVE DATA SUMMARY: 2D MEASUREMENTS:                           Normal Ranges: IVSd:          1.84 cm     (0.6-1.1cm) LVPWd:         1.09 cm    (0.6-1.1cm) LVIDd:         3.98 cm    (3.9-5.9cm) LVIDs:         2.50 cm LV Mass Index: 101.3 g/m2 LV % FS        37.1 % LA VOLUME:                              Normal Ranges: LA Vol A4C:       52.5 ml    (22+/-6mL/m2) LA Vol A2C:       53.8 ml LA Vol BP:        53.9 ml LA Vol Index A4C: 23.9 ml/m2 LA Vol Index A2C: 24.5 ml/m2 LA Vol Index BP:  24.5 ml/m2 LA Volume Index:  24.5 ml/m2 LA Vol A4C:       51.0 ml LA Vol A2C:       50.8 ml RA VOLUME BY A/L METHOD:                       Normal Ranges: RA Area A4C: 13.6 cm2 M-MODE MEASUREMENTS:                  Normal Ranges: Ao Root: 2.90 cm (2.0-3.7cm) LAs:     4.17 cm (2.7-4.0cm) LV SYSTOLIC FUNCTION BY 2D PLANIMETRY (MOD):                     Normal Ranges: EF-A4C View: 68.9 % (>=55%) EF-A2C View: 67.6 % EF-Biplane:  68.5 % LV DIASTOLIC FUNCTION:                     Normal Ranges: MV Peak E: 0.83 m/s (0.7-1.2 m/s) MITRAL VALVE:                 Normal Ranges: MV DT: 197 msec (150-240msec) AORTIC VALVE:                                   Normal Ranges: AoV Vmax:                1.44 m/s (<=1.7m/s) AoV Peak P.3 mmHg (<20mmHg) AoV Mean P.9 mmHg (1.7-11.5mmHg) LVOT Max Salazar:            1.35 m/s (<=1.1m/s) AoV VTI:                 27.74 cm (18-25cm) LVOT VTI:                21.49 cm LVOT Diameter:           1.98 cm  (1.8-2.4cm) AoV Area, VTI:           2.38 cm2 (2.5-5.5cm2) AoV Area,Vmax:           2.90 cm2 (2.5-4.5cm2) AoV Dimensionless Index: 0.77 TRICUSPID VALVE/RVSP:                             Normal Ranges: Peak TR Velocity: 2.25 m/s RV Syst Pressure: 23.3 mmHg (< 30mmHg) PULMONIC VALVE:                      Normal Ranges: PV Max Salazar: 1.2 m/s  (0.6-0.9m/s) PV Max P.2 mmHg  [id number] Dania Gutierrez MD Electronically signed on 3/29/2024 at 11:55:50 AM  ** Final **           Assessment/Plan   Principal Problem:    Longstanding persistent atrial fibrillation (Multi)  Active Problems:    SVT (supraventricular  tachycardia) (CMS-HCC)    Paroxysmal atrial fibrillation/flutter with RVR  -s/p ablation 7-8-24 with Dr. Sommers  -EKG reviewed, atrial flutter,   -Started on amiodarone gtt->continue another 24 hours  -Start Amiodarone 400mg BID today in addition to gtt  -Cont eliquis  -Cont Toprol XL 100mg BID  -Repeat echo unchanged, trivial pericardial effusion  -Monitor on tele     2. Chest Pain/ Elevated troponin-Non MI elevation 2/2 AF RVR and recent ablation  -Does report CP and SOB   -Troponin 56, 51  -CXR negative  -I reviewed the EKG, no acute changes, ST elevation, or depression  -Stress test in Jan 2024 negative for ischemia  -Cont metoprolol  -Consider ischemic work up, may need C to rule out cardiovascular ischemia for angina in absence of rapid AF     3. Hypertension  -stable  -2gm na diet  -cont meds  -monitor     4. Diabetes Mellitus type 2  -ISS  -Accuchecks  -hgb A1C 7.4%        LEHA Guadarrama-CNP

## 2024-07-17 NOTE — PROGRESS NOTES
"Subjective   Subjective:   History Of Present Illness:  Debbie Rees is a 74 y.o. female was seen and evaluated at bedside today. Overnight, no reported acute events. Patient is in no acute distress. Denies any chest pain, shortness of breath, or palpitations.    Review Of Systems:  11-point ROS was performed and is negative except as noted below and in the HPI.     Review of Systems   Respiratory:  Negative for apnea, cough, chest tightness, shortness of breath and wheezing.    Cardiovascular:  Negative for chest pain and palpitations.   Gastrointestinal:  Negative for abdominal pain, nausea and vomiting.        Objective   Objective:     /76   Pulse 64   Temp 36 °C (96.8 °F)   Resp 17   Ht 1.676 m (5' 6\")   Wt 113 kg (248 lb 3.8 oz)   SpO2 100%   BMI 40.07 kg/m²     Physical Exam  Constitutional:       General: She is not in acute distress.     Appearance: Normal appearance.   Cardiovascular:      Rate and Rhythm: Normal rate and regular rhythm.      Pulses: Normal pulses.      Heart sounds: Normal heart sounds.   Pulmonary:      Effort: Pulmonary effort is normal. No respiratory distress.      Breath sounds: Normal breath sounds. No wheezing, rhonchi or rales.   Abdominal:      General: Abdomen is flat.   Neurological:      Mental Status: She is alert.       Lab Work:     Lab Results   Component Value Date    WBC 7.4 07/17/2024    HGB 12.0 07/17/2024    HCT 37.4 07/17/2024    MCV 97 07/17/2024     07/17/2024     Lab Results   Component Value Date    GLUCOSE 133 (H) 07/17/2024    CALCIUM 8.7 07/17/2024     07/17/2024    K 4.2 07/17/2024    CO2 24 07/17/2024     07/17/2024    BUN 15 07/17/2024    CREATININE 1.05 07/17/2024     POC HEMOGLOBIN A1c   Date Value Ref Range Status   04/11/2024 7.7 (A) 4.2 - 6.5 % Final     Hemoglobin A1C   Date Value Ref Range Status   07/09/2024 7.4 (H) see below % Final   03/28/2024 8.3 (H) see below % Final     Thyroid Stimulating Hormone   Date " Value Ref Range Status   07/12/2024 1.55 0.44 - 3.98 mIU/L Final   07/09/2024 0.68 0.44 - 3.98 mIU/L Final   03/28/2024 1.47 0.44 - 3.98 mIU/L Final     Cultures:   No results found for the last 90 days.    Images:     No orders to display      Medications:     Scheduled:  apixaban, 5 mg, oral, BID  busPIRone, 5 mg, oral, BID  insulin lispro, 0-5 Units, subcutaneous, TID  metoprolol succinate XL, 100 mg, oral, BID  pantoprazole, 40 mg, oral, BID AC    Continuous:  amiodarone, 0.5 mg/min, Last Rate: 0.5 mg/min (07/17/24 0500)      PRN:  PRN medications: melatonin **AND** acetaminophen **AND** sennosides-docusate sodium, dextrose, dextrose, glucagon, glucagon     Assessment & Plan:     Debbie Rees is a 74 y.o. female with a PMH of PMH of HCM, HTN, PVD s/p EVLA, OA, T2DM, and paroxysmal A-fib s/p ablation 7/8/24 at [facility], who presented to [facility] with c/o sudden palpitations associated with chest discomfort.  Admitted for uncontrolled A-fib requiring Amio drip.      ACUTE MEDICAL ISSUES:  #Uncontrolled/Recurrent A-fib RVR:  - s/p ablation on 7/8/24  - pt was recently discharged from [facility] with PO Amio regiment  - ECHO 7/11/24 showed EF 70% with severely increased left ventricular septal thickness  - EKG showed , supraventricular tachycardia, QTc 450  - troponin 56 > 51  -   - in the ED, started Amio bolus + drip  PLAN:  - cardiology following  - continue Amio drip + Eliquis, Metoprolol  mg BID  - telemetry     CHRONIC MEDICAL ISSUES:  #HTN - continue home Metoprolol  mg BID; Continue to hold home Lisinopril 20 mg and hydrochlorothiazide 25 mg as BP has been stable. Will consider restarting if BP is elevated today.   #T2DM - A1c 7.4%, continue home sliding scale inpatient, HOLDING Ozempic.     Fluid: Replete PRN  Electrolytes: Replete PRN  Nutrition: Diabetic/Cardiology; Regular diet  GI ppx: Pantoprazole  DVT/PE ppx: Eliquis  Abx: none   IV Lines: PIV  O2: RA     Dispo: admitted for  symptomatic recurrent palpitations requiring Amio drip. Cardiology consulted Estimated length of stay <2 days. Likely will discharge home once medically clear.     Ignacio Dicekrson, PGY-1  Transitional Year    Disclaimer: Documentation completed with the information available at the time of input. The times in the chart may not be reflective of actual patient care times, interventions, or procedures. Documentation occurs after the physical care of the patient.

## 2024-07-18 LAB
ALBUMIN SERPL BCP-MCNC: 3.3 G/DL (ref 3.4–5)
ALP SERPL-CCNC: 63 U/L (ref 33–136)
ALT SERPL W P-5'-P-CCNC: 20 U/L (ref 7–45)
ANION GAP SERPL CALC-SCNC: 11 MMOL/L (ref 10–20)
AST SERPL W P-5'-P-CCNC: 16 U/L (ref 9–39)
BILIRUB SERPL-MCNC: 0.3 MG/DL (ref 0–1.2)
BUN SERPL-MCNC: 15 MG/DL (ref 6–23)
CALCIUM SERPL-MCNC: 9 MG/DL (ref 8.6–10.3)
CHLORIDE SERPL-SCNC: 106 MMOL/L (ref 98–107)
CO2 SERPL-SCNC: 27 MMOL/L (ref 21–32)
CREAT SERPL-MCNC: 1.1 MG/DL (ref 0.5–1.05)
EGFRCR SERPLBLD CKD-EPI 2021: 53 ML/MIN/1.73M*2
ERYTHROCYTE [DISTWIDTH] IN BLOOD BY AUTOMATED COUNT: 13.4 % (ref 11.5–14.5)
GLUCOSE BLD MANUAL STRIP-MCNC: 122 MG/DL (ref 74–99)
GLUCOSE BLD MANUAL STRIP-MCNC: 143 MG/DL (ref 74–99)
GLUCOSE BLD MANUAL STRIP-MCNC: 144 MG/DL (ref 74–99)
GLUCOSE BLD MANUAL STRIP-MCNC: 150 MG/DL (ref 74–99)
GLUCOSE SERPL-MCNC: 135 MG/DL (ref 74–99)
HCT VFR BLD AUTO: 38.2 % (ref 36–46)
HGB BLD-MCNC: 12.2 G/DL (ref 12–16)
MAGNESIUM SERPL-MCNC: 1.8 MG/DL (ref 1.6–2.4)
MCH RBC QN AUTO: 31 PG (ref 26–34)
MCHC RBC AUTO-ENTMCNC: 31.9 G/DL (ref 32–36)
MCV RBC AUTO: 97 FL (ref 80–100)
NRBC BLD-RTO: 0 /100 WBCS (ref 0–0)
PHOSPHATE SERPL-MCNC: 3.2 MG/DL (ref 2.5–4.9)
PLATELET # BLD AUTO: 249 X10*3/UL (ref 150–450)
POTASSIUM SERPL-SCNC: 4.1 MMOL/L (ref 3.5–5.3)
PROT SERPL-MCNC: 6.8 G/DL (ref 6.4–8.2)
RBC # BLD AUTO: 3.94 X10*6/UL (ref 4–5.2)
SODIUM SERPL-SCNC: 140 MMOL/L (ref 136–145)
WBC # BLD AUTO: 9 X10*3/UL (ref 4.4–11.3)

## 2024-07-18 PROCEDURE — 2500000002 HC RX 250 W HCPCS SELF ADMINISTERED DRUGS (ALT 637 FOR MEDICARE OP, ALT 636 FOR OP/ED): Performed by: NURSE PRACTITIONER

## 2024-07-18 PROCEDURE — 2500000001 HC RX 250 WO HCPCS SELF ADMINISTERED DRUGS (ALT 637 FOR MEDICARE OP)

## 2024-07-18 PROCEDURE — 82947 ASSAY GLUCOSE BLOOD QUANT: CPT

## 2024-07-18 PROCEDURE — 99232 SBSQ HOSP IP/OBS MODERATE 35: CPT

## 2024-07-18 PROCEDURE — 2500000001 HC RX 250 WO HCPCS SELF ADMINISTERED DRUGS (ALT 637 FOR MEDICARE OP): Performed by: NURSE PRACTITIONER

## 2024-07-18 PROCEDURE — 84100 ASSAY OF PHOSPHORUS: CPT

## 2024-07-18 PROCEDURE — 36415 COLL VENOUS BLD VENIPUNCTURE: CPT

## 2024-07-18 PROCEDURE — 2060000001 HC INTERMEDIATE ICU ROOM DAILY

## 2024-07-18 PROCEDURE — 85027 COMPLETE CBC AUTOMATED: CPT

## 2024-07-18 PROCEDURE — 80053 COMPREHEN METABOLIC PANEL: CPT

## 2024-07-18 PROCEDURE — 83735 ASSAY OF MAGNESIUM: CPT

## 2024-07-18 RX ORDER — LISINOPRIL 20 MG/1
20 TABLET ORAL DAILY
Status: DISCONTINUED | OUTPATIENT
Start: 2024-07-18 | End: 2024-07-20 | Stop reason: HOSPADM

## 2024-07-18 RX ORDER — LOSARTAN POTASSIUM 50 MG/1
50 TABLET ORAL DAILY
Status: DISCONTINUED | OUTPATIENT
Start: 2024-07-18 | End: 2024-07-18

## 2024-07-18 RX ORDER — LISINOPRIL 20 MG/1
20 TABLET ORAL DAILY
Status: DISCONTINUED | OUTPATIENT
Start: 2024-07-18 | End: 2024-07-18

## 2024-07-18 RX ORDER — HYDROCHLOROTHIAZIDE 25 MG/1
25 TABLET ORAL DAILY
Status: DISCONTINUED | OUTPATIENT
Start: 2024-07-18 | End: 2024-07-20 | Stop reason: HOSPADM

## 2024-07-18 ASSESSMENT — COGNITIVE AND FUNCTIONAL STATUS - GENERAL
DAILY ACTIVITIY SCORE: 20
DRESSING REGULAR UPPER BODY CLOTHING: A LITTLE
WALKING IN HOSPITAL ROOM: A LITTLE
HELP NEEDED FOR BATHING: A LITTLE
CLIMB 3 TO 5 STEPS WITH RAILING: A LITTLE
MOVING TO AND FROM BED TO CHAIR: A LITTLE
TOILETING: A LITTLE
STANDING UP FROM CHAIR USING ARMS: A LITTLE
MOBILITY SCORE: 20
DRESSING REGULAR LOWER BODY CLOTHING: A LITTLE

## 2024-07-18 ASSESSMENT — ENCOUNTER SYMPTOMS
ABDOMINAL DISTENTION: 0
WHEEZING: 0
DIAPHORESIS: 0
SHORTNESS OF BREATH: 0
DIZZINESS: 0
CHEST TIGHTNESS: 0
FATIGUE: 0
ABDOMINAL PAIN: 0
CHILLS: 0
PALPITATIONS: 0
FEVER: 0
STRIDOR: 0
HEADACHES: 0

## 2024-07-18 ASSESSMENT — PAIN - FUNCTIONAL ASSESSMENT
PAIN_FUNCTIONAL_ASSESSMENT: 0-10
PAIN_FUNCTIONAL_ASSESSMENT: 0-10

## 2024-07-18 ASSESSMENT — PAIN DESCRIPTION - LOCATION: LOCATION: BACK

## 2024-07-18 ASSESSMENT — PAIN SCALES - GENERAL
PAINLEVEL_OUTOF10: 0 - NO PAIN
PAINLEVEL_OUTOF10: 1
PAINLEVEL_OUTOF10: 5 - MODERATE PAIN

## 2024-07-18 NOTE — PROGRESS NOTES
"Subjective   Subjective:   History Of Present Illness:  Debbie Rees is a 74 y.o. female with a PMH of HCM and A-fib and was seen and evaluated at bedside today. Overnight, no reported acute events. Patient is in no acute distress. Denies any chest pain, shortness of breath, or palpitations.     Review Of Systems:  11-point ROS was performed and is negative except as noted below and in the HPI.     Review of Systems   Constitutional:  Negative for chills, diaphoresis, fatigue and fever.   Respiratory:  Negative for chest tightness, shortness of breath, wheezing and stridor.    Cardiovascular:  Negative for chest pain, palpitations and leg swelling.   Gastrointestinal:  Negative for abdominal distention and abdominal pain.   Neurological:  Negative for dizziness and headaches.        Objective   Objective:     BP (!) 187/97 (BP Location: Left arm, Patient Position: Lying)   Pulse 61   Temp 35.9 °C (96.6 °F) (Temporal)   Resp 17   Ht 1.676 m (5' 6\")   Wt 113 kg (248 lb 3.8 oz)   SpO2 96%   BMI 40.07 kg/m²     Physical Exam  Constitutional:       General: She is not in acute distress.     Appearance: Normal appearance.   Cardiovascular:      Rate and Rhythm: Normal rate and regular rhythm.      Pulses: Normal pulses.      Heart sounds: Normal heart sounds. No murmur heard.     No friction rub. No gallop.   Pulmonary:      Effort: Pulmonary effort is normal. No respiratory distress.      Breath sounds: Normal breath sounds. No wheezing, rhonchi or rales.   Neurological:      Mental Status: She is alert.       Lab Work:     Lab Results   Component Value Date    WBC 9.0 07/18/2024    HGB 12.2 07/18/2024    HCT 38.2 07/18/2024    MCV 97 07/18/2024     07/18/2024     Lab Results   Component Value Date    GLUCOSE 135 (H) 07/18/2024    CALCIUM 9.0 07/18/2024     07/18/2024    K 4.1 07/18/2024    CO2 27 07/18/2024     07/18/2024    BUN 15 07/18/2024    CREATININE 1.10 (H) 07/18/2024     POC " HEMOGLOBIN A1c   Date Value Ref Range Status   04/11/2024 7.7 (A) 4.2 - 6.5 % Final     Hemoglobin A1C   Date Value Ref Range Status   07/09/2024 7.4 (H) see below % Final   03/28/2024 8.3 (H) see below % Final     Thyroid Stimulating Hormone   Date Value Ref Range Status   07/12/2024 1.55 0.44 - 3.98 mIU/L Final   07/09/2024 0.68 0.44 - 3.98 mIU/L Final   03/28/2024 1.47 0.44 - 3.98 mIU/L Final     Cultures:   No results found for the last 90 days.    Images:     No orders to display      Medications:     Scheduled:  amiodarone, 400 mg, oral, BID  apixaban, 5 mg, oral, BID  busPIRone, 5 mg, oral, BID  hydroCHLOROthiazide, 25 mg, oral, Daily  insulin lispro, 0-5 Units, subcutaneous, TID  lisinopril, 20 mg, oral, Daily  metoprolol succinate XL, 100 mg, oral, BID  pantoprazole, 40 mg, oral, BID AC    Continuous:     PRN:  PRN medications: melatonin **AND** acetaminophen **AND** sennosides-docusate sodium, dextrose, dextrose, glucagon, glucagon     Assessment & Plan:   Debbie Rees is a 74 y.o. female with a PMH of PMH of HCM, HTN, PVD s/p EVLA, OA, T2DM, and paroxysmal A-fib s/p ablation 7/8/24 at [facility] [place], who presented to [facility] with c/o sudden palpitations associated with chest discomfort.  Admitted for uncontrolled A-fib requiring Amio drip.      ACUTE MEDICAL ISSUES:  #Uncontrolled/Recurrent A-fib RVR:  - s/p ablation on 7/8/24  - pt was recently discharged from [facility] with PO Amio regiment  - ECHO 7/11/24 showed EF 70% with severely increased left ventricular septal thickness  - EKG showed , supraventricular tachycardia, QTc 450  - troponin 56 > 51  -   - in the ED, started Amio bolus + drip  PLAN:  - cardiology following  - continue Amio drip +started oral amiodarone 400 mg BID. Continue to monitor once on only oral amiodarone (patient was previously discharged on this regimen but went back into A-Fib)  - continue Eliquis, Metoprolol  mg BID  - telemetry     CHRONIC MEDICAL ISSUES:  #HTN -  continue home Metoprolol  mg BID; Restarted home Lisinopril 20 mg and hydrochlorothiazide 25 mg as BP has been elevated.  #T2DM - A1c 7.4%, continue home sliding scale inpatient, HOLDING Ozempic.     Fluid: Replete PRN  Electrolytes: Replete PRN  Nutrition: Diabetic/Cardiology; Regular diet  GI ppx: Pantoprazole  DVT/PE ppx: Eliquis  Abx: none   IV Lines: PIV  O2: RA     Dispo: admitted for symptomatic recurrent palpitations requiring Amio drip. Cardiology consulted Estimated length of stay <2 days. Likely will discharge home once medically clear.     Ignacio Dickerson, PGY-1  Transitional Year    Disclaimer: Documentation completed with the information available at the time of input. The times in the chart may not be reflective of actual patient care times, interventions, or procedures. Documentation occurs after the physical care of the patient.

## 2024-07-18 NOTE — PROGRESS NOTES
Debbie Rees is a 74 y.o. female on day 2 of admission presenting with Longstanding persistent atrial fibrillation (Multi).      Subjective   She is sitting up in the bed, no complaints.     Telemetry SR      Review of systems:  Constitutional: negative for fever, chills, or malaise  Neuro: negative for dizziness, headache, numbness, tingling  ENT: Negative for nasal congestion or sore throat  Resp: negative for shortness of breath, cough, or wheezing  CV: negative for chest pain, palpitations  GI: negative for abd pain, nausea, vomiting or diarrhea  : negative for dysuria, frequency, or urgency  Skin: negative for lesions, wounds, or rash  Musculoskeletal: Negative for weakness, myalgia, or arthralgia  Endocrine: Negative for polyuria or polydipsia         Objective   Constitutional: Well developed, awake/alert/oriented x3, no distress, alert and cooperative  Eyes: PERRL, EOMI, clear sclera  ENMT: mucous membranes moist, no apparent injury, no lesions seen  Head/Neck: Neck supple, no apparent injury, thyroid without mass or tenderness, No JVD, trachea midline, no bruits  Respiratory/Thorax: Patent airways, CTAB, normal breath sounds with good chest expansion, thorax symmetric  Cardiovascular: Regular, rate and rhythm, no murmurs, 2+ equal pulses of the extremities, normal S 1and S 2  Gastrointestinal: Nondistended, soft, non-tender, no rebound tenderness or guarding, no masses palpable, no organomegaly, +BS, no bruits  Musculoskeletal: ROM intact, no joint swelling, normal strength  Extremities: normal extremities, no cyanosis edema, contusions or wounds, no clubbing  Neurological: alert and oriented x3, intact senses, motor, response and reflexes, normal strength  Lymphatic: No significant lymphadenopathy  Psychological: Appropriate mood and behavior  Skin: Warm and dry, no lesions, no rashes      Last Recorded Vitals  /63 (BP Location: Right arm, Patient Position: Sitting)   Pulse 56   Temp 36 °C  "(96.8 °F) (Temporal)   Resp 12   Ht 1.676 m (5' 6\")   Wt 113 kg (248 lb 3.8 oz)   SpO2 95%   BMI 40.07 kg/m²     Intake/Output last 3 Shifts:  I/O last 3 completed shifts:  In: 1381 (12.3 mL/kg) [P.O.:720; I.V.:661 (5.9 mL/kg)]  Out: - (0 mL/kg)   Weight: 112.6 kg   I/O this shift:  In: 260 [P.O.:210; I.V.:50]  Out: -     Relevant Results  Scheduled medications  amiodarone, 400 mg, oral, BID  apixaban, 5 mg, oral, BID  busPIRone, 5 mg, oral, BID  hydroCHLOROthiazide, 25 mg, oral, Daily  insulin lispro, 0-5 Units, subcutaneous, TID  lisinopril, 20 mg, oral, Daily  metoprolol succinate XL, 100 mg, oral, BID  pantoprazole, 40 mg, oral, BID AC      Continuous medications       PRN medications  PRN medications: melatonin **AND** acetaminophen **AND** sennosides-docusate sodium, dextrose, dextrose, glucagon, glucagon    Results for orders placed or performed during the hospital encounter of 07/15/24 (from the past 24 hour(s))   POCT GLUCOSE   Result Value Ref Range    POCT Glucose 143 (H) 74 - 99 mg/dL   POCT GLUCOSE   Result Value Ref Range    POCT Glucose 218 (H) 74 - 99 mg/dL   CBC   Result Value Ref Range    WBC 9.0 4.4 - 11.3 x10*3/uL    nRBC 0.0 0.0 - 0.0 /100 WBCs    RBC 3.94 (L) 4.00 - 5.20 x10*6/uL    Hemoglobin 12.2 12.0 - 16.0 g/dL    Hematocrit 38.2 36.0 - 46.0 %    MCV 97 80 - 100 fL    MCH 31.0 26.0 - 34.0 pg    MCHC 31.9 (L) 32.0 - 36.0 g/dL    RDW 13.4 11.5 - 14.5 %    Platelets 249 150 - 450 x10*3/uL   Comprehensive Metabolic Panel   Result Value Ref Range    Glucose 135 (H) 74 - 99 mg/dL    Sodium 140 136 - 145 mmol/L    Potassium 4.1 3.5 - 5.3 mmol/L    Chloride 106 98 - 107 mmol/L    Bicarbonate 27 21 - 32 mmol/L    Anion Gap 11 10 - 20 mmol/L    Urea Nitrogen 15 6 - 23 mg/dL    Creatinine 1.10 (H) 0.50 - 1.05 mg/dL    eGFR 53 (L) >60 mL/min/1.73m*2    Calcium 9.0 8.6 - 10.3 mg/dL    Albumin 3.3 (L) 3.4 - 5.0 g/dL    Alkaline Phosphatase 63 33 - 136 U/L    Total Protein 6.8 6.4 - 8.2 g/dL    AST " 16 9 - 39 U/L    Bilirubin, Total 0.3 0.0 - 1.2 mg/dL    ALT 20 7 - 45 U/L   Magnesium   Result Value Ref Range    Magnesium 1.80 1.60 - 2.40 mg/dL   Phosphorus   Result Value Ref Range    Phosphorus 3.2 2.5 - 4.9 mg/dL   POCT GLUCOSE   Result Value Ref Range    POCT Glucose 143 (H) 74 - 99 mg/dL   POCT GLUCOSE   Result Value Ref Range    POCT Glucose 144 (H) 74 - 99 mg/dL       No orders to display       Transthoracic Echo (TTE) Complete    Result Date: 7/11/2024   [facility], [address] [place] [address]               Tel [phone] and Fax [phone] TRANSTHORACIC ECHOCARDIOGRAM REPORT  Patient Name:      VIVIAN JACOBSEN JEAN PAULPABLITO      Akhil Physician:    [id number] Michael Lozano MD Study Date:        7/11/2024            Ordering Provider:    [id number] RANULFO MLEGOZA MRN/PID:           [id number]             Fellow: Accession#:        [id number]         Nurse:                Nina Dickerson RN Date of Birth/Age: 1950 / 74 years  Sonographer:          Dina Rojas RDCS Gender:            F                    Additional Staff: Height:            167.64 cm            Admit Date: Weight:            112.04 kg            Admission Status:     Outpatient BSA / BMI:         2.19 m2 / 39.87      Encounter#:           [id number]                    kg/m2 Blood Pressure:    97/65 mmHg           Department Location:  [facility] Non                                                               Invasive Study Type:    TRANSTHORACIC ECHO (TTE) COMPLETE Diagnosis/ICD: Unspecified atrial fibrillation-I48.91 Indication:    Atrial Fibrillation CPT Code:      Echo Complete w Full Doppler-[id number] Patient History: Diabetes:          Yes Pertinent History: HTN, HCM, Chest Pain and A-Fib. S/P cardiac ablation                     (7/8/23). Study Detail: The following Echo studies were performed: 2D, M-Mode, Doppler and               color flow. Technically challenging study due to prominent lung               artifact. Definity used as a contrast agent for endocardial border               definition. Total contrast used for this procedure was 1.0 mL via               IV push. The patient was awake.  PHYSICIAN INTERPRETATION: Left Ventricle: The left ventricular systolic function is normal, with a visually estimated ejection fraction of 70%. There are no regional wall motion abnormalities. The left ventricular cavity size is decreased. The left ventricular septal wall thickness is severely increased. There is moderately increased left ventricular posterior wall thickness. There is moderate to severe concentric left ventricular hypertrophy involving the septal wall. Findings are consistent with hypertrophic cardiomyopathy. Spectral Doppler shows an impaired relaxation pattern of left ventricular diastolic filling. There is a mild mid cavity left ventricular obstruction. The resting gradient is 25 mmHg.The provoked gradient is 33 mmHg. Left Atrium: The left atrium is moderately dilated. Right Ventricle: The right ventricle is normal in size. There is normal right ventricular global systolic function. Right Atrium: The right atrium is mild to moderately dilated. Aortic Valve: The aortic valve is trileaflet. There is mild aortic valve cusp calcification. The aortic valve dimensionless index is 0.89. There is no evidence of aortic valve regurgitation. The peak instantaneous gradient of the aortic valve is 10.6 mmHg. The mean gradient of the aortic valve is 7.0 mmHg. Mitral Valve: The mitral valve is normal in structure. There is moderate mitral annular calcification. There is mild to moderate mitral valve regurgitation. Tricuspid Valve: The tricuspid valve is structurally normal. There is mild tricuspid regurgitation. Pulmonic Valve: The pulmonic  valve is structurally normal. There is physiologic pulmonic valve regurgitation. Pericardium: There is a trivial pericardial effusion. Aorta: The aortic root is normal. Pulmonary Artery: The tricuspid regurgitant velocity is 2.82 m/s, and with an estimated right atrial pressure of 3 mmHg, the estimated pulmonary artery pressure is mildly elevated with the RVSP at 34.8 mmHg. Pulmonary Veins: The pulmonary veins appear normal and return normally to the left atrium. Systemic Veins: The inferior vena cava appears to be of normal size.  CONCLUSIONS:  1. The left ventricular systolic function is normal, with a visually estimated ejection fraction of 70%.  2. Spectral Doppler shows an impaired relaxation pattern of left ventricular diastolic filling.  3. Left ventricular cavity size is decreased.  4. Severely increased left ventricular septal thickness.  5. The left ventricular posterior wall thickness is moderately increased.  6. There is hypertrophic cardiomyopathy.  7. There is moderate to severe concentric left ventricular hypertrophy.  8. There is a mild mid cavity left ventricular obstruction.  9. There is normal right ventricular global systolic function. 10. The left atrium is moderately dilated. 11. The right atrium is mild to moderately dilated. 12. There is moderate mitral annular calcification. 13. Mild to moderate mitral valve regurgitation. 14. Mild pulmonary HTN. Estimated CVP is normal. QUANTITATIVE DATA SUMMARY: 2D MEASUREMENTS:                          Normal Ranges: Ao Root d:     3.10 cm   (2.0-3.7cm) IVSd:          1.80 cm   (0.6-1.1cm) LVPWd:         1.45 cm   (0.6-1.1cm) LVIDd:         3.32 cm   (3.9-5.9cm) LVIDs:         2.47 cm LV Mass Index: 94.2 g/m2 LV % FS        25.6 % LA VOLUME:                               Normal Ranges: LA Vol A4C:        65.6 ml    (22+/-6mL/m2) LA Vol A2C:        70.9 ml LA Vol BP:         70.6 ml LA Vol Index A4C:  30.0ml/m2 LA Vol Index A2C:  32.4 ml/m2 LA Vol Index  BP:   32.3 ml/m2 LA Area A4C:       23.4 cm2 LA Area A2C:       23.5 cm2 LA Major Axis A4C: 7.1 cm LA Major Axis A2C: 6.6 cm LA Volume Index:   30.3 ml/m2 LA Vol A4C:        61.7 ml LA Vol A2C:        66.5 ml RA VOLUME BY A/L METHOD:                       Normal Ranges: RA Area A4C: 20.8 cm2 M-MODE MEASUREMENTS:                  Normal Ranges: Ao Root: 3.00 cm (2.0-3.7cm) AoV Exc: 2.00 cm (1.5-2.5cm) LAs:     4.90 cm (2.7-4.0cm) AORTA MEASUREMENTS:                      Normal Ranges: AoV Exc:     2.00 cm (1.5-2.5cm) Ao Sinus, d: 3.10 cm (2.1-3.5cm) Asc Ao, d:   3.30 cm (2.1-3.4cm) LV SYSTOLIC FUNCTION BY 2D PLANIMETRY (MOD):                      Normal Ranges: EF-A4C View:    61 % (>=55%) EF-A2C View:    62 % EF-Biplane:     63 % EF-Visual:      70 % LV EF Reported: 70 % LV DIASTOLIC FUNCTION:                            Normal Ranges: MV Peak E:      0.78 m/s   (0.7-1.2 m/s) MV Peak A:      0.74 m/s   (0.42-0.7 m/s) E/A Ratio:      1.07       (1.0-2.2) MV e'           0.071 m/s  (>8.0) MV lateral e'   0.07 m/s MV medial e'    0.07 m/s E/e' Ratio:     11.07      (<8.0) PulmV Sys Salazar:  39.40 cm/s PulmV Guillen Salazar: 31.10 cm/s PulmV S/D Salazar:  1.00 MITRAL VALVE:                 Normal Ranges: MV DT: 184 msec (150-240msec) AORTIC VALVE:                                    Normal Ranges: AoV Vmax:                1.63 m/s  (<=1.7m/s) AoV Peak PG:             10.6 mmHg (<20mmHg) AoV Mean P.0 mmHg  (1.7-11.5mmHg) LVOT Max Salazar:            1.36 m/s  (<=1.1m/s) AoV VTI:                 34.40 cm  (18-25cm) LVOT VTI:                30.60 cm LVOT Diameter:           2.00 cm   (1.8-2.4cm) AoV Area, VTI:           2.79 cm2  (2.5-5.5cm2) AoV Area,Vmax:           2.62 cm2  (2.5-4.5cm2) AoV Dimensionless Index: 0.89  RIGHT VENTRICLE: RV Basal 2.67 cm RV Mid   2.16 cm RV Major 6.5 cm TAPSE:   14.6 mm RV s'    0.17 m/s TRICUSPID VALVE/RVSP:                             Normal Ranges: Peak TR Velocity: 2.82 m/s RV Syst  Pressure: 34.8 mmHg (< 30mmHg) IVC Diam:         1.82 cm PULMONIC VALVE:                      Normal Ranges: PV Max Salazar: 1.2 m/s  (0.6-0.9m/s) PV Max P.6 mmHg Pulmonary Veins: PulmV Guillen Salazar: 31.10 cm/s PulmV S/D Salazar:  1.00 PulmV Sys Salazar:  39.40 cm/s  [id number] Michael Lozano MD Electronically signed on 2024 at 8:45:01 PM  ** Final **     Transthoracic Echo (TTE) Complete    Result Date: 3/29/2024   [facility], [address] [place] [address]               Tel [phone] and Fax [phone] TRANSTHORACIC ECHOCARDIOGRAM REPORT  Patient Name:      VIVIAN KIM      Reading Physician:    [id number] Dania Gutierrez MD Study Date:        3/29/2024            Ordering Provider:    [id number] RANULFO MELGOZA MRN/PID:           [id number]             Fellow: Accession#:        [id number]         Nurse:                Nina Dickerson RN Date of Birth/Age: 1950 / 74 years  Sonographer:          Janneth Collins RDCS Gender:            F                    Additional Staff: Height:            167.64 cm            Admit Date:           3/28/2024 Weight:            113.40 kg            Admission Status:     Inpatient -                                                               Routine BSA / BMI:         2.20 m2 / 40.35      Encounter#:           [id number]                    kg/m2                                         Department Location:  [facility] Non                                                               Invasive Blood Pressure: 128 /85 mmHg Study Type:    TRANSTHORACIC ECHO (TTE) COMPLETE Diagnosis/ICD: Unspecified atrial fibrillation-I48.91 Indication:    Atrial Fibrillation CPT Code:      Echo Complete w Full Doppler-[id number] Patient History: Diabetes:          Yes Pertinent History: HTN and A-Fib.  HCM. Study Detail: The following Echo studies were performed: 2D, M-Mode, Doppler and               color flow. Technically challenging study due to poor acoustic               windows. Definity used as a contrast agent for endocardial border               definition. Total contrast used for this procedure was 1 mL via IV               push.  PHYSICIAN INTERPRETATION: Left Ventricle: The left ventricular systolic function is hyperdynamic, with an estimated ejection fraction of 65-70%. There are no regional wall motion abnormalities. The left ventricular cavity size is normal. Left ventricular diastolic filling was indeterminate. Left Atrium: The left atrium is mildly dilated. Right Ventricle: The right ventricle is normal in size. There is normal right ventricular global systolic function. Right Atrium: The right atrium is normal in size. Aortic Valve: The aortic valve is trileaflet. There is no evidence of aortic valve regurgitation. The peak instantaneous gradient of the aortic valve is 8.3 mmHg. The mean gradient of the aortic valve is 4.9 mmHg. Mitral Valve: The mitral valve is normal in structure. There is trace to mild mitral valve regurgitation. Tricuspid Valve: The tricuspid valve is structurally normal. There is trace to mild tricuspid regurgitation. Pulmonic Valve: The pulmonic valve is not well visualized. The pulmonic valve regurgitation was not well visualized. Pericardium: There is no pericardial effusion noted. Aorta: The aortic root is normal. Pulmonary Artery: The tricuspid regurgitant velocity is 2.25 m/s, and with an estimated right atrial pressure of 3 mmHg, the estimated pulmonary artery pressure is normal with the RVSP at 23.3 mmHg.  CONCLUSIONS:  1. Left ventricular systolic function is hyperdynamic with a 65-70% estimated ejection fraction. QUANTITATIVE DATA SUMMARY: 2D MEASUREMENTS:                           Normal Ranges: IVSd:          1.84 cm    (0.6-1.1cm) LVPWd:         1.09 cm     (0.6-1.1cm) LVIDd:         3.98 cm    (3.9-5.9cm) LVIDs:         2.50 cm LV Mass Index: 101.3 g/m2 LV % FS        37.1 % LA VOLUME:                              Normal Ranges: LA Vol A4C:       52.5 ml    (22+/-6mL/m2) LA Vol A2C:       53.8 ml LA Vol BP:        53.9 ml LA Vol Index A4C: 23.9 ml/m2 LA Vol Index A2C: 24.5 ml/m2 LA Vol Index BP:  24.5 ml/m2 LA Volume Index:  24.5 ml/m2 LA Vol A4C:       51.0 ml LA Vol A2C:       50.8 ml RA VOLUME BY A/L METHOD:                       Normal Ranges: RA Area A4C: 13.6 cm2 M-MODE MEASUREMENTS:                  Normal Ranges: Ao Root: 2.90 cm (2.0-3.7cm) LAs:     4.17 cm (2.7-4.0cm) LV SYSTOLIC FUNCTION BY 2D PLANIMETRY (MOD):                     Normal Ranges: EF-A4C View: 68.9 % (>=55%) EF-A2C View: 67.6 % EF-Biplane:  68.5 % LV DIASTOLIC FUNCTION:                     Normal Ranges: MV Peak E: 0.83 m/s (0.7-1.2 m/s) MITRAL VALVE:                 Normal Ranges: MV DT: 197 msec (150-240msec) AORTIC VALVE:                                   Normal Ranges: AoV Vmax:                1.44 m/s (<=1.7m/s) AoV Peak P.3 mmHg (<20mmHg) AoV Mean P.9 mmHg (1.7-11.5mmHg) LVOT Max Salazar:            1.35 m/s (<=1.1m/s) AoV VTI:                 27.74 cm (18-25cm) LVOT VTI:                21.49 cm LVOT Diameter:           1.98 cm  (1.8-2.4cm) AoV Area, VTI:           2.38 cm2 (2.5-5.5cm2) AoV Area,Vmax:           2.90 cm2 (2.5-4.5cm2) AoV Dimensionless Index: 0.77 TRICUSPID VALVE/RVSP:                             Normal Ranges: Peak TR Velocity: 2.25 m/s RV Syst Pressure: 23.3 mmHg (< 30mmHg) PULMONIC VALVE:                      Normal Ranges: PV Max Salazar: 1.2 m/s  (0.6-0.9m/s) PV Max P.2 mmHg  [id number] Dania Gutierrez MD Electronically signed on 3/29/2024 at 11:55:50 AM  ** Final **           Assessment/Plan   Principal Problem:    Longstanding persistent atrial fibrillation (Multi)  Active Problems:    SVT (supraventricular tachycardia) (CMS-HCC)    Paroxysmal  atrial fibrillation/flutter with RVR  -s/p ablation 7-8-24 with Dr. Sommers  -EKG reviewed, atrial flutter,   -Started on amiodarone gtt->had been on for about 60 hours  -Started Amiodarone 400mg BID today in addition to gtt  -stop amiodarone gtt  -cont amiodarone PO  -Cont eliquis  -Cont Toprol XL 100mg BID  -Repeat echo unchanged, trivial pericardial effusion  -Monitor on tele  -Monitor for another 24-48 hours on PO amiodarone     2. Chest Pain/ Elevated troponin-Non MI elevation 2/2 AF RVR and recent ablation  -Does report CP and SOB   -Troponin 56, 51  -CXR negative  -I reviewed the EKG, no acute changes, ST elevation, or depression  -Stress test in Jan 2024 negative for ischemia  -Cont metoprolol  -Consider ischemic work up, may need LHC to rule out cardiovascular ischemia for angina in absence of rapid AF     3. Hypertension  -bp elevated today  -2gm na diet  -cont metoprolol  -restart lisinopril/hctz  -monitor     4. Diabetes Mellitus type 2  -ISS  -Accuchecks  -hgb A1C 7.4%        LEAH Guadarrama-CNP

## 2024-07-18 NOTE — CARE PLAN
The patient's goals for the shift include      The clinical goals for the shift include Pt will walk in the hallway this shift    Pt did get up and walk in the hallway with her daughter.

## 2024-07-18 NOTE — CARE PLAN
The patient's goals for the shift include      The clinical goals for the shift include Pt will remain HDS this shift.    Over the shift, the patient did make progress toward the following goals. HR remains controlled in NS, monitored and medicated as ordered this shift.

## 2024-07-19 LAB
ALBUMIN SERPL BCP-MCNC: 3.5 G/DL (ref 3.4–5)
ALP SERPL-CCNC: 65 U/L (ref 33–136)
ALT SERPL W P-5'-P-CCNC: 20 U/L (ref 7–45)
ANION GAP SERPL CALC-SCNC: 10 MMOL/L (ref 10–20)
AST SERPL W P-5'-P-CCNC: 16 U/L (ref 9–39)
BILIRUB SERPL-MCNC: 0.6 MG/DL (ref 0–1.2)
BUN SERPL-MCNC: 12 MG/DL (ref 6–23)
CALCIUM SERPL-MCNC: 9.2 MG/DL (ref 8.6–10.3)
CHLORIDE SERPL-SCNC: 104 MMOL/L (ref 98–107)
CO2 SERPL-SCNC: 28 MMOL/L (ref 21–32)
CREAT SERPL-MCNC: 1.07 MG/DL (ref 0.5–1.05)
EGFRCR SERPLBLD CKD-EPI 2021: 55 ML/MIN/1.73M*2
ERYTHROCYTE [DISTWIDTH] IN BLOOD BY AUTOMATED COUNT: 13.3 % (ref 11.5–14.5)
GLUCOSE BLD MANUAL STRIP-MCNC: 102 MG/DL (ref 74–99)
GLUCOSE BLD MANUAL STRIP-MCNC: 123 MG/DL (ref 74–99)
GLUCOSE BLD MANUAL STRIP-MCNC: 130 MG/DL (ref 74–99)
GLUCOSE BLD MANUAL STRIP-MCNC: 176 MG/DL (ref 74–99)
GLUCOSE SERPL-MCNC: 133 MG/DL (ref 74–99)
HCT VFR BLD AUTO: 38.6 % (ref 36–46)
HGB BLD-MCNC: 12.5 G/DL (ref 12–16)
MAGNESIUM SERPL-MCNC: 1.67 MG/DL (ref 1.6–2.4)
MCH RBC QN AUTO: 31.3 PG (ref 26–34)
MCHC RBC AUTO-ENTMCNC: 32.4 G/DL (ref 32–36)
MCV RBC AUTO: 97 FL (ref 80–100)
NRBC BLD-RTO: 0 /100 WBCS (ref 0–0)
PHOSPHATE SERPL-MCNC: 2.9 MG/DL (ref 2.5–4.9)
PLATELET # BLD AUTO: 249 X10*3/UL (ref 150–450)
POTASSIUM SERPL-SCNC: 4.2 MMOL/L (ref 3.5–5.3)
PROT SERPL-MCNC: 7.1 G/DL (ref 6.4–8.2)
RBC # BLD AUTO: 3.99 X10*6/UL (ref 4–5.2)
SODIUM SERPL-SCNC: 138 MMOL/L (ref 136–145)
WBC # BLD AUTO: 8.5 X10*3/UL (ref 4.4–11.3)

## 2024-07-19 PROCEDURE — 2500000001 HC RX 250 WO HCPCS SELF ADMINISTERED DRUGS (ALT 637 FOR MEDICARE OP)

## 2024-07-19 PROCEDURE — 2500000002 HC RX 250 W HCPCS SELF ADMINISTERED DRUGS (ALT 637 FOR MEDICARE OP, ALT 636 FOR OP/ED): Performed by: NURSE PRACTITIONER

## 2024-07-19 PROCEDURE — 36415 COLL VENOUS BLD VENIPUNCTURE: CPT

## 2024-07-19 PROCEDURE — 84100 ASSAY OF PHOSPHORUS: CPT

## 2024-07-19 PROCEDURE — 83735 ASSAY OF MAGNESIUM: CPT

## 2024-07-19 PROCEDURE — 85027 COMPLETE CBC AUTOMATED: CPT

## 2024-07-19 PROCEDURE — 99232 SBSQ HOSP IP/OBS MODERATE 35: CPT

## 2024-07-19 PROCEDURE — 2060000001 HC INTERMEDIATE ICU ROOM DAILY

## 2024-07-19 PROCEDURE — 2500000001 HC RX 250 WO HCPCS SELF ADMINISTERED DRUGS (ALT 637 FOR MEDICARE OP): Performed by: NURSE PRACTITIONER

## 2024-07-19 PROCEDURE — 82947 ASSAY GLUCOSE BLOOD QUANT: CPT

## 2024-07-19 PROCEDURE — 80053 COMPREHEN METABOLIC PANEL: CPT

## 2024-07-19 RX ORDER — AMIODARONE HYDROCHLORIDE 200 MG/1
200 TABLET ORAL DAILY
Status: DISCONTINUED | OUTPATIENT
Start: 2024-08-03 | End: 2024-07-20 | Stop reason: HOSPADM

## 2024-07-19 RX ORDER — AMIODARONE HYDROCHLORIDE 200 MG/1
400 TABLET ORAL DAILY
Status: DISCONTINUED | OUTPATIENT
Start: 2024-07-20 | End: 2024-07-20 | Stop reason: HOSPADM

## 2024-07-19 RX ORDER — MAGNESIUM SULFATE HEPTAHYDRATE 40 MG/ML
4 INJECTION, SOLUTION INTRAVENOUS ONCE
Status: CANCELLED | OUTPATIENT
Start: 2024-07-19 | End: 2024-07-19

## 2024-07-19 ASSESSMENT — ENCOUNTER SYMPTOMS
CHILLS: 0
DIAPHORESIS: 0
ABDOMINAL PAIN: 0
DIZZINESS: 0
FEVER: 0
CHEST TIGHTNESS: 0
ABDOMINAL DISTENTION: 0
FATIGUE: 0
LIGHT-HEADEDNESS: 0
WHEEZING: 0
SHORTNESS OF BREATH: 0
VOMITING: 0
PALPITATIONS: 0
NAUSEA: 0

## 2024-07-19 ASSESSMENT — PAIN - FUNCTIONAL ASSESSMENT: PAIN_FUNCTIONAL_ASSESSMENT: 0-10

## 2024-07-19 ASSESSMENT — COGNITIVE AND FUNCTIONAL STATUS - GENERAL
DRESSING REGULAR LOWER BODY CLOTHING: A LITTLE
TOILETING: A LITTLE
MOVING TO AND FROM BED TO CHAIR: A LITTLE
MOBILITY SCORE: 22
WALKING IN HOSPITAL ROOM: A LITTLE
DRESSING REGULAR UPPER BODY CLOTHING: A LITTLE
DAILY ACTIVITIY SCORE: 23
HELP NEEDED FOR BATHING: A LITTLE
WALKING IN HOSPITAL ROOM: A LITTLE
DAILY ACTIVITIY SCORE: 20
CLIMB 3 TO 5 STEPS WITH RAILING: A LITTLE
CLIMB 3 TO 5 STEPS WITH RAILING: A LITTLE
STANDING UP FROM CHAIR USING ARMS: A LITTLE
TOILETING: A LITTLE
MOBILITY SCORE: 20

## 2024-07-19 ASSESSMENT — PAIN SCALES - GENERAL
PAINLEVEL_OUTOF10: 3
PAINLEVEL_OUTOF10: 0 - NO PAIN

## 2024-07-19 ASSESSMENT — PAIN DESCRIPTION - DESCRIPTORS: DESCRIPTORS: ACHING;DISCOMFORT

## 2024-07-19 NOTE — PROGRESS NOTES
07/19/24 1051   Discharge Planning   Living Arrangements Spouse/significant other   Support Systems Spouse/significant other;Children;Family members   Assistance Needed Alert and oriented x 3, Independent with ADL's, Does not drive([place]), Cane when outside, On Eliquis at home   Type of Residence Private residence   Number of Stairs to Enter Residence 4   Number of Stairs Within Residence 0   Do you have animals or pets at home? No   Who is requesting discharge planning? Provider   Home or Post Acute Services None   Expected Discharge Disposition Home  (Patient continues to deny any HHC needs, is on room air)   Does the patient need discharge transport arranged? No

## 2024-07-19 NOTE — CARE PLAN
The patient's goals for the shift include  stay out of afib.    The clinical goals for the shift include Patient will remain in normal sinus through out the shift.    Over the shift, the patient did not make progress toward the following goals. Barriers to progression include acute illness. Recommendations to address these barriers include medications.

## 2024-07-19 NOTE — PROGRESS NOTES
"Subjective   Subjective:   History Of Present Illness:  Debbie Rees is a 74 y.o. female with a PMH of HCM and A-fib and was seen and evaluated at bedside today. Last night, she reported an episode of rapid heart rate with slight chest pain after walking to the bathroom. She does not think she went out of sinus rhythm but she stated that this is what typically occurs when she goes into A-fib. Denies any other symptoms.    Review Of Systems:  11-point ROS was performed and is negative except as noted below and in the HPI.     Review of Systems   Constitutional:  Negative for chills, diaphoresis, fatigue and fever.   Respiratory:  Negative for chest tightness, shortness of breath and wheezing.    Cardiovascular:  Negative for chest pain and palpitations.   Gastrointestinal:  Negative for abdominal distention, abdominal pain, nausea and vomiting.   Neurological:  Negative for dizziness and light-headedness.        Objective   Objective:     /68 (BP Location: Left arm, Patient Position: Sitting)   Pulse 63   Temp 36 °C (96.8 °F) (Temporal)   Resp 15   Ht 1.676 m (5' 6\")   Wt 113 kg (248 lb 3.8 oz)   SpO2 96%   BMI 40.07 kg/m²     Physical Exam  Constitutional:       General: She is not in acute distress.     Appearance: Normal appearance. She is not ill-appearing.   Cardiovascular:      Rate and Rhythm: Normal rate and regular rhythm.      Pulses: Normal pulses.      Heart sounds: Normal heart sounds. No murmur heard.     No friction rub. No gallop.   Pulmonary:      Effort: Pulmonary effort is normal. No respiratory distress.      Breath sounds: Normal breath sounds. No wheezing, rhonchi or rales.   Abdominal:      General: Abdomen is flat. Bowel sounds are normal.      Palpations: Abdomen is soft.   Neurological:      Mental Status: She is alert.       Lab Work:     Lab Results   Component Value Date    WBC 8.5 07/19/2024    HGB 12.5 07/19/2024    HCT 38.6 07/19/2024    MCV 97 07/19/2024     " 07/19/2024     Lab Results   Component Value Date    GLUCOSE 133 (H) 07/19/2024    CALCIUM 9.2 07/19/2024     07/19/2024    K 4.2 07/19/2024    CO2 28 07/19/2024     07/19/2024    BUN 12 07/19/2024    CREATININE 1.07 (H) 07/19/2024     POC HEMOGLOBIN A1c   Date Value Ref Range Status   04/11/2024 7.7 (A) 4.2 - 6.5 % Final     Hemoglobin A1C   Date Value Ref Range Status   07/09/2024 7.4 (H) see below % Final   03/28/2024 8.3 (H) see below % Final     Thyroid Stimulating Hormone   Date Value Ref Range Status   07/12/2024 1.55 0.44 - 3.98 mIU/L Final   07/09/2024 0.68 0.44 - 3.98 mIU/L Final   03/28/2024 1.47 0.44 - 3.98 mIU/L Final     Cultures:   No results found for the last 90 days.    Images:     No orders to display      Medications:     Scheduled:  amiodarone, 400 mg, oral, BID  apixaban, 5 mg, oral, BID  busPIRone, 5 mg, oral, BID  hydroCHLOROthiazide, 25 mg, oral, Daily  insulin lispro, 0-5 Units, subcutaneous, TID  lisinopril, 20 mg, oral, Daily  metoprolol succinate XL, 100 mg, oral, BID  pantoprazole, 40 mg, oral, BID AC    Continuous:     PRN:  PRN medications: melatonin **AND** acetaminophen **AND** sennosides-docusate sodium, dextrose, dextrose, glucagon, glucagon     Assessment & Plan:   Debbie Rees is a 74 y.o. female with a PMH of PMH of HCM, HTN, PVD s/p EVLA, OA, T2DM, and [place]oxysmal [place] [facility] [place] [facility] with c/o sudden palpitations associated with chest discomfort.  Admitted for uncontrolled A-fib requiring Amio drip.      ACUTE MEDICAL ISSUES:  #Uncontrolled/Recurrent A-fib RVR:  - s/p ablation on 7/8/24  - pt was recently discharged from [facility] with PO Amio regiment  - ECHO 7/11/24 showed EF 70% with severely increased left ventricular septal thickness  - EKG showed , supraventricular tachycardia, QTc 450  - troponin 56 > 51  -   - in the ED, started Amio bolus + drip  PLAN:  - cardiology following  - Amiodarone gtt discontinued  yesterday  - Started oral amiodarone 400 mg BID. Continue to monitor once on only oral amiodarone (patient was previously discharged on this regimen but went back into A-Fib)  - continue Eliquis, Metoprolol  mg BID  - telemetry     CHRONIC MEDICAL ISSUES:  #HTN - continue home Metoprolol  mg BID and home Lisinopril 20 mg and hydrochlorothiazide 25 mg.   #T2DM - A1c 7.4%, continue home sliding scale inpatient, HOLDING Ozempic.     Fluid: Replete PRN  Electrolytes: Replete PRN  Nutrition: Diabetic/Cardiology; Regular diet  GI ppx: Pantoprazole  DVT/PE ppx: Eliquis  Abx: none   IV Lines: PIV  O2: RA     Dispo: admitted for symptomatic recurrent palpitations requiring Amio drip. Cardiology consulted Estimated length of stay <2 days. Likely will discharge home once medically clear.     Ignacio Dickerson, PGY-1  Transitional Year    Disclaimer: Documentation completed with the information available at the time of input. The times in the chart may not be reflective of actual patient care times, interventions, or procedures. Documentation occurs after the physical care of the patient.

## 2024-07-19 NOTE — CARE PLAN
The patient's goals for the shift include      The clinical goals for the shift include Pt will remain in NSR through end of shift.    Over the shift, the patient made progress toward the following goals. Pt remained in NSR through the shift. With HR of less than 85 bpm,

## 2024-07-20 VITALS
TEMPERATURE: 97.2 F | WEIGHT: 248.24 LBS | BODY MASS INDEX: 39.9 KG/M2 | SYSTOLIC BLOOD PRESSURE: 126 MMHG | DIASTOLIC BLOOD PRESSURE: 73 MMHG | HEIGHT: 66 IN | HEART RATE: 59 BPM | RESPIRATION RATE: 16 BRPM | OXYGEN SATURATION: 97 %

## 2024-07-20 LAB
ALBUMIN SERPL BCP-MCNC: 3.4 G/DL (ref 3.4–5)
ALP SERPL-CCNC: 62 U/L (ref 33–136)
ALT SERPL W P-5'-P-CCNC: 16 U/L (ref 7–45)
ANION GAP SERPL CALC-SCNC: 11 MMOL/L (ref 10–20)
AST SERPL W P-5'-P-CCNC: 13 U/L (ref 9–39)
BILIRUB SERPL-MCNC: 0.5 MG/DL (ref 0–1.2)
BUN SERPL-MCNC: 14 MG/DL (ref 6–23)
CALCIUM SERPL-MCNC: 9.2 MG/DL (ref 8.6–10.3)
CHLORIDE SERPL-SCNC: 104 MMOL/L (ref 98–107)
CO2 SERPL-SCNC: 29 MMOL/L (ref 21–32)
CREAT SERPL-MCNC: 1.16 MG/DL (ref 0.5–1.05)
EGFRCR SERPLBLD CKD-EPI 2021: 50 ML/MIN/1.73M*2
ERYTHROCYTE [DISTWIDTH] IN BLOOD BY AUTOMATED COUNT: 13.4 % (ref 11.5–14.5)
GLUCOSE BLD MANUAL STRIP-MCNC: 132 MG/DL (ref 74–99)
GLUCOSE SERPL-MCNC: 132 MG/DL (ref 74–99)
HCT VFR BLD AUTO: 37.6 % (ref 36–46)
HGB BLD-MCNC: 12.2 G/DL (ref 12–16)
MAGNESIUM SERPL-MCNC: 1.71 MG/DL (ref 1.6–2.4)
MCH RBC QN AUTO: 31.2 PG (ref 26–34)
MCHC RBC AUTO-ENTMCNC: 32.4 G/DL (ref 32–36)
MCV RBC AUTO: 96 FL (ref 80–100)
NRBC BLD-RTO: 0 /100 WBCS (ref 0–0)
PHOSPHATE SERPL-MCNC: 3.2 MG/DL (ref 2.5–4.9)
PLATELET # BLD AUTO: 256 X10*3/UL (ref 150–450)
POTASSIUM SERPL-SCNC: 4.2 MMOL/L (ref 3.5–5.3)
PROT SERPL-MCNC: 6.8 G/DL (ref 6.4–8.2)
RBC # BLD AUTO: 3.91 X10*6/UL (ref 4–5.2)
SODIUM SERPL-SCNC: 140 MMOL/L (ref 136–145)
WBC # BLD AUTO: 7.9 X10*3/UL (ref 4.4–11.3)

## 2024-07-20 PROCEDURE — 80053 COMPREHEN METABOLIC PANEL: CPT

## 2024-07-20 PROCEDURE — 85027 COMPLETE CBC AUTOMATED: CPT

## 2024-07-20 PROCEDURE — 2500000001 HC RX 250 WO HCPCS SELF ADMINISTERED DRUGS (ALT 637 FOR MEDICARE OP)

## 2024-07-20 PROCEDURE — 82947 ASSAY GLUCOSE BLOOD QUANT: CPT

## 2024-07-20 PROCEDURE — 2500000002 HC RX 250 W HCPCS SELF ADMINISTERED DRUGS (ALT 637 FOR MEDICARE OP, ALT 636 FOR OP/ED)

## 2024-07-20 PROCEDURE — 99239 HOSP IP/OBS DSCHRG MGMT >30: CPT

## 2024-07-20 PROCEDURE — 83735 ASSAY OF MAGNESIUM: CPT

## 2024-07-20 PROCEDURE — 2500000001 HC RX 250 WO HCPCS SELF ADMINISTERED DRUGS (ALT 637 FOR MEDICARE OP): Performed by: NURSE PRACTITIONER

## 2024-07-20 PROCEDURE — 36415 COLL VENOUS BLD VENIPUNCTURE: CPT

## 2024-07-20 PROCEDURE — 84100 ASSAY OF PHOSPHORUS: CPT

## 2024-07-20 RX ORDER — LISINOPRIL 20 MG/1
20 TABLET ORAL DAILY
Qty: 30 TABLET | Refills: 3 | Status: SHIPPED | OUTPATIENT
Start: 2024-07-21

## 2024-07-20 RX ORDER — HYDROCHLOROTHIAZIDE 25 MG/1
25 TABLET ORAL DAILY
Qty: 30 TABLET | Refills: 0 | Status: SHIPPED | OUTPATIENT
Start: 2024-07-21 | End: 2024-07-20

## 2024-07-20 RX ORDER — AMIODARONE HYDROCHLORIDE 200 MG/1
200 TABLET ORAL DAILY
Qty: 30 TABLET | Refills: 3 | Status: SHIPPED | OUTPATIENT
Start: 2024-07-31

## 2024-07-20 RX ORDER — HYDROCHLOROTHIAZIDE 25 MG/1
25 TABLET ORAL DAILY
Qty: 30 TABLET | Refills: 3 | Status: SHIPPED | OUTPATIENT
Start: 2024-07-21

## 2024-07-20 RX ORDER — BUSPIRONE HYDROCHLORIDE 5 MG/1
5 TABLET ORAL 2 TIMES DAILY
Qty: 60 TABLET | Refills: 3 | Status: SHIPPED | OUTPATIENT
Start: 2024-07-20

## 2024-07-20 RX ORDER — AMIODARONE HYDROCHLORIDE 200 MG/1
400 TABLET ORAL DAILY
Qty: 28 TABLET | Refills: 0 | Status: SHIPPED | OUTPATIENT
Start: 2024-07-20 | End: 2024-08-03

## 2024-07-20 RX ORDER — LISINOPRIL 20 MG/1
20 TABLET ORAL DAILY
Qty: 30 TABLET | Refills: 0 | Status: SHIPPED | OUTPATIENT
Start: 2024-07-21 | End: 2024-07-20

## 2024-07-20 RX ORDER — AMIODARONE HYDROCHLORIDE 200 MG/1
TABLET ORAL
Qty: 86 TABLET | Refills: 0 | Status: SHIPPED | OUTPATIENT
Start: 2024-07-21 | End: 2024-10-02

## 2024-07-20 RX ORDER — METOPROLOL SUCCINATE 100 MG/1
100 TABLET, EXTENDED RELEASE ORAL 2 TIMES DAILY
Qty: 60 TABLET | Refills: 0 | Status: SHIPPED | OUTPATIENT
Start: 2024-07-20 | End: 2024-08-19

## 2024-07-20 ASSESSMENT — PAIN SCALES - GENERAL
PAINLEVEL_OUTOF10: 0 - NO PAIN
PAINLEVEL_OUTOF10: 0 - NO PAIN

## 2024-07-20 ASSESSMENT — PAIN - FUNCTIONAL ASSESSMENT
PAIN_FUNCTIONAL_ASSESSMENT: 0-10
PAIN_FUNCTIONAL_ASSESSMENT: 0-10

## 2024-07-20 NOTE — CARE PLAN
The patient's goals for the shift include  to be discharged today    The clinical goals for the shift include patient will remain in SR this shift

## 2024-07-20 NOTE — CARE PLAN
Problem: Safety - Adult  Goal: Free from fall injury  Outcome: Met   The patient's goals for the shift include      The clinical goals for the shift include patient will remain in SR this shift     Patient is alert and oriented. Currently in SR. No complaints of pain or shortness of breath this shift

## 2024-07-20 NOTE — PROGRESS NOTES
07/20/24 1100   Discharge Planning   Expected Discharge Disposition Home  (Pt dcing today and is aware. Pt arranging transport home. Pt denies any home going needs.)

## 2024-07-20 NOTE — PROGRESS NOTES
Debbie Rees is a 74 y.o. female on day 4 of admission presenting with Longstanding persistent atrial fibrillation (Multi).      Subjective     Debbie is laying in bed, maintaining sinus rhythm. She reports that she is feeling better and slept ok last  night. She is agreeable to going home today.     Review of systems:  Constitutional: negative for fever, chills, or malaise  Neuro: negative for dizziness, headache, numbness, tingling  ENT: Negative for nasal congestion or sore throat  Resp: negative for shortness of breath, cough, or wheezing  CV: negative for chest pain, palpitations  GI: negative for abd pain, nausea, vomiting or diarrhea  : negative for dysuria, frequency, or urgency  Skin: negative for lesions, wounds, or rash  Musculoskeletal: Negative for weakness, myalgia, or arthralgia  Endocrine: Negative for polyuria or polydipsia         Objective   Constitutional: Well developed, awake/alert/oriented x3, no distress, alert and cooperative  Eyes: PERRL, EOMI, clear sclera  ENMT: mucous membranes moist, no apparent injury, no lesions seen  Head/Neck: Neck supple, no apparent injury, thyroid without mass or tenderness, No JVD, trachea midline, no bruits  Respiratory/Thorax: Patent airways, CTAB, normal breath sounds with good chest expansion, thorax symmetric  Cardiovascular: Regular, rate and rhythm, no murmurs, 2+ equal pulses of the extremities, normal S 1and S 2  Gastrointestinal: Nondistended, soft, non-tender, no rebound tenderness or guarding, no masses palpable, no organomegaly, +BS, no bruits  Musculoskeletal: ROM intact, no joint swelling, normal strength  Extremities: normal extremities, no cyanosis 1+ edema to BLE, contusions or wounds, no clubbing  Neurological: alert and oriented x3, intact senses, motor, response and reflexes, normal strength  Lymphatic: No significant lymphadenopathy  Psychological: Appropriate mood and behavior  Skin: Warm and dry, no lesions, no rashes, redness to right  "lower extremity      Last Recorded Vitals  /79 (BP Location: Left arm, Patient Position: Lying)   Pulse 69   Temp 36.1 °C (97 °F) (Temporal)   Resp 10   Ht 1.676 m (5' 6\")   Wt 113 kg (248 lb 3.8 oz)   SpO2 96%   BMI 40.07 kg/m²     Intake/Output last 3 Shifts:  I/O last 3 completed shifts:  In: 240 (2.1 mL/kg) [P.O.:240]  Out: - (0 mL/kg)   Weight: 112.6 kg   I/O this shift:  In: 240 [P.O.:240]  Out: -     Relevant Results  Scheduled medications  amiodarone, 400 mg, oral, Daily   Followed by  [START ON 8/3/2024] amiodarone, 200 mg, oral, Daily  apixaban, 5 mg, oral, BID  busPIRone, 5 mg, oral, BID  hydroCHLOROthiazide, 25 mg, oral, Daily  insulin lispro, 0-5 Units, subcutaneous, TID  lisinopril, 20 mg, oral, Daily  metoprolol succinate XL, 100 mg, oral, BID  pantoprazole, 40 mg, oral, BID AC      Continuous medications     PRN medications  PRN medications: melatonin **AND** acetaminophen **AND** sennosides-docusate sodium, dextrose, dextrose, glucagon, glucagon    Results for orders placed or performed during the hospital encounter of 07/15/24 (from the past 24 hour(s))   POCT GLUCOSE   Result Value Ref Range    POCT Glucose 123 (H) 74 - 99 mg/dL   POCT GLUCOSE   Result Value Ref Range    POCT Glucose 102 (H) 74 - 99 mg/dL   POCT GLUCOSE   Result Value Ref Range    POCT Glucose 176 (H) 74 - 99 mg/dL   CBC   Result Value Ref Range    WBC 7.9 4.4 - 11.3 x10*3/uL    nRBC 0.0 0.0 - 0.0 /100 WBCs    RBC 3.91 (L) 4.00 - 5.20 x10*6/uL    Hemoglobin 12.2 12.0 - 16.0 g/dL    Hematocrit 37.6 36.0 - 46.0 %    MCV 96 80 - 100 fL    MCH 31.2 26.0 - 34.0 pg    MCHC 32.4 32.0 - 36.0 g/dL    RDW 13.4 11.5 - 14.5 %    Platelets 256 150 - 450 x10*3/uL   Comprehensive Metabolic Panel   Result Value Ref Range    Glucose 132 (H) 74 - 99 mg/dL    Sodium 140 136 - 145 mmol/L    Potassium 4.2 3.5 - 5.3 mmol/L    Chloride 104 98 - 107 mmol/L    Bicarbonate 29 21 - 32 mmol/L    Anion Gap 11 10 - 20 mmol/L    Urea Nitrogen 14 6 " - 23 mg/dL    Creatinine 1.16 (H) 0.50 - 1.05 mg/dL    eGFR 50 (L) >60 mL/min/1.73m*2    Calcium 9.2 8.6 - 10.3 mg/dL    Albumin 3.4 3.4 - 5.0 g/dL    Alkaline Phosphatase 62 33 - 136 U/L    Total Protein 6.8 6.4 - 8.2 g/dL    AST 13 9 - 39 U/L    Bilirubin, Total 0.5 0.0 - 1.2 mg/dL    ALT 16 7 - 45 U/L   Magnesium   Result Value Ref Range    Magnesium 1.71 1.60 - 2.40 mg/dL   Phosphorus   Result Value Ref Range    Phosphorus 3.2 2.5 - 4.9 mg/dL   POCT GLUCOSE   Result Value Ref Range    POCT Glucose 132 (H) 74 - 99 mg/dL       No orders to display       Transthoracic Echo (TTE) Complete    Result Date: 7/11/2024   [facility], [address] [place] [address]               Tel [phone] and Fax [phone] TRANSTHORACIC ECHOCARDIOGRAM REPORT  Patient Name:      VIVIAN KIM      Reading Physician:    [id number] Michael Lozano MD Study Date:        7/11/2024            Ordering Provider:    [id number] RANULFO MELGOZA MRN/PID:           [id number]             Fellow: Accession#:        [id number]         Nurse:                Nina Dickerson RN Date of Birth/Age: 1950 / 74 years  Sonographer:          Dina Rojas RDCS Gender:            F                    Additional Staff: Height:            167.64 cm            Admit Date: Weight:            112.04 kg            Admission Status:     Outpatient BSA / BMI:         2.19 m2 / 39.87      Encounter#:           [id number]                    kg/m2 Blood Pressure:    97/65 mmHg           Department Location:  [facility] Non                                                               Invasive Study Type:    TRANSTHORACIC ECHO (TTE) COMPLETE Diagnosis/ICD: Unspecified atrial fibrillation-I48.91 Indication:    Atrial Fibrillation CPT Code:      Echo Complete  w Full [id number] Patient History: Diabetes:          Yes Pertinent History: HTN, HCM, Chest Pain and A-Fib. S/P cardiac ablation                    (7/8/23). Study Detail: The following Echo studies were performed: 2D, M-Mode, Doppler and               color flow. Technically challenging study due to prominent lung               artifact. Definity used as a contrast agent for endocardial border               definition. Total contrast used for this procedure was 1.0 mL via               IV push. The patient was awake.  PHYSICIAN INTERPRETATION: Left Ventricle: The left ventricular systolic function is normal, with a visually estimated ejection fraction of 70%. There are no regional wall motion abnormalities. The left ventricular cavity size is decreased. The left ventricular septal wall thickness is severely increased. There is moderately increased left ventricular posterior wall thickness. There is moderate to severe concentric left ventricular hypertrophy involving the septal wall. Findings are consistent with hypertrophic cardiomyopathy. Spectral Doppler shows an impaired relaxation pattern of left ventricular diastolic filling. There is a mild mid cavity left ventricular obstruction. The resting gradient is 25 mmHg.The provoked gradient is 33 mmHg. Left Atrium: The left atrium is moderately dilated. Right Ventricle: The right ventricle is normal in size. There is normal right ventricular global systolic function. Right Atrium: The right atrium is mild to moderately dilated. Aortic Valve: The aortic valve is trileaflet. There is mild aortic valve cusp calcification. The aortic valve dimensionless index is 0.89. There is no evidence of aortic valve regurgitation. The peak instantaneous gradient of the aortic valve is 10.6 mmHg. The mean gradient of the aortic valve is 7.0 mmHg. Mitral Valve: The mitral valve is normal in structure. There is moderate mitral annular calcification. There is mild to moderate  mitral valve regurgitation. Tricuspid Valve: The tricuspid valve is structurally normal. There is mild tricuspid regurgitation. Pulmonic Valve: The pulmonic valve is structurally normal. There is physiologic pulmonic valve regurgitation. Pericardium: There is a trivial pericardial effusion. Aorta: The aortic root is normal. Pulmonary Artery: The tricuspid regurgitant velocity is 2.82 m/s, and with an estimated right atrial pressure of 3 mmHg, the estimated pulmonary artery pressure is mildly elevated with the RVSP at 34.8 mmHg. Pulmonary Veins: The pulmonary veins appear normal and return normally to the left atrium. Systemic Veins: The inferior vena cava appears to be of normal size.  CONCLUSIONS:  1. The left ventricular systolic function is normal, with a visually estimated ejection fraction of 70%.  2. Spectral Doppler shows an impaired relaxation pattern of left ventricular diastolic filling.  3. Left ventricular cavity size is decreased.  4. Severely increased left ventricular septal thickness.  5. The left ventricular posterior wall thickness is moderately increased.  6. There is hypertrophic cardiomyopathy.  7. There is moderate to severe concentric left ventricular hypertrophy.  8. There is a mild mid cavity left ventricular obstruction.  9. There is normal right ventricular global systolic function. 10. The left atrium is moderately dilated. 11. The right atrium is mild to moderately dilated. 12. There is moderate mitral annular calcification. 13. Mild to moderate mitral valve regurgitation. 14. Mild pulmonary HTN. Estimated CVP is normal. QUANTITATIVE DATA SUMMARY: 2D MEASUREMENTS:                          Normal Ranges: Ao Root d:     3.10 cm   (2.0-3.7cm) IVSd:          1.80 cm   (0.6-1.1cm) LVPWd:         1.45 cm   (0.6-1.1cm) LVIDd:         3.32 cm   (3.9-5.9cm) LVIDs:         2.47 cm LV Mass Index: 94.2 g/m2 LV % FS        25.6 % LA VOLUME:                               Normal Ranges: LA Vol A4C:         65.6 ml    (22+/-6mL/m2) LA Vol A2C:        70.9 ml LA Vol BP:         70.6 ml LA Vol Index A4C:  30.0ml/m2 LA Vol Index A2C:  32.4 ml/m2 LA Vol Index BP:   32.3 ml/m2 LA Area A4C:       23.4 cm2 LA Area A2C:       23.5 cm2 LA Major Axis A4C: 7.1 cm LA Major Axis A2C: 6.6 cm LA Volume Index:   30.3 ml/m2 LA Vol A4C:        61.7 ml LA Vol A2C:        66.5 ml RA VOLUME BY A/L METHOD:                       Normal Ranges: RA Area A4C: 20.8 cm2 M-MODE MEASUREMENTS:                  Normal Ranges: Ao Root: 3.00 cm (2.0-3.7cm) AoV Exc: 2.00 cm (1.5-2.5cm) LAs:     4.90 cm (2.7-4.0cm) AORTA MEASUREMENTS:                      Normal Ranges: AoV Exc:     2.00 cm (1.5-2.5cm) Ao Sinus, d: 3.10 cm (2.1-3.5cm) Asc Ao, d:   3.30 cm (2.1-3.4cm) LV SYSTOLIC FUNCTION BY 2D PLANIMETRY (MOD):                      Normal Ranges: EF-A4C View:    61 % (>=55%) EF-A2C View:    62 % EF-Biplane:     63 % EF-Visual:      70 % LV EF Reported: 70 % LV DIASTOLIC FUNCTION:                            Normal Ranges: MV Peak E:      0.78 m/s   (0.7-1.2 m/s) MV Peak A:      0.74 m/s   (0.42-0.7 m/s) E/A Ratio:      1.07       (1.0-2.2) MV e'           0.071 m/s  (>8.0) MV lateral e'   0.07 m/s MV medial e'    0.07 m/s E/e' Ratio:     11.07      (<8.0) PulmV Sys Salazar:  39.40 cm/s PulmV Guillen Salazar: 31.10 cm/s PulmV S/D Salazar:  1.00 MITRAL VALVE:                 Normal Ranges: MV DT: 184 msec (150-240msec) AORTIC VALVE:                                    Normal Ranges: AoV Vmax:                1.63 m/s  (<=1.7m/s) AoV Peak PG:             10.6 mmHg (<20mmHg) AoV Mean P.0 mmHg  (1.7-11.5mmHg) LVOT Max Salazar:            1.36 m/s  (<=1.1m/s) AoV VTI:                 34.40 cm  (18-25cm) LVOT VTI:                30.60 cm LVOT Diameter:           2.00 cm   (1.8-2.4cm) AoV Area, VTI:           2.79 cm2  (2.5-5.5cm2) AoV Area,Vmax:           2.62 cm2  (2.5-4.5cm2) AoV Dimensionless Index: 0.89  RIGHT VENTRICLE: RV Basal 2.67 cm RV Mid   2.16 cm RV  Major 6.5 cm TAPSE:   14.6 mm RV s'    0.17 m/s TRICUSPID VALVE/RVSP:                             Normal Ranges: Peak TR Velocity: 2.82 m/s RV Syst Pressure: 34.8 mmHg (< 30mmHg) IVC Diam:         1.82 cm PULMONIC VALVE:                      Normal Ranges: PV Max Salazar: 1.2 m/s  (0.6-0.9m/s) PV Max P.6 mmHg Pulmonary Veins: PulmV Guillen Salazar: 31.10 cm/s PulmV S/D Salazar:  1.00 PulmV Sys Salazar:  39.40 cm/s  [id number] Michael Lozano MD Electronically signed on 2024 at 8:45:01 PM  ** Final **     Transthoracic Echo (TTE) Complete    Result Date: 3/29/2024   [facility], [address] [place] [address]               Tel [phone] and Fax [phone] TRANSTHORACIC ECHOCARDIOGRAM REPORT  Patient Name:      VIVIAN JACOBSEN JUDY      Reading Physician:    [id number] Dania Gutierrez MD Study Date:        3/29/2024            Ordering Provider:    [id number] RANULFO MELGOZA MRN/PID:           [id number]             Fellow: Accession#:        [id number]         Nurse:                Nina Dickerson RN Date of Birth/Age: 1950 / 74 years  Sonographer:          Janneth Collins RDCS Gender:            F                    Additional Staff: Height:            167.64 cm            Admit Date:           3/28/2024 Weight:            113.40 kg            Admission Status:     Inpatient -                                                               Routine BSA / BMI:         2.20 m2 / 40.35      Encounter#:           [id number]                    kg/m2                                         Department Location:  [facility] Non                                                               Invasive Blood Pressure: 128 /85 mmHg Study Type:    TRANSTHORACIC ECHO (TTE) COMPLETE Diagnosis/ICD: Unspecified atrial fibrillation-I48.91 Indication:     Atrial Fibrillation CPT Code:      Echo Complete w Full Doppler-[id number] Patient History: Diabetes:          Yes Pertinent History: HTN and A-Fib. HCM. Study Detail: The following Echo studies were performed: 2D, M-Mode, Doppler and               color flow. Technically challenging study due to poor acoustic               windows. Definity used as a contrast agent for endocardial border               definition. Total contrast used for this procedure was 1 mL via IV               push.  PHYSICIAN INTERPRETATION: Left Ventricle: The left ventricular systolic function is hyperdynamic, with an estimated ejection fraction of 65-70%. There are no regional wall motion abnormalities. The left ventricular cavity size is normal. Left ventricular diastolic filling was indeterminate. Left Atrium: The left atrium is mildly dilated. Right Ventricle: The right ventricle is normal in size. There is normal right ventricular global systolic function. Right Atrium: The right atrium is normal in size. Aortic Valve: The aortic valve is trileaflet. There is no evidence of aortic valve regurgitation. The peak instantaneous gradient of the aortic valve is 8.3 mmHg. The mean gradient of the aortic valve is 4.9 mmHg. Mitral Valve: The mitral valve is normal in structure. There is trace to mild mitral valve regurgitation. Tricuspid Valve: The tricuspid valve is structurally normal. There is trace to mild tricuspid regurgitation. Pulmonic Valve: The pulmonic valve is not well visualized. The pulmonic valve regurgitation was not well visualized. Pericardium: There is no pericardial effusion noted. Aorta: The aortic root is normal. Pulmonary Artery: The tricuspid regurgitant velocity is 2.25 m/s, and with an estimated right atrial pressure of 3 mmHg, the estimated pulmonary artery pressure is normal with the RVSP at 23.3 mmHg.  CONCLUSIONS:  1. Left ventricular systolic function is hyperdynamic with a 65-70% estimated ejection fraction.  QUANTITATIVE DATA SUMMARY: 2D MEASUREMENTS:                           Normal Ranges: IVSd:          1.84 cm    (0.6-1.1cm) LVPWd:         1.09 cm    (0.6-1.1cm) LVIDd:         3.98 cm    (3.9-5.9cm) LVIDs:         2.50 cm LV Mass Index: 101.3 g/m2 LV % FS        37.1 % LA VOLUME:                              Normal Ranges: LA Vol A4C:       52.5 ml    (22+/-6mL/m2) LA Vol A2C:       53.8 ml LA Vol BP:        53.9 ml LA Vol Index A4C: 23.9 ml/m2 LA Vol Index A2C: 24.5 ml/m2 LA Vol Index BP:  24.5 ml/m2 LA Volume Index:  24.5 ml/m2 LA Vol A4C:       51.0 ml LA Vol A2C:       50.8 ml RA VOLUME BY A/L METHOD:                       Normal Ranges: RA Area A4C: 13.6 cm2 M-MODE MEASUREMENTS:                  Normal Ranges: Ao Root: 2.90 cm (2.0-3.7cm) LAs:     4.17 cm (2.7-4.0cm) LV SYSTOLIC FUNCTION BY 2D PLANIMETRY (MOD):                     Normal Ranges: EF-A4C View: 68.9 % (>=55%) EF-A2C View: 67.6 % EF-Biplane:  68.5 % LV DIASTOLIC FUNCTION:                     Normal Ranges: MV Peak E: 0.83 m/s (0.7-1.2 m/s) MITRAL VALVE:                 Normal Ranges: MV DT: 197 msec (150-240msec) AORTIC VALVE:                                   Normal Ranges: AoV Vmax:                1.44 m/s (<=1.7m/s) AoV Peak P.3 mmHg (<20mmHg) AoV Mean P.9 mmHg (1.7-11.5mmHg) LVOT Max Salazar:            1.35 m/s (<=1.1m/s) AoV VTI:                 27.74 cm (18-25cm) LVOT VTI:                21.49 cm LVOT Diameter:           1.98 cm  (1.8-2.4cm) AoV Area, VTI:           2.38 cm2 (2.5-5.5cm2) AoV Area,Vmax:           2.90 cm2 (2.5-4.5cm2) AoV Dimensionless Index: 0.77 TRICUSPID VALVE/RVSP:                             Normal Ranges: Peak TR Velocity: 2.25 m/s RV Syst Pressure: 23.3 mmHg (< 30mmHg) PULMONIC VALVE:                      Normal Ranges: PV Max Salazar: 1.2 m/s  (0.6-0.9m/s) PV Max P.2 mmHg  [id number] Dania Gutierrez MD Electronically signed on 3/29/2024 at 11:55:50 AM  ** Final **           Assessment/Plan    Principal Problem:    Longstanding persistent atrial fibrillation (Multi)  Active Problems:    SVT (supraventricular tachycardia) (CMS-HCC)    Paroxysmal atrial fibrillation/flutter with RVR  -s/p ablation 7-8-24 with Dr. Sommers  -EKG reviewed, atrial flutter,   -Started on amiodarone gtt->had been on for about 60 hours  -Started Amiodarone 400mg BID today in addition to gtt  -stop amiodarone gtt  -cont amiodarone PO  - Has had amiodarone po 400 mg BID x 3 days, now start 400 mg daily x 2 weeks.   -Cont eliquis  -Cont Toprol XL 100mg BID  -Repeat echo unchanged, trivial pericardial effusion  -Monitor on tele  -Monitor for another 24-48 hours on PO amiodarone  - Ok to be discharged home today  - If recurrence of atrial fibrillation at home instructed to take amiodarone 400 mg bid X 3 days  - She will follow up with me in the office on Monday     2. Chest Pain/ Elevated troponin-Non MI elevation 2/2 AF RVR and recent ablation  -Does report CP and SOB   -Troponin 56, 51  -CXR negative  -I reviewed the EKG, no acute changes, ST elevation, or depression  -Stress test in Jan 2024 negative for ischemia  -Cont metoprolol  -Consider ischemic work up, may need C to rule out cardiovascular ischemia for angina in absence of rapid AF     3. Hypertension  -bp elevated today  -2gm na diet  -cont metoprolol  -restart lisinopril/hctz  -monitor     4. Diabetes Mellitus type 2  -ISS  -Accuchecks  -hgb A1C 7.4%      LEAH Ang-CNP

## 2024-07-20 NOTE — DISCHARGE SUMMARY
Discharge Diagnosis  Longstanding persistent atrial fibrillation (Multi)    Issues Requiring Follow-Up  1) A-Fib w/ RVR  - Start amiodarone 400 mg daily x 2 weeks (until 8/2/24). Then on 8/3, start taking amiodarone 200 mg daily. If you experience palpitations, restart Amidorone 400 mg twice a day for 3 days.   - Start hydrochlorothiazide 25 mg daily and lisinopril 20 mg daily.   - Continue Toprol- mg twice a day.   - Discontinue Lopressor 25 mg.   - Continue taking your other home medications as prescribed.   - Please follow up with cardiology and your PCP.    Discharge Meds     Your medication list        START taking these medications        Instructions Last Dose Given Next Dose Due   hydroCHLOROthiazide 25 mg tablet  Commonly known as: HYDRODiuril  Start taking on: July 21, 2024      Take 1 tablet (25 mg) by mouth once daily.       lisinopril 20 mg tablet  Start taking on: July 21, 2024      Take 1 tablet (20 mg) by mouth once daily.              CHANGE how you take these medications        Instructions Last Dose Given Next Dose Due   amiodarone 200 mg tablet  Commonly known as: Pacerone  What changed: Another medication with the same name was removed. Continue taking this medication, and follow the directions you see here.      Take 2 tablets (400 mg) by mouth once daily for 14 days.Then take 1 tablet (200mg) once daily thereafter       amiodarone 200 mg tablet  Commonly known as: Pacerone  Start taking on: July 31, 2024  What changed: Another medication with the same name was removed. Continue taking this medication, and follow the directions you see here.      Take 1 tablet (200 mg) by mouth once daily. Do not fill before July 31, 2024.              CONTINUE taking these medications        Instructions Last Dose Given Next Dose Due   apixaban 5 mg tablet  Commonly known as: Eliquis      Take 1 tablet (5 mg) by mouth 2 times a day.       magnesium oxide 400 mg tablet  Commonly known as: Mag-Ox            metoprolol succinate  mg 24 hr tablet  Commonly known as: Toprol-XL      Take 1 tablet (100 mg) by mouth 2 times a day. Do not crush or chew.       pantoprazole 40 mg EC tablet  Commonly known as: ProtoNix      Take 1 tablet (40 mg) by mouth 2 times a day before meals. Do not crush, chew, or split.       semaglutide 1 mg/dose (4 mg/3 mL) pen injector  Commonly known as: OZEMPIC      Inject 1 mg under the skin 1 (one) time per week.              STOP taking these medications      metoprolol tartrate 25 mg tablet  Commonly known as: Lopressor                  Where to Get Your Medications        These medications were sent to  [facility]  [address] , [address], [facility] [address]      Hours: 8 AM to 6 PM Mon-Fri, 9 AM to 1 PM Saturday Phone: [phone]   hydroCHLOROthiazide 25 mg tablet  lisinopril 20 mg tablet         Test Results Pending At Discharge  Pending Labs       No current pending labs.            Hospital Course  Debbie Rees is a 74 y.o. female with a PMH of PMH of HCM, HTN, PVD s/p EVLA, OA, T2DM, and paroxysmal A-fib [place]p [place] [facility] [facility] on 7/15 with c/o sudden palpitations associated with chest discomfort, SOB, nausea, and dizziness.  She took additional Amiodarone 200 mg prior to coming to the ED without resolution of symptoms.     Patient was recently admitted for the same medical issue and was just discharged earlier that day with instructions to continue Amiodarone 400 mg twice a day to finish total of 3 days, then start taking Amiodarone 400 mg for 14 days, then Amiodarone 200 mg daily but unfortunately had to come back for uncontrolled symptomatic palpitations.      Patient follows Dr. Rodriguez as her primary cardiologist who recommend that patient go the ED for treatment and potentially needing transition to Tikosyn since PO Amiodarone not helping. Patient was put on an amiodarone drip and monitored on telemetry. She was transitioned to  oral amiodarone for 2 days with no recurrence of A-fib.    Patient was discharged on the following regimen: Start amiodarone 400 mg daily x 2 weeks (until 8/2/24). Then on 8/3, start taking amiodarone 200 mg daily. If you experience palpitations, restart Amidorone 400 mg twice a day for 3 days.     Pertinent Physical Exam At Time of Discharge  Physical Exam  Constitutional:       General: She is not in acute distress.     Appearance: Normal appearance. She is not ill-appearing.   Cardiovascular:      Rate and Rhythm: Normal rate and regular rhythm.      Pulses: Normal pulses.      Heart sounds: Normal heart sounds. No murmur heard.     No friction rub. No gallop.   Pulmonary:      Effort: Pulmonary effort is normal. No respiratory distress.      Breath sounds: Normal breath sounds. No wheezing or rales.   Abdominal:      General: Abdomen is flat. Bowel sounds are normal.      Palpations: Abdomen is soft.   Neurological:      General: No focal deficit present.      Mental Status: She is alert and oriented to person, place, and time.         Outpatient Follow-Up  Future Appointments   Date Time Provider Department Center   8/2/2024 10:00 AM Jarrell MACHADO DO [id number]         Ignacio Dickerson MD

## 2024-07-20 NOTE — DISCHARGE INSTRUCTIONS
1) Following recommendations were made on your discharge day:  - Start amiodarone 400 mg daily x 2 weeks (until 8/2/24). Then on 8/3, start taking amiodarone 200 mg daily. If you experience palpitations, restart Amidorone 400 mg twice a day for 3 days.   - Start hydrochlorothiazide 25 mg daily and lisinopril 20 mg daily.   - Continue Toprol- mg twice a day.   - Discontinue Lopressor 25 mg.   - Continue taking your other home medications as prescribed.   - Please follow up with cardiology and your PCP.    2) Please, follow-up with your primary care provider within 7 to 14 days after leaving the hospital. /appointment services has been requested to make an appointment for you, however if you do not hear back from them within 1 to 2 days, please call your primary physician's office to schedule an appointment. Bring your photo ID and insurance card to your appointment.   [facility]  services can be reached at [phone] and appointment services can be reached at [phone].    - Please, call   or appointment services and schedule a follow-up with your PCP within 1-2 weeks after you leave the hospital.    3) If you experience any worsening symptoms or have any concerns, please contact your primary care provider to schedule an appointment. If you cannot get in touch with your primary care physician, please return to the nearest emergency room or urgent care clinic for an evaluation and treatment.    Thank you for choosing [facility] and allowing us to partake in your medical care!    - Your primary care inpatient team.

## 2024-07-23 ENCOUNTER — PATIENT OUTREACH (OUTPATIENT)
Dept: PRIMARY CARE | Facility: CLINIC | Age: 74
End: 2024-07-23
Payer: MEDICARE

## 2024-07-23 DIAGNOSIS — I48.91 ATRIAL FIBRILLATION WITH RVR (MULTI): ICD-10-CM

## 2024-07-23 DIAGNOSIS — Z09 HOSPITAL DISCHARGE FOLLOW-UP: ICD-10-CM

## 2024-07-23 NOTE — SIGNIFICANT EVENT
Follow Up Phone Call    Outgoing phone call    Spoke to: Debbie JACOBSEN Jennywilliam Relationship:self   Phone number: [phone]      Outcome: I left a message on answering machine   Chief Complaint   Patient presents with    Palpitations     Pt reports sudden onset of palpitations around 3 hours ago. Pt has extensive recent hx with A-fib. Pt recently had an ablation 1 week ago. Pt reports she has been in the ED 3 times since the ablation. Pt denies any current CP. Pt reports slight current dyspnea.          Diagnosis:Not applicable

## 2024-07-23 NOTE — PROGRESS NOTES
TCM outreach completed.  Detailed message left providing call back phone number- no return call as of this time.    Patient is scheduled within 14 days of discharge on (8/2/24) for hospital follow up, however was unable to reach patient during two business days after discharge despite at least two attempts made.    Moderately complex & follow-up within 14 days- bill [id number] for hospital follow up.   Highly complex and follow-up visit within 7 days-can bill [id number] for hospital follow up    * Virtual follow up needs modifier added (95 or GT)  AWV AND TCM CAN BE BILLED TOGETHER WITH 25 MODIFIER       Discharge Facility: [facility]  Discharge Diagnosis: Longstanding persistent atrial fibrillation   Admission Date: 7/15/24  Discharge Date: 7/20/24  Specialist Appointment Date: Cardiology- 8/28/24  Hospital Encounter and Summary Linked: Yes

## 2024-07-24 DIAGNOSIS — E11.9 CONTROLLED TYPE 2 DIABETES MELLITUS WITHOUT COMPLICATION, WITHOUT LONG-TERM CURRENT USE OF INSULIN (MULTI): Primary | ICD-10-CM

## 2024-07-28 DIAGNOSIS — I48.92 ATRIAL FLUTTER WITH RAPID VENTRICULAR RESPONSE (MULTI): ICD-10-CM

## 2024-07-28 DIAGNOSIS — R06.02 SHORTNESS OF BREATH: ICD-10-CM

## 2024-07-28 DIAGNOSIS — R00.2 PALPITATIONS: ICD-10-CM

## 2024-07-29 RX ORDER — METOPROLOL SUCCINATE 100 MG/1
TABLET, EXTENDED RELEASE ORAL
Qty: 60 TABLET | Refills: 0 | Status: SHIPPED | OUTPATIENT
Start: 2024-07-29

## 2024-08-02 ENCOUNTER — APPOINTMENT (OUTPATIENT)
Dept: PRIMARY CARE | Facility: CLINIC | Age: 74
End: 2024-08-02
Payer: MEDICARE

## 2024-08-02 VITALS
BODY MASS INDEX: 39.06 KG/M2 | HEART RATE: 78 BPM | OXYGEN SATURATION: 98 % | SYSTOLIC BLOOD PRESSURE: 122 MMHG | WEIGHT: 242 LBS | DIASTOLIC BLOOD PRESSURE: 72 MMHG

## 2024-08-02 DIAGNOSIS — L97.211: ICD-10-CM

## 2024-08-02 DIAGNOSIS — E11.42 TYPE 2 DIABETES MELLITUS WITH DIABETIC POLYNEUROPATHY, WITHOUT LONG-TERM CURRENT USE OF INSULIN (MULTI): ICD-10-CM

## 2024-08-02 DIAGNOSIS — I42.2 HYPERTROPHIC CARDIOMYOPATHY (MULTI): ICD-10-CM

## 2024-08-02 DIAGNOSIS — I83.012: ICD-10-CM

## 2024-08-02 DIAGNOSIS — I48.21 PERMANENT ATRIAL FIBRILLATION (MULTI): ICD-10-CM

## 2024-08-02 DIAGNOSIS — E11.9 TYPE 2 DIABETES MELLITUS WITHOUT COMPLICATION, WITHOUT LONG-TERM CURRENT USE OF INSULIN (MULTI): ICD-10-CM

## 2024-08-02 DIAGNOSIS — N18.31 STAGE 3A CHRONIC KIDNEY DISEASE (MULTI): Primary | ICD-10-CM

## 2024-08-02 DIAGNOSIS — I10 ESSENTIAL HYPERTENSION: ICD-10-CM

## 2024-08-02 DIAGNOSIS — Z00.00 MEDICARE ANNUAL WELLNESS VISIT, SUBSEQUENT: ICD-10-CM

## 2024-08-02 DIAGNOSIS — Z12.31 SCREENING MAMMOGRAM, ENCOUNTER FOR: ICD-10-CM

## 2024-08-02 LAB — POC HEMOGLOBIN A1C: 7.3 % (ref 4.2–6.5)

## 2024-08-02 PROCEDURE — 1159F MED LIST DOCD IN RCRD: CPT | Performed by: FAMILY MEDICINE

## 2024-08-02 PROCEDURE — 3078F DIAST BP <80 MM HG: CPT | Performed by: FAMILY MEDICINE

## 2024-08-02 PROCEDURE — 1157F ADVNC CARE PLAN IN RCRD: CPT | Performed by: FAMILY MEDICINE

## 2024-08-02 PROCEDURE — 99495 TRANSJ CARE MGMT MOD F2F 14D: CPT | Performed by: FAMILY MEDICINE

## 2024-08-02 PROCEDURE — 83036 HEMOGLOBIN GLYCOSYLATED A1C: CPT | Performed by: FAMILY MEDICINE

## 2024-08-02 PROCEDURE — 3051F HG A1C>EQUAL 7.0%<8.0%: CPT | Performed by: FAMILY MEDICINE

## 2024-08-02 PROCEDURE — 1111F DSCHRG MED/CURRENT MED MERGE: CPT | Performed by: FAMILY MEDICINE

## 2024-08-02 PROCEDURE — 1170F FXNL STATUS ASSESSED: CPT | Performed by: FAMILY MEDICINE

## 2024-08-02 PROCEDURE — 1124F ACP DISCUSS-NO DSCNMKR DOCD: CPT | Performed by: FAMILY MEDICINE

## 2024-08-02 PROCEDURE — G0439 PPPS, SUBSEQ VISIT: HCPCS | Performed by: FAMILY MEDICINE

## 2024-08-02 PROCEDURE — 3074F SYST BP LT 130 MM HG: CPT | Performed by: FAMILY MEDICINE

## 2024-08-02 PROCEDURE — 4010F ACE/ARB THERAPY RXD/TAKEN: CPT | Performed by: FAMILY MEDICINE

## 2024-08-02 ASSESSMENT — ACTIVITIES OF DAILY LIVING (ADL)
MANAGING_FINANCES: INDEPENDENT
DOING_HOUSEWORK: INDEPENDENT
DRESSING: INDEPENDENT
GROCERY_SHOPPING: INDEPENDENT
TAKING_MEDICATION: INDEPENDENT
BATHING: INDEPENDENT

## 2024-08-02 ASSESSMENT — PATIENT HEALTH QUESTIONNAIRE - PHQ9
2. FEELING DOWN, DEPRESSED OR HOPELESS: NOT AT ALL
1. LITTLE INTEREST OR PLEASURE IN DOING THINGS: NOT AT ALL
SUM OF ALL RESPONSES TO PHQ9 QUESTIONS 1 AND 2: 0

## 2024-08-02 NOTE — PROGRESS NOTES
Subjective   Patient ID: Debbie Rees is a 74 y.o. female who presents for Follow-up.  HPI    Hx of severe HOCM  -had a fib w/ ablation on 7/8 then went to a fib w/ RVR  -d/c home w/ amiodarone and went home- had another event and then returned.   -Denies any chest pain, shortness of breath, palpitations  - Will discuss states that she has not been cardiac surgery about other options    Diabetes: A1c is well-controlled.  She been off her Ozempic when she was bouncing in and out of the hospital.  We discussed how to read titrate    Stage III chronic kidney disease: Appears stable from blood work.  Does need a microalbumin  Review of Systems   Constitutional:  Negative for appetite change, fever and unexpected weight change.   HENT:  Negative for congestion and trouble swallowing.    Eyes:  Negative for visual disturbance.   Respiratory:  Negative for shortness of breath.    Cardiovascular:  Positive for leg swelling. Negative for chest pain and palpitations.   Gastrointestinal:  Negative for blood in stool, constipation and diarrhea.   Genitourinary:  Negative for difficulty urinating and hematuria.   Musculoskeletal:  Negative for gait problem and joint swelling.   Skin:  Negative for color change.   Allergic/Immunologic: Negative for immunocompromised state.   Neurological:  Negative for seizures and syncope.   Psychiatric/Behavioral:  Negative for confusion and suicidal ideas. The patient is not nervous/anxious.        Objective   /72   Pulse 78   Wt 110 kg (242 lb)   SpO2 98%   BMI 39.06 kg/m²     Physical Exam  Constitutional:       General: She is not in acute distress.     Appearance: Normal appearance. She is not ill-appearing.   HENT:      Head: Normocephalic and atraumatic.      Right Ear: Tympanic membrane normal.      Left Ear: Tympanic membrane normal.      Nose: Nose normal.      Mouth/Throat:      Mouth: Mucous membranes are moist.   Eyes:      Pupils: Pupils are equal, round, and reactive  to light.   Cardiovascular:      Rate and Rhythm: Normal rate and regular rhythm.      Heart sounds: Murmur (2/6 murmur heard best at the apex) heard.      No friction rub. No gallop.   Pulmonary:      Effort: Pulmonary effort is normal.      Breath sounds: Normal breath sounds.   Abdominal:      General: Abdomen is flat. There is no distension.      Palpations: Abdomen is soft.      Tenderness: There is no abdominal tenderness. There is no guarding.      Hernia: No hernia is present.   Musculoskeletal:         General: Normal range of motion.   Skin:     General: Skin is warm and dry.   Neurological:      General: No focal deficit present.      Mental Status: She is alert. Mental status is at baseline.      Cranial Nerves: No cranial nerve deficit.      Motor: No weakness.      Gait: Gait normal.   Psychiatric:         Mood and Affect: Mood normal.         Behavior: Behavior normal.         Thought Content: Thought content normal.         Judgment: Judgment normal.         Assessment/Plan   Problem List Items Addressed This Visit       Essential hypertension    Type 2 diabetes mellitus with diabetic polyneuropathy, without long-term current use of insulin (Multi)    Hypertrophic cardiomyopathy (Multi)    Stage 3a chronic kidney disease (Multi) - Primary    Relevant Orders    Albumin-Creatinine Ratio, Urine Random    Permanent atrial fibrillation (Multi)     Other Visit Diagnoses       Type 2 diabetes mellitus without complication, without long-term current use of insulin (Multi)        Relevant Orders    POCT glycosylated hemoglobin (Hb A1C) manually resulted (Completed)    Venous stasis ulcer of right calf limited to breakdown of skin, unspecified whether varicose veins present (Multi)        Screening mammogram, encounter for        Relevant Orders    BI mammo bilateral screening tomosynthesis    Medicare annual wellness visit, subsequent               Assessment:  -Hypertension  Lisinopril/hydrochlorothiazide/metoprolol     -Cardiomyopathy- hyperthrophic- genetic/Bahai variant:   Cardiology  Metoprolol 100mg BID  Suggest seeing cardiac surgery    -Afib: failed ablation  Eliquis, metoprolol    -Diabetes: A1c 7.3% (8/24), microalbumin: 49(3/22) eye exam: 3/22 , foot exam: Next visit  Ozempic retitrate up  lisinopril    -CKD;  Lisinopril    -Hyperlipidemia: refused statin- cardiac CT 72    -AK    -right shoulder pain: concerning for RTC tear    -osteopenia    -left hip pain: 2/2 OA vs possible lumbar     Health Maintenance Reminder:  -medicare:2025  -preventative: next   -Blood Work: 12/24  -ASA: 81mg 10y CV >7.5%  -Hep C: completed  -Mammogram: ordered  -Colonoscopy: 2026  -Pap: >65y  -DEXA (FRAX): 11/24  -Zostavax: ordered  -Prevnar: completed  -Pneumovax: completed  -Flu: Yearly

## 2024-08-05 ASSESSMENT — ENCOUNTER SYMPTOMS
SHORTNESS OF BREATH: 0
PALPITATIONS: 0
DIARRHEA: 0
TROUBLE SWALLOWING: 0
CONFUSION: 0
NERVOUS/ANXIOUS: 0
SEIZURES: 0
FEVER: 0
CONSTIPATION: 0
COLOR CHANGE: 0
BLOOD IN STOOL: 0
JOINT SWELLING: 0
APPETITE CHANGE: 0
UNEXPECTED WEIGHT CHANGE: 0
DIFFICULTY URINATING: 0
HEMATURIA: 0

## 2024-08-06 ENCOUNTER — PATIENT OUTREACH (OUTPATIENT)
Dept: PRIMARY CARE | Facility: CLINIC | Age: 74
End: 2024-08-06
Payer: MEDICARE

## 2024-08-06 DIAGNOSIS — Z09 HOSPITAL DISCHARGE FOLLOW-UP: ICD-10-CM

## 2024-08-06 NOTE — PROGRESS NOTES
Unable to reach patient for call back after patient's follow up appointment with PCP. LVM with call back number for patient to call if needed. If no voicemail available call attempts x 2 were made to contact the patient to assist with any questions or concerns patient may have. TCM to reassess patient needs 30 days post discharge.

## 2024-08-07 NOTE — PROGRESS NOTES
Pharmacy Post-Discharge Visit    Debbie Rees is a 74 y.o. female was referred to Clinical Pharmacy Team to complete a post-discharge medication optimization and monitoring visit.  The patient was referred for their Diabetes.    Pt is here for First appointment.     Admission Date: 7/15/24  Discharge Date: 7/20/24    Referring Provider/PCP: Jarrell Dominguez DO  Last Visit: 8/2/24  Next visit: 11/8/24    Subjective   HPI  DIABETES MELLITUS TYPE II:    Diagnosed with diabetes:  at least 15 years Known diabetic complications: neuropathy and nephropathy.  Does patient follow with Endocrinology: No  Last optometry exam: Spring of 2024  Most recent visit in Podiatry: Every 2 months     Current diabetic medications include:  Semaglutide 1 mg once weekly (Wednesday)    Clarifications to above regimen: Was off for 2 weeks around surgery. Had went up to 4 mg on her own before, plan to go back to 2 mg once done with 1 mg  Adverse Effects: none    Glucose Readings:  Glucometer/CGM Type: Acu chek Glucometer  Patient tests BG 1 times per day on and off    Current home BG readings: unsure at this time   Previous home BG readings: N/A    Any episodes of hypoglycemia? No,   .  Did patient treat episode of hypoglycemia appropriately? N/A  Does the patient have a prescription for ready-to-use Glucagon? Not on insulin  Does pt have proteinuria? Yes    Lifestyle:  Diet: unknown meals/day.   BK: unknown  LN: unknown  DN: unknown  Snacks: unknown  Drinks: unknown    Physical Activity: unknown    Secondary Prevention:  Statin? No  ACE-I/ARB? Yes  Aspirin? No    Pertinent PMH Review:  PMH of Pancreatitis: No  PMH of Retinopathy: No  PMH of Urinary Tract Infections: No  PMH of MTC: No    Notable Medication changes following discharge  Start:   Hydrochlorothiazide 25 mg daily  Lisinopril 20 mg daily  Amiodarone 200 mg daily  Stop:   None  Change: none    Medication Reconciliation  Start: none  Stop: none  Change: none    Drug  Interactions  No relevant drug interactions were noted.    Medication System Management  Patients preferred pharmacy: [facility]  Adherence/Organization: good  Affordability/Accessibility: ozempic expensive      Objective   Allergies   Allergen Reactions    Procaine Other and Palpitations     palpitations    Empagliflozin Unknown     Social History     Social History Narrative    Not on file      Medication Review  Current Outpatient Medications   Medication Instructions    amiodarone (PACERONE) 200 mg, oral, Daily    apixaban (ELIQUIS) 5 mg, oral, 2 times daily    busPIRone (BUSPAR) 5 mg, oral, 2 times daily    hydroCHLOROthiazide (HYDRODIURIL) 25 mg, oral, Daily    lisinopril 20 mg, oral, 2 times daily    magnesium oxide (MAG-OX) 400 mg, oral, 4 times weekly, Take every Monday, Tuesday, Wednesday, and Friday    metoprolol succinate XL (Toprol-XL) 100 mg 24 hr tablet TAKE ONE TABLET BY MOUTH TWO TIMES A DAY. DO NOT CRUSH OR CHEW.    Ozempic 2 mg, subcutaneous, Once Weekly    pantoprazole (PROTONIX) 40 mg, oral, 2 times daily before meals, Do not crush, chew, or split.      Vitals  BP Readings from Last 2 Encounters:   08/02/24 122/72   07/20/24 126/73     BMI Readings from Last 1 Encounters:   08/02/24 39.06 kg/m²      Labs  A1C  Lab Results   Component Value Date    HGBA1C 7.3 (A) 08/02/2024    HGBA1C 7.4 (H) 07/09/2024    HGBA1C 7.7 (A) 04/11/2024     BMP  Lab Results   Component Value Date    CALCIUM 9.2 07/20/2024     07/20/2024    K 4.2 07/20/2024    CO2 29 07/20/2024     07/20/2024    BUN 14 07/20/2024    CREATININE 1.16 (H) 07/20/2024    EGFR 50 (L) 07/20/2024     LFTs  Lab Results   Component Value Date    ALT 16 07/20/2024    AST 13 07/20/2024    ALKPHOS 62 07/20/2024    BILITOT 0.5 07/20/2024     FLP  Lab Results   Component Value Date    TRIG 88 09/22/2023    CHOL 144 03/28/2024    LDLF 91 09/22/2023    HDL 35.0 03/28/2024     Urine Microalbumin  Lab Results   Component Value Date     MICROALBCREA 82.7 (H) 09/22/2023     Wt Readings from Last 3 Encounters:   08/02/24 110 kg (242 lb)   07/16/24 113 kg (248 lb 3.8 oz)   07/13/24 112 kg (246 lb 11.1 oz)      There is no height or weight on file to calculate BMI.       Assessment/Plan   Problem List Items Addressed This Visit       Type 2 diabetes mellitus with diabetic polyneuropathy, without long-term current use of insulin (Multi) - Primary     Patient's goal A1c is < 7.5%.  Is pt at goal? Yes, 7.3 .     Rationale for plan: At goal, retitrate after being off.    Medication Changes:  CONTINUE:  Semaglutide 1 mg once weekly for 4 weeks    Future Considerations:  Increase semaglutide back to 2 mg weekly    Monitoring and Education:   Educated patient on max dosing of Ozempic and difference from Mounjaro.         Relevant Medications    semaglutide (Ozempic) 2 mg/dose (8 mg/3 mL) pen injector    Other Relevant Orders    Follow Up In Clinical Pharmacy     Other Visit Diagnoses       Controlled type 2 diabetes mellitus without complication, without long-term current use of insulin (Multi)                Clinical Pharmacist follow-up: 8/29, Telehealth visit    Continue all meds under the continuation of care with the referring provider and clinical pharmacy team.    Thank you,  Kathryn Bullard, PharmD  Clinical Pharmacy Specialist, Primary Care  [phone]    Verbal consent to manage patient's drug therapy was obtained from the patient. They were informed they may decline to participate or withdraw from participation in pharmacy services at any time.

## 2024-08-08 ENCOUNTER — TELEMEDICINE (OUTPATIENT)
Dept: PHARMACY | Facility: HOSPITAL | Age: 74
End: 2024-08-08
Payer: MEDICARE

## 2024-08-08 DIAGNOSIS — E11.9 CONTROLLED TYPE 2 DIABETES MELLITUS WITHOUT COMPLICATION, WITHOUT LONG-TERM CURRENT USE OF INSULIN (MULTI): ICD-10-CM

## 2024-08-08 DIAGNOSIS — I10 ESSENTIAL HYPERTENSION: ICD-10-CM

## 2024-08-08 DIAGNOSIS — E11.42 TYPE 2 DIABETES MELLITUS WITH DIABETIC POLYNEUROPATHY, WITHOUT LONG-TERM CURRENT USE OF INSULIN (MULTI): Primary | ICD-10-CM

## 2024-08-08 RX ORDER — LISINOPRIL 20 MG/1
20 TABLET ORAL 2 TIMES DAILY
Qty: 180 TABLET | Refills: 0 | Status: SHIPPED | OUTPATIENT
Start: 2024-08-08

## 2024-08-08 RX ORDER — SEMAGLUTIDE 2.68 MG/ML
2 INJECTION, SOLUTION SUBCUTANEOUS
Qty: 3 ML | Refills: 11 | Status: SHIPPED | OUTPATIENT
Start: 2024-08-08

## 2024-08-08 NOTE — Clinical Note
[place] pharmacy visit post hospital discharge. Helped educate patient on proper Ozempic dosing (she had been doing 4mg!) and will apply patient for  copay assistance for it.

## 2024-08-08 NOTE — ASSESSMENT & PLAN NOTE
Patient with Asthma that is well controlled and stable. Based on asthma severity, patient's asthma is classified as mild intermittent and prn timbo therapy is recommended.     Medication Changes:  CONTINUE    STOP    START    INCREASE    DECREASE      Future Considerations:      Monitoring and Education:

## 2024-08-08 NOTE — ASSESSMENT & PLAN NOTE
Patient's goal A1c is < 7.5%.  Is pt at goal? Yes, 7.3 .     Rationale for plan: At goal, retitrate after being off.    Medication Changes:  CONTINUE:  Semaglutide 1 mg once weekly for 4 weeks    Future Considerations:  Increase semaglutide back to 2 mg weekly    Monitoring and Education:   Educated patient on max dosing of Ozempic and difference from Mounjaro.

## 2024-08-14 ENCOUNTER — PHARMACY VISIT (OUTPATIENT)
Dept: PHARMACY | Facility: CLINIC | Age: 74
End: 2024-08-14
Payer: MEDICARE

## 2024-08-14 PROCEDURE — RXMED WILLOW AMBULATORY MEDICATION CHARGE

## 2024-08-28 ENCOUNTER — PATIENT OUTREACH (OUTPATIENT)
Dept: PRIMARY CARE | Facility: CLINIC | Age: 74
End: 2024-08-28

## 2024-08-28 ENCOUNTER — APPOINTMENT (OUTPATIENT)
Dept: LAB | Facility: LAB | Age: 74
End: 2024-08-28
Payer: MEDICARE

## 2024-08-28 ENCOUNTER — APPOINTMENT (OUTPATIENT)
Dept: CARDIOLOGY | Facility: CLINIC | Age: 74
End: 2024-08-28
Payer: MEDICARE

## 2024-08-28 DIAGNOSIS — I48.91 ATRIAL FIBRILLATION WITH RVR (MULTI): ICD-10-CM

## 2024-08-28 DIAGNOSIS — Z09 HOSPITAL DISCHARGE FOLLOW-UP: ICD-10-CM

## 2024-08-28 PROCEDURE — 1157F ADVNC CARE PLAN IN RCRD: CPT | Performed by: INTERNAL MEDICINE

## 2024-08-28 PROCEDURE — 1036F TOBACCO NON-USER: CPT | Performed by: INTERNAL MEDICINE

## 2024-08-28 PROCEDURE — 3051F HG A1C>EQUAL 7.0%<8.0%: CPT | Performed by: INTERNAL MEDICINE

## 2024-08-28 PROCEDURE — 93000 ELECTROCARDIOGRAM COMPLETE: CPT | Performed by: INTERNAL MEDICINE

## 2024-08-28 PROCEDURE — 1159F MED LIST DOCD IN RCRD: CPT | Performed by: INTERNAL MEDICINE

## 2024-08-28 PROCEDURE — 1160F RVW MEDS BY RX/DR IN RCRD: CPT | Performed by: INTERNAL MEDICINE

## 2024-08-28 PROCEDURE — 4010F ACE/ARB THERAPY RXD/TAKEN: CPT | Performed by: INTERNAL MEDICINE

## 2024-08-28 PROCEDURE — 99214 OFFICE O/P EST MOD 30 MIN: CPT | Performed by: INTERNAL MEDICINE

## 2024-08-28 NOTE — PROGRESS NOTES
I had the pleasure seeing Debbie Rees     Chief Complaint   Patient presents with    Atrial Fibrillation    Cardiomyopathy     He presents for follow-up 6 week s/p ablation.          Current Outpatient Medications   Medication Instructions    amiodarone (PACERONE) 200 mg, oral, Daily    apixaban (ELIQUIS) 5 mg, oral, 2 times daily    busPIRone (BUSPAR) 5 mg, oral, 2 times daily    hydroCHLOROthiazide (HYDRODIURIL) 25 mg, oral, Daily    lisinopril 20 mg, oral, 2 times daily    magnesium oxide (MAG-OX) 400 mg, oral, 4 times weekly, Take every Monday, Tuesday, Wednesday, and Friday    metoprolol succinate XL (Toprol-XL) 100 mg 24 hr tablet TAKE ONE TABLET BY MOUTH TWO TIMES A DAY. DO NOT CRUSH OR CHEW.    Ozempic 2 mg, subcutaneous, Once Weekly    pantoprazole (PROTONIX) 40 mg, oral, 2 times daily before meals, Do not crush, chew, or split.        Debbie Rees is a 74 y.o. with:     HTN, DM T2, Anxiety  PVD / Venous insufficiency s/p EVLA   Hypertrophic Obstructive Cardiomyopathy  Palpitations / persistent AF - s/p DCCV (4/29/24).  S/p ablation (7/8/24).       March 2024:  (3/38) Pt presented to [facility] with chest pain and elevated HR.  EKG: AF with  bpm.   Pt started on diltiazem drip with HR improvement.  Dilt changed to oral amiodarone.  (3/30) Pt Dc'd home on Amio and Metoprolol.   (4/29/24) s/p DCCV      July 8, 2024:  s/p Acutely successful radiofrequency ablation for Atrial fibrillation with PVI and adjunctive lines done by me.  The anterior wall was isolated.     Cardiac TESTING    -ECHO/ TTE:  (3/29/24) LVEF 65-70%.    -ECHO/ TTE:  (5/25/22) LVEF >65% with nrl size and function.  LVH, mild LAE.      4/2/24:EKG: A-Fib    01/08/24:  Stress test:  Perfusion Findings:  SPECT stress imaging showed mildly reduced radiotracer uptake in the distal anterior wall and apex.  There was no significant redistribution at rest.  Gated imaging showed normal myocardial thickening suggesting attenuation as the cause  of reduced uptake.  LV Function Findings:     LVEF: 56%   Global Function: Normal    LV Volume: Normal    Regional Function: Normal   Summary:     1.  No chest discomfort or ischemic EKG changes with pharmacological stress.   2.  Normal left ventricular systolic function.   3.  No perfusion evidence of scar, or focal ischemia.   4.  Mild resting systolic and diastolic HTN    12/18/23:  EKG: SR with PAC's; LVH.    06/26/23:  Echocardiogram shows vigorous LV systolic function, asymmetric moderate septal hypertrophy, moderate MR, normal estimated CVP    5/31/23: EKG:  SR with 1 AVB, anterior infarct undetermined    05/26/23:  Carotid ultrasound:  CONCLUSIONS:   Right Internal Carotid Stenosis:   20-49%.  Right Vertebral Flow:   Antegrade.  Left Internal Carotid Stenosis:   20-49%.  Left Vertebral Flow:    Antegrade.  Bilateral Upper Extremity:     Findings consistent with a normal interarm systolic pressure gradient.  Recommend repeat study annually. No significant change since the study of 05/25/2022-[facility]     05/31/22:  Electrocardiogram shows sinus rhythm, no ischemia, 77 beats per minute, left anterior fascicular block, moderate voltage criteria for LVH     05/25/22:  Carotid ultrasound:  CONCLUSIONS:   Right Internal Carotid Stenosis:   20-49%.  Right Vertebral Flow:   Antegrade.  Left Internal Carotid Stenosis:   20-49%.  Left Vertebral Flow:    Antegrade.  Bilateral Upper Extremity:     Findings consistent with a normal interarm systolic pressure gradient.  Recommend repeat study annually. No significant change since the study of 5/25/2021 - [facility].     05/25/22:  Echocardiogram;  CONCLUSIONS:   normal LV size and function, vigorous LV systolic function, no obvious LV wall motion abnormalities, LVH, left atrial enlargement, mild to moderate mitral regurgitation, trace tricuspid regurgitation, normal estimated PASP and normal estimated CVP    11/24/15:  Stress test:  Perfusion Findings:  SPECT stress images show  uniform distribution of radiotracer in the entire myocardium. No fixed or reversible perfusion defects to suggest myocardial ischemia or scar are identified. Gated images show normal LV myocardial thickening.       LV Function Findings:     LVEF: 60%   Global Function: Normal.   LV Volume: Normal   Regional Function: Normal  Summary:   1.  No chest discomfort or ischemic EKG changes with LexiScan (regadenoson) infusion.   2.  Normal left ventricular systolic function.   3.  No perfusion evidence of scar or focal ischemia          Objective   Physical Exam  There were no vitals taken for this visit.    General Appearance:  Alert, cooperative, no distress, appears stated age   Head:  Normocephalic, without obvious abnormality, atraumatic   Eyes:  PERRL, conjunctiva/corneas clear, EOM's intact, fundi benign, both eyes   Ears:  Normal TM's and external ear canals, both ears   Nose: Nares normal, septum midline,mucosa normal, no drainage or sinus tenderness   Throat: Lips, mucosa, and tongue normal; teeth and gums normal   Neck: Supple, symmetrical, trachea midline, no adenopathy;  thyroid: not enlarged, symmetric, no tenderness/mass/nodules; no carotid bruit or JVD   Back:   Symmetric, no curvature, ROM normal, no CVA tenderness   Lungs:   Clear to auscultation bilaterally, respirations unlabored   Breasts:  No masses or tenderness   Heart:  Regular rate and rhythm, S1 and S2 normal, no murmur, rub, or gallop   Abdomen:   Soft, non-tender, bowel sounds active all four quadrants,  no masses, no organomegaly   Pelvic: Deferred   Extremities: Extremities normal, atraumatic, no cyanosis or edema   Pulses: 2+ and symmetric   Skin: Skin color, texture, turgor normal, no rashes or lesions   Lymph nodes: Cervical, supraclavicular, and axillary nodes normal   Neurologic: Normal   .  ECG - NSR,  widening of V1 and left axis         Assesment/Plan:     AF - Persistent . S/p PVI with extensive anterior wall ablation on 07/08/2024.   Presented a week later with AF most likely secondary to inflammation. She is now doing very well, in NSR. Has had no recurrences. We will see her back in 3 months time. For now she should continue the Amiodarone.

## 2024-08-28 NOTE — PROGRESS NOTES
Unable to reach patient for one month post discharge follow up call. Left a  with call back number for patient to call if needed   If no voicemail available call attempts x 2 were made to contact the patient to assist with any questions or concerns patient may have. Patient dis-enrolled from [facility] this date due to inability to reach the patient for 30 days post discharge.

## 2024-08-28 NOTE — PROGRESS NOTES
Pharmacy Post-Discharge Visit    Debbie Rees is a 74 y.o. female was referred to Clinical Pharmacy Team to complete a post-discharge medication optimization and monitoring visit.  The patient was referred for their Diabetes.    Pt is here for Follow Up appointment.     Admission Date: 7/15/24  Discharge Date: 7/20/24    Referring Provider/PCP: Jarrell Dominguez DO  Last Visit: 8/2/24  Next visit: 11/8/24    Subjective   HPI  DIABETES MELLITUS TYPE II:    Diagnosed with diabetes:  at least 15 years Known diabetic complications: neuropathy and nephropathy.  Does patient follow with Endocrinology: No  Last optometry exam: Spring of 2024  Most recent visit in Podiatry: Every 2 months     Current diabetic medications include:  Semaglutide 1 mg once weekly (Wednesday)    Clarifications to above regimen: None  Adverse Effects: none    Glucose Readings:  Glucometer/CGM Type: Acu chek Glucometer  Patient tests BG 1 times per day on and off    Current home BG readings: Not checking  Previous home BG readings: N/A    Any episodes of hypoglycemia? No,   .  Did patient treat episode of hypoglycemia appropriately? N/A  Does the patient have a prescription for ready-to-use Glucagon? Not on insulin  Does pt have proteinuria? Yes    Lifestyle:  Diet: unknown meals/day.   BK: unknown  LN: unknown  DN: unknown  Snacks: unknown  Drinks: unknown    Physical Activity: unknown    Secondary Prevention:  Statin? No  ACE-I/ARB? Yes  Aspirin? No    Pertinent PMH Review:  PMH of Pancreatitis: No  PMH of Retinopathy: No  PMH of Urinary Tract Infections: No  PMH of MTC: No    ATRIAL FIBRILLATION persistent   Does patient follow with cardiology? Yes  Last cardiology appointment: 8/28/24    Diagnosis:  Onset: at least 2023  Patient is projected to be on anticoagulation indefinitely    [id number] Score: [4]  Age: [<65 (0)] [65-74 (+1)] [> 75 (+2)]: 1  Sex: [Male/Female (+1)]: 1  CHF history: [No/Yes(+1)]: 1  Hypertension history:  [No/Yes(+1)]: 1  Stroke/TIA/thromboembolism history: [No/Yes(+2)]: 0  Vascular disease history (prior MI, peripheral artery disease, aortic plaque): [No/Yes(+1)]: 0  Diabetes history: [No/Yes(+1)]: 0    Pertinent Medical History  Medical history: none  GI bleed, PE/DVT, Stroke, Previous Falls, Dementia, Hypotension  Social history: None  Alcohol Abuse, High Risk Profession  Medication history: None  SSRI, Antiplatelet therapy, NSAIDs     Current atrial fibrillations medications include:  Rate Control: metoprolol succinate 100 mg daily, amiodarone 200 mg daily  Anticoagulation: apixaban 5 mg twice daily    Screening for dose adjustment:  No dose change recommended at this time    Monitoring:  Date of last BMP: 7/20/24  Last echo:7/11/24  Recent Hospitalizations: 7/15/24  Recent Falls/Trauma: none  Changes in Tobacco or Alcohol Intake: none     Notable Medication changes following discharge  Start:   Hydrochlorothiazide 25 mg daily  Lisinopril 20 mg daily  Amiodarone 200 mg daily  Stop:   None  Change: none    Medication Reconciliation  Start: none  Stop: none  Change: none    Drug Interactions  No relevant drug interactions were noted.    Medication System Management  Patients preferred pharmacy: [facility]  Adherence/Organization: good  Affordability/Accessibility: [facility] good through 8/14/25      Objective   Allergies   Allergen Reactions    Procaine Other and Palpitations     palpitations    Empagliflozin Unknown     Social History     Social History Narrative    Not on file      Medication Review  Current Outpatient Medications   Medication Instructions    amiodarone (PACERONE) 200 mg, oral, Daily    apixaban (ELIQUIS) 5 mg, oral, 2 times daily    busPIRone (BUSPAR) 5 mg, oral, 2 times daily    hydroCHLOROthiazide (HYDRODIURIL) 25 mg, oral, Daily    lisinopril 20 mg, oral, 2 times daily    magnesium oxide (MAG-OX) 400 mg, oral, 4 times weekly, Take every Monday, Tuesday, Wednesday, and Friday     metoprolol succinate XL (Toprol-XL) 100 mg 24 hr tablet TAKE ONE TABLET BY MOUTH TWO TIMES A DAY. DO NOT CRUSH OR CHEW.    Ozempic 2 mg, subcutaneous, Once Weekly    pantoprazole (PROTONIX) 40 mg, oral, 2 times daily before meals, Do not crush, chew, or split.      Vitals  BP Readings from Last 2 Encounters:   08/02/24 122/72   07/20/24 126/73     BMI Readings from Last 1 Encounters:   08/02/24 39.06 kg/m²      Labs  A1C  Lab Results   Component Value Date    HGBA1C 7.3 (A) 08/02/2024    HGBA1C 7.4 (H) 07/09/2024    HGBA1C 7.7 (A) 04/11/2024     BMP  Lab Results   Component Value Date    CALCIUM 9.2 07/20/2024     07/20/2024    K 4.2 07/20/2024    CO2 29 07/20/2024     07/20/2024    BUN 14 07/20/2024    CREATININE 1.16 (H) 07/20/2024    EGFR 50 (L) 07/20/2024     LFTs  Lab Results   Component Value Date    ALT 16 07/20/2024    AST 13 07/20/2024    ALKPHOS 62 07/20/2024    BILITOT 0.5 07/20/2024     FLP  Lab Results   Component Value Date    TRIG 88 09/22/2023    CHOL 144 03/28/2024    LDLF 91 09/22/2023    HDL 35.0 03/28/2024     Urine Microalbumin  Lab Results   Component Value Date    MICROALBCREA 82.7 (H) 09/22/2023     Wt Readings from Last 3 Encounters:   08/02/24 110 kg (242 lb)   07/16/24 113 kg (248 lb 3.8 oz)   07/13/24 112 kg (246 lb 11.1 oz)      There is no height or weight on file to calculate BMI.       Assessment/Plan   Problem List Items Addressed This Visit       Type 2 diabetes mellitus with diabetic polyneuropathy, without long-term current use of insulin (Multi)     Patient's goal A1c is < 7.5%.  Is pt at goal? Yes, 7.3 .     Rationale for plan: At goal, no side effects from restarting 1 mg weekly.    Medication Changes:  CONTINUE:  Semaglutide 1 mg once weekly for 1 more week then increase to 2 mg weekly         Permanent atrial fibrillation (Multi) - Primary     ASSESSMENT OF ATRIAL FIBRILLATION  Patient is to be on anticoagulation indefinitely  Rationale for plan: chadsvasc of 4,  sees cardiology    Medication Changes:  Continue  Apixaban 5 mg twice daily           Patient Assistance Screening (VAF)  Patient verbally reports monthly or yearly income which is less than 400% federal poverty level  Application for program has been submitted for the following medications:   Eliquis  Patient aware this process may take up to 2 weeks once income documents have been sent to the team.  If approved, medication must be filled through [facility] pharmacy and may be picked up or mailed to patient.   If approved, medication will be billed through insurance, and patient assistance team will pay the copay. This will result in a $0 copay for the patient.  Counseled patient on mechanism of action, side effects, contraindications, and what to do if the patient misses a dose. All patients questions were answered.      Clinical Pharmacist follow-up: 10/1, Telehealth visit    Continue all meds under the continuation of care with the referring provider and clinical pharmacy team.    Thank you,  Kathryn Bullard, PharmD  Clinical Pharmacy Specialist, Primary Care  [phone]    Verbal consent to manage patient's drug therapy was obtained from the patient. They were informed they may decline to participate or withdraw from participation in pharmacy services at any time.

## 2024-08-29 ENCOUNTER — LAB (OUTPATIENT)
Dept: LAB | Facility: LAB | Age: 74
End: 2024-08-29
Payer: MEDICARE

## 2024-08-29 ENCOUNTER — APPOINTMENT (OUTPATIENT)
Dept: PHARMACY | Facility: HOSPITAL | Age: 74
End: 2024-08-29
Payer: MEDICARE

## 2024-08-29 DIAGNOSIS — N18.31 STAGE 3A CHRONIC KIDNEY DISEASE (MULTI): ICD-10-CM

## 2024-08-29 DIAGNOSIS — E11.42 TYPE 2 DIABETES MELLITUS WITH DIABETIC POLYNEUROPATHY, WITHOUT LONG-TERM CURRENT USE OF INSULIN (MULTI): ICD-10-CM

## 2024-08-29 DIAGNOSIS — I48.21 PERMANENT ATRIAL FIBRILLATION (MULTI): Primary | ICD-10-CM

## 2024-08-29 LAB
CREAT UR-MCNC: 81.3 MG/DL (ref 20–320)
MICROALBUMIN UR-MCNC: 9.4 MG/L
MICROALBUMIN/CREAT UR: 11.6 UG/MG CREAT

## 2024-08-29 PROCEDURE — 82043 UR ALBUMIN QUANTITATIVE: CPT

## 2024-08-29 PROCEDURE — 82570 ASSAY OF URINE CREATININE: CPT

## 2024-08-29 NOTE — ASSESSMENT & PLAN NOTE
Patient's goal A1c is < 7.5%.  Is pt at goal? Yes, 7.3 .     Rationale for plan: At goal, no side effects from restarting 1 mg weekly.    Medication Changes:  CONTINUE:  Semaglutide 1 mg once weekly for 1 more week then increase to 2 mg weekly

## 2024-08-29 NOTE — ASSESSMENT & PLAN NOTE
ASSESSMENT OF ATRIAL FIBRILLATION  Patient is to be on anticoagulation indefinitely  Rationale for plan: chadsvasc of 4, sees cardiology    Medication Changes:  Continue  Apixaban 5 mg twice daily

## 2024-08-29 NOTE — Clinical Note
Hello, this is a patient that is following in pharmacy clinic for diabetes. The patient mentioned their eliquis is too expensive. Would it be ok for me to add a referral for her so that her eliquis can be covered under the  copay assistance program. Thank you!

## 2024-09-03 ENCOUNTER — APPOINTMENT (OUTPATIENT)
Dept: RADIOLOGY | Facility: HOSPITAL | Age: 74
End: 2024-09-03
Payer: MEDICARE

## 2024-09-06 DIAGNOSIS — R06.02 SHORTNESS OF BREATH: ICD-10-CM

## 2024-09-06 DIAGNOSIS — R00.2 PALPITATIONS: ICD-10-CM

## 2024-09-06 DIAGNOSIS — I48.92 ATRIAL FLUTTER WITH RAPID VENTRICULAR RESPONSE (MULTI): ICD-10-CM

## 2024-09-06 RX ORDER — METOPROLOL SUCCINATE 100 MG/1
TABLET, EXTENDED RELEASE ORAL
Qty: 60 TABLET | Refills: 0 | Status: SHIPPED | OUTPATIENT
Start: 2024-09-06

## 2024-09-10 ENCOUNTER — APPOINTMENT (OUTPATIENT)
Dept: RADIOLOGY | Facility: HOSPITAL | Age: 74
End: 2024-09-10
Payer: MEDICARE

## 2024-09-10 ENCOUNTER — TELEPHONE (OUTPATIENT)
Dept: PRIMARY CARE | Facility: CLINIC | Age: 74
End: 2024-09-10
Payer: MEDICARE

## 2024-09-10 NOTE — TELEPHONE ENCOUNTER
PT needs you to reorder labs because she was in Afib and her procedure was moved to 9/20 so she needs her labs drawn again.

## 2024-09-11 DIAGNOSIS — Z13.1 DIABETES MELLITUS SCREENING: ICD-10-CM

## 2024-09-11 DIAGNOSIS — L97.209: ICD-10-CM

## 2024-09-11 DIAGNOSIS — E11.622: ICD-10-CM

## 2024-09-11 DIAGNOSIS — Z13.0 SCREENING FOR DEFICIENCY ANEMIA: Primary | ICD-10-CM

## 2024-09-11 DIAGNOSIS — N18.31 STAGE 3A CHRONIC KIDNEY DISEASE (MULTI): ICD-10-CM

## 2024-09-17 ENCOUNTER — CLINICAL SUPPORT (OUTPATIENT)
Dept: PRIMARY CARE | Facility: CLINIC | Age: 74
End: 2024-09-17
Payer: MEDICARE

## 2024-09-17 DIAGNOSIS — Z13.0 SCREENING FOR DEFICIENCY ANEMIA: ICD-10-CM

## 2024-09-17 DIAGNOSIS — N18.31 STAGE 3A CHRONIC KIDNEY DISEASE (MULTI): ICD-10-CM

## 2024-09-17 DIAGNOSIS — Z13.1 DIABETES MELLITUS SCREENING: ICD-10-CM

## 2024-09-17 LAB
ALBUMIN SERPL BCP-MCNC: 4.3 G/DL (ref 3.4–5)
ALP SERPL-CCNC: 74 U/L (ref 33–136)
ALT SERPL W P-5'-P-CCNC: 25 U/L (ref 7–45)
ANION GAP SERPL CALC-SCNC: 11 MMOL/L (ref 10–20)
AST SERPL W P-5'-P-CCNC: 26 U/L (ref 9–39)
BILIRUB SERPL-MCNC: 0.6 MG/DL (ref 0–1.2)
BUN SERPL-MCNC: 18 MG/DL (ref 6–23)
CALCIUM SERPL-MCNC: 9.9 MG/DL (ref 8.6–10.3)
CHLORIDE SERPL-SCNC: 101 MMOL/L (ref 98–107)
CO2 SERPL-SCNC: 29 MMOL/L (ref 21–32)
CREAT SERPL-MCNC: 1.07 MG/DL (ref 0.5–1.05)
EGFRCR SERPLBLD CKD-EPI 2021: 55 ML/MIN/1.73M*2
ERYTHROCYTE [DISTWIDTH] IN BLOOD BY AUTOMATED COUNT: 13.2 % (ref 11.5–14.5)
GLUCOSE SERPL-MCNC: 197 MG/DL (ref 74–99)
HCT VFR BLD AUTO: 43.6 % (ref 36–46)
HGB BLD-MCNC: 13.7 G/DL (ref 12–16)
MCH RBC QN AUTO: 31.5 PG (ref 26–34)
MCHC RBC AUTO-ENTMCNC: 31.4 G/DL (ref 32–36)
MCV RBC AUTO: 100 FL (ref 80–100)
NRBC BLD-RTO: 0 /100 WBCS (ref 0–0)
PLATELET # BLD AUTO: 262 X10*3/UL (ref 150–450)
POTASSIUM SERPL-SCNC: 4.7 MMOL/L (ref 3.5–5.3)
PROT SERPL-MCNC: 7.5 G/DL (ref 6.4–8.2)
RBC # BLD AUTO: 4.35 X10*6/UL (ref 4–5.2)
SODIUM SERPL-SCNC: 136 MMOL/L (ref 136–145)
WBC # BLD AUTO: 6.3 X10*3/UL (ref 4.4–11.3)

## 2024-09-17 PROCEDURE — 85027 COMPLETE CBC AUTOMATED: CPT

## 2024-09-17 PROCEDURE — 80053 COMPREHEN METABOLIC PANEL: CPT

## 2024-09-18 ENCOUNTER — TELEPHONE (OUTPATIENT)
Dept: PRIMARY CARE | Facility: CLINIC | Age: 74
End: 2024-09-18
Payer: MEDICARE

## 2024-09-18 RX ORDER — CEPHALEXIN 500 MG/1
500 CAPSULE ORAL 4 TIMES DAILY
Qty: 28 CAPSULE | Refills: 0 | Status: SHIPPED | OUTPATIENT
Start: 2024-09-18 | End: 2024-09-20 | Stop reason: WASHOUT

## 2024-09-18 NOTE — TELEPHONE ENCOUNTER
I think it was back in June, when I was in with a leg ulcer that I had from a blister that was there that I popped.  I have another one but not in the same sopt but I am not going to pop it this time but I do need the antibiotic for it.  I also have an angiogram scheduled on Friday with dr tidwell, will the antibiotic or any of my other meds interfere with the angiogram?

## 2024-09-19 ENCOUNTER — TELEPHONE (OUTPATIENT)
Dept: PRIMARY CARE | Facility: CLINIC | Age: 74
End: 2024-09-19
Payer: MEDICARE

## 2024-09-19 NOTE — TELEPHONE ENCOUNTER
She can't take the keflex.   She gets an itchy vagina as soon as she starts it.  I think she had amoxicillin before, can she get that?

## 2024-09-20 ENCOUNTER — HOSPITAL ENCOUNTER (OUTPATIENT)
Dept: RADIOLOGY | Facility: HOSPITAL | Age: 74
Discharge: HOME | End: 2024-09-20
Payer: MEDICARE

## 2024-09-20 VITALS
RESPIRATION RATE: 18 BRPM | DIASTOLIC BLOOD PRESSURE: 61 MMHG | HEART RATE: 93 BPM | SYSTOLIC BLOOD PRESSURE: 132 MMHG | OXYGEN SATURATION: 98 %

## 2024-09-20 DIAGNOSIS — I20.89 ANGINAL EQUIVALENT (CMS-HCC): ICD-10-CM

## 2024-09-20 DIAGNOSIS — L97.219 ULCER OF RIGHT CALF, UNSPECIFIED ULCER STAGE (MULTI): Primary | ICD-10-CM

## 2024-09-20 PROCEDURE — 75574 CT ANGIO HRT W/3D IMAGE: CPT

## 2024-09-20 PROCEDURE — 2500000001 HC RX 250 WO HCPCS SELF ADMINISTERED DRUGS (ALT 637 FOR MEDICARE OP): Performed by: RADIOLOGY

## 2024-09-20 PROCEDURE — 2500000005 HC RX 250 GENERAL PHARMACY W/O HCPCS: Performed by: RADIOLOGY

## 2024-09-20 PROCEDURE — 2550000001 HC RX 255 CONTRASTS: Performed by: INTERNAL MEDICINE

## 2024-09-20 RX ORDER — METOPROLOL TARTRATE 1 MG/ML
5 INJECTION, SOLUTION INTRAVENOUS ONCE AS NEEDED
Status: COMPLETED | OUTPATIENT
Start: 2024-09-20 | End: 2024-09-20

## 2024-09-20 RX ORDER — LORAZEPAM 2 MG/ML
0.5 INJECTION INTRAMUSCULAR EVERY 5 MIN PRN
Status: DISCONTINUED | OUTPATIENT
Start: 2024-09-20 | End: 2024-09-21 | Stop reason: HOSPADM

## 2024-09-20 RX ORDER — AMOXICILLIN AND CLAVULANATE POTASSIUM 875; 125 MG/1; MG/1
875 TABLET, FILM COATED ORAL 2 TIMES DAILY
Qty: 20 TABLET | Refills: 0 | Status: SHIPPED | OUTPATIENT
Start: 2024-09-20 | End: 2024-09-30

## 2024-09-20 RX ORDER — METOPROLOL TARTRATE 1 MG/ML
5 INJECTION, SOLUTION INTRAVENOUS ONCE
Status: COMPLETED | OUTPATIENT
Start: 2024-09-20 | End: 2024-09-20

## 2024-09-20 RX ORDER — METOPROLOL TARTRATE 100 MG/1
100 TABLET ORAL ONCE
Status: DISCONTINUED | OUTPATIENT
Start: 2024-09-20 | End: 2024-09-21 | Stop reason: HOSPADM

## 2024-09-20 RX ORDER — NITROGLYCERIN 0.4 MG/1
0.8 TABLET SUBLINGUAL ONCE
Status: COMPLETED | OUTPATIENT
Start: 2024-09-20 | End: 2024-09-20

## 2024-09-20 RX ORDER — METOPROLOL TARTRATE 100 MG/1
100 TABLET ORAL ONCE AS NEEDED
Status: DISCONTINUED | OUTPATIENT
Start: 2024-09-20 | End: 2024-09-21 | Stop reason: HOSPADM

## 2024-09-20 RX ORDER — METOPROLOL TARTRATE 1 MG/ML
5 INJECTION, SOLUTION INTRAVENOUS ONCE AS NEEDED
Status: DISCONTINUED | OUTPATIENT
Start: 2024-09-20 | End: 2024-09-21 | Stop reason: HOSPADM

## 2024-09-23 ENCOUNTER — APPOINTMENT (OUTPATIENT)
Dept: RADIOLOGY | Facility: HOSPITAL | Age: 74
End: 2024-09-23
Payer: MEDICARE

## 2024-10-01 ENCOUNTER — APPOINTMENT (OUTPATIENT)
Dept: PHARMACY | Facility: HOSPITAL | Age: 74
End: 2024-10-01
Payer: MEDICARE

## 2024-10-01 DIAGNOSIS — E11.42 TYPE 2 DIABETES MELLITUS WITH DIABETIC POLYNEUROPATHY, WITHOUT LONG-TERM CURRENT USE OF INSULIN: ICD-10-CM

## 2024-10-01 DIAGNOSIS — I48.21 PERMANENT ATRIAL FIBRILLATION (MULTI): Primary | ICD-10-CM

## 2024-10-01 DIAGNOSIS — I48.91 ATRIAL FIBRILLATION WITH RAPID VENTRICULAR RESPONSE (MULTI): ICD-10-CM

## 2024-10-01 PROCEDURE — RXMED WILLOW AMBULATORY MEDICATION CHARGE

## 2024-10-01 NOTE — ASSESSMENT & PLAN NOTE
Patient's goal A1c is < 7.5%.  Is pt at goal? Yes, 7.3 .     Rationale for plan: At goal A1C, no side effects from restarting 2 mg weekly. Patient reports sugars around 180-190 in the am/    Medication Changes:  CONTINUE:  Semaglutide 2 mg once weekly

## 2024-10-01 NOTE — Clinical Note
Thank you for the expanded referral. Patient will now get eliquis for free though [facility] copay assistance.

## 2024-10-01 NOTE — Clinical Note
Patient doing well and titrated back to Ozempic 2 mg weekly post surgery. Patient getting Ozempic and Eliquis for free through  copay assistance.

## 2024-10-01 NOTE — PROGRESS NOTES
Pharmacy Post-Discharge Visit    Debbie Rees is a 74 y.o. female was referred to Clinical Pharmacy Team to complete a post-discharge medication optimization and monitoring visit.  The patient was referred for their No chief complaint on file..    Pt is here for Follow Up appointment.     Admission Date: 7/15/24  Discharge Date: 7/20/24    Referring Provider/PCP: Jarrell Dominguez DO  Last Visit: 8/2/24  Next visit: 11/8/24    Referring Provider: Ney Sommers (Cardiology)  Last Visit: 8/28/24  Next visit: 12/11/24    Subjective   HPI  DIABETES MELLITUS TYPE II:    Diagnosed with diabetes:  at least 15 years Known diabetic complications: neuropathy and nephropathy.  Does patient follow with Endocrinology: No  Last optometry exam: Spring of 2024  Most recent visit in Podiatry: Every 2 months     Current diabetic medications include:  Semaglutide 2 mg once weekly (Wednesday)    Clarifications to above regimen: None  Adverse Effects: none    Glucose Readings:  Glucometer/CGM Type: Acu chek Glucometer  Patient tests BG 1 times per day on and off    Current home BG readings: Fasting 180's  Previous home BG readings: N/A    Any episodes of hypoglycemia? No,   .  Did patient treat episode of hypoglycemia appropriately? N/A  Does the patient have a prescription for ready-to-use Glucagon? Not on insulin  Does pt have proteinuria? Yes    Lifestyle:  Diet: unknown meals/day.   BK: unknown  LN: unknown  DN: unknown  Snacks: unknown  Drinks: unknown    Physical Activity: unknown    Secondary Prevention:  Statin? No  ACE-I/ARB? Yes  Aspirin? No    Pertinent PMH Review:  PMH of Pancreatitis: No  PMH of Retinopathy: No  PMH of Urinary Tract Infections: No  PMH of MTC: No    ATRIAL FIBRILLATION persistent   Does patient follow with cardiology? Yes  Last cardiology appointment: 8/28/24    Diagnosis:  Onset: at least 2023  Patient is projected to be on anticoagulation indefinitely    [id number] Score: [4]  Age: [<65 (0)]  [65-74 (+1)] [> 75 (+2)]: 1  Sex: [Male/Female (+1)]: 1  CHF history: [No/Yes(+1)]: 1  Hypertension history: [No/Yes(+1)]: 1  Stroke/TIA/thromboembolism history: [No/Yes(+2)]: 0  Vascular disease history (prior MI, peripheral artery disease, aortic plaque): [No/Yes(+1)]: 0  Diabetes history: [No/Yes(+1)]: 0    Pertinent Medical History  Medical history: none  GI bleed, PE/DVT, Stroke, Previous Falls, Dementia, Hypotension  Social history: None  Alcohol Abuse, High Risk Profession  Medication history: None  SSRI, Antiplatelet therapy, NSAIDs     Current atrial fibrillations medications include:  Rate Control: metoprolol succinate 100 mg daily, amiodarone 200 mg daily  Anticoagulation: apixaban 5 mg twice daily    Screening for dose adjustment:  No dose change recommended at this time    Monitoring:  Date of last BMP: 7/20/24  Last echo:7/11/24  Recent Hospitalizations: 7/15/24  Recent Falls/Trauma: none  Changes in Tobacco or Alcohol Intake: none     Notable Medication changes following discharge  Start:   Hydrochlorothiazide 25 mg daily  Lisinopril 20 mg daily  Amiodarone 200 mg daily  Stop:   None  Change: none    Medication Reconciliation  Start: none  Stop: none  Change: none    Drug Interactions  No relevant drug interactions were noted.    Medication System Management  Patients preferred pharmacy: [facility]  Adherence/Organization: good  Affordability/Accessibility: [facility] good through 8/14/25      Objective   Allergies   Allergen Reactions    Procaine Other and Palpitations     palpitations    Empagliflozin Unknown     Social History     Social History Narrative    Not on file      Medication Review  Current Outpatient Medications   Medication Instructions    amiodarone (PACERONE) 200 mg, oral, Daily    apixaban (ELIQUIS) 5 mg, oral, 2 times daily    busPIRone (BUSPAR) 5 mg, oral, 2 times daily    hydroCHLOROthiazide (HYDRODIURIL) 25 mg, oral, Daily    lisinopril 20 mg, oral, 2 times daily     magnesium oxide (MAG-OX) 400 mg, oral, 4 times weekly, Take every Monday, Tuesday, Wednesday, and Friday    metoprolol succinate XL (Toprol-XL) 100 mg 24 hr tablet TAKE ONE TABLET BY MOUTH TWO TIMES A DAY DO NOT CRUSH OR CHEW    Ozempic 2 mg, subcutaneous, Once Weekly    pantoprazole (PROTONIX) 40 mg, oral, 2 times daily before meals, Do not crush, chew, or split.      Vitals  BP Readings from Last 2 Encounters:   09/20/24 132/61   08/02/24 122/72     BMI Readings from Last 1 Encounters:   08/02/24 39.06 kg/m²      Labs  A1C  Lab Results   Component Value Date    HGBA1C 7.3 (A) 08/02/2024    HGBA1C 7.4 (H) 07/09/2024    HGBA1C 7.7 (A) 04/11/2024     BMP  Lab Results   Component Value Date    CALCIUM 9.9 09/17/2024     09/17/2024    K 4.7 09/17/2024    CO2 29 09/17/2024     09/17/2024    BUN 18 09/17/2024    CREATININE 1.07 (H) 09/17/2024    EGFR 55 (L) 09/17/2024     LFTs  Lab Results   Component Value Date    ALT 25 09/17/2024    AST 26 09/17/2024    ALKPHOS 74 09/17/2024    BILITOT 0.6 09/17/2024     FLP  Lab Results   Component Value Date    TRIG 88 09/22/2023    CHOL 144 03/28/2024    LDLF 91 09/22/2023    HDL 35.0 03/28/2024     Urine Microalbumin  Lab Results   Component Value Date    MICROALBCREA 11.6 08/29/2024     Wt Readings from Last 3 Encounters:   08/02/24 110 kg (242 lb)   07/16/24 113 kg (248 lb 3.8 oz)   07/13/24 112 kg (246 lb 11.1 oz)      There is no height or weight on file to calculate BMI.       Assessment/Plan   Problem List Items Addressed This Visit       Type 2 diabetes mellitus with diabetic polyneuropathy, without long-term current use of insulin     Patient's goal A1c is < 7.5%.  Is pt at goal? Yes, 7.3 .     Rationale for plan: At goal A1C, no side effects from restarting 2 mg weekly. Patient reports sugars around 180-190 in the am/    Medication Changes:  CONTINUE:  Semaglutide 2 mg once weekly         Relevant Orders    Follow Up In Clinical Pharmacy    Permanent atrial  fibrillation (Multi) - Primary     ASSESSMENT OF ATRIAL FIBRILLATION  Patient is to be on anticoagulation indefinitely  Rationale for plan: chadsvasc of 4, sees cardiology    Medication Changes:  Continue  Apixaban 5 mg twice daily          Other Visit Diagnoses       Atrial fibrillation with rapid ventricular response (Multi)        Relevant Medications    apixaban (Eliquis) 5 mg tablet    Other Relevant Orders    Follow Up In Clinical Pharmacy             Patient Assistance Screening (VAF)  Patient verbally reports monthly or yearly income which is less than 400% federal poverty level  Application for program has been submitted for the following medications:   Eliquis  Patient aware this process may take up to 2 weeks once income documents have been sent to the team.  If approved, medication must be filled through [facility] pharmacy and may be picked up or mailed to patient.   If approved, medication will be billed through insurance, and patient assistance team will pay the copay. This will result in a $0 copay for the patient.  Counseled patient on mechanism of action, side effects, contraindications, and what to do if the patient misses a dose. All patients questions were answered.      Clinical Pharmacist follow-up: 11/5, Telehealth visit    Continue all meds under the continuation of care with the referring provider and clinical pharmacy team.    Thank you,  Kathryn Bullard, PharmD  Clinical Pharmacy Specialist, Primary Care  [phone]    Verbal consent to manage patient's drug therapy was obtained from the patient. They were informed they may decline to participate or withdraw from participation in pharmacy services at any time.

## 2024-10-02 ENCOUNTER — PHARMACY VISIT (OUTPATIENT)
Dept: PHARMACY | Facility: CLINIC | Age: 74
End: 2024-10-02
Payer: MEDICARE

## 2024-10-03 ENCOUNTER — PHARMACY VISIT (OUTPATIENT)
Dept: PHARMACY | Facility: CLINIC | Age: 74
End: 2024-10-03

## 2024-10-03 DIAGNOSIS — R00.2 PALPITATIONS: ICD-10-CM

## 2024-10-03 DIAGNOSIS — I48.92 ATRIAL FLUTTER WITH RAPID VENTRICULAR RESPONSE (MULTI): ICD-10-CM

## 2024-10-03 DIAGNOSIS — R06.02 SHORTNESS OF BREATH: ICD-10-CM

## 2024-10-04 RX ORDER — METOPROLOL SUCCINATE 100 MG/1
TABLET, EXTENDED RELEASE ORAL
Qty: 60 TABLET | Refills: 0 | Status: SHIPPED | OUTPATIENT
Start: 2024-10-04

## 2024-10-07 DIAGNOSIS — R00.2 PALPITATIONS: ICD-10-CM

## 2024-10-07 DIAGNOSIS — R06.02 SHORTNESS OF BREATH: ICD-10-CM

## 2024-10-07 DIAGNOSIS — I48.92 ATRIAL FLUTTER WITH RAPID VENTRICULAR RESPONSE (MULTI): ICD-10-CM

## 2024-10-07 RX ORDER — METOPROLOL SUCCINATE 100 MG/1
TABLET, EXTENDED RELEASE ORAL
Qty: 60 TABLET | Refills: 0 | Status: SHIPPED | OUTPATIENT
Start: 2024-10-07

## 2024-11-01 PROCEDURE — RXMED WILLOW AMBULATORY MEDICATION CHARGE

## 2024-11-02 DIAGNOSIS — I10 ESSENTIAL HYPERTENSION: ICD-10-CM

## 2024-11-05 ENCOUNTER — APPOINTMENT (OUTPATIENT)
Dept: PHARMACY | Facility: HOSPITAL | Age: 74
End: 2024-11-05
Payer: MEDICARE

## 2024-11-05 ENCOUNTER — PHARMACY VISIT (OUTPATIENT)
Dept: PHARMACY | Facility: CLINIC | Age: 74
End: 2024-11-05
Payer: MEDICARE

## 2024-11-05 RX ORDER — LISINOPRIL 20 MG/1
20 TABLET ORAL 2 TIMES DAILY
Qty: 180 TABLET | Refills: 0 | Status: SHIPPED | OUTPATIENT
Start: 2024-11-05

## 2024-11-06 ENCOUNTER — APPOINTMENT (OUTPATIENT)
Dept: PHARMACY | Facility: HOSPITAL | Age: 74
End: 2024-11-06
Payer: MEDICARE

## 2024-11-06 DIAGNOSIS — I48.91 ATRIAL FIBRILLATION WITH RAPID VENTRICULAR RESPONSE (MULTI): ICD-10-CM

## 2024-11-06 DIAGNOSIS — I48.21 PERMANENT ATRIAL FIBRILLATION (MULTI): Primary | ICD-10-CM

## 2024-11-06 DIAGNOSIS — E11.42 TYPE 2 DIABETES MELLITUS WITH DIABETIC POLYNEUROPATHY, WITHOUT LONG-TERM CURRENT USE OF INSULIN: ICD-10-CM

## 2024-11-06 NOTE — PROGRESS NOTES
Pharmacy Post-Discharge Visit    Debbie Rees is a 74 y.o. female was referred to Clinical Pharmacy Team to complete a post-discharge medication optimization and monitoring visit.  The patient was referred for their Diabetes and Atrial Fibrillation.    Pt is here for Follow Up appointment.     Admission Date: 7/15/24  Discharge Date: 7/20/24    Referring Provider/PCP: Jarrell Dominguez DO  Last Visit: 8/2/24  Next visit: 11/8/24    Referring Provider: Ney Sommers (Cardiology)  Last Visit: 8/28/24  Next visit: 12/11/24    Subjective   HPI  DIABETES MELLITUS TYPE II:    Diagnosed with diabetes:  at least 15 years Known diabetic complications: neuropathy and nephropathy.  Does patient follow with Endocrinology: No  Last optometry exam: Spring of 2024  Most recent visit in Podiatry: Every 2 months     Current diabetic medications include:  Semaglutide 2 mg once weekly (Wednesday)    Clarifications to above regimen: None  Adverse Effects: none    Glucose Readings:  Glucometer/CGM Type: Acu chek Glucometer  Patient tests BG 1 times per day on and off    Current home BG readings: fasting 156 (175-179)  Previous home BG readings: Fasting 180's    Any episodes of hypoglycemia? No,   .  Did patient treat episode of hypoglycemia appropriately? N/A  Does the patient have a prescription for ready-to-use Glucagon? Not on insulin  Does pt have proteinuria? Yes    Lifestyle:  Diet: unknown meals/day.   BK: unknown  LN: unknown  DN: unknown  Snacks: unknown  Drinks: unknown    Physical Activity: unknown    Secondary Prevention:  Statin? No  ACE-I/ARB? Yes  Aspirin? No    Pertinent PMH Review:  PMH of Pancreatitis: No  PMH of Retinopathy: No  PMH of Urinary Tract Infections: No  PMH of MTC: No    ATRIAL FIBRILLATION persistent   Does patient follow with cardiology? Yes  Last cardiology appointment: 8/28/24    Diagnosis:  Onset: at least 2023  Patient is projected to be on anticoagulation indefinitely    [id number] Score:  [4]  Age: [<65 (0)] [65-74 (+1)] [> 75 (+2)]: 1  Sex: [Male/Female (+1)]: 1  CHF history: [No/Yes(+1)]: 1  Hypertension history: [No/Yes(+1)]: 1  Stroke/TIA/thromboembolism history: [No/Yes(+2)]: 0  Vascular disease history (prior MI, peripheral artery disease, aortic plaque): [No/Yes(+1)]: 0  Diabetes history: [No/Yes(+1)]: 0    Pertinent Medical History  Medical history: none  GI bleed, PE/DVT, Stroke, Previous Falls, Dementia, Hypotension  Social history: None  Alcohol Abuse, High Risk Profession  Medication history: None  SSRI, Antiplatelet therapy, NSAIDs     Current atrial fibrillations medications include:  Rate Control: metoprolol succinate 100 mg daily, amiodarone 200 mg daily  Anticoagulation: apixaban 5 mg twice daily    Screening for dose adjustment:  No dose change recommended at this time    Monitoring:  Date of last BMP: 7/20/24  Last echo:7/11/24  Recent Hospitalizations: 7/15/24  Recent Falls/Trauma: none  Changes in Tobacco or Alcohol Intake: none     Notable Medication changes following discharge  Start:   Hydrochlorothiazide 25 mg daily  Lisinopril 20 mg daily  Amiodarone 200 mg daily  Stop:   None  Change: none    Medication Reconciliation  Start: none  Stop: none  Change: none    Drug Interactions  No relevant drug interactions were noted.    Medication System Management  Patients preferred pharmacy: [facility]  Adherence/Organization: good  Affordability/Accessibility: Ozempic and Eliquis  PAP good through 8/14/25      Objective   Allergies   Allergen Reactions    Procaine Other and Palpitations     palpitations    Empagliflozin Unknown     Social History     Social History Narrative    Not on file      Medication Review  Current Outpatient Medications   Medication Instructions    amiodarone (PACERONE) 200 mg, oral, Daily    busPIRone (BUSPAR) 5 mg, oral, 2 times daily    Eliquis 5 mg, oral, 2 times daily    hydroCHLOROthiazide (HYDRODIURIL) 25 mg, oral, Daily    lisinopril 20 mg, oral, 2  times daily    magnesium oxide (MAG-OX) 400 mg, oral, 4 times weekly, Take every Monday, Tuesday, Wednesday, and Friday    metoprolol succinate XL (Toprol-XL) 100 mg 24 hr tablet TAKE ONE TABLET BY MOUTH TWO TIMES A DAY DO NOT CRUSH OR CHEW    Ozempic 2 mg, subcutaneous, Once Weekly    pantoprazole (PROTONIX) 40 mg, oral, 2 times daily before meals, Do not crush, chew, or split.      Vitals  BP Readings from Last 2 Encounters:   09/20/24 132/61   08/02/24 122/72     BMI Readings from Last 1 Encounters:   08/02/24 39.06 kg/m²      Labs  A1C  Lab Results   Component Value Date    HGBA1C 7.3 (A) 08/02/2024    HGBA1C 7.4 (H) 07/09/2024    HGBA1C 7.7 (A) 04/11/2024     BMP  Lab Results   Component Value Date    CALCIUM 9.9 09/17/2024     09/17/2024    K 4.7 09/17/2024    CO2 29 09/17/2024     09/17/2024    BUN 18 09/17/2024    CREATININE 1.07 (H) 09/17/2024    EGFR 55 (L) 09/17/2024     LFTs  Lab Results   Component Value Date    ALT 25 09/17/2024    AST 26 09/17/2024    ALKPHOS 74 09/17/2024    BILITOT 0.6 09/17/2024     FLP  Lab Results   Component Value Date    TRIG 88 09/22/2023    CHOL 144 03/28/2024    LDLF 91 09/22/2023    HDL 35.0 03/28/2024     Urine Microalbumin  Lab Results   Component Value Date    MICROALBCREA 11.6 08/29/2024     Wt Readings from Last 3 Encounters:   08/02/24 110 kg (242 lb)   07/16/24 113 kg (248 lb 3.8 oz)   07/13/24 112 kg (246 lb 11.1 oz)      There is no height or weight on file to calculate BMI.       Assessment/Plan   Problem List Items Addressed This Visit       Type 2 diabetes mellitus with diabetic polyneuropathy, without long-term current use of insulin     Patient's goal A1c is < 7.5%.  Is pt at goal? Yes, 7.3 .     Rationale for plan: At goal A1C, no side effects from restarting 2 mg weekly. Patient reports sugars around 180 in the am    Medication Changes:  CONTINUE:  Semaglutide 2 mg once weekly         Relevant Orders    Referral to Clinical Pharmacy    Permanent  atrial fibrillation (Multi) - Primary     ASSESSMENT OF ATRIAL FIBRILLATION  Patient is to be on anticoagulation indefinitely  Rationale for plan: chadsvasc of 4, sees cardiology    Medication Changes:  Continue  Apixaban 5 mg twice daily         Relevant Orders    Referral to Clinical Pharmacy     Other Visit Diagnoses       Atrial fibrillation with rapid ventricular response (Multi)        Relevant Orders    Referral to Clinical Pharmacy          Clinical Pharmacist follow-up: 12/4 at 10 am, Telehealth visit    Continue all meds under the continuation of care with the referring provider and clinical pharmacy team.    Thank you,  Kathryn Bullard, PharmD  Clinical Pharmacy Specialist, Primary Care  [phone]    Verbal consent to manage patient's drug therapy was obtained from the patient. They were informed they may decline to participate or withdraw from participation in pharmacy services at any time.

## 2024-11-06 NOTE — ASSESSMENT & PLAN NOTE
Patient's goal A1c is < 7.5%.  Is pt at goal? Yes, 7.3 .     Rationale for plan: At goal A1C, no side effects from restarting 2 mg weekly. Patient reports sugars around 180 in the am    Medication Changes:  CONTINUE:  Semaglutide 2 mg once weekly

## 2024-11-06 NOTE — Clinical Note
Patient titrated back up to 2 mg of ozempic, getting ozempic and eliquis for free through . Patient to see you later this week, if we need better glucose control patient able to be switched to Mounjaro at no cost.

## 2024-11-08 ENCOUNTER — APPOINTMENT (OUTPATIENT)
Dept: PRIMARY CARE | Facility: CLINIC | Age: 74
End: 2024-11-08
Payer: MEDICARE

## 2024-11-08 VITALS
BODY MASS INDEX: 39.87 KG/M2 | WEIGHT: 247 LBS | SYSTOLIC BLOOD PRESSURE: 114 MMHG | OXYGEN SATURATION: 97 % | HEART RATE: 66 BPM | DIASTOLIC BLOOD PRESSURE: 70 MMHG

## 2024-11-08 DIAGNOSIS — E11.42 TYPE 2 DIABETES MELLITUS WITH DIABETIC POLYNEUROPATHY, WITHOUT LONG-TERM CURRENT USE OF INSULIN: ICD-10-CM

## 2024-11-08 DIAGNOSIS — Z00.00 ROUTINE GENERAL MEDICAL EXAMINATION AT A HEALTH CARE FACILITY: Primary | ICD-10-CM

## 2024-11-08 DIAGNOSIS — Z23 NEED FOR VACCINATION: ICD-10-CM

## 2024-11-08 DIAGNOSIS — Z12.31 SCREENING MAMMOGRAM, ENCOUNTER FOR: ICD-10-CM

## 2024-11-08 DIAGNOSIS — Z78.0 MENOPAUSE: ICD-10-CM

## 2024-11-08 DIAGNOSIS — F51.02 ADJUSTMENT INSOMNIA: ICD-10-CM

## 2024-11-08 LAB — POC HEMOGLOBIN A1C: 7.3 % (ref 4.2–6.5)

## 2024-11-08 PROCEDURE — 83036 HEMOGLOBIN GLYCOSYLATED A1C: CPT | Performed by: FAMILY MEDICINE

## 2024-11-08 PROCEDURE — 1159F MED LIST DOCD IN RCRD: CPT | Performed by: FAMILY MEDICINE

## 2024-11-08 PROCEDURE — G0008 ADMIN INFLUENZA VIRUS VAC: HCPCS | Performed by: FAMILY MEDICINE

## 2024-11-08 PROCEDURE — 1157F ADVNC CARE PLAN IN RCRD: CPT | Performed by: FAMILY MEDICINE

## 2024-11-08 PROCEDURE — 3078F DIAST BP <80 MM HG: CPT | Performed by: FAMILY MEDICINE

## 2024-11-08 PROCEDURE — 90662 IIV NO PRSV INCREASED AG IM: CPT | Performed by: FAMILY MEDICINE

## 2024-11-08 PROCEDURE — 3074F SYST BP LT 130 MM HG: CPT | Performed by: FAMILY MEDICINE

## 2024-11-08 PROCEDURE — 1124F ACP DISCUSS-NO DSCNMKR DOCD: CPT | Performed by: FAMILY MEDICINE

## 2024-11-08 PROCEDURE — 3061F NEG MICROALBUMINURIA REV: CPT | Performed by: FAMILY MEDICINE

## 2024-11-08 PROCEDURE — 3051F HG A1C>EQUAL 7.0%<8.0%: CPT | Performed by: FAMILY MEDICINE

## 2024-11-08 PROCEDURE — 4010F ACE/ARB THERAPY RXD/TAKEN: CPT | Performed by: FAMILY MEDICINE

## 2024-11-08 PROCEDURE — 99397 PER PM REEVAL EST PAT 65+ YR: CPT | Performed by: FAMILY MEDICINE

## 2024-11-08 RX ORDER — TRAZODONE HYDROCHLORIDE 50 MG/1
50-100 TABLET ORAL NIGHTLY PRN
Qty: 60 TABLET | Refills: 5 | Status: SHIPPED | OUTPATIENT
Start: 2024-11-08 | End: 2025-11-08

## 2024-11-08 ASSESSMENT — ENCOUNTER SYMPTOMS
JOINT SWELLING: 0
TROUBLE SWALLOWING: 0
BLOOD IN STOOL: 0
DIFFICULTY URINATING: 0
SHORTNESS OF BREATH: 0
HEMATURIA: 0
CONFUSION: 0
APPETITE CHANGE: 0
UNEXPECTED WEIGHT CHANGE: 0
FEVER: 0
SEIZURES: 0
PALPITATIONS: 0
CONSTIPATION: 0
COLOR CHANGE: 0
DIARRHEA: 0
NERVOUS/ANXIOUS: 0

## 2024-11-08 NOTE — PROGRESS NOTES
Subjective   Patient ID: Debbie Rees is a 74 y.o. female who presents for Annual Exam (3 month follow up ).  HPI  Diabetes:  -working w/ pharmacy and doing well    Afib:  -controlled  -cards following- feels a lot better in NSR    Hypertrophic cardiomyopathy:  -no symptoms     Review of Systems   Constitutional:  Negative for appetite change, fever and unexpected weight change.   HENT:  Negative for congestion and trouble swallowing.    Eyes:  Negative for visual disturbance.   Respiratory:  Negative for shortness of breath.    Cardiovascular:  Negative for chest pain, palpitations and leg swelling.   Gastrointestinal:  Negative for blood in stool, constipation and diarrhea.   Genitourinary:  Negative for difficulty urinating and hematuria.   Musculoskeletal:  Negative for gait problem and joint swelling.   Skin:  Negative for color change.   Allergic/Immunologic: Negative for immunocompromised state.   Neurological:  Negative for seizures and syncope.   Psychiatric/Behavioral:  Negative for confusion and suicidal ideas. The patient is not nervous/anxious.        Objective   /70   Pulse 66   Wt 112 kg (247 lb)   SpO2 97%   BMI 39.87 kg/m²     Physical Exam  Constitutional:       General: She is not in acute distress.     Appearance: Normal appearance. She is not ill-appearing.   HENT:      Head: Normocephalic and atraumatic.      Right Ear: Tympanic membrane normal.      Left Ear: Tympanic membrane normal.      Nose: Nose normal.      Mouth/Throat:      Mouth: Mucous membranes are moist.   Eyes:      Pupils: Pupils are equal, round, and reactive to light.   Cardiovascular:      Rate and Rhythm: Normal rate and regular rhythm.      Heart sounds: No murmur heard.     No friction rub. No gallop.   Pulmonary:      Effort: Pulmonary effort is normal.      Breath sounds: Normal breath sounds.   Abdominal:      General: Abdomen is flat. There is no distension.      Palpations: Abdomen is soft.       Tenderness: There is no abdominal tenderness. There is no guarding.      Hernia: No hernia is present.   Musculoskeletal:         General: Normal range of motion.   Skin:     General: Skin is warm and dry.   Neurological:      General: No focal deficit present.      Mental Status: She is alert. Mental status is at baseline.      Cranial Nerves: No cranial nerve deficit.      Motor: No weakness.      Gait: Gait normal.   Psychiatric:         Mood and Affect: Mood normal.         Behavior: Behavior normal.         Thought Content: Thought content normal.         Judgment: Judgment normal.         Assessment/Plan   Problem List Items Addressed This Visit       Type 2 diabetes mellitus with diabetic polyneuropathy, without long-term current use of insulin    Relevant Orders    POCT glycosylated hemoglobin (Hb A1C) manually resulted (Completed)     Other Visit Diagnoses       Screening mammogram, encounter for    -  Primary    Relevant Orders    BI mammo bilateral screening tomosynthesis    Menopause        Relevant Orders    XR DEXA bone density    Need for vaccination        Relevant Orders    Flu vaccine, trivalent, preservative free, HIGH-DOSE, age 65y+ (Fluzone)    Adjustment insomnia        Relevant Medications    traZODone (Desyrel) 50 mg tablet          Assessment:  -Hypertension  Lisinopril/hydrochlorothiazide/metoprolol        -Cardiomyopathy- hyperthrophic- genetic/vanita variant:   Cardiology  Metoprolol 100mg BID  Suggest seeing cardiac surgery     -Afib:   S/p ablation  Eliquis, metoprolol  Amiodarone  *needs TSH at next visit      -Diabetes: A1c 7.3% (11/24), microalbumin: 11(11/24) eye exam: 3/22 , foot exam: Next visit  Ozempic 2mg  lisinopril     -CKD;  Lisinopril   consider jardiance    -Hyperlipidemia:   refused statin  cardiac CT 72    -osteopenia     -left hip pain: 2/2 OA vs possible lumbar      -insomnia:  Trazodone trial     Health Maintenance Reminder:  -medicare:2025  -preventative:  2025   -Blood Work: 12/24  -ASA: 81mg 10y CV >7.5%  -Hep C: completed  -Mammogram: ordered  -Colonoscopy: 2026  -Pap: >65y  -DEXA (FRAX): ordered  -Zostavax: ordered  -Prevnar: completed  -Pneumovax: completed  -Flu: Yearly

## 2024-12-03 ENCOUNTER — TELEPHONE (OUTPATIENT)
Dept: PHARMACY | Facility: HOSPITAL | Age: 74
End: 2024-12-03
Payer: MEDICARE

## 2024-12-03 NOTE — TELEPHONE ENCOUNTER
I reviewed the progress note and agree with the resident’s findings and plans as written. Case discussed with resident.    Rosanna Earl, PharmD

## 2024-12-03 NOTE — TELEPHONE ENCOUNTER
Population Health: Outreach by Ambulatory Pharmacy Team    Patient: Debbie Rees  Primary Care Provider (PCP): Jarrell Dominguez DO  Payor: Sandy LAM  Reason: Adherence  Medication(s): Ozempic   Outcome: Patient Reached: Claims Adherence, Patient reported she has enough Ozempic at this time, will refill when needed.      Gayle Marquez, PharmD  PGY-1 Pharmacy Resident

## 2024-12-04 ENCOUNTER — APPOINTMENT (OUTPATIENT)
Dept: PHARMACY | Facility: HOSPITAL | Age: 74
End: 2024-12-04
Payer: MEDICARE

## 2024-12-04 DIAGNOSIS — E11.42 TYPE 2 DIABETES MELLITUS WITH DIABETIC POLYNEUROPATHY, WITHOUT LONG-TERM CURRENT USE OF INSULIN: ICD-10-CM

## 2024-12-04 DIAGNOSIS — I48.91 ATRIAL FIBRILLATION WITH RAPID VENTRICULAR RESPONSE (MULTI): ICD-10-CM

## 2024-12-04 DIAGNOSIS — I48.21 PERMANENT ATRIAL FIBRILLATION (MULTI): ICD-10-CM

## 2024-12-04 NOTE — PROGRESS NOTES
Pharmacy Post-Discharge Visit    Debbie Rees is a 74 y.o. female was referred to Clinical Pharmacy Team to complete a post-discharge medication optimization and monitoring visit. The patient was referred for their Diabetes and Atrial Fibrillation.    Pt is here for Follow Up appointment.     Admission Date: 7/15/24  Discharge Date: 7/20/24    Referring Provider/PCP: Jarrell Dominguez DO  Last Visit: 11/8/24  Next visit: Not yet scheduled    Referring Provider: Ney Sommers (Cardiology)  Last Visit: 8/28/24  Next visit: 12/11/24    Subjective   HPI  DIABETES MELLITUS TYPE II:    Diagnosed with diabetes:  at least 15 years Known diabetic complications: neuropathy and nephropathy.  Does patient follow with Endocrinology: No  Last optometry exam: Spring of 2024  Most recent visit in Podiatry: Every 2 months     Current diabetic medications include:  Semaglutide 2 mg once weekly (Wednesday)    Clarifications to above regimen: None  Adverse Effects: none    Past medications  Jardiance- Yeast infections  Metformin- IR- diarrhea    Glucose Readings:  Glucometer/CGM Type: Acu chek Glucometer  Patient tests BG 1 times per day on and off    Current home BG readings: 160-170s  Previous home BG readings:   fasting 156 (175-179)  Fasting 180's    Any episodes of hypoglycemia? No,   .  Did patient treat episode of hypoglycemia appropriately? N/A  Does the patient have a prescription for ready-to-use Glucagon? Not on insulin  Does pt have proteinuria? Yes    Lifestyle:  Diet: unknown meals/day.   BK: unknown  LN: unknown  DN: unknown  Snacks: unknown  Drinks: unknown    Physical Activity: unknown    Secondary Prevention:  Statin? No  ACE-I/ARB? Yes  Aspirin? No    Pertinent PMH Review:  PMH of Pancreatitis: No  PMH of Retinopathy: No  PMH of Urinary Tract Infections: No  PMH of MTC: No    ATRIAL FIBRILLATION persistent   Does patient follow with cardiology? Yes  Last cardiology appointment: 8/28/24    Diagnosis:  Onset: at  least 2023  Patient is projected to be on anticoagulation indefinitely    [id number] Score: [4]  Age: [<65 (0)] [65-74 (+1)] [> 75 (+2)]: 1  Sex: [Male/Female (+1)]: 1  CHF history: [No/Yes(+1)]: 1  Hypertension history: [No/Yes(+1)]: 1  Stroke/TIA/thromboembolism history: [No/Yes(+2)]: 0  Vascular disease history (prior MI, peripheral artery disease, aortic plaque): [No/Yes(+1)]: 0  Diabetes history: [No/Yes(+1)]: 0    Pertinent Medical History  Medical history: none  GI bleed, PE/DVT, Stroke, Previous Falls, Dementia, Hypotension  Social history: None  Alcohol Abuse, High Risk Profession  Medication history: None  SSRI, Antiplatelet therapy, NSAIDs     Current atrial fibrillations medications include:  Rate Control: metoprolol succinate 100 mg daily, amiodarone 200 mg daily  Anticoagulation: apixaban 5 mg twice daily    Screening for dose adjustment:  No dose change recommended at this time    Monitoring:  Date of last BMP: 7/20/24  Last echo:7/11/24  Recent Hospitalizations: 7/15/24  Recent Falls/Trauma: none  Changes in Tobacco or Alcohol Intake: none     Notable Medication changes following discharge  Start:   Hydrochlorothiazide 25 mg daily  Lisinopril 20 mg daily  Amiodarone 200 mg daily  Stop:   None  Change: none    Medication Reconciliation  Start: none  Stop: none  Change: none    Drug Interactions  No relevant drug interactions were noted.    Medication System Management  Patients preferred pharmacy: [facility]  Adherence/Organization: good  Affordability/Accessibility: Ozempic and Eliquis  PAP good through 8/14/25      Objective   Allergies   Allergen Reactions    Procaine Other and Palpitations     palpitations    Empagliflozin Other     Yeast infections     Social History     Social History Narrative    Not on file      Medication Review  Current Outpatient Medications   Medication Instructions    amiodarone (PACERONE) 200 mg, oral, Daily    Eliquis 5 mg, oral, 2 times daily     hydroCHLOROthiazide (HYDRODIURIL) 25 mg, oral, Daily    lisinopril 20 mg, oral, 2 times daily    magnesium oxide (MAG-OX) 400 mg, 4 times weekly    metoprolol succinate XL (Toprol-XL) 100 mg 24 hr tablet TAKE ONE TABLET BY MOUTH TWO TIMES A DAY DO NOT CRUSH OR CHEW    Ozempic 2 mg, subcutaneous, Once Weekly    pantoprazole (PROTONIX) 40 mg, oral, 2 times daily before meals, Do not crush, chew, or split.    traZODone (DESYREL)  mg, oral, Nightly PRN      Vitals  BP Readings from Last 2 Encounters:   11/08/24 114/70   09/20/24 132/61     BMI Readings from Last 1 Encounters:   11/08/24 39.87 kg/m²      Labs  A1C  Lab Results   Component Value Date    HGBA1C 7.3 (A) 11/08/2024    HGBA1C 7.3 (A) 08/02/2024    HGBA1C 7.4 (H) 07/09/2024     BMP  Lab Results   Component Value Date    CALCIUM 9.9 09/17/2024     09/17/2024    K 4.7 09/17/2024    CO2 29 09/17/2024     09/17/2024    BUN 18 09/17/2024    CREATININE 1.07 (H) 09/17/2024    EGFR 55 (L) 09/17/2024     LFTs  Lab Results   Component Value Date    ALT 25 09/17/2024    AST 26 09/17/2024    ALKPHOS 74 09/17/2024    BILITOT 0.6 09/17/2024     FLP  Lab Results   Component Value Date    TRIG 88 09/22/2023    CHOL 144 03/28/2024    LDLF 91 09/22/2023    HDL 35.0 03/28/2024     Urine Microalbumin  Lab Results   Component Value Date    MICROALBCREA 11.6 08/29/2024     Wt Readings from Last 3 Encounters:   11/08/24 112 kg (247 lb)   08/02/24 110 kg (242 lb)   07/16/24 113 kg (248 lb 3.8 oz)      There is no height or weight on file to calculate BMI.       Assessment/Plan   Problem List Items Addressed This Visit       Type 2 diabetes mellitus with diabetic polyneuropathy, without long-term current use of insulin     Patient's goal A1c is < 7.5%.  Is pt at goal? Yes, 7.3 .     Rationale for plan: At goal A1C, no side effects from 2 mg weekly. Patient reports sugars around 160-170 in the am. Patient desiring increased glycemic control and weight loss, will use  up ozempic 2 mg she has at home, then possible switch to mounjaro.    Medication Changes:  CONTINUE:  Semaglutide 2 mg once weekly         Relevant Orders    Referral to Clinical Pharmacy    Permanent atrial fibrillation (Multi)     ASSESSMENT OF ATRIAL FIBRILLATION  Patient is to be on anticoagulation indefinitely  Rationale for plan: chadsvasc of 4, sees cardiology    Medication Changes:  Continue  Apixaban 5 mg twice daily         Relevant Orders    Referral to Clinical Pharmacy     Other Visit Diagnoses       Atrial fibrillation with rapid ventricular response (Multi)        Relevant Orders    Referral to Clinical Pharmacy            Clinical Pharmacist follow-up: 12/27 at 10 am, Telehealth visit    Continue all meds under the continuation of care with the referring provider and clinical pharmacy team.    Thank you,  Kathryn Bullard, PharmD  Clinical Pharmacy Specialist, Primary Care  [phone]    Verbal consent to manage patient's drug therapy was obtained from the patient. They were informed they may decline to participate or withdraw from participation in pharmacy services at any time.

## 2024-12-04 NOTE — ASSESSMENT & PLAN NOTE
Patient's goal A1c is < 7.5%.  Is pt at goal? Yes, 7.3 .     Rationale for plan: At goal A1C, no side effects from 2 mg weekly. Patient reports sugars around 160-170 in the am. Patient desiring increased glycemic control and weight loss, will use up ozempic 2 mg she has at home, then possible switch to mounjaro.    Medication Changes:  CONTINUE:  Semaglutide 2 mg once weekly

## 2024-12-04 NOTE — Clinical Note
Continuing on Ozempic 2 mg for now. May switch to Mounjaro in the new year to help with better glucose control and weight loss.

## 2024-12-07 DIAGNOSIS — I48.92 ATRIAL FLUTTER WITH RAPID VENTRICULAR RESPONSE (MULTI): ICD-10-CM

## 2024-12-07 DIAGNOSIS — R00.2 PALPITATIONS: ICD-10-CM

## 2024-12-07 DIAGNOSIS — R06.02 SHORTNESS OF BREATH: ICD-10-CM

## 2024-12-09 RX ORDER — METOPROLOL SUCCINATE 100 MG/1
TABLET, EXTENDED RELEASE ORAL
Qty: 60 TABLET | Refills: 0 | Status: SHIPPED | OUTPATIENT
Start: 2024-12-09 | End: 2024-12-12 | Stop reason: SDUPTHER

## 2024-12-11 ENCOUNTER — APPOINTMENT (OUTPATIENT)
Dept: CARDIOLOGY | Facility: CLINIC | Age: 74
End: 2024-12-11
Payer: MEDICARE

## 2024-12-11 DIAGNOSIS — Z79.899 LONG TERM CURRENT USE OF AMIODARONE: Primary | ICD-10-CM

## 2024-12-11 PROCEDURE — 99214 OFFICE O/P EST MOD 30 MIN: CPT | Performed by: INTERNAL MEDICINE

## 2024-12-11 PROCEDURE — 1159F MED LIST DOCD IN RCRD: CPT | Performed by: INTERNAL MEDICINE

## 2024-12-11 PROCEDURE — 4010F ACE/ARB THERAPY RXD/TAKEN: CPT | Performed by: INTERNAL MEDICINE

## 2024-12-11 PROCEDURE — 1160F RVW MEDS BY RX/DR IN RCRD: CPT | Performed by: INTERNAL MEDICINE

## 2024-12-11 PROCEDURE — G2211 COMPLEX E/M VISIT ADD ON: HCPCS | Performed by: INTERNAL MEDICINE

## 2024-12-11 PROCEDURE — 1157F ADVNC CARE PLAN IN RCRD: CPT | Performed by: INTERNAL MEDICINE

## 2024-12-11 NOTE — PROGRESS NOTES
I had the pleasure seeing Debbie Rees     Chief Complaint   Patient presents with    Atrial Fibrillation      She presents for 4 month follow-up visit s/p ablation July 2024.      Current Outpatient Medications   Medication Instructions    amiodarone (PACERONE) 200 mg, oral, Daily    Eliquis 5 mg, oral, 2 times daily    hydroCHLOROthiazide (HYDRODIURIL) 25 mg, oral, Daily    lisinopril 20 mg, oral, 2 times daily    magnesium oxide (MAG-OX) 400 mg, 4 times weekly    metoprolol succinate XL (Toprol-XL) 100 mg 24 hr tablet TAKE ONE TABLET BY MOUTH TWO TIMES A DAY, do not crush or chew    Ozempic 2 mg, subcutaneous, Once Weekly    pantoprazole (PROTONIX) 40 mg, oral, 2 times daily before meals, Do not crush, chew, or split.    traZODone (DESYREL)  mg, oral, Nightly PRN      Debbie Rees is a 74 y.o. with:     Pmh includes HTN, DM T2, Anxiety, and PVD / Venous insufficiency (s/p EVLA).    Cardiac Hx  Hypertrophic Obstructive Cardiomyopathy  Palpitations / persistent AF - s/p DCCV (4/29/24).  S/p ablation (7/8/24).         March 2024:  (3/38) Pt presented to [facility] with chest pain and elevated HR.  EKG: AF with  bpm.   Pt started on diltiazem drip with HR improvement.  Dilt changed to oral amiodarone.  (3/30) Pt Dc'd home on Amio and Metoprolol.   4/2/24:EKG: A-Fib (4/29/24) s/p DCCV       July 8, 2024:  s/p Acutely successful radiofrequency ablation for Atrial fibrillation with PVI and adjunctive lines done by me.  The anterior wall was isolated.      Cardiac TESTING     -ECHO/ TTE:  (3/29/24) LVEF 65-70%.    -ECHO/ TTE:  (5/25/22) LVEF >65% with nrl size and function.  LVH, mild LAE.      -NM STRESS pharm: (1/8/24) LVEF 56%.  No evidence of ischemia on imaging or ECG tracing.        Objective   Physical Exam  Constitutional: alert and in no acute distress.   Eyes: no erythema, swelling or discharge from the eye .   Neck: neck is supple, symmetric, trachea midline, no masses .   Pulmonary: no increased  work of breathing or signs of respiratory distress  and lungs clear to auscultation.    Cardiovascular:  regular rhythm, normal S1 and S2, no murmurs , pedal pulses 2+ bilaterally  and no edema .   Abdomen: abdomen non-tender, no masses .   Skin: skin warm and dry, normal skin turgor .   Psychiatric judgment and insight is normal  and oriented to person, place and time .             Assessment/Plan   AF - Persistent . S/p PVI with extensive anterior wall ablation on 07/08/2024.  Presented a week later with AF most likely secondary to inflammation. She is now doing very well, in NSR. Has had no recurrences.  For now she should continue the Amiodarone. We will order TSH, comprehensive panel.

## 2024-12-12 DIAGNOSIS — R00.2 PALPITATIONS: ICD-10-CM

## 2024-12-12 DIAGNOSIS — I48.92 ATRIAL FLUTTER WITH RAPID VENTRICULAR RESPONSE (MULTI): ICD-10-CM

## 2024-12-12 DIAGNOSIS — R06.02 SHORTNESS OF BREATH: ICD-10-CM

## 2024-12-12 RX ORDER — METOPROLOL SUCCINATE 100 MG/1
100 TABLET, EXTENDED RELEASE ORAL DAILY
Qty: 90 TABLET | Refills: 3 | Status: SHIPPED | OUTPATIENT
Start: 2024-12-12 | End: 2025-12-12

## 2024-12-13 ENCOUNTER — OFFICE VISIT (OUTPATIENT)
Dept: PRIMARY CARE | Facility: CLINIC | Age: 74
End: 2024-12-13
Payer: MEDICARE

## 2024-12-13 VITALS
HEART RATE: 71 BPM | DIASTOLIC BLOOD PRESSURE: 76 MMHG | OXYGEN SATURATION: 99 % | SYSTOLIC BLOOD PRESSURE: 136 MMHG | BODY MASS INDEX: 40.35 KG/M2 | WEIGHT: 250 LBS

## 2024-12-13 DIAGNOSIS — J01.00 ACUTE NON-RECURRENT MAXILLARY SINUSITIS: Primary | ICD-10-CM

## 2024-12-13 PROCEDURE — 1159F MED LIST DOCD IN RCRD: CPT | Performed by: FAMILY MEDICINE

## 2024-12-13 PROCEDURE — 1124F ACP DISCUSS-NO DSCNMKR DOCD: CPT | Performed by: FAMILY MEDICINE

## 2024-12-13 PROCEDURE — 3078F DIAST BP <80 MM HG: CPT | Performed by: FAMILY MEDICINE

## 2024-12-13 PROCEDURE — 1157F ADVNC CARE PLAN IN RCRD: CPT | Performed by: FAMILY MEDICINE

## 2024-12-13 PROCEDURE — 1036F TOBACCO NON-USER: CPT | Performed by: FAMILY MEDICINE

## 2024-12-13 PROCEDURE — G2211 COMPLEX E/M VISIT ADD ON: HCPCS | Performed by: FAMILY MEDICINE

## 2024-12-13 PROCEDURE — 99213 OFFICE O/P EST LOW 20 MIN: CPT | Performed by: FAMILY MEDICINE

## 2024-12-13 PROCEDURE — 3051F HG A1C>EQUAL 7.0%<8.0%: CPT | Performed by: FAMILY MEDICINE

## 2024-12-13 PROCEDURE — 3075F SYST BP GE 130 - 139MM HG: CPT | Performed by: FAMILY MEDICINE

## 2024-12-13 PROCEDURE — 4010F ACE/ARB THERAPY RXD/TAKEN: CPT | Performed by: FAMILY MEDICINE

## 2024-12-13 PROCEDURE — 3061F NEG MICROALBUMINURIA REV: CPT | Performed by: FAMILY MEDICINE

## 2024-12-13 RX ORDER — PREDNISONE 20 MG/1
40 TABLET ORAL DAILY
Qty: 10 TABLET | Refills: 0 | Status: SHIPPED | OUTPATIENT
Start: 2024-12-13 | End: 2024-12-18

## 2024-12-13 RX ORDER — DOXYCYCLINE 100 MG/1
100 CAPSULE ORAL 2 TIMES DAILY
Qty: 14 CAPSULE | Refills: 0 | Status: SHIPPED | OUTPATIENT
Start: 2024-12-13 | End: 2024-12-20

## 2024-12-13 ASSESSMENT — ENCOUNTER SYMPTOMS
DIARRHEA: 0
JOINT SWELLING: 0
COUGH: 1
DIFFICULTY URINATING: 0
SORE THROAT: 0
HEMATURIA: 0
BLOOD IN STOOL: 0
COLOR CHANGE: 0
DIAPHORESIS: 0
PALPITATIONS: 0
TROUBLE SWALLOWING: 0
SINUS PRESSURE: 1
SINUS PAIN: 1
APPETITE CHANGE: 0
CONSTIPATION: 0
FEVER: 0
UNEXPECTED WEIGHT CHANGE: 0
SHORTNESS OF BREATH: 1
SEIZURES: 0
NERVOUS/ANXIOUS: 0
CHILLS: 1
CONFUSION: 0

## 2024-12-13 NOTE — PROGRESS NOTES
Subjective   Patient ID: Debbie Rees is a 74 y.o. female who presents for Cough (X1 week, sob ).  HPI  7-10d hx of cough, congestion  Some chest congestion  No wheezing  Some sob when doing activities  No n/v/f  +chills  Tried otc meds      Review of Systems   Constitutional:  Positive for chills. Negative for appetite change, diaphoresis, fever and unexpected weight change.   HENT:  Positive for congestion, sinus pressure and sinus pain. Negative for sneezing, sore throat and trouble swallowing.    Eyes:  Negative for visual disturbance.   Respiratory:  Positive for cough and shortness of breath.    Cardiovascular:  Negative for chest pain, palpitations and leg swelling.   Gastrointestinal:  Negative for blood in stool, constipation and diarrhea.   Genitourinary:  Negative for difficulty urinating and hematuria.   Musculoskeletal:  Negative for gait problem and joint swelling.   Skin:  Negative for color change.   Allergic/Immunologic: Negative for immunocompromised state.   Neurological:  Negative for seizures and syncope.   Psychiatric/Behavioral:  Negative for confusion and suicidal ideas. The patient is not nervous/anxious.        Objective   /76   Pulse 71   Wt 113 kg (250 lb)   SpO2 99%   BMI 40.35 kg/m²     Physical Exam  Constitutional:       General: She is not in acute distress.     Appearance: Normal appearance. She is not toxic-appearing.   HENT:      Head: Normocephalic.      Right Ear: Tympanic membrane normal.      Left Ear: Tympanic membrane normal.      Nose: Nose normal. No congestion or rhinorrhea.      Mouth/Throat:      Mouth: Mucous membranes are moist.   Eyes:      General:         Right eye: No discharge.         Left eye: No discharge.      Pupils: Pupils are equal, round, and reactive to light.   Cardiovascular:      Rate and Rhythm: Normal rate and regular rhythm.      Heart sounds: No murmur heard.  Pulmonary:      Effort: No respiratory distress.      Breath sounds: No  wheezing or rhonchi.   Abdominal:      General: Abdomen is flat.      Palpations: Abdomen is soft.   Skin:     General: Skin is warm and dry.      Capillary Refill: Capillary refill takes less than 2 seconds.   Neurological:      General: No focal deficit present.      Mental Status: She is alert.   Psychiatric:         Mood and Affect: Mood normal.       Assessment/Plan   Problem List Items Addressed This Visit    None    Assessment:  -Hypertension  Lisinopril/hydrochlorothiazide/metoprolol         -Cardiomyopathy- hyperthrophic- genetic/vanita variant:   Cardiology  Metoprolol 100mg BID  Suggest seeing cardiac surgery     -Afib:   S/p ablation  Eliquis, metoprolol  Amiodarone  *needs TSH at next visit      -Diabetes: A1c 7.3% (11/24), microalbumin: 11(11/24) eye exam: 3/22 , foot exam: Next visit  Ozempic 2mg  lisinopril     -CKD;  Lisinopril   consider jardiance     -Hyperlipidemia:   refused statin  cardiac CT 72     -osteopenia     -left hip pain: 2/2 OA vs possible lumbar      -insomnia:  Trazodone trial      Health Maintenance Reminder:  -medicare:2025  -preventative: 2025   -Blood Work: 12/24  -ASA: 81mg 10y CV >7.5%  -Hep C: completed  -Mammogram: ordered  -Colonoscopy: 2026  -Pap: >65y  -DEXA (FRAX): ordered  -Zostavax: ordered  -Prevnar: completed  -Pneumovax: completed  -Flu: Yearly

## 2024-12-18 ENCOUNTER — TELEPHONE (OUTPATIENT)
Dept: PRIMARY CARE | Facility: CLINIC | Age: 74
End: 2024-12-18
Payer: MEDICARE

## 2024-12-18 DIAGNOSIS — J01.00 ACUTE NON-RECURRENT MAXILLARY SINUSITIS: Primary | ICD-10-CM

## 2024-12-18 RX ORDER — CEFDINIR 300 MG/1
300 CAPSULE ORAL 2 TIMES DAILY
Qty: 20 CAPSULE | Refills: 0 | Status: SHIPPED | OUTPATIENT
Start: 2024-12-18 | End: 2024-12-28

## 2024-12-18 NOTE — TELEPHONE ENCOUNTER
SHE CALLED AND SAID SHE IS STILL NOT FEELING WELL HER PREDNISONE IS MAKING HER SUGAR LEVELS RISE AND HER HEART RACE, ASKED WHAT SHE SHOULD DO OR SHOULD SHE COME IN

## 2024-12-26 DIAGNOSIS — E83.42 HYPOMAGNESEMIA: Primary | ICD-10-CM

## 2024-12-27 ENCOUNTER — APPOINTMENT (OUTPATIENT)
Dept: PHARMACY | Facility: HOSPITAL | Age: 74
End: 2024-12-27
Payer: MEDICARE

## 2024-12-27 DIAGNOSIS — I48.91 ATRIAL FIBRILLATION WITH RAPID VENTRICULAR RESPONSE (MULTI): ICD-10-CM

## 2024-12-27 DIAGNOSIS — E11.42 TYPE 2 DIABETES MELLITUS WITH DIABETIC POLYNEUROPATHY, WITHOUT LONG-TERM CURRENT USE OF INSULIN: ICD-10-CM

## 2024-12-27 DIAGNOSIS — I48.21 PERMANENT ATRIAL FIBRILLATION (MULTI): ICD-10-CM

## 2024-12-27 PROCEDURE — RXMED WILLOW AMBULATORY MEDICATION CHARGE

## 2024-12-27 RX ORDER — CALCIUM CARBONATE 300MG(750)
1 TABLET,CHEWABLE ORAL DAILY
Qty: 30 TABLET | Refills: 0 | Status: SHIPPED | OUTPATIENT
Start: 2024-12-27

## 2024-12-27 RX ORDER — TIRZEPATIDE 5 MG/.5ML
5 INJECTION, SOLUTION SUBCUTANEOUS WEEKLY
Qty: 2 ML | Refills: 3 | Status: SHIPPED | OUTPATIENT
Start: 2024-12-27

## 2024-12-27 NOTE — Clinical Note
Patient reports morning sugars have been over 200 for last few weeks. She has been sick and on prednisone during that time. Prior to getting sick, sugars were still 160-170 in the am. Patient desiring better glucose control and weight loss (BMI 40). Will switch from Ozempic 2 mg to Mounjaro 5 mg at this time.

## 2024-12-27 NOTE — PROGRESS NOTES
Pharmacy Post-Discharge Visit    Debbie Rees is a 74 y.o. female was referred to Clinical Pharmacy Team to complete a post-discharge medication optimization and monitoring visit. The patient was referred for their Diabetes.    Pt is here for Follow Up appointment.     Admission Date: 7/15/24  Discharge Date: 7/20/24    Referring Provider/PCP: Jarrell Dominguez DO  Last Visit: 12/13/24  Next visit: Not yet scheduled    Referring Provider: Ney Sommers (Cardiology)  Last Visit: 12/11/24  Next visit: Not yet scheduled    Subjective   HPI  DIABETES MELLITUS TYPE II:    Diagnosed with diabetes:  at least 15 years Known diabetic complications: neuropathy and nephropathy.  Does patient follow with Endocrinology: No  Last optometry exam: Spring of 2024  Most recent visit in Podiatry: Every 2 months     Current diabetic medications include:  Semaglutide (Ozempic) 2 mg once weekly (Wednesday)    Clarifications to above regimen: None  Adverse Effects: none    Past medications  Jardiance- Yeast infections  Metformin- IR- diarrhea    Glucose Readings:  Glucometer/CGM Type: Acu chek Glucometer  Patient tests BG 1 times per day on and off    Current home BG readings: 219- Has has been sick recently and on steroids.  Previous home BG readings:   160-170s  fasting 156 (175-179)  Fasting 180's    Any episodes of hypoglycemia? No,   .  Did patient treat episode of hypoglycemia appropriately? N/A  Does the patient have a prescription for ready-to-use Glucagon? Not on insulin  Does pt have proteinuria? Yes    Lifestyle:  Diet: unknown meals/day.   BK: unknown  LN: unknown  DN: unknown  Snacks: unknown  Drinks: unknown    Physical Activity: unknown    Secondary Prevention:  Statin? No  ACE-I/ARB? Yes  Aspirin? No    Pertinent PMH Review:  PMH of Pancreatitis: No  PMH of Retinopathy: No  PMH of Urinary Tract Infections: No  PMH of MTC: No    ATRIAL FIBRILLATION persistent   Does patient follow with cardiology? Yes  Last  cardiology appointment: 8/28/24    Diagnosis:  Onset: at least 2023  Patient is projected to be on anticoagulation indefinitely    [id number] Score: [4]  Age: [<65 (0)] [65-74 (+1)] [> 75 (+2)]: 1  Sex: [Male/Female (+1)]: 1  CHF history: [No/Yes(+1)]: 1  Hypertension history: [No/Yes(+1)]: 1  Stroke/TIA/thromboembolism history: [No/Yes(+2)]: 0  Vascular disease history (prior MI, peripheral artery disease, aortic plaque): [No/Yes(+1)]: 0  Diabetes history: [No/Yes(+1)]: 0    Pertinent Medical History  Medical history: none  GI bleed, PE/DVT, Stroke, Previous Falls, Dementia, Hypotension  Social history: None  Alcohol Abuse, High Risk Profession  Medication history: None  SSRI, Antiplatelet therapy, NSAIDs     Current atrial fibrillations medications include:  Rate Control: metoprolol succinate 100 mg daily, amiodarone 200 mg daily  Anticoagulation: apixaban 5 mg twice daily    Screening for dose adjustment:  No dose change recommended at this time    Monitoring:  Date of last BMP: 7/20/24  Last echo:7/11/24  Recent Hospitalizations: 7/15/24  Recent Falls/Trauma: none  Changes in Tobacco or Alcohol Intake: none     Notable Medication changes following discharge  Start:   Hydrochlorothiazide 25 mg daily  Lisinopril 20 mg daily  Amiodarone 200 mg daily  Stop:   None  Change: none    Medication Reconciliation  Start: none  Stop: none  Change: none    Drug Interactions  No relevant drug interactions were noted.    Medication System Management  Patients preferred pharmacy: [facility]  Adherence/Organization: good  Affordability/Accessibility: Ozempic and Eliquis [facility] good through 8/14/25      Objective   Allergies   Allergen Reactions    Procaine Other and Palpitations     palpitations    Empagliflozin Other     Yeast infections     Social History     Social History Narrative    Not on file      Medication Review  Current Outpatient Medications   Medication Instructions    amiodarone (PACERONE) 200 mg, oral, Daily     cefdinir (OMNICEF) 300 mg, oral, 2 times daily    Eliquis 5 mg, oral, 2 times daily    hydroCHLOROthiazide (HYDRODIURIL) 25 mg, oral, Daily    lisinopril 20 mg, oral, 2 times daily    magnesium oxide (MAG-OX) 400 mg, oral, Daily    metoprolol succinate XL (TOPROL-XL) 100 mg, oral, Daily, Do not crush or chew.    Mounjaro 5 mg, subcutaneous, Weekly    pantoprazole (PROTONIX) 40 mg, oral, 2 times daily before meals, Do not crush, chew, or split.    traZODone (DESYREL)  mg, oral, Nightly PRN      Vitals  BP Readings from Last 2 Encounters:   12/13/24 136/76   11/08/24 114/70     BMI Readings from Last 1 Encounters:   12/13/24 40.35 kg/m²      Labs  A1C  Lab Results   Component Value Date    HGBA1C 7.3 (A) 11/08/2024    HGBA1C 7.3 (A) 08/02/2024    HGBA1C 7.4 (H) 07/09/2024     BMP  Lab Results   Component Value Date    CALCIUM 9.9 09/17/2024     09/17/2024    K 4.7 09/17/2024    CO2 29 09/17/2024     09/17/2024    BUN 18 09/17/2024    CREATININE 1.07 (H) 09/17/2024    EGFR 55 (L) 09/17/2024     LFTs  Lab Results   Component Value Date    ALT 25 09/17/2024    AST 26 09/17/2024    ALKPHOS 74 09/17/2024    BILITOT 0.6 09/17/2024     FLP  Lab Results   Component Value Date    TRIG 88 09/22/2023    CHOL 144 03/28/2024    LDLF 91 09/22/2023    HDL 35.0 03/28/2024     Urine Microalbumin  Lab Results   Component Value Date    MICROALBCREA 11.6 08/29/2024     Wt Readings from Last 3 Encounters:   12/13/24 113 kg (250 lb)   11/08/24 112 kg (247 lb)   08/02/24 110 kg (242 lb)      There is no height or weight on file to calculate BMI.       Assessment/Plan   Problem List Items Addressed This Visit       Type 2 diabetes mellitus with diabetic polyneuropathy, without long-term current use of insulin     Patient's goal A1c is < 7.5%.  Is pt at goal? Yes, 7.3 .     Rationale for plan: At goal A1C, Patient reports sugars over 200 in the am, has been sick recently and on steroids. Patient desiring increased  glycemic control and weight loss. Patient interested in switching to Mounjaro at this time.    Medication Changes:  STOP  Semaglutide 2 mg once weekly  START  Mounjaro 5 mg once weekly         Relevant Medications    tirzepatide (Mounjaro) 5 mg/0.5 mL pen injector    Other Relevant Orders    Referral to Clinical Pharmacy    Permanent atrial fibrillation (Multi)     ASSESSMENT OF ATRIAL FIBRILLATION  Patient is to be on anticoagulation indefinitely  Rationale for plan: chadsvasc of 4, sees cardiology    Medication Changes:  Continue  Apixaban 5 mg twice daily         Relevant Orders    Referral to Clinical Pharmacy     Other Visit Diagnoses       Atrial fibrillation with rapid ventricular response (Multi)        Relevant Orders    Referral to Clinical Pharmacy            Clinical Pharmacist follow-up: 1/24 at 10 am, Telehealth visit    Continue all meds under the continuation of care with the referring provider and clinical pharmacy team.    Thank you,  Kathryn Bullard, PharmD  Clinical Pharmacy Specialist, Primary Care  [phone]    Verbal consent to manage patient's drug therapy was obtained from the patient. They were informed they may decline to participate or withdraw from participation in pharmacy services at any time.

## 2024-12-27 NOTE — ASSESSMENT & PLAN NOTE
Patient's goal A1c is < 7.5%.  Is pt at goal? Yes, 7.3 .     Rationale for plan: At goal A1C, Patient reports sugars over 200 in the am, has been sick recently and on steroids. Patient desiring increased glycemic control and weight loss. Patient interested in switching to Mounjaro at this time.    Medication Changes:  STOP  Semaglutide 2 mg once weekly  START  Mounjaro 5 mg once weekly

## 2025-01-02 ENCOUNTER — PHARMACY VISIT (OUTPATIENT)
Dept: PHARMACY | Facility: CLINIC | Age: 75
End: 2025-01-02
Payer: MEDICARE

## 2025-01-06 DIAGNOSIS — R00.2 PALPITATIONS: ICD-10-CM

## 2025-01-06 DIAGNOSIS — R06.02 SHORTNESS OF BREATH: ICD-10-CM

## 2025-01-06 DIAGNOSIS — I48.92 ATRIAL FLUTTER WITH RAPID VENTRICULAR RESPONSE (MULTI): ICD-10-CM

## 2025-01-07 RX ORDER — METOPROLOL SUCCINATE 100 MG/1
TABLET, EXTENDED RELEASE ORAL
Qty: 60 TABLET | Refills: 0 | Status: SHIPPED | OUTPATIENT
Start: 2025-01-07 | End: 2025-01-09

## 2025-01-09 DIAGNOSIS — R06.02 SHORTNESS OF BREATH: ICD-10-CM

## 2025-01-09 DIAGNOSIS — I48.92 ATRIAL FLUTTER WITH RAPID VENTRICULAR RESPONSE (MULTI): ICD-10-CM

## 2025-01-09 DIAGNOSIS — R00.2 PALPITATIONS: ICD-10-CM

## 2025-01-09 RX ORDER — METOPROLOL SUCCINATE 100 MG/1
TABLET, EXTENDED RELEASE ORAL
Qty: 60 TABLET | Refills: 0 | Status: SHIPPED | OUTPATIENT
Start: 2025-01-09

## 2025-01-24 ENCOUNTER — APPOINTMENT (OUTPATIENT)
Dept: PHARMACY | Facility: HOSPITAL | Age: 75
End: 2025-01-24
Payer: MEDICARE

## 2025-01-24 DIAGNOSIS — E11.42 TYPE 2 DIABETES MELLITUS WITH DIABETIC POLYNEUROPATHY, WITHOUT LONG-TERM CURRENT USE OF INSULIN: ICD-10-CM

## 2025-01-24 DIAGNOSIS — I48.21 PERMANENT ATRIAL FIBRILLATION (MULTI): ICD-10-CM

## 2025-01-24 DIAGNOSIS — I48.91 ATRIAL FIBRILLATION WITH RAPID VENTRICULAR RESPONSE (MULTI): ICD-10-CM

## 2025-01-24 PROCEDURE — RXMED WILLOW AMBULATORY MEDICATION CHARGE

## 2025-01-24 RX ORDER — TIRZEPATIDE 5 MG/.5ML
5 INJECTION, SOLUTION SUBCUTANEOUS WEEKLY
Qty: 2 ML | Refills: 3 | Status: SHIPPED | OUTPATIENT
Start: 2025-01-24

## 2025-01-24 NOTE — ASSESSMENT & PLAN NOTE
Patient's goal A1c is < 7.5%.  Is pt at goal? Yes, 7.3 .     Rationale for plan: At goal A1C, Patient ran out of test strips so no recent glucose readings. Feeling well after 3 injections of mounjaro.    Medication Changes:  CONTINUE  Mounjaro 5 mg once weekly

## 2025-01-24 NOTE — Clinical Note
Patient tolerated switch from Ozempic to Mounjaro well. She ran out of test strips so unsure of recent glucose levels. Sent rx for refills, will stay at Mounjaro 5 mg and check back in next month.

## 2025-01-24 NOTE — PROGRESS NOTES
Pharmacy Post-Discharge Visit    Debbie Rees is a 75 y.o. female was referred to Clinical Pharmacy Team to complete a post-discharge medication optimization and monitoring visit. The patient was referred for their Diabetes.    Pt is here for Follow Up appointment.     Admission Date: 7/15/24  Discharge Date: 7/20/24    Referring Provider/PCP: Jarrell Dominguez DO  Last Visit: 12/13/24  Next visit: Not yet scheduled    Referring Provider: Ney Sommers (Cardiology)  Last Visit: 12/11/24  Next visit: Not yet scheduled    Subjective   HPI  DIABETES MELLITUS TYPE II:    Diagnosed with diabetes:  at least 15 years Known diabetic complications: neuropathy and nephropathy.  Does patient follow with Endocrinology: No  Last optometry exam: Spring of 2024  Most recent visit in Podiatry: Every 2 months     Current diabetic medications include:  Mounjaro 5 mg once weekly on Wednesday x 3    Clarifications to above regimen: None  Adverse Effects: None    Past medications  Jardiance- Yeast infections  Metformin- IR- diarrhea  Semaglutide (Ozempic) 2 mg once weekly (Wednesday)    Glucose Readings:  Glucometer/CGM Type: Acu chek Glucometer  Patient tests BG 1 times per day on and off    Current home BG readings: unknown  Previous home BG readings:    219- Has has been sick recently and on steroids.  160-170s  fasting 156 (175-179)  Fasting 180's    Any episodes of hypoglycemia? No,   .  Did patient treat episode of hypoglycemia appropriately? N/A  Does the patient have a prescription for ready-to-use Glucagon? Not on insulin  Does pt have proteinuria? Yes    Lifestyle: (appetite down)  Diet: 2 meals/day.   BK: unknown  LN: skips  DN: unknown  Snacks: pretzels  Drinks: unknown    Physical Activity: unknown    Secondary Prevention:  Statin? No  ACE-I/ARB? Yes  Aspirin? No    Pertinent PMH Review:  PMH of Pancreatitis: No  PMH of Retinopathy: No  PMH of Urinary Tract Infections: No  PMH of MTC: No    ATRIAL FIBRILLATION  persistent   Does patient follow with cardiology? Yes  Last cardiology appointment: 8/28/24    Diagnosis:  Onset: at least 2023  Patient is projected to be on anticoagulation indefinitely    [id number] Score: [4]  Age: [<65 (0)] [65-74 (+1)] [> 75 (+2)]: 1  Sex: [Male/Female (+1)]: 1  CHF history: [No/Yes(+1)]: 1  Hypertension history: [No/Yes(+1)]: 1  Stroke/TIA/thromboembolism history: [No/Yes(+2)]: 0  Vascular disease history (prior MI, peripheral artery disease, aortic plaque): [No/Yes(+1)]: 0  Diabetes history: [No/Yes(+1)]: 0    Pertinent Medical History  Medical history: none  GI bleed, PE/DVT, Stroke, Previous Falls, Dementia, Hypotension  Social history: None  Alcohol Abuse, High Risk Profession  Medication history: None  SSRI, Antiplatelet therapy, NSAIDs     Current atrial fibrillations medications include:  Rate Control: metoprolol succinate 100 mg daily, amiodarone 200 mg daily  Anticoagulation: apixaban 5 mg twice daily    Screening for dose adjustment:  No dose change recommended at this time    Monitoring:  Date of last BMP: 7/20/24  Last echo:7/11/24  Recent Hospitalizations: 7/15/24  Recent Falls/Trauma: none  Changes in Tobacco or Alcohol Intake: none       Medication Reconciliation  Start: none  Stop: none  Change: none    Drug Interactions  No relevant drug interactions were noted.    Medication System Management  Patients preferred pharmacy: [facility]  Adherence/Organization: good  Affordability/Accessibility: Mounjaro and Eliquis  PAP good through 8/14/25      Objective   Allergies   Allergen Reactions    Procaine Other and Palpitations     palpitations    Empagliflozin Other     Yeast infections     Social History     Social History Narrative    Not on file      Medication Review  Current Outpatient Medications   Medication Instructions    amiodarone (PACERONE) 200 mg, oral, Daily    blood sugar diagnostic strip 1 each, miscellaneous, Daily    Eliquis 5 mg, oral, 2 times daily     hydroCHLOROthiazide (HYDRODIURIL) 25 mg, oral, Daily    lisinopril 20 mg, oral, 2 times daily    magnesium oxide (MAG-OX) 400 mg, oral, Daily    metoprolol succinate XL (Toprol-XL) 100 mg 24 hr tablet TAKE ONE TABLET BY MOUTH TWO TIMES A DAY. do not crush or chew    Mounjaro 5 mg, subcutaneous, Weekly    pantoprazole (PROTONIX) 40 mg, oral, 2 times daily before meals, Do not crush, chew, or split.    traZODone (DESYREL)  mg, oral, Nightly PRN      Vitals  BP Readings from Last 2 Encounters:   12/13/24 136/76   11/08/24 114/70     BMI Readings from Last 1 Encounters:   12/13/24 40.35 kg/m²      Labs  A1C  Lab Results   Component Value Date    HGBA1C 7.3 (A) 11/08/2024    HGBA1C 7.3 (A) 08/02/2024    HGBA1C 7.4 (H) 07/09/2024     BMP  Lab Results   Component Value Date    CALCIUM 9.9 09/17/2024     09/17/2024    K 4.7 09/17/2024    CO2 29 09/17/2024     09/17/2024    BUN 18 09/17/2024    CREATININE 1.07 (H) 09/17/2024    EGFR 55 (L) 09/17/2024     LFTs  Lab Results   Component Value Date    ALT 25 09/17/2024    AST 26 09/17/2024    ALKPHOS 74 09/17/2024    BILITOT 0.6 09/17/2024     FLP  Lab Results   Component Value Date    TRIG 88 09/22/2023    CHOL 144 03/28/2024    LDLF 91 09/22/2023    HDL 35.0 03/28/2024     Urine Microalbumin  Lab Results   Component Value Date    MICROALBCREA 11.6 08/29/2024     Wt Readings from Last 3 Encounters:   12/13/24 113 kg (250 lb)   11/08/24 112 kg (247 lb)   08/02/24 110 kg (242 lb)      There is no height or weight on file to calculate BMI.       Assessment/Plan   Problem List Items Addressed This Visit       Type 2 diabetes mellitus with diabetic polyneuropathy, without long-term current use of insulin     Patient's goal A1c is < 7.5%.  Is pt at goal? Yes, 7.3 .     Rationale for plan: At goal A1C, Patient ran out of test strips so no recent glucose readings. Feeling well after 3 injections of mounjaro.    Medication Changes:  CONTINUE  Mounjaro 5 mg once  weekly         Relevant Medications    blood sugar diagnostic strip    tirzepatide (Mounjaro) 5 mg/0.5 mL pen injector    Other Relevant Orders    Referral to Clinical Pharmacy    Permanent atrial fibrillation (Multi)     ASSESSMENT OF ATRIAL FIBRILLATION  Patient is to be on anticoagulation indefinitely  Rationale for plan: chadsvasc of 4, sees cardiology    Medication Changes:  Continue  Apixaban 5 mg twice daily         Relevant Orders    Referral to Clinical Pharmacy     Other Visit Diagnoses       Atrial fibrillation with rapid ventricular response (Multi)        Relevant Orders    Referral to Clinical Pharmacy              Clinical Pharmacist follow-up: 2/21 at 1020, Telehealth visit    Continue all meds under the continuation of care with the referring provider and clinical pharmacy team.    Thank you,  Kathryn Bullard, PharmD  Clinical Pharmacy Specialist, Primary Care  [phone]    Verbal consent to manage patient's drug therapy was obtained from the patient. They were informed they may decline to participate or withdraw from participation in pharmacy services at any time.

## 2025-01-28 ENCOUNTER — PHARMACY VISIT (OUTPATIENT)
Dept: PHARMACY | Facility: CLINIC | Age: 75
End: 2025-01-28
Payer: MEDICARE

## 2025-02-04 LAB
ALBUMIN SERPL-MCNC: 4.3 G/DL (ref 3.6–5.1)
ALP SERPL-CCNC: 77 U/L (ref 37–153)
ALT SERPL-CCNC: 32 U/L (ref 6–29)
ANION GAP SERPL CALCULATED.4IONS-SCNC: 12 MMOL/L (CALC) (ref 7–17)
AST SERPL-CCNC: 25 U/L (ref 10–35)
BILIRUB SERPL-MCNC: 0.4 MG/DL (ref 0.2–1.2)
BUN SERPL-MCNC: 24 MG/DL (ref 7–25)
CALCIUM SERPL-MCNC: 10.2 MG/DL (ref 8.6–10.4)
CHLORIDE SERPL-SCNC: 103 MMOL/L (ref 98–110)
CO2 SERPL-SCNC: 24 MMOL/L (ref 20–32)
CREAT SERPL-MCNC: 1.03 MG/DL (ref 0.6–1)
EGFRCR SERPLBLD CKD-EPI 2021: 57 ML/MIN/1.73M2
GLUCOSE SERPL-MCNC: 151 MG/DL (ref 65–139)
POTASSIUM SERPL-SCNC: 4.4 MMOL/L (ref 3.5–5.3)
PROT SERPL-MCNC: 7.9 G/DL (ref 6.1–8.1)
SODIUM SERPL-SCNC: 139 MMOL/L (ref 135–146)
TSH SERPL-ACNC: 1.81 MIU/L (ref 0.4–4.5)

## 2025-02-07 DIAGNOSIS — R06.02 SHORTNESS OF BREATH: ICD-10-CM

## 2025-02-07 DIAGNOSIS — I48.92 ATRIAL FLUTTER WITH RAPID VENTRICULAR RESPONSE (MULTI): ICD-10-CM

## 2025-02-07 DIAGNOSIS — R00.2 PALPITATIONS: ICD-10-CM

## 2025-02-11 RX ORDER — METOPROLOL SUCCINATE 100 MG/1
TABLET, EXTENDED RELEASE ORAL
Qty: 180 TABLET | Refills: 3 | Status: SHIPPED | OUTPATIENT
Start: 2025-02-11

## 2025-02-21 ENCOUNTER — APPOINTMENT (OUTPATIENT)
Dept: PHARMACY | Facility: HOSPITAL | Age: 75
End: 2025-02-21
Payer: MEDICARE

## 2025-02-21 DIAGNOSIS — I48.91 ATRIAL FIBRILLATION WITH RAPID VENTRICULAR RESPONSE (MULTI): ICD-10-CM

## 2025-02-21 DIAGNOSIS — I48.21 PERMANENT ATRIAL FIBRILLATION (MULTI): ICD-10-CM

## 2025-02-21 DIAGNOSIS — E11.42 TYPE 2 DIABETES MELLITUS WITH DIABETIC POLYNEUROPATHY, WITHOUT LONG-TERM CURRENT USE OF INSULIN: ICD-10-CM

## 2025-02-21 PROCEDURE — RXMED WILLOW AMBULATORY MEDICATION CHARGE

## 2025-02-21 RX ORDER — TIRZEPATIDE 7.5 MG/.5ML
7.5 INJECTION, SOLUTION SUBCUTANEOUS WEEKLY
Qty: 2 ML | Refills: 11 | Status: SHIPPED | OUTPATIENT
Start: 2025-02-21

## 2025-02-21 NOTE — Clinical Note
Patient tolerating Mounjaro well, reports fasting sugars 140-152, patient eager for better sugar control and weight loss, will increase dose to 7.5 mg at this time.

## 2025-02-21 NOTE — ASSESSMENT & PLAN NOTE
Patient's goal A1c is < 7.5%.  Is pt at goal? Yes, 7.3 .     Rationale for plan: At goal A1C, Patient reports sugars 140-152. No side effects.     Medication Changes:  INCREASE  Mounjaro 5 to 7.5 mg once weekly

## 2025-02-21 NOTE — PROGRESS NOTES
Pharmacy Post-Discharge Visit    Debbie Rees is a 75 y.o. female was referred to Clinical Pharmacy Team to complete a post-discharge medication optimization and monitoring visit. The patient was referred for their Diabetes and Atrial Fibrillation.    Pt is here for Follow Up appointment.     Admission Date: 7/15/24  Discharge Date: 7/20/24    Referring Provider/PCP: Jarrell Dominguez DO  Last Visit: 12/13/24  Next visit: Not yet scheduled    Referring Provider: Ney Sommers (Cardiology)  Last Visit: 12/11/24  Next visit: Not yet scheduled    Subjective   HPI  DIABETES MELLITUS TYPE II:    Diagnosed with diabetes:  at least 15 years Known diabetic complications: neuropathy and nephropathy.  Does patient follow with Endocrinology: No  Last optometry exam: Spring of 2024  Most recent visit in Podiatry: Every 2 months     Current diabetic medications include:  Mounjaro 5 mg once weekly on Wednesday    Clarifications to above regimen: None  Adverse Effects: None    Past medications  Jardiance- Yeast infections  Metformin- IR- diarrhea  Semaglutide (Ozempic) 2 mg once weekly (Wednesday)    Glucose Readings:  Glucometer/CGM Type: Acu chek Glucometer  Patient tests BG 1 times per day on and off    Current home BG readings: 140-152  Previous home BG readings:    219- Has has been sick recently and on steroids.  160-170s  fasting 156 (175-179)  Fasting 180's    Any episodes of hypoglycemia? No,   .  Did patient treat episode of hypoglycemia appropriately? N/A  Does the patient have a prescription for ready-to-use Glucagon? Not on insulin  Does pt have proteinuria? Yes    Lifestyle: (appetite down)  Diet: 2 meals/day.   BK: unknown  LN: skips  DN: unknown  Snacks: pretzels  Drinks: unknown    Physical Activity: unknown    Secondary Prevention:  Statin? No  ACE-I/ARB? Yes  Aspirin? No    Pertinent PMH Review:  PMH of Pancreatitis: No  PMH of Retinopathy: No  PMH of Urinary Tract Infections: No  PMH of MTC: No    ATRIAL  FIBRILLATION persistent   Does patient follow with cardiology? Yes  Last cardiology appointment: 8/28/24    Diagnosis:  Onset: at least 2023  Patient is projected to be on anticoagulation indefinitely    [id number] Score: [4]  Age: [<65 (0)] [65-74 (+1)] [> 75 (+2)]: 1  Sex: [Male/Female (+1)]: 1  CHF history: [No/Yes(+1)]: 1  Hypertension history: [No/Yes(+1)]: 1  Stroke/TIA/thromboembolism history: [No/Yes(+2)]: 0  Vascular disease history (prior MI, peripheral artery disease, aortic plaque): [No/Yes(+1)]: 0  Diabetes history: [No/Yes(+1)]: 0    Pertinent Medical History  Medical history: none  GI bleed, PE/DVT, Stroke, Previous Falls, Dementia, Hypotension  Social history: None  Alcohol Abuse, High Risk Profession  Medication history: None  SSRI, Antiplatelet therapy, NSAIDs     Current atrial fibrillations medications include:  Rate Control: metoprolol succinate 100 mg daily, amiodarone 200 mg daily  Anticoagulation: apixaban 5 mg twice daily    Screening for dose adjustment:  No dose change recommended at this time    Monitoring:  Date of last BMP: 7/20/24  Last echo: 7/11/24  Recent Hospitalizations: 7/15/24  Recent Falls/Trauma: none  Changes in Tobacco or Alcohol Intake: none       Medication Reconciliation  Start: none  Stop: none  Change: none    Drug Interactions  No relevant drug interactions were noted.    Medication System Management  Patients preferred pharmacy: [facility]  Adherence/Organization: good  Affordability/Accessibility: Mounjaro and Eliquis  PAP good through 8/14/25      Objective   Allergies   Allergen Reactions    Procaine Other and Palpitations     palpitations    Empagliflozin Other     Yeast infections     Social History     Social History Narrative    Not on file      Medication Review  Current Outpatient Medications   Medication Instructions    amiodarone (PACERONE) 200 mg, oral, Daily    apixaban (ELIQUIS) 5 mg, oral, 2 times daily    blood sugar diagnostic strip 1 each,  miscellaneous, Daily    hydroCHLOROthiazide (HYDRODIURIL) 25 mg, oral, Daily    lisinopril 20 mg, oral, 2 times daily    magnesium oxide (MAG-OX) 400 mg, oral, Daily    metoprolol succinate XL (Toprol-XL) 100 mg 24 hr tablet TAKE ONE TABLET BY MOUTH TWO TIMES A DAY. DO NOT CRUSH OR CHEW    Mounjaro 7.5 mg, subcutaneous, Weekly    pantoprazole (PROTONIX) 40 mg, oral, 2 times daily before meals, Do not crush, chew, or split.    traZODone (DESYREL)  mg, oral, Nightly PRN      Vitals  BP Readings from Last 2 Encounters:   12/13/24 136/76   11/08/24 114/70     BMI Readings from Last 1 Encounters:   12/13/24 40.35 kg/m²      Labs  A1C  Lab Results   Component Value Date    HGBA1C 7.3 (A) 11/08/2024    HGBA1C 7.3 (A) 08/02/2024    HGBA1C 7.4 (H) 07/09/2024     BMP  Lab Results   Component Value Date    CALCIUM 10.2 02/03/2025     02/03/2025    K 4.4 02/03/2025    CO2 24 02/03/2025     02/03/2025    BUN 24 02/03/2025    CREATININE 1.03 (H) 02/03/2025    EGFR 57 (L) 02/03/2025     LFTs  Lab Results   Component Value Date    ALT 32 (H) 02/03/2025    AST 25 02/03/2025    ALKPHOS 77 02/03/2025    BILITOT 0.4 02/03/2025     FLP  Lab Results   Component Value Date    TRIG 88 09/22/2023    CHOL 144 03/28/2024    LDLF 91 09/22/2023    HDL 35.0 03/28/2024     Urine Microalbumin  Lab Results   Component Value Date    MICROALBCREA 11.6 08/29/2024     Wt Readings from Last 3 Encounters:   12/13/24 113 kg (250 lb)   11/08/24 112 kg (247 lb)   08/02/24 110 kg (242 lb)      There is no height or weight on file to calculate BMI.       Assessment/Plan   Problem List Items Addressed This Visit       Type 2 diabetes mellitus with diabetic polyneuropathy, without long-term current use of insulin     Patient's goal A1c is < 7.5%.  Is pt at goal? Yes, 7.3 .     Rationale for plan: At goal A1C, Patient reports sugars 140-152. No side effects.     Medication Changes:  INCREASE  Mounjaro 5 to 7.5 mg once weekly         Relevant  Medications    tirzepatide (Mounjaro) 7.5 mg/0.5 mL pen injector    Other Relevant Orders    Referral to Clinical Pharmacy    Permanent atrial fibrillation (Multi)     ASSESSMENT OF ATRIAL FIBRILLATION  Patient is to be on anticoagulation indefinitely  Rationale for plan: chadsvasc of 4, sees cardiology    Medication Changes:  Continue  Apixaban 5 mg twice daily         Relevant Orders    Referral to Clinical Pharmacy     Other Visit Diagnoses       Atrial fibrillation with rapid ventricular response (Multi)        Relevant Medications    apixaban (Eliquis) 5 mg tablet    Other Relevant Orders    Referral to Clinical Pharmacy            Clinical Pharmacist follow-up: 3/21 at 1020, Telehealth visit    Continue all meds under the continuation of care with the referring provider and clinical pharmacy team.    Thank you,  Kathryn Bullard, PharmD  Clinical Pharmacy Specialist, Primary Care  [phone]    Verbal consent to manage patient's drug therapy was obtained from the patient. They were informed they may decline to participate or withdraw from participation in pharmacy services at any time.

## 2025-02-27 ENCOUNTER — PHARMACY VISIT (OUTPATIENT)
Dept: PHARMACY | Facility: CLINIC | Age: 75
End: 2025-02-27
Payer: MEDICARE

## 2025-03-07 DIAGNOSIS — R00.2 PALPITATIONS: ICD-10-CM

## 2025-03-07 DIAGNOSIS — I48.92 ATRIAL FLUTTER WITH RAPID VENTRICULAR RESPONSE (MULTI): ICD-10-CM

## 2025-03-07 DIAGNOSIS — R06.02 SHORTNESS OF BREATH: ICD-10-CM

## 2025-03-07 RX ORDER — METOPROLOL SUCCINATE 100 MG/1
100 TABLET, EXTENDED RELEASE ORAL DAILY
Qty: 180 TABLET | Refills: 3 | Status: SHIPPED | OUTPATIENT
Start: 2025-03-07

## 2025-03-21 ENCOUNTER — APPOINTMENT (OUTPATIENT)
Dept: PHARMACY | Facility: HOSPITAL | Age: 75
End: 2025-03-21
Payer: MEDICARE

## 2025-03-21 DIAGNOSIS — I48.21 PERMANENT ATRIAL FIBRILLATION (MULTI): ICD-10-CM

## 2025-03-21 DIAGNOSIS — E11.42 TYPE 2 DIABETES MELLITUS WITH DIABETIC POLYNEUROPATHY, WITHOUT LONG-TERM CURRENT USE OF INSULIN: ICD-10-CM

## 2025-03-21 DIAGNOSIS — I48.91 ATRIAL FIBRILLATION WITH RAPID VENTRICULAR RESPONSE (MULTI): ICD-10-CM

## 2025-03-21 NOTE — ASSESSMENT & PLAN NOTE
Patient's goal A1c is < 7.5%.  Is pt at goal? Yes, 7.3 .     Rationale for plan: At goal A1C, Patient reports sugars 160s. No side effects, 1 dose of Mounjaro 7.5 mg.     Medication Changes:  CONTINUE  Mounjaro 7.5 mg once weekly

## 2025-03-21 NOTE — PROGRESS NOTES
Pharmacy Post-Discharge Visit    Debbie Rees is a 75 y.o. female was referred to Clinical Pharmacy Team to complete a post-discharge medication optimization and monitoring visit. The patient was referred for their Diabetes and Atrial Fibrillation.    Pt is here for Follow Up appointment.     Admission Date: 7/15/24  Discharge Date: 7/20/24    Referring Provider/PCP: Jarrell Dominguez DO  Last Visit: 12/13/24  Next visit: Not yet scheduled    Referring Provider: Ney Sommers (Cardiology)  Last Visit: 12/11/24  Next visit: Not yet scheduled    Subjective   HPI  DIABETES MELLITUS TYPE II:    Diagnosed with diabetes: At least 15 years   Known diabetic complications: neuropathy and nephropathy.  Does patient follow with Endocrinology: No  Last optometry exam: Spring of 2024  Most recent visit in Podiatry: Every 2 months     Current diabetic medications include:  Mounjaro 7.5 mg once weekly on Wednesday x 1    Clarifications to above regimen: None  Adverse Effects: None    Past medications  Jardiance- Yeast infections  Metformin- IR- diarrhea  Semaglutide (Ozempic) 2 mg once weekly (Wednesday)    Glucose Readings:  Glucometer/CGM Type: Acu-chek Glucometer  Patient tests BG 1 times per day on and off    Current home BG readings: 167, 160  Previous home BG readings:   140-152   219- Has has been sick recently and on steroids.  160-170s  fasting 156 (175-179)  Fasting 180's    Any episodes of hypoglycemia? No,   .  Did patient treat episode of hypoglycemia appropriately? N/A  Does the patient have a prescription for ready-to-use Glucagon? Not on insulin  Does pt have proteinuria? Yes    Lifestyle: (appetite down)  Diet: 2 meals/day.   BK: unknown  LN: skips  DN: unknown  Snacks: pretzels  Drinks: unknown    Physical Activity: unknown    Secondary Prevention:  Statin? No  ACE-I/ARB? Yes  Aspirin? No    Pertinent PMH Review:  PMH of Pancreatitis: No  PMH of Retinopathy: No  PMH of Urinary Tract Infections: No  PMH of  MTC: No    ATRIAL FIBRILLATION persistent   Does patient follow with cardiology? Yes  Last cardiology appointment: 8/28/24    Diagnosis:  Onset: at least 2023  Patient is projected to be on anticoagulation indefinitely    [id number] Score: [4]  Age: [<65 (0)] [65-74 (+1)] [> 75 (+2)]: 1  Sex: [Male/Female (+1)]: 1  CHF history: [No/Yes(+1)]: 1  Hypertension history: [No/Yes(+1)]: 1  Stroke/TIA/thromboembolism history: [No/Yes(+2)]: 0  Vascular disease history (prior MI, peripheral artery disease, aortic plaque): [No/Yes(+1)]: 0  Diabetes history: [No/Yes(+1)]: 0    Pertinent Medical History  Medical history: none  GI bleed, PE/DVT, Stroke, Previous Falls, Dementia, Hypotension  Social history: None  Alcohol Abuse, High Risk Profession  Medication history: None  SSRI, Antiplatelet therapy, NSAIDs     Current atrial fibrillations medications include:  Rate Control: metoprolol succinate 100 mg daily, amiodarone 200 mg daily  Anticoagulation: apixaban 5 mg twice daily    Screening for dose adjustment:  No dose change recommended at this time    Monitoring:  Date of last BMP: 7/20/24  Last echo: 7/11/24  Recent Hospitalizations: 7/15/24  Recent Falls/Trauma: none  Changes in Tobacco or Alcohol Intake: none       Medication Reconciliation  Start: none  Stop: none  Change: none    Drug Interactions  No relevant drug interactions were noted.    Medication System Management  Patients preferred pharmacy: [facility]  Adherence/Organization: good  Affordability/Accessibility: Mounjaro and Eliquis  PAP good through 8/14/25      Objective   Allergies   Allergen Reactions    Procaine Other and Palpitations     palpitations    Empagliflozin Other     Yeast infections     Social History     Social History Narrative    Not on file      Medication Review  Current Outpatient Medications   Medication Instructions    amiodarone (PACERONE) 200 mg, oral, Daily    blood sugar diagnostic strip 1 each, miscellaneous, Daily    Eliquis 5  mg, oral, 2 times daily    hydroCHLOROthiazide (HYDRODIURIL) 25 mg, oral, Daily    lisinopril 20 mg, oral, 2 times daily    magnesium oxide (MAG-OX) 400 mg, oral, Daily    metoprolol succinate XL (TOPROL-XL) 100 mg, oral, Daily, Do not crush or chew.    Mounjaro 7.5 mg, subcutaneous, Weekly    pantoprazole (PROTONIX) 40 mg, oral, 2 times daily before meals, Do not crush, chew, or split.    traZODone (DESYREL)  mg, oral, Nightly PRN      Vitals  BP Readings from Last 2 Encounters:   12/13/24 136/76   11/08/24 114/70     BMI Readings from Last 1 Encounters:   12/13/24 40.35 kg/m²      Labs  A1C  Lab Results   Component Value Date    HGBA1C 7.3 (A) 11/08/2024    HGBA1C 7.3 (A) 08/02/2024    HGBA1C 7.4 (H) 07/09/2024     BMP  Lab Results   Component Value Date    CALCIUM 10.2 02/03/2025     02/03/2025    K 4.4 02/03/2025    CO2 24 02/03/2025     02/03/2025    BUN 24 02/03/2025    CREATININE 1.03 (H) 02/03/2025    EGFR 57 (L) 02/03/2025     LFTs  Lab Results   Component Value Date    ALT 32 (H) 02/03/2025    AST 25 02/03/2025    ALKPHOS 77 02/03/2025    BILITOT 0.4 02/03/2025     FLP  Lab Results   Component Value Date    TRIG 88 09/22/2023    CHOL 144 03/28/2024    LDLF 91 09/22/2023    HDL 35.0 03/28/2024     Urine Microalbumin  Lab Results   Component Value Date    MICROALBCREA 11.6 08/29/2024     Wt Readings from Last 3 Encounters:   12/13/24 113 kg (250 lb)   11/08/24 112 kg (247 lb)   08/02/24 110 kg (242 lb)      There is no height or weight on file to calculate BMI.       Assessment/Plan   Problem List Items Addressed This Visit       Type 2 diabetes mellitus with diabetic polyneuropathy, without long-term current use of insulin     Patient's goal A1c is < 7.5%.  Is pt at goal? Yes, 7.3 .     Rationale for plan: At goal A1C, Patient reports sugars 160s. No side effects, 1 dose of Mounjaro 7.5 mg.     Medication Changes:  CONTINUE  Mounjaro 7.5 mg once weekly         Relevant Orders    Referral  to Clinical Pharmacy    Permanent atrial fibrillation (Multi)     ASSESSMENT OF ATRIAL FIBRILLATION  Patient is to be on anticoagulation indefinitely  Rationale for plan: chadsvasc of 4, sees cardiology    Medication Changes:  Continue  Apixaban 5 mg twice daily         Relevant Orders    Referral to Clinical Pharmacy     Other Visit Diagnoses       Atrial fibrillation with rapid ventricular response (Multi)        Relevant Orders    Referral to Clinical Pharmacy              Clinical Pharmacist follow-up: 4/2 at 11:40, Telehealth visit    Continue all meds under the continuation of care with the referring provider and clinical pharmacy team.    Thank you,  Kathryn Bullard, PharmD  Clinical Pharmacy Specialist, Primary Care  [phone]    Verbal consent to manage patient's drug therapy was obtained from the patient. They were informed they may decline to participate or withdraw from participation in pharmacy services at any time.

## 2025-04-01 ENCOUNTER — TELEPHONE (OUTPATIENT)
Dept: PHARMACY | Facility: HOSPITAL | Age: 75
End: 2025-04-01
Payer: MEDICARE

## 2025-04-01 NOTE — TELEPHONE ENCOUNTER
Population Health: Outreach by Ambulatory Pharmacy Team    Patient: Debbie Rees  Primary Care Provider (PCP): Annabel Morgan PA-C  Payor: Sandy LAM  Reason: Adherence  Medication(s): Mounjaro 7.5mg  Outcome: Left Voicemail    Gayle Marquez, PharmD  PGY-1 Pharmacy Resident

## 2025-04-02 ENCOUNTER — APPOINTMENT (OUTPATIENT)
Dept: PHARMACY | Facility: HOSPITAL | Age: 75
End: 2025-04-02
Payer: MEDICARE

## 2025-04-02 DIAGNOSIS — I48.91 ATRIAL FIBRILLATION WITH RAPID VENTRICULAR RESPONSE (MULTI): ICD-10-CM

## 2025-04-02 DIAGNOSIS — E11.42 TYPE 2 DIABETES MELLITUS WITH DIABETIC POLYNEUROPATHY, WITHOUT LONG-TERM CURRENT USE OF INSULIN: ICD-10-CM

## 2025-04-02 DIAGNOSIS — I48.21 PERMANENT ATRIAL FIBRILLATION (MULTI): ICD-10-CM

## 2025-04-02 NOTE — Clinical Note
Patient doing well on Mounjaro 7.5, reports sugars have gone down to 130-150 with mild side effects. Will stay on the 7.5 for at least 1 more month to see if side effects resolve.

## 2025-04-02 NOTE — ASSESSMENT & PLAN NOTE
Patient's goal A1c is < 7.5%.  Is pt at goal? Yes, 7.3 .     Rationale for plan: At goal A1C, Patient reports sugars 130-150. Tolerating well with mild constipation then diarrhea.    Medication Changes:  CONTINUE  Mounjaro 7.5 mg once weekly

## 2025-04-02 NOTE — PROGRESS NOTES
Pharmacy Post-Discharge Visit    Debbie Rees is a 75 y.o. female was referred to Clinical Pharmacy Team to complete a post-discharge medication optimization and monitoring visit. The patient was referred for their Diabetes.    Pt is here for Follow Up appointment.     Admission Date: 7/15/24  Discharge Date: 7/20/24    Referring Provider/PCP: Jarrell Dominguez DO  Last Visit: 12/13/24  Next visit: 7/9/25 with Annabel Morgan    Referring Provider: Ney Sommers (Cardiology)  Last Visit: 12/11/24  Next visit: Not yet scheduled    Subjective   HPI  DIABETES MELLITUS TYPE II:    Diagnosed with diabetes: At least 15 years   Known diabetic complications: neuropathy and nephropathy.  Does patient follow with Endocrinology: No  Last optometry exam: Spring of 2024  Most recent visit in Podiatry: Every 2 months     Current diabetic medications include:  Mounjaro 7.5 mg once weekly on Wednesday x 3    Clarifications to above regimen: None  Adverse Effects: Slight diarrhea    Past medications  Jardiance- Yeast infections  Metformin- IR- diarrhea  Semaglutide (Ozempic) 2 mg once weekly (Wednesday)    Glucose Readings:  Glucometer/CGM Type: Acu-chek Glucometer  Patient tests BG 1 times per day on and off    Current home BG readings: 137, 150  Previous home BG readings:   167, 160  140-152   219- Has has been sick recently and on steroids.  160-170s  fasting 156 (175-179)  Fasting 180's    Any episodes of hypoglycemia? No,   .  Did patient treat episode of hypoglycemia appropriately? N/A  Does the patient have a prescription for ready-to-use Glucagon? Not on insulin  Does pt have proteinuria? Yes    Lifestyle: (appetite down)  Diet: 2 meals/day.   BK: unknown  LN: skips  DN: unknown  Snacks: pretzels  Drinks: unknown    Physical Activity: unknown    Secondary Prevention:  Statin? No  ACE-I/ARB? Yes  Aspirin? No    Pertinent PMH Review:  PMH of Pancreatitis: No  PMH of Retinopathy: No  PMH of Urinary Tract Infections: No  PMH  of MTC: No    ATRIAL FIBRILLATION persistent   Does patient follow with cardiology? Yes  Last cardiology appointment: 8/28/24    Diagnosis:  Onset: at least 2023  Patient is projected to be on anticoagulation indefinitely    [id number] Score: [4]  Age: [<65 (0)] [65-74 (+1)] [> 75 (+2)]: 1  Sex: [Male/Female (+1)]: 1  CHF history: [No/Yes(+1)]: 1  Hypertension history: [No/Yes(+1)]: 1  Stroke/TIA/thromboembolism history: [No/Yes(+2)]: 0  Vascular disease history (prior MI, peripheral artery disease, aortic plaque): [No/Yes(+1)]: 0  Diabetes history: [No/Yes(+1)]: 0    Pertinent Medical History  Medical history: none  GI bleed, PE/DVT, Stroke, Previous Falls, Dementia, Hypotension  Social history: None  Alcohol Abuse, High Risk Profession  Medication history: None  SSRI, Antiplatelet therapy, NSAIDs     Current atrial fibrillations medications include:  Rate Control: metoprolol succinate 100 mg daily, amiodarone 200 mg daily  Anticoagulation: apixaban 5 mg twice daily    Screening for dose adjustment:  No dose change recommended at this time    Monitoring:  Date of last BMP: 7/20/24  Last echo: 7/11/24  Recent Hospitalizations: 7/15/24  Recent Falls/Trauma: none  Changes in Tobacco or Alcohol Intake: none       Medication Reconciliation  Start: none  Stop: none  Change: none    Drug Interactions  No relevant drug interactions were noted.    Medication System Management  Patients preferred pharmacy: [facility]  Adherence/Organization: good  Affordability/Accessibility: Mounjaro and Eliquis [facility] good through 8/14/25      Objective   Allergies   Allergen Reactions    Procaine Other and Palpitations     palpitations    Empagliflozin Other     Yeast infections     Social History     Social History Narrative    Not on file      Medication Review  Current Outpatient Medications   Medication Instructions    amiodarone (PACERONE) 200 mg, oral, Daily    blood sugar diagnostic strip 1 each, miscellaneous, Daily    Eliquis  5 mg, oral, 2 times daily    hydroCHLOROthiazide (HYDRODIURIL) 25 mg, oral, Daily    lisinopril 20 mg, oral, 2 times daily    magnesium oxide (MAG-OX) 400 mg, oral, Daily    metoprolol succinate XL (TOPROL-XL) 100 mg, oral, Daily, Do not crush or chew.    Mounjaro 7.5 mg, subcutaneous, Weekly    pantoprazole (PROTONIX) 40 mg, oral, 2 times daily before meals, Do not crush, chew, or split.    traZODone (DESYREL)  mg, oral, Nightly PRN      Vitals  BP Readings from Last 2 Encounters:   12/13/24 136/76   11/08/24 114/70     BMI Readings from Last 1 Encounters:   12/13/24 40.35 kg/m²      Labs  A1C  Lab Results   Component Value Date    HGBA1C 7.3 (A) 11/08/2024    HGBA1C 7.3 (A) 08/02/2024    HGBA1C 7.4 (H) 07/09/2024     BMP  Lab Results   Component Value Date    CALCIUM 10.2 02/03/2025     02/03/2025    K 4.4 02/03/2025    CO2 24 02/03/2025     02/03/2025    BUN 24 02/03/2025    CREATININE 1.03 (H) 02/03/2025    EGFR 57 (L) 02/03/2025     LFTs  Lab Results   Component Value Date    ALT 32 (H) 02/03/2025    AST 25 02/03/2025    ALKPHOS 77 02/03/2025    BILITOT 0.4 02/03/2025     FLP  Lab Results   Component Value Date    TRIG 88 09/22/2023    CHOL 144 03/28/2024    LDLF 91 09/22/2023    HDL 35.0 03/28/2024     Urine Microalbumin  Lab Results   Component Value Date    MICROALBCREA 11.6 08/29/2024     Wt Readings from Last 3 Encounters:   12/13/24 113 kg (250 lb)   11/08/24 112 kg (247 lb)   08/02/24 110 kg (242 lb)      There is no height or weight on file to calculate BMI.       Assessment/Plan   Problem List Items Addressed This Visit       Type 2 diabetes mellitus with diabetic polyneuropathy, without long-term current use of insulin     Patient's goal A1c is < 7.5%.  Is pt at goal? Yes, 7.3 .     Rationale for plan: At goal A1C, Patient reports sugars 130-150. Tolerating well with mild constipation then diarrhea.    Medication Changes:  CONTINUE  Mounjaro 7.5 mg once weekly         Relevant Orders     Referral to Clinical Pharmacy    Permanent atrial fibrillation (Multi)    Relevant Orders    Referral to Clinical Pharmacy     Other Visit Diagnoses       Atrial fibrillation with rapid ventricular response (Multi)        Relevant Orders    Referral to Clinical Pharmacy            Clinical Pharmacist follow-up: 4/30 at 1040, Telehealth visit    Continue all meds under the continuation of care with the referring provider and clinical pharmacy team.    Thank you,  Kathryn Bullard, PharmD  Clinical Pharmacy Specialist, Primary Care  [phone]    Verbal consent to manage patient's drug therapy was obtained from the patient. They were informed they may decline to participate or withdraw from participation in pharmacy services at any time.

## 2025-04-07 PROCEDURE — RXMED WILLOW AMBULATORY MEDICATION CHARGE

## 2025-04-09 DIAGNOSIS — I48.91 ATRIAL FIBRILLATION WITH RAPID VENTRICULAR RESPONSE (MULTI): ICD-10-CM

## 2025-04-09 DIAGNOSIS — E11.9 CONTROLLED TYPE 2 DIABETES MELLITUS WITHOUT COMPLICATION, WITHOUT LONG-TERM CURRENT USE OF INSULIN: ICD-10-CM

## 2025-04-09 DIAGNOSIS — E11.42 TYPE 2 DIABETES MELLITUS WITH DIABETIC POLYNEUROPATHY, WITHOUT LONG-TERM CURRENT USE OF INSULIN: Primary | ICD-10-CM

## 2025-04-09 DIAGNOSIS — I48.21 PERMANENT ATRIAL FIBRILLATION (MULTI): ICD-10-CM

## 2025-04-10 ENCOUNTER — PHARMACY VISIT (OUTPATIENT)
Dept: PHARMACY | Facility: CLINIC | Age: 75
End: 2025-04-10
Payer: MEDICARE

## 2025-04-18 ENCOUNTER — TELEPHONE (OUTPATIENT)
Dept: PRIMARY CARE | Facility: CLINIC | Age: 75
End: 2025-04-18
Payer: MEDICARE

## 2025-04-29 PROCEDURE — RXMED WILLOW AMBULATORY MEDICATION CHARGE

## 2025-04-30 ENCOUNTER — APPOINTMENT (OUTPATIENT)
Dept: PHARMACY | Facility: HOSPITAL | Age: 75
End: 2025-04-30
Payer: MEDICARE

## 2025-04-30 ENCOUNTER — TELEMEDICINE (OUTPATIENT)
Dept: PHARMACY | Facility: HOSPITAL | Age: 75
End: 2025-04-30
Payer: MEDICARE

## 2025-04-30 DIAGNOSIS — I48.91 ATRIAL FIBRILLATION WITH RAPID VENTRICULAR RESPONSE (MULTI): ICD-10-CM

## 2025-04-30 DIAGNOSIS — I48.21 PERMANENT ATRIAL FIBRILLATION (MULTI): ICD-10-CM

## 2025-04-30 DIAGNOSIS — E11.42 TYPE 2 DIABETES MELLITUS WITH DIABETIC POLYNEUROPATHY, WITHOUT LONG-TERM CURRENT USE OF INSULIN: ICD-10-CM

## 2025-04-30 DIAGNOSIS — E11.9 CONTROLLED TYPE 2 DIABETES MELLITUS WITHOUT COMPLICATION, WITHOUT LONG-TERM CURRENT USE OF INSULIN: ICD-10-CM

## 2025-04-30 NOTE — ASSESSMENT & PLAN NOTE
Patient's goal A1c is < 7.5%.  Is pt at goal? Yes, 7.3.     Rationale for plan: At goal A1C, Patient reports sugars 120-140. Tolerating well with improving diarrhea.    Medication Changes:  CONTINUE  Mounjaro 7.5 mg once weekly

## 2025-04-30 NOTE — PROGRESS NOTES
Pharmacy Post-Discharge Visit    Debbie Rees is a 75 y.o. female was referred to Clinical Pharmacy Team to complete a post-discharge medication optimization and monitoring visit. The patient was referred for their Diabetes and Atrial Fibrillation.    Pt is here for Follow Up appointment.     Admission Date: 7/15/24  Discharge Date: 7/20/24    Referring Provider/PCP: Annabel Morgan PA-C  Last Visit: 12/13/24  Next visit: 7/9/25 with Annabel Morgan    Referring Provider: Ney Sommers (Cardiology)  Last Visit: 12/11/24  Next visit: 5/14/25    Subjective   HPI  DIABETES MELLITUS TYPE II:    Diagnosed with diabetes: At least 15 years   Known diabetic complications: neuropathy and nephropathy.  Does patient follow with Endocrinology: No  Last optometry exam: Spring of 2024  Most recent visit in Podiatry: Every 2 months     Current diabetic medications include:  Mounjaro 7.5 mg once weekly on Wednesday    Clarifications to above regimen: None  Adverse Effects: Slight diarrhea (getting better)    Past medications  Jardiance- Yeast infections  Metformin- IR- diarrhea  Semaglutide (Ozempic) 2 mg once weekly (Wednesday)    Glucose Readings:  Glucometer/CGM Type: Acu-chek Glucometer  Patient tests BG 1 times per day on and off    Current home BG readings: 120-140  Previous home BG readings:   137, 150  167, 160  140-152   219- Has has been sick recently and on steroids.  160-170s  fasting 156 (175-179)  Fasting 180's    Any episodes of hypoglycemia? No,  .  Did patient treat episode of hypoglycemia appropriately? N/A  Does the patient have a prescription for ready-to-use Glucagon? Not on insulin  Does pt have proteinuria? Yes    Lifestyle: (appetite down)  Diet: 2 meals/day.   BK: unknown  LN: skips  DN: unknown  Snacks: pretzels  Drinks: unknown    Physical Activity: unknown    Secondary Prevention:  Statin? No  ACE-I/ARB? Yes  Aspirin? No    Pertinent PMH Review:  PMH of Pancreatitis: No  PMH of Retinopathy: No  PMH of  Urinary Tract Infections: No  PMH of MTC: No    ATRIAL FIBRILLATION persistent   Does patient follow with cardiology? Yes  Last cardiology appointment: 8/28/24    Diagnosis:  Onset: at least 2023  Patient is projected to be on anticoagulation indefinitely    [id number] Score: [4]  Age: [<65 (0)] [65-74 (+1)] [> 75 (+2)]: 1  Sex: [Male/Female (+1)]: 1  CHF history: [No/Yes(+1)]: 1  Hypertension history: [No/Yes(+1)]: 1  Stroke/TIA/thromboembolism history: [No/Yes(+2)]: 0  Vascular disease history (prior MI, peripheral artery disease, aortic plaque): [No/Yes(+1)]: 0  Diabetes history: [No/Yes(+1)]: 0    Pertinent Medical History  Medical history: none  GI bleed, PE/DVT, Stroke, Previous Falls, Dementia, Hypotension  Social history: None  Alcohol Abuse, High Risk Profession  Medication history: None  SSRI, Antiplatelet therapy, NSAIDs     Current atrial fibrillations medications include:  Rate Control: metoprolol succinate 100 mg daily, amiodarone 200 mg daily  Anticoagulation: apixaban 5 mg twice daily    Screening for dose adjustment:  No dose change recommended at this time    Monitoring:  Date of last BMP: 7/20/24  Last echo: 7/11/24  Recent Hospitalizations: 7/15/24  Recent Falls/Trauma: none  Changes in Tobacco or Alcohol Intake: none       Medication Reconciliation  Start: none  Stop: none  Change: none    Drug Interactions  No relevant drug interactions were noted.    Medication System Management  Patients preferred pharmacy: [facility]  Adherence/Organization: good  Affordability/Accessibility: Mounjaro and Eliquis  PAP good through 8/14/25      Objective   Allergies   Allergen Reactions    Procaine Other and Palpitations     palpitations    Empagliflozin Other     Yeast infections     Social History     Social History Narrative    Not on file      Medication Review  Current Outpatient Medications   Medication Instructions    amiodarone (PACERONE) 200 mg, oral, Daily    blood sugar diagnostic strip 1  each, miscellaneous, Daily    Eliquis 5 mg, oral, 2 times daily    hydroCHLOROthiazide (HYDRODIURIL) 25 mg, oral, Daily    lisinopril 20 mg, oral, 2 times daily    magnesium oxide (MAG-OX) 400 mg, oral, Daily    metoprolol succinate XL (TOPROL-XL) 100 mg, oral, Daily, Do not crush or chew.    Mounjaro 7.5 mg, subcutaneous, Weekly    pantoprazole (PROTONIX) 40 mg, oral, 2 times daily before meals, Do not crush, chew, or split.    traZODone (DESYREL)  mg, oral, Nightly PRN      Vitals  BP Readings from Last 2 Encounters:   12/13/24 136/76   11/08/24 114/70     BMI Readings from Last 1 Encounters:   12/13/24 40.35 kg/m²      Labs  A1C  Lab Results   Component Value Date    HGBA1C 7.3 (A) 11/08/2024    HGBA1C 7.3 (A) 08/02/2024    HGBA1C 7.4 (H) 07/09/2024     BMP  Lab Results   Component Value Date    CALCIUM 10.2 02/03/2025     02/03/2025    K 4.4 02/03/2025    CO2 24 02/03/2025     02/03/2025    BUN 24 02/03/2025    CREATININE 1.03 (H) 02/03/2025    EGFR 57 (L) 02/03/2025     LFTs  Lab Results   Component Value Date    ALT 32 (H) 02/03/2025    AST 25 02/03/2025    ALKPHOS 77 02/03/2025    BILITOT 0.4 02/03/2025     FLP  Lab Results   Component Value Date    TRIG 88 09/22/2023    CHOL 144 03/28/2024    LDLF 91 09/22/2023    HDL 35.0 03/28/2024     Urine Microalbumin  Lab Results   Component Value Date    MICROALBCREA 11.6 08/29/2024     Wt Readings from Last 3 Encounters:   12/13/24 113 kg (250 lb)   11/08/24 112 kg (247 lb)   08/02/24 110 kg (242 lb)      There is no height or weight on file to calculate BMI.       Assessment/Plan   Problem List Items Addressed This Visit       Type 2 diabetes mellitus with diabetic polyneuropathy, without long-term current use of insulin    Patient's goal A1c is < 7.5%.  Is pt at goal? Yes, 7.3.     Rationale for plan: At goal A1C, Patient reports sugars 120-140. Tolerating well with improving diarrhea.    Medication Changes:  CONTINUE  Mounjaro 7.5 mg once  weekly         Permanent atrial fibrillation (Multi)    ASSESSMENT OF ATRIAL FIBRILLATION  Patient is to be on anticoagulation indefinitely  Rationale for plan: chadsvasc of 4, sees cardiology    Medication Changes:  Continue  Apixaban 5 mg twice daily          Other Visit Diagnoses         Atrial fibrillation with rapid ventricular response (Multi)          Controlled type 2 diabetes mellitus without complication, without long-term current use of insulin                  Clinical Pharmacist follow-up: In July after PCP visit, Telehealth visit    Continue all meds under the continuation of care with the referring provider and clinical pharmacy team.    Thank you,  Kathryn Bullard, PharmD  Clinical Pharmacy Specialist, Primary Care  [phone]    Verbal consent to manage patient's drug therapy was obtained from the patient. They were informed they may decline to participate or withdraw from participation in pharmacy services at any time.

## 2025-04-30 NOTE — Clinical Note
Patient doing well on Mounjaro 7.5 mg. Reports fasting sugars 120-140 (much improved). Will stay at this dose until at least next visit with you 7/9

## 2025-05-05 ENCOUNTER — PHARMACY VISIT (OUTPATIENT)
Dept: PHARMACY | Facility: CLINIC | Age: 75
End: 2025-05-05
Payer: MEDICARE

## 2025-05-12 NOTE — PROGRESS NOTES
Cardiac Electrophysiology Office Visit   I had the pleasure seeing Debbie Rees    Current Outpatient Medications   Medication Instructions    amiodarone (PACERONE) 200 mg, oral, Daily    blood sugar diagnostic strip 1 each, miscellaneous, Daily    Eliquis 5 mg, oral, 2 times daily    hydroCHLOROthiazide (HYDRODIURIL) 25 mg, oral, Daily    lisinopril 20 mg, oral, 2 times daily    magnesium oxide (MAG-OX) 400 mg, oral, Daily    metoprolol succinate XL (TOPROL-XL) 100 mg, oral, Daily, Do not crush or chew.    Mounjaro 7.5 mg, subcutaneous, Weekly    pantoprazole (PROTONIX) 40 mg, oral, 2 times daily before meals, Do not crush, chew, or split.    traZODone (DESYREL)  mg, oral, Nightly PRN      Subjective    Debbie Rees is a 75 y.o. female .        Chief Complaint   Patient presents with    Atrial Fibrillation     OUTPATIENT CONSULTATION: Cardiac Electrophysiology  DOS: 05/14/2025   REASON: AF - 10 months s/p ablation     HPI     Debbie Rees has a past medical history of HTN, DM T2, Anxiety, and PVD / Venous insufficiency (s/p EVLA), Obesity,   CARDIAC HISTORY:  Hypertrophic Obstructive Cardiomyopathy  Palpitations  Persistent AF - s/p DCCV 04/29/2024 and s/p PVI RFA ablation 07/08/2024 by me. Besides the PVI roof and extensive anterior wall isolation.   March 2024:  (3/38) Pt presented to [facility] with chest pain and elevated HR.  EKG: AF with  bpm.   Pt started on diltiazem drip with HR improvement.  Dilt changed to oral amiodarone.  (3/30) Pt Dc'd home on Amio and Metoprolol.   4/2/24:EKG: A-Fib (4/29/24) s/p DCCV       July 8, 2024:  s/p Acutely successful radiofrequency ablation for Atrial fibrillation with PVI and adjunctive lines done by me.  The anterior wall was isolated.     [id number] Score  Age >= 75: 2   Sex Female: 1   CHF History Yes: 1   HTN Yes: 1   Stroke/TIA/Thromboembolism No: 0   Vascular Dz: CAD/PAD/Aortic Plaque Yes: 1   DM Yes: 1   Total Score 7     RELEVANT TESTING:      Transthoracic Echo (TTE) Complete 2024   CONCLUSIONS:   1. The left ventricular systolic function is normal, with a visually estimated ejection fraction of 70%.   2. Spectral Doppler shows an impaired relaxation pattern of left ventricular diastolic filling.   3. Left ventricular cavity size is decreased.   4. Severely increased left ventricular septal thickness.   5. The left ventricular posterior wall thickness is moderately increased.   6. There is hypertrophic cardiomyopathy.   7. There is moderate to severe concentric left ventricular hypertrophy.   8. There is a mild mid cavity left ventricular obstruction.   9. There is normal right ventricular global systolic function.  10. The left atrium is moderately dilated.  11. The right atrium is mild to moderately dilated.  12. There is moderate mitral annular calcification.  13. Mild to moderate mitral valve regurgitation.  14. Mild pulmonary HTN. Estimated CVP is normal.    Lab Review:   Lab Results   Component Value Date    WBC 6.3 2024    HGB 13.7 2024    HCT 43.6 2024     2024    CHOL 144 2024    TRIG 88 2023    HDL 35.0 2024    ALT 32 (H) 2025    AST 25 2025     2025    K 4.4 2025     2025    CREATININE 1.03 (H) 2025    BUN 24 2025    CO2 24 2025    TSH 1.81 2025    INR 1.3 (H) 2024    HGBA1C 7.3 (A) 2024       Review of Systems   All other systems reviewed and are negative.       Objective    Cardiovascular:      PMI at left midclavicular line. Normal rate. Regular rhythm. Normal S1. Normal S2.       Murmurs: There is no murmur.      No gallop.  No click. No rub.   Pulses:     Intact distal pulses.   Edema:     Peripheral edema absent.            Assessment/Plan    1.   Hypertension, restin/60 usually home readings in 130's/70's  2.   Hypertrophic Cardiomyopathy, stable  Normal EF on 3/29/24 echo  NYHA Class I-II, stage  A  3.    Atrial Fibrillation, maintaining SR  s/p DCCV 4/29/24  s/p Ablation 7/8/2024. Pvi plus extensive anterior wall and roof   Amiodarone  4.   Exertional shortness of breath, stable  5.   Venous insufficiency, leg edema  S/P EVLA  6.    Left knee pain, chronic, consider left TKA  7.    PVD  Carotid stenosis, bilateral  8.    DMT2  AIC: 8.0, 9/22/23  Ozempic initially now Mounjaro   ADA/low carb/low tyron diet  9.    BMI=40  10. Recent hospitalization, 3/24, A-Fib with RVR, elevated troponin d/t tachycardia,   symptomatic.  11. CAD, mild-moderate onCTA HF  Chest pain/dyspnea with exertion - concern for anginal equivalence  CTA Heart Flow(9/20/24):   1. Evaluation limited due to imaging artifact, body habitus and atrial arrhythmia during the course of exam.  2. Normal coronary anatomy with mild-to-moderate  stenosis at the origin of the right coronary artery. No gross evidence of flow limiting stenoses.  3. Scattered minimal to mild stenosis throughout the remainder of the coronary arteries as detailed further in formal report.  4. Left atrial enlargement.    PLAN: I am very pleased that she is maintaining NSR. No side effects from the Amiodarone. Because of HCM and extensive fibrosis I would continue the Amiodarone at least for now. She also has to continue Eliquis indefinitely.

## 2025-05-14 ENCOUNTER — APPOINTMENT (OUTPATIENT)
Dept: CARDIOLOGY | Facility: CLINIC | Age: 75
End: 2025-05-14
Payer: MEDICARE

## 2025-05-14 DIAGNOSIS — I48.0 PAF (PAROXYSMAL ATRIAL FIBRILLATION) (MULTI): Primary | ICD-10-CM

## 2025-05-14 ASSESSMENT — ENCOUNTER SYMPTOMS
DEPRESSION: 0
OCCASIONAL FEELINGS OF UNSTEADINESS: 0
LOSS OF SENSATION IN FEET: 0

## 2025-05-21 PROCEDURE — RXMED WILLOW AMBULATORY MEDICATION CHARGE

## 2025-05-22 PROCEDURE — RXMED WILLOW AMBULATORY MEDICATION CHARGE

## 2025-05-23 ENCOUNTER — PHARMACY VISIT (OUTPATIENT)
Dept: PHARMACY | Facility: CLINIC | Age: 75
End: 2025-05-23
Payer: MEDICARE

## 2025-05-28 ENCOUNTER — OFFICE VISIT (OUTPATIENT)
Dept: PRIMARY CARE | Facility: CLINIC | Age: 75
End: 2025-05-28
Payer: MEDICARE

## 2025-05-28 VITALS
OXYGEN SATURATION: 97 % | TEMPERATURE: 98.9 F | BODY MASS INDEX: 40.19 KG/M2 | DIASTOLIC BLOOD PRESSURE: 81 MMHG | SYSTOLIC BLOOD PRESSURE: 150 MMHG | HEART RATE: 67 BPM | WEIGHT: 249 LBS

## 2025-05-28 DIAGNOSIS — M25.512 ACUTE PAIN OF LEFT SHOULDER: ICD-10-CM

## 2025-05-28 DIAGNOSIS — L03.116 CELLULITIS OF LEFT LEG: ICD-10-CM

## 2025-05-28 DIAGNOSIS — L57.0 ACTINIC KERATOSIS: Primary | ICD-10-CM

## 2025-05-28 PROCEDURE — 1160F RVW MEDS BY RX/DR IN RCRD: CPT | Performed by: FAMILY MEDICINE

## 2025-05-28 PROCEDURE — 99213 OFFICE O/P EST LOW 20 MIN: CPT | Performed by: FAMILY MEDICINE

## 2025-05-28 PROCEDURE — G2211 COMPLEX E/M VISIT ADD ON: HCPCS | Performed by: FAMILY MEDICINE

## 2025-05-28 PROCEDURE — 3079F DIAST BP 80-89 MM HG: CPT | Performed by: FAMILY MEDICINE

## 2025-05-28 PROCEDURE — 4010F ACE/ARB THERAPY RXD/TAKEN: CPT | Performed by: FAMILY MEDICINE

## 2025-05-28 PROCEDURE — 3077F SYST BP >= 140 MM HG: CPT | Performed by: FAMILY MEDICINE

## 2025-05-28 PROCEDURE — 1159F MED LIST DOCD IN RCRD: CPT | Performed by: FAMILY MEDICINE

## 2025-05-28 PROCEDURE — 1036F TOBACCO NON-USER: CPT | Performed by: FAMILY MEDICINE

## 2025-05-28 RX ORDER — CEPHALEXIN 500 MG/1
500 CAPSULE ORAL 3 TIMES DAILY
Qty: 30 CAPSULE | Refills: 0 | Status: SHIPPED | OUTPATIENT
Start: 2025-05-28 | End: 2025-06-07

## 2025-05-28 RX ORDER — DICLOFENAC SODIUM 10 MG/G
2 GEL TOPICAL 4 TIMES DAILY PRN
Qty: 100 G | Refills: 1 | Status: SHIPPED | OUTPATIENT
Start: 2025-05-28

## 2025-05-28 ASSESSMENT — PATIENT HEALTH QUESTIONNAIRE - PHQ9
2. FEELING DOWN, DEPRESSED OR HOPELESS: NOT AT ALL
SUM OF ALL RESPONSES TO PHQ9 QUESTIONS 1 AND 2: 0
1. LITTLE INTEREST OR PLEASURE IN DOING THINGS: NOT AT ALL

## 2025-05-28 NOTE — PROGRESS NOTES
Subjective   Patient ID: Debbie Rees is a 75 y.o. female who presents for Leg Swelling (Left lower leg is warm and very red. Chilling and then her face gets all hot.).    HPI     Here for redness and swelling left leg     Freezing yesterday at [facility] wedding     Then saw that her left leg looked very red -   Does not normally look red at all -   More swollen too -   But not a  lot of pain     Has also been having left shoulder pain -   To see shoulder specialist tomorrow -   Cannot take Ibuprofen   Tylenol barely helps     Regular appt in July     Review of Systems    Objective   /81   Pulse 67   Temp 37.2 °C (98.9 °F) (Oral)   Wt 113 kg (249 lb)   SpO2 97%   BMI 40.19 kg/m²     Physical Exam  Vitals reviewed.   Constitutional:       General: She is not in acute distress.     Appearance: Normal appearance. She is obese. She is not ill-appearing, toxic-appearing or diaphoretic.   Cardiovascular:      Rate and Rhythm: Normal rate and regular rhythm.   Pulmonary:      Effort: Pulmonary effort is normal.   Musculoskeletal:      Comments: Right leg swollen - pt states that is chronic  -   Pink skin on lower 1/2     Left leg with Bright erythema, 1 plus swelling  - some redness/pink tissue up to back of knee -   But not very painful to palpation    Neurological:      Mental Status: She is alert.         Assessment/Plan   Assessment & Plan  Actinic keratosis         Cellulitis of left leg    Orders:    cephalexin (Keflex) 500 mg capsule; Take 1 capsule (500 mg) by mouth 3 times a day for 10 days.    Acute pain of left shoulder    Orders:    diclofenac sodium (Voltaren) 1 % gel; Apply 2.25 inches (2 g) topically 4 times a day as needed (for shoulder pain).        Will try cephalexin for the cellulitis -   Education on Signs and Sx to go to ER   Or call if not improving    I gave her some diclofenac gel to try for shoulder  - sees specialist soon     BP up today - acutely ill -     Has appt in July for med  check     We discussed at visit any disease processes that were of concern as well as the risks, benefits and instructions of any new medication provided.    See orders and discussion section for information provided to patient in their After Visit Summary.   Patient (and/or caretaker of patient if present)  stated all questions were answered, and they voiced understanding of instructions.

## 2025-06-26 PROCEDURE — RXMED WILLOW AMBULATORY MEDICATION CHARGE

## 2025-06-27 ENCOUNTER — PHARMACY VISIT (OUTPATIENT)
Dept: PHARMACY | Facility: CLINIC | Age: 75
End: 2025-06-27
Payer: MEDICARE

## 2025-07-09 ENCOUNTER — APPOINTMENT (OUTPATIENT)
Dept: PRIMARY CARE | Facility: CLINIC | Age: 75
End: 2025-07-09
Payer: MEDICARE

## 2025-07-09 VITALS
BODY MASS INDEX: 39.37 KG/M2 | OXYGEN SATURATION: 96 % | HEIGHT: 66 IN | HEART RATE: 58 BPM | WEIGHT: 245 LBS | DIASTOLIC BLOOD PRESSURE: 83 MMHG | SYSTOLIC BLOOD PRESSURE: 130 MMHG

## 2025-07-09 DIAGNOSIS — Z12.31 SCREENING MAMMOGRAM FOR BREAST CANCER: ICD-10-CM

## 2025-07-09 DIAGNOSIS — N18.31 STAGE 3A CHRONIC KIDNEY DISEASE (MULTI): ICD-10-CM

## 2025-07-09 DIAGNOSIS — Z00.00 ROUTINE GENERAL MEDICAL EXAMINATION AT HEALTH CARE FACILITY: Primary | ICD-10-CM

## 2025-07-09 DIAGNOSIS — E11.42 TYPE 2 DIABETES MELLITUS WITH DIABETIC POLYNEUROPATHY, WITHOUT LONG-TERM CURRENT USE OF INSULIN: ICD-10-CM

## 2025-07-09 DIAGNOSIS — E66.01 MORBID (SEVERE) OBESITY DUE TO EXCESS CALORIES (MULTI): ICD-10-CM

## 2025-07-09 DIAGNOSIS — M60.9 STATIN-INDUCED MYOSITIS: ICD-10-CM

## 2025-07-09 DIAGNOSIS — L03.115 CELLULITIS OF RIGHT LOWER EXTREMITY: ICD-10-CM

## 2025-07-09 DIAGNOSIS — I87.2 VENOUS STASIS DERMATITIS OF RIGHT LOWER EXTREMITY: ICD-10-CM

## 2025-07-09 DIAGNOSIS — M85.89 OSTEOPENIA OF MULTIPLE SITES: ICD-10-CM

## 2025-07-09 DIAGNOSIS — I42.2 HYPERTROPHIC CARDIOMYOPATHY (MULTI): ICD-10-CM

## 2025-07-09 DIAGNOSIS — I10 ESSENTIAL HYPERTENSION: ICD-10-CM

## 2025-07-09 DIAGNOSIS — T46.6X5A STATIN-INDUCED MYOSITIS: ICD-10-CM

## 2025-07-09 DIAGNOSIS — I48.11 LONGSTANDING PERSISTENT ATRIAL FIBRILLATION (MULTI): ICD-10-CM

## 2025-07-09 LAB — POC HEMOGLOBIN A1C: 7.4 % (ref 4.2–6.5)

## 2025-07-09 PROCEDURE — 4010F ACE/ARB THERAPY RXD/TAKEN: CPT

## 2025-07-09 PROCEDURE — G0439 PPPS, SUBSEQ VISIT: HCPCS

## 2025-07-09 PROCEDURE — 99214 OFFICE O/P EST MOD 30 MIN: CPT

## 2025-07-09 PROCEDURE — 3051F HG A1C>EQUAL 7.0%<8.0%: CPT

## 2025-07-09 PROCEDURE — 1036F TOBACCO NON-USER: CPT

## 2025-07-09 PROCEDURE — 83036 HEMOGLOBIN GLYCOSYLATED A1C: CPT

## 2025-07-09 PROCEDURE — 99397 PER PM REEVAL EST PAT 65+ YR: CPT

## 2025-07-09 PROCEDURE — 3079F DIAST BP 80-89 MM HG: CPT

## 2025-07-09 PROCEDURE — 1170F FXNL STATUS ASSESSED: CPT

## 2025-07-09 PROCEDURE — 3075F SYST BP GE 130 - 139MM HG: CPT

## 2025-07-09 PROCEDURE — 1160F RVW MEDS BY RX/DR IN RCRD: CPT

## 2025-07-09 PROCEDURE — 1159F MED LIST DOCD IN RCRD: CPT

## 2025-07-09 RX ORDER — CEPHALEXIN 500 MG/1
500 CAPSULE ORAL 4 TIMES DAILY
Qty: 28 CAPSULE | Refills: 0 | Status: SHIPPED | OUTPATIENT
Start: 2025-07-09 | End: 2025-07-16

## 2025-07-09 ASSESSMENT — ACTIVITIES OF DAILY LIVING (ADL)
MANAGING_FINANCES: INDEPENDENT
GROCERY_SHOPPING: INDEPENDENT
BATHING: INDEPENDENT
TAKING_MEDICATION: INDEPENDENT
DRESSING: INDEPENDENT
DOING_HOUSEWORK: INDEPENDENT

## 2025-07-09 NOTE — ASSESSMENT & PLAN NOTE
Lisinopril 10mg, hydrochlorothiazide 25mg, metoprolol 100mg BID  Orders:    CBC and Auto Differential

## 2025-07-09 NOTE — PROGRESS NOTES
"Subjective   Reason for Visit: Debbie Rees is an 75 y.o. female here for a Medicare Wellness visit.     Past Medical, Surgical, and Family History reviewed and updated in chart.    Reviewed all medications by prescribing practitioner or clinical pharmacist (such as prescriptions, OTCs, herbal therapies and supplements) and documented in the medical record.    HPI  Debbie presents for medicare wellness visit     Concerns/updates today:  R lower leg has a blister on it   -Does not hurt   -Making her lower leg worse   -A week, it is bigger than it was   -No injury   -Putting gauze on it, it does drain at times, clear   -Redness appeared since that started   -Has opened a blister in the past, it got very sore     Chronic conditions:     HTN  -Metoprolol 100mg BID, Hydrochlorothiazide 25mg, Lisinopril 20mg    Cardiomyopathy - Hx of severe HOCM  -Followed by cardiology   -Dr Rodriguez   -Metoprolol     DM  -Last A1c was 7.3%  -Mounjaro 7.5mg  -Doing fine   -Not hungry lately     A-Fib  -Eliquis for life   -Amiodorone for another year   -Follows with Dr. Rodriguez, has been in normal rhythm     Health Maintenance Reminder:  -Blood Work: 7/9/25  -ASA: 81mg 10y CV >7.5%  -Hep C: completed  -Mammogram: needs -> ordered   -Colonoscopy: 2026  -Pap: >65y  -DEXA (FRAX): 11/22 osteopenia -> DUE ordered   -Zostavax: needs  -Prevnar: completed  -Pneumovax: completed  -Flu: Yearly       Patient Care Team:  Annabel Morgan PA-C as PCP - General (Family Medicine)  Jarrell MACHADO DO as PCP - [employer] PCP  Kathryn Bullard, PharmD as Pharmacist (Pharmacy)     Review of Systems  10 point review of systems performed and is negative except as noted in the HPI.      Objective   Vitals:  /83   Pulse 58   Ht 1.676 m (5' 6\")   Wt 111 kg (245 lb)   SpO2 96%   BMI 39.54 kg/m²       Physical Exam  Constitutional:       Appearance: Normal appearance. She is obese.   HENT:      Head: Normocephalic and atraumatic. "   Cardiovascular:      Rate and Rhythm: Normal rate and regular rhythm.   Pulmonary:      Effort: Pulmonary effort is normal.      Breath sounds: Normal breath sounds. No wheezing, rhonchi or rales.   Skin:            Comments: R lower leg has some mild erythema on the lateral side. On the anterior side there is a large bullous that has clear fluid underneath. There are varicose veins present b/l.   L lower leg has a dusky color.  Skin changes on b/l lower legs do resemble changes from venous stasis.   Neurological:      Mental Status: She is alert and oriented to person, place, and time.   Psychiatric:         Mood and Affect: Mood normal.         Behavior: Behavior normal.         Assessment & Plan  Routine general medical examination at health care facility         Type 2 diabetes mellitus with diabetic polyneuropathy, without long-term current use of insulin  Hgb A1c today is 7.4%, previous A1c was 7.3% (11/8/24)   Continue mounjaro, working well   Will do updated labs today     Diabetes   -Last hgb A1c: 7.3% (11/8/24)  -Last labs: CMP - 2/3/25 creatinine 1.03, eGFR 57 ; microalbumin - 8/29/24 wnl  -Last DM eye exam: Dr Palma, is due   -Statin: refuses   -Foot exam:     Orders:    POCT glycosylated hemoglobin (Hb A1C) manually resulted    Albumin-Creatinine Ratio, Urine Random    Lipid Panel    Comprehensive metabolic panel    Essential hypertension  Lisinopril 10mg, hydrochlorothiazide 25mg, metoprolol 100mg BID  Orders:    CBC and Auto Differential    Hypertrophic cardiomyopathy (Multi)  Hypertrophic obstructive cardiomyopathy   Echo 3/29/24 - normal EF   Did discuss seeing the DDC, she will consider        Longstanding persistent atrial fibrillation (Multi)  S/p ablation   Amiodarone (may stop in a year but pending cardiology)  Remains in NSR   Eliquis for life        Stage 3a chronic kidney disease (Multi)  On lisinopril   Could consider jardiance        Screening mammogram for breast cancer    Orders:     BI mammo bilateral screening tomosynthesis; Future    Osteopenia of multiple sites  Dexa ordered   Orders:    XR DEXA bone density; Future    Cellulitis of right lower extremity  Start keflex, also as prophylaxis since we did drain fluid from the blister today and she does have poor circulation in R lower leg   Orders:    cephalexin (Keflex) 500 mg capsule; Take 1 capsule (500 mg) by mouth 4 times a day for 7 days.    Venous stasis dermatitis of right lower extremity  Recommend she see vascular medicine as this appears to be a recurrent issue in both legs  HX of sclerotherapy in both legs for varicose veins - did see Dr. Dickerson for this   Discussed she could also try compression stockings   Recommend wet to dry dressings for the blister area on her R lower leg   Orders:    Referral to Vascular Medicine; Future    Statin-induced myositis         Morbid (severe) obesity due to excess calories (Multi)  Mounjaro   Dicussed diet             Discussed at visit any disease processes that were of concern as well as the risks, benefits and instructions on any new medication provided. Patient (and/or caretaker of patient if present) stated all questions were answered, and they voiced understanding of instructions.

## 2025-07-09 NOTE — ASSESSMENT & PLAN NOTE
Hypertrophic obstructive cardiomyopathy   Echo 3/29/24 - normal EF   Did discuss seeing the DDC, she will consider

## 2025-07-09 NOTE — ASSESSMENT & PLAN NOTE
Hgb A1c today is 7.4%, previous A1c was 7.3% (11/8/24)   Continue mounjaro, working well   Will do updated labs today     Diabetes   -Last hgb A1c: 7.3% (11/8/24)  -Last labs: CMP - 2/3/25 creatinine 1.03, eGFR 57 ; microalbumin - 8/29/24 wnl  -Last DM eye exam: Dr Palma, is due   -Statin: refuses   -Foot exam:     Orders:    POCT glycosylated hemoglobin (Hb A1C) manually resulted    Albumin-Creatinine Ratio, Urine Random    Lipid Panel    Comprehensive metabolic panel

## 2025-07-09 NOTE — ASSESSMENT & PLAN NOTE
S/p ablation   Amiodarone (may stop in a year but pending cardiology)  Remains in NSR   Eliquis for life

## 2025-07-10 LAB
ALBUMIN SERPL-MCNC: 3.9 G/DL (ref 3.6–5.1)
ALBUMIN/CREAT UR: 7 MG/G CREAT
ALP SERPL-CCNC: 60 U/L (ref 37–153)
ALT SERPL-CCNC: 32 U/L (ref 6–29)
ANION GAP SERPL CALCULATED.4IONS-SCNC: 10 MMOL/L (CALC) (ref 7–17)
AST SERPL-CCNC: 27 U/L (ref 10–35)
BASOPHILS # BLD AUTO: 37 CELLS/UL (ref 0–200)
BASOPHILS NFR BLD AUTO: 0.5 %
BILIRUB SERPL-MCNC: 0.7 MG/DL (ref 0.2–1.2)
BUN SERPL-MCNC: 26 MG/DL (ref 7–25)
CALCIUM SERPL-MCNC: 9.8 MG/DL (ref 8.6–10.4)
CHLORIDE SERPL-SCNC: 103 MMOL/L (ref 98–110)
CHOLEST SERPL-MCNC: 118 MG/DL
CHOLEST/HDLC SERPL: 2 (CALC)
CO2 SERPL-SCNC: 24 MMOL/L (ref 20–32)
CREAT SERPL-MCNC: 1.05 MG/DL (ref 0.6–1)
CREAT UR-MCNC: 88 MG/DL (ref 20–275)
EGFRCR SERPLBLD CKD-EPI 2021: 55 ML/MIN/1.73M2
EOSINOPHIL # BLD AUTO: 81 CELLS/UL (ref 15–500)
EOSINOPHIL NFR BLD AUTO: 1.1 %
ERYTHROCYTE [DISTWIDTH] IN BLOOD BY AUTOMATED COUNT: 14.4 % (ref 11–15)
GLUCOSE SERPL-MCNC: 151 MG/DL (ref 65–99)
HCT VFR BLD AUTO: 42.8 % (ref 35–45)
HDLC SERPL-MCNC: 58 MG/DL
HGB BLD-MCNC: 13.3 G/DL (ref 11.7–15.5)
LDLC SERPL CALC-MCNC: 44 MG/DL (CALC)
LYMPHOCYTES # BLD AUTO: 1865 CELLS/UL (ref 850–3900)
LYMPHOCYTES NFR BLD AUTO: 25.2 %
MCH RBC QN AUTO: 31.3 PG (ref 27–33)
MCHC RBC AUTO-ENTMCNC: 31.1 G/DL (ref 32–36)
MCV RBC AUTO: 100.7 FL (ref 80–100)
MICROALBUMIN UR-MCNC: 0.6 MG/DL
MONOCYTES # BLD AUTO: 422 CELLS/UL (ref 200–950)
MONOCYTES NFR BLD AUTO: 5.7 %
NEUTROPHILS # BLD AUTO: 4995 CELLS/UL (ref 1500–7800)
NEUTROPHILS NFR BLD AUTO: 67.5 %
NONHDLC SERPL-MCNC: 60 MG/DL (CALC)
PLATELET # BLD AUTO: 248 THOUSAND/UL (ref 140–400)
PMV BLD REES-ECKER: 10.1 FL (ref 7.5–12.5)
POTASSIUM SERPL-SCNC: 4.1 MMOL/L (ref 3.5–5.3)
PROT SERPL-MCNC: 7 G/DL (ref 6.1–8.1)
RBC # BLD AUTO: 4.25 MILLION/UL (ref 3.8–5.1)
SODIUM SERPL-SCNC: 137 MMOL/L (ref 135–146)
TRIGL SERPL-MCNC: 82 MG/DL
WBC # BLD AUTO: 7.4 THOUSAND/UL (ref 3.8–10.8)

## 2025-07-11 PROBLEM — E66.01 MORBID (SEVERE) OBESITY DUE TO EXCESS CALORIES (MULTI): Status: ACTIVE | Noted: 2025-07-11

## 2025-07-11 PROBLEM — L97.219: Status: RESOLVED | Noted: 2024-05-08 | Resolved: 2025-07-11

## 2025-07-14 ENCOUNTER — TELEPHONE (OUTPATIENT)
Dept: PRIMARY CARE | Facility: CLINIC | Age: 75
End: 2025-07-14
Payer: MEDICARE

## 2025-07-14 DIAGNOSIS — L03.115 CELLULITIS OF RIGHT LOWER EXTREMITY: Primary | ICD-10-CM

## 2025-07-14 RX ORDER — SULFAMETHOXAZOLE AND TRIMETHOPRIM 800; 160 MG/1; MG/1
1 TABLET ORAL 2 TIMES DAILY
Qty: 20 TABLET | Refills: 0 | Status: SHIPPED | OUTPATIENT
Start: 2025-07-14 | End: 2025-07-18 | Stop reason: HOSPADM

## 2025-07-14 NOTE — TELEPHONE ENCOUNTER
Seen last week, rx antibiotics(Keflex) for bubble on leg, its worse red and liquid is yellow now, is Keflex strong enough? [phone]. She uses [facility]

## 2025-07-16 ENCOUNTER — APPOINTMENT (OUTPATIENT)
Dept: RADIOLOGY | Facility: HOSPITAL | Age: 75
DRG: 603 | End: 2025-07-16
Payer: MEDICARE

## 2025-07-16 ENCOUNTER — APPOINTMENT (OUTPATIENT)
Dept: PHARMACY | Facility: HOSPITAL | Age: 75
End: 2025-07-16
Payer: MEDICARE

## 2025-07-16 ENCOUNTER — TELEPHONE (OUTPATIENT)
Dept: PRIMARY CARE | Facility: CLINIC | Age: 75
End: 2025-07-16

## 2025-07-16 ENCOUNTER — HOSPITAL ENCOUNTER (INPATIENT)
Facility: HOSPITAL | Age: 75
LOS: 2 days | Discharge: HOME | DRG: 603 | End: 2025-07-18
Attending: STUDENT IN AN ORGANIZED HEALTH CARE EDUCATION/TRAINING PROGRAM | Admitting: INTERNAL MEDICINE
Payer: MEDICARE

## 2025-07-16 DIAGNOSIS — L03.90 CELLULITIS, UNSPECIFIED CELLULITIS SITE: Primary | ICD-10-CM

## 2025-07-16 LAB
ALBUMIN SERPL BCP-MCNC: 4.1 G/DL (ref 3.4–5)
ALP SERPL-CCNC: 66 U/L (ref 33–136)
ALT SERPL W P-5'-P-CCNC: 31 U/L (ref 7–45)
ANION GAP SERPL CALC-SCNC: 11 MMOL/L (ref 10–20)
APPEARANCE UR: CLEAR
AST SERPL W P-5'-P-CCNC: 34 U/L (ref 9–39)
BASOPHILS # BLD AUTO: 0.04 X10*3/UL (ref 0–0.1)
BASOPHILS NFR BLD AUTO: 0.5 %
BILIRUB SERPL-MCNC: 0.5 MG/DL (ref 0–1.2)
BILIRUB UR STRIP.AUTO-MCNC: NEGATIVE MG/DL
BUN SERPL-MCNC: 28 MG/DL (ref 6–23)
CALCIUM SERPL-MCNC: 10 MG/DL (ref 8.6–10.3)
CHLORIDE SERPL-SCNC: 104 MMOL/L (ref 98–107)
CO2 SERPL-SCNC: 24 MMOL/L (ref 21–32)
COLOR UR: NORMAL
CREAT SERPL-MCNC: 1.65 MG/DL (ref 0.5–1.05)
CRP SERPL-MCNC: 0.8 MG/DL
EGFRCR SERPLBLD CKD-EPI 2021: 32 ML/MIN/1.73M*2
EOSINOPHIL # BLD AUTO: 0.12 X10*3/UL (ref 0–0.4)
EOSINOPHIL NFR BLD AUTO: 1.6 %
ERYTHROCYTE [DISTWIDTH] IN BLOOD BY AUTOMATED COUNT: 14.5 % (ref 11.5–14.5)
ERYTHROCYTE [SEDIMENTATION RATE] IN BLOOD BY WESTERGREN METHOD: 61 MM/H (ref 0–30)
GLUCOSE BLD MANUAL STRIP-MCNC: 124 MG/DL (ref 74–99)
GLUCOSE SERPL-MCNC: 116 MG/DL (ref 74–99)
GLUCOSE UR STRIP.AUTO-MCNC: NORMAL MG/DL
HCT VFR BLD AUTO: 42.6 % (ref 36–46)
HGB BLD-MCNC: 13.9 G/DL (ref 12–16)
IMM GRANULOCYTES # BLD AUTO: 0.03 X10*3/UL (ref 0–0.5)
IMM GRANULOCYTES NFR BLD AUTO: 0.4 % (ref 0–0.9)
INR PPP: 1.3 (ref 0.9–1.1)
KETONES UR STRIP.AUTO-MCNC: NEGATIVE MG/DL
LACTATE SERPL-SCNC: 0.8 MMOL/L (ref 0.4–2)
LEUKOCYTE ESTERASE UR QL STRIP.AUTO: NEGATIVE
LYMPHOCYTES # BLD AUTO: 2.05 X10*3/UL (ref 0.8–3)
LYMPHOCYTES NFR BLD AUTO: 26.6 %
MAGNESIUM SERPL-MCNC: 2.26 MG/DL (ref 1.6–2.4)
MCH RBC QN AUTO: 31.4 PG (ref 26–34)
MCHC RBC AUTO-ENTMCNC: 32.6 G/DL (ref 32–36)
MCV RBC AUTO: 96 FL (ref 80–100)
MONOCYTES # BLD AUTO: 0.51 X10*3/UL (ref 0.05–0.8)
MONOCYTES NFR BLD AUTO: 6.6 %
NEUTROPHILS # BLD AUTO: 4.97 X10*3/UL (ref 1.6–5.5)
NEUTROPHILS NFR BLD AUTO: 64.3 %
NITRITE UR QL STRIP.AUTO: NEGATIVE
NRBC BLD-RTO: 0 /100 WBCS (ref 0–0)
PH UR STRIP.AUTO: 6 [PH]
PLATELET # BLD AUTO: 240 X10*3/UL (ref 150–450)
POTASSIUM SERPL-SCNC: 4.4 MMOL/L (ref 3.5–5.3)
POTASSIUM SERPL-SCNC: 5.4 MMOL/L (ref 3.5–5.3)
PROT SERPL-MCNC: 8.1 G/DL (ref 6.4–8.2)
PROT UR STRIP.AUTO-MCNC: NEGATIVE MG/DL
PROTHROMBIN TIME: 14.4 SECONDS (ref 9.8–12.4)
RBC # BLD AUTO: 4.42 X10*6/UL (ref 4–5.2)
RBC # UR STRIP.AUTO: NEGATIVE MG/DL
SODIUM SERPL-SCNC: 134 MMOL/L (ref 136–145)
SP GR UR STRIP.AUTO: 1.02
UROBILINOGEN UR STRIP.AUTO-MCNC: NORMAL MG/DL
WBC # BLD AUTO: 7.7 X10*3/UL (ref 4.4–11.3)

## 2025-07-16 PROCEDURE — 36415 COLL VENOUS BLD VENIPUNCTURE: CPT

## 2025-07-16 PROCEDURE — 83735 ASSAY OF MAGNESIUM: CPT | Performed by: HEALTH CARE PROVIDER

## 2025-07-16 PROCEDURE — 2500000005 HC RX 250 GENERAL PHARMACY W/O HCPCS: Performed by: HEALTH CARE PROVIDER

## 2025-07-16 PROCEDURE — 81003 URINALYSIS AUTO W/O SCOPE: CPT | Performed by: HEALTH CARE PROVIDER

## 2025-07-16 PROCEDURE — 93971 EXTREMITY STUDY: CPT

## 2025-07-16 PROCEDURE — 87075 CULTR BACTERIA EXCEPT BLOOD: CPT | Mod: GEALAB

## 2025-07-16 PROCEDURE — 2500000004 HC RX 250 GENERAL PHARMACY W/ HCPCS (ALT 636 FOR OP/ED): Performed by: HEALTH CARE PROVIDER

## 2025-07-16 PROCEDURE — 36415 COLL VENOUS BLD VENIPUNCTURE: CPT | Performed by: HEALTH CARE PROVIDER

## 2025-07-16 PROCEDURE — 82947 ASSAY GLUCOSE BLOOD QUANT: CPT

## 2025-07-16 PROCEDURE — 93971 EXTREMITY STUDY: CPT | Mod: FOREIGN READ | Performed by: RADIOLOGY

## 2025-07-16 PROCEDURE — 85652 RBC SED RATE AUTOMATED: CPT | Performed by: HEALTH CARE PROVIDER

## 2025-07-16 PROCEDURE — 2500000004 HC RX 250 GENERAL PHARMACY W/ HCPCS (ALT 636 FOR OP/ED): Performed by: STUDENT IN AN ORGANIZED HEALTH CARE EDUCATION/TRAINING PROGRAM

## 2025-07-16 PROCEDURE — 96365 THER/PROPH/DIAG IV INF INIT: CPT

## 2025-07-16 PROCEDURE — 83036 HEMOGLOBIN GLYCOSYLATED A1C: CPT | Mod: GEALAB

## 2025-07-16 PROCEDURE — 96375 TX/PRO/DX INJ NEW DRUG ADDON: CPT

## 2025-07-16 PROCEDURE — 86140 C-REACTIVE PROTEIN: CPT | Performed by: HEALTH CARE PROVIDER

## 2025-07-16 PROCEDURE — 84132 ASSAY OF SERUM POTASSIUM: CPT

## 2025-07-16 PROCEDURE — 83605 ASSAY OF LACTIC ACID: CPT | Performed by: HEALTH CARE PROVIDER

## 2025-07-16 PROCEDURE — 2500000004 HC RX 250 GENERAL PHARMACY W/ HCPCS (ALT 636 FOR OP/ED): Mod: JZ

## 2025-07-16 PROCEDURE — 85610 PROTHROMBIN TIME: CPT | Performed by: HEALTH CARE PROVIDER

## 2025-07-16 PROCEDURE — 99285 EMERGENCY DEPT VISIT HI MDM: CPT | Mod: 25 | Performed by: STUDENT IN AN ORGANIZED HEALTH CARE EDUCATION/TRAINING PROGRAM

## 2025-07-16 PROCEDURE — G0378 HOSPITAL OBSERVATION PER HR: HCPCS

## 2025-07-16 PROCEDURE — 1200000002 HC GENERAL ROOM WITH TELEMETRY DAILY

## 2025-07-16 PROCEDURE — 85025 COMPLETE CBC W/AUTO DIFF WBC: CPT | Performed by: HEALTH CARE PROVIDER

## 2025-07-16 PROCEDURE — 99223 1ST HOSP IP/OBS HIGH 75: CPT | Performed by: STUDENT IN AN ORGANIZED HEALTH CARE EDUCATION/TRAINING PROGRAM

## 2025-07-16 PROCEDURE — 80053 COMPREHEN METABOLIC PANEL: CPT | Performed by: HEALTH CARE PROVIDER

## 2025-07-16 RX ORDER — VANCOMYCIN HYDROCHLORIDE 1 G/20ML
INJECTION, POWDER, LYOPHILIZED, FOR SOLUTION INTRAVENOUS DAILY PRN
Status: DISCONTINUED | OUTPATIENT
Start: 2025-07-16 | End: 2025-07-18 | Stop reason: HOSPADM

## 2025-07-16 RX ORDER — ACETAMINOPHEN 325 MG/1
975 TABLET ORAL EVERY 6 HOURS PRN
Status: DISCONTINUED | OUTPATIENT
Start: 2025-07-16 | End: 2025-07-18 | Stop reason: HOSPADM

## 2025-07-16 RX ORDER — OXYCODONE HYDROCHLORIDE 5 MG/1
2.5 TABLET ORAL EVERY 6 HOURS PRN
Status: DISCONTINUED | OUTPATIENT
Start: 2025-07-16 | End: 2025-07-18 | Stop reason: HOSPADM

## 2025-07-16 RX ORDER — DEXTROSE 50 % IN WATER (D50W) INTRAVENOUS SYRINGE
25
Status: DISCONTINUED | OUTPATIENT
Start: 2025-07-16 | End: 2025-07-18 | Stop reason: HOSPADM

## 2025-07-16 RX ORDER — VANCOMYCIN HYDROCHLORIDE 1 G/20ML
INJECTION, POWDER, LYOPHILIZED, FOR SOLUTION INTRAVENOUS DAILY PRN
Status: DISCONTINUED | OUTPATIENT
Start: 2025-07-16 | End: 2025-07-16

## 2025-07-16 RX ORDER — INSULIN LISPRO 100 [IU]/ML
0-10 INJECTION, SOLUTION INTRAVENOUS; SUBCUTANEOUS
Status: DISCONTINUED | OUTPATIENT
Start: 2025-07-17 | End: 2025-07-18 | Stop reason: HOSPADM

## 2025-07-16 RX ORDER — CEFAZOLIN SODIUM 2 G/50ML
2 SOLUTION INTRAVENOUS EVERY 8 HOURS
Status: DISCONTINUED | OUTPATIENT
Start: 2025-07-16 | End: 2025-07-17

## 2025-07-16 RX ORDER — OXYCODONE HYDROCHLORIDE 5 MG/1
5 TABLET ORAL EVERY 6 HOURS PRN
Status: DISCONTINUED | OUTPATIENT
Start: 2025-07-16 | End: 2025-07-18 | Stop reason: HOSPADM

## 2025-07-16 RX ORDER — KETOROLAC TROMETHAMINE 15 MG/ML
INJECTION, SOLUTION INTRAMUSCULAR; INTRAVENOUS
Status: COMPLETED
Start: 2025-07-16 | End: 2025-07-16

## 2025-07-16 RX ORDER — METOPROLOL SUCCINATE 50 MG/1
100 TABLET, EXTENDED RELEASE ORAL DAILY
Status: DISCONTINUED | OUTPATIENT
Start: 2025-07-17 | End: 2025-07-18 | Stop reason: HOSPADM

## 2025-07-16 RX ORDER — LISINOPRIL 20 MG/1
20 TABLET ORAL 2 TIMES DAILY
Status: DISCONTINUED | OUTPATIENT
Start: 2025-07-16 | End: 2025-07-18 | Stop reason: HOSPADM

## 2025-07-16 RX ORDER — AMIODARONE HYDROCHLORIDE 200 MG/1
200 TABLET ORAL DAILY
Status: DISCONTINUED | OUTPATIENT
Start: 2025-07-17 | End: 2025-07-18 | Stop reason: HOSPADM

## 2025-07-16 RX ORDER — DICLOFENAC SODIUM 10 MG/G
2 GEL TOPICAL 4 TIMES DAILY PRN
Status: DISCONTINUED | OUTPATIENT
Start: 2025-07-16 | End: 2025-07-18 | Stop reason: HOSPADM

## 2025-07-16 RX ORDER — HYDROCHLOROTHIAZIDE 25 MG/1
25 TABLET ORAL DAILY
Status: DISCONTINUED | OUTPATIENT
Start: 2025-07-17 | End: 2025-07-18 | Stop reason: HOSPADM

## 2025-07-16 RX ORDER — ASPIRIN 81 MG/1
81 TABLET ORAL DAILY
COMMUNITY

## 2025-07-16 RX ORDER — DEXTROSE 50 % IN WATER (D50W) INTRAVENOUS SYRINGE
12.5
Status: DISCONTINUED | OUTPATIENT
Start: 2025-07-16 | End: 2025-07-18 | Stop reason: HOSPADM

## 2025-07-16 RX ORDER — LANOLIN ALCOHOL/MO/W.PET/CERES
400 CREAM (GRAM) TOPICAL DAILY
Status: DISCONTINUED | OUTPATIENT
Start: 2025-07-17 | End: 2025-07-18 | Stop reason: HOSPADM

## 2025-07-16 RX ORDER — KETOROLAC TROMETHAMINE 15 MG/ML
15 INJECTION, SOLUTION INTRAMUSCULAR; INTRAVENOUS ONCE
Status: COMPLETED | OUTPATIENT
Start: 2025-07-16 | End: 2025-07-16

## 2025-07-16 RX ADMIN — PIPERACILLIN SODIUM AND TAZOBACTAM SODIUM 4.5 G: 4; .5 INJECTION, SOLUTION INTRAVENOUS at 18:35

## 2025-07-16 RX ADMIN — KETOROLAC TROMETHAMINE 15 MG: 15 INJECTION, SOLUTION INTRAMUSCULAR; INTRAVENOUS at 18:09

## 2025-07-16 RX ADMIN — VANCOMYCIN HYDROCHLORIDE 2000 MG: 10 INJECTION, POWDER, LYOPHILIZED, FOR SOLUTION INTRAVENOUS at 18:35

## 2025-07-16 RX ADMIN — SODIUM CHLORIDE, POTASSIUM CHLORIDE, SODIUM LACTATE AND CALCIUM CHLORIDE 1000 ML: 600; 310; 30; 20 INJECTION, SOLUTION INTRAVENOUS at 19:17

## 2025-07-16 SDOH — ECONOMIC STABILITY: INCOME INSECURITY: IN THE PAST 12 MONTHS HAS THE ELECTRIC, GAS, OIL, OR WATER COMPANY THREATENED TO SHUT OFF SERVICES IN YOUR HOME?: NO

## 2025-07-16 SDOH — SOCIAL STABILITY: SOCIAL INSECURITY: HAVE YOU HAD THOUGHTS OF HARMING ANYONE ELSE?: NO

## 2025-07-16 SDOH — SOCIAL STABILITY: SOCIAL INSECURITY: DO YOU FEEL ANYONE HAS EXPLOITED OR TAKEN ADVANTAGE OF YOU FINANCIALLY OR OF YOUR PERSONAL PROPERTY?: NO

## 2025-07-16 SDOH — SOCIAL STABILITY: SOCIAL INSECURITY: ARE YOU OR HAVE YOU BEEN THREATENED OR ABUSED PHYSICALLY, EMOTIONALLY, OR SEXUALLY BY ANYONE?: NO

## 2025-07-16 SDOH — ECONOMIC STABILITY: HOUSING INSECURITY: IN THE LAST 12 MONTHS, WAS THERE A TIME WHEN YOU WERE NOT ABLE TO PAY THE MORTGAGE OR RENT ON TIME?: NO

## 2025-07-16 SDOH — ECONOMIC STABILITY: HOUSING INSECURITY: AT ANY TIME IN THE PAST 12 MONTHS, WERE YOU HOMELESS OR LIVING IN A SHELTER (INCLUDING NOW)?: NO

## 2025-07-16 SDOH — SOCIAL STABILITY: SOCIAL INSECURITY: WITHIN THE LAST YEAR, HAVE YOU BEEN AFRAID OF YOUR PARTNER OR EX-PARTNER?: NO

## 2025-07-16 SDOH — ECONOMIC STABILITY: FOOD INSECURITY: WITHIN THE PAST 12 MONTHS, THE FOOD YOU BOUGHT JUST DIDN'T LAST AND YOU DIDN'T HAVE MONEY TO GET MORE.: NEVER TRUE

## 2025-07-16 SDOH — ECONOMIC STABILITY: FOOD INSECURITY: WITHIN THE PAST 12 MONTHS, YOU WORRIED THAT YOUR FOOD WOULD RUN OUT BEFORE YOU GOT THE MONEY TO BUY MORE.: NEVER TRUE

## 2025-07-16 SDOH — SOCIAL STABILITY: SOCIAL INSECURITY: WITHIN THE LAST YEAR, HAVE YOU BEEN HUMILIATED OR EMOTIONALLY ABUSED IN OTHER WAYS BY YOUR PARTNER OR EX-PARTNER?: NO

## 2025-07-16 SDOH — ECONOMIC STABILITY: FOOD INSECURITY: HOW HARD IS IT FOR YOU TO PAY FOR THE VERY BASICS LIKE FOOD, HOUSING, MEDICAL CARE, AND HEATING?: NOT HARD AT ALL

## 2025-07-16 SDOH — SOCIAL STABILITY: SOCIAL INSECURITY: WERE YOU ABLE TO COMPLETE ALL THE BEHAVIORAL HEALTH SCREENINGS?: YES

## 2025-07-16 SDOH — SOCIAL STABILITY: SOCIAL INSECURITY
WITHIN THE LAST YEAR, HAVE YOU BEEN RAPED OR FORCED TO HAVE ANY KIND OF SEXUAL ACTIVITY BY YOUR PARTNER OR EX-PARTNER?: NO

## 2025-07-16 SDOH — SOCIAL STABILITY: SOCIAL INSECURITY: ARE THERE ANY APPARENT SIGNS OF INJURIES/BEHAVIORS THAT COULD BE RELATED TO ABUSE/NEGLECT?: NO

## 2025-07-16 SDOH — SOCIAL STABILITY: SOCIAL INSECURITY: DO YOU FEEL UNSAFE GOING BACK TO THE PLACE WHERE YOU ARE LIVING?: NO

## 2025-07-16 SDOH — SOCIAL STABILITY: SOCIAL INSECURITY
WITHIN THE LAST YEAR, HAVE YOU BEEN KICKED, HIT, SLAPPED, OR OTHERWISE PHYSICALLY HURT BY YOUR PARTNER OR EX-PARTNER?: NO

## 2025-07-16 SDOH — SOCIAL STABILITY: SOCIAL INSECURITY: ABUSE: ADULT

## 2025-07-16 SDOH — ECONOMIC STABILITY: HOUSING INSECURITY: IN THE PAST 12 MONTHS, HOW MANY TIMES HAVE YOU MOVED WHERE YOU WERE LIVING?: 0

## 2025-07-16 SDOH — ECONOMIC STABILITY: TRANSPORTATION INSECURITY: IN THE PAST 12 MONTHS, HAS LACK OF TRANSPORTATION KEPT YOU FROM MEDICAL APPOINTMENTS OR FROM GETTING MEDICATIONS?: NO

## 2025-07-16 SDOH — SOCIAL STABILITY: SOCIAL INSECURITY: DOES ANYONE TRY TO KEEP YOU FROM HAVING/CONTACTING OTHER FRIENDS OR DOING THINGS OUTSIDE YOUR HOME?: NO

## 2025-07-16 SDOH — SOCIAL STABILITY: SOCIAL INSECURITY: HAS ANYONE EVER THREATENED TO HURT YOUR FAMILY OR YOUR PETS?: NO

## 2025-07-16 ASSESSMENT — ENCOUNTER SYMPTOMS
DIZZINESS: 0
SHORTNESS OF BREATH: 0
COUGH: 0
ABDOMINAL PAIN: 0
STRIDOR: 0
CONSTIPATION: 0
PALPITATIONS: 0
TREMORS: 0
DIAPHORESIS: 0
ABDOMINAL DISTENTION: 0
CHILLS: 0
COLOR CHANGE: 0
DIARRHEA: 0
WOUND: 1
FATIGUE: 0
DYSURIA: 0
FACIAL ASYMMETRY: 0
FREQUENCY: 0

## 2025-07-16 ASSESSMENT — LIFESTYLE VARIABLES
AUDIT-C TOTAL SCORE: 0
TOTAL SCORE: 0
EVER FELT BAD OR GUILTY ABOUT YOUR DRINKING: NO
HAVE YOU EVER FELT YOU SHOULD CUT DOWN ON YOUR DRINKING: NO
HOW OFTEN DO YOU HAVE 6 OR MORE DRINKS ON ONE OCCASION: NEVER
AUDIT-C TOTAL SCORE: 0
HOW MANY STANDARD DRINKS CONTAINING ALCOHOL DO YOU HAVE ON A TYPICAL DAY: PATIENT DOES NOT DRINK
SKIP TO QUESTIONS 9-10: 1
EVER HAD A DRINK FIRST THING IN THE MORNING TO STEADY YOUR NERVES TO GET RID OF A HANGOVER: NO
HAVE PEOPLE ANNOYED YOU BY CRITICIZING YOUR DRINKING: NO
HOW OFTEN DO YOU HAVE A DRINK CONTAINING ALCOHOL: NEVER

## 2025-07-16 ASSESSMENT — ACTIVITIES OF DAILY LIVING (ADL)
BATHING: INDEPENDENT
HEARING - LEFT EAR: FUNCTIONAL
FEEDING YOURSELF: INDEPENDENT
WALKS IN HOME: INDEPENDENT
TOILETING: INDEPENDENT
ADEQUATE_TO_COMPLETE_ADL: YES
JUDGMENT_ADEQUATE_SAFELY_COMPLETE_DAILY_ACTIVITIES: YES
LACK_OF_TRANSPORTATION: NO
LACK_OF_TRANSPORTATION: NO
HEARING - RIGHT EAR: FUNCTIONAL
DRESSING YOURSELF: INDEPENDENT
PATIENT'S MEMORY ADEQUATE TO SAFELY COMPLETE DAILY ACTIVITIES?: YES
GROOMING: INDEPENDENT

## 2025-07-16 ASSESSMENT — COGNITIVE AND FUNCTIONAL STATUS - GENERAL
DAILY ACTIVITIY SCORE: 24
CLIMB 3 TO 5 STEPS WITH RAILING: A LITTLE
PATIENT BASELINE BEDBOUND: NO
DAILY ACTIVITIY SCORE: 24
CLIMB 3 TO 5 STEPS WITH RAILING: A LITTLE
MOBILITY SCORE: 23
MOBILITY SCORE: 23

## 2025-07-16 ASSESSMENT — PAIN SCALES - GENERAL
PAINLEVEL_OUTOF10: 0 - NO PAIN
PAINLEVEL_OUTOF10: 10 - WORST POSSIBLE PAIN

## 2025-07-16 ASSESSMENT — PAIN - FUNCTIONAL ASSESSMENT
PAIN_FUNCTIONAL_ASSESSMENT: 0-10
PAIN_FUNCTIONAL_ASSESSMENT: 0-10

## 2025-07-16 ASSESSMENT — PAIN DESCRIPTION - ORIENTATION: ORIENTATION: RIGHT

## 2025-07-16 ASSESSMENT — PAIN DESCRIPTION - PAIN TYPE: TYPE: ACUTE PAIN

## 2025-07-16 ASSESSMENT — PAIN DESCRIPTION - LOCATION: LOCATION: LEG

## 2025-07-16 NOTE — TELEPHONE ENCOUNTER
Wound on LT is not better, blister came off. Keeps putting medicine on it, area around leg is red and warm.  She had bad night, itchy. Does she need to give it more time? Using derma wound.  [phone]

## 2025-07-16 NOTE — ED TRIAGE NOTES
Pt came in for a wound check. Pt stated it started as a quarter sized bubble on her lower R calf, and her PCP chose to prick it to pop it a few days ago, but not take the skin off. The blister skin came off on its own. Pt was on a different antibiotic but it did not do anything, so her PCP switched her to bactrim on monday, and around 24hrs after starting, she started to get itchy all over her body. Pt denies fever, chills, N/V. Pt reports wound is 15/10 pain, red, hot to the touch, and swollen. Pt is able to ambulate independently.

## 2025-07-16 NOTE — TELEPHONE ENCOUNTER
I talked with Debbie, discussed since it sounds like the area on her leg is worsening despite being on keflex and then switching to bactrim I think she should go to the ER. She likely needs IV antibiotics. She agreed, will see what she can do for getting to the ER.

## 2025-07-16 NOTE — ED PROVIDER NOTES
HPI   Chief Complaint   Patient presents with    Wound Check       CC: Right lower extremity wound  HPI:   75-year-old female presents ED with concern for infection to the right lower extremity.  Patient stated several years ago she had a history of varicose veins she underwent vein stripping ever since then she has been having edema in the lower extremities specifically in the right lower extremity she is also non-insulin-dependent diabetic on Mounjaro, she noticed a small blister that developed on her right lower extremity 2 weeks ago she was initially placed on Keflex she also placed over-the-counter dermal wound and has also been keeping the area covered 24/7.  She notes increased soft tissue swelling redness pain denies any prior DVTs she denies any history of PAD PVD denies having any fever, chills.  Patient reports history of hypertrophic cardiomyopathy, A-fib on Eliquis, non-insulin-dependent type 2 diabetes    Additional Limitations to History:   External Records Reviewed: I reviewed recent and relevant outside records including   History Obtained From:     Past Medical History: Per HPI  Medications: Reviewed in EMR and with patient  Allergies:  Reviewed in EMR  Past Surgical History:   Social History:     ------------------------------------------------------------------------------------------------------  Physical Exam:  --Vital signs reviewed in nursing triage note, EMR flow sheets, and at patient's bedside  GEN:  A&Ox3, no acute distress, appears comfortable.  Conversational and appropriate.  No confusion or gross mental status changes.  EYES: EOMI, non-injected sclera.  ENT: Moist mucous membranes, no apparent injuries or lesions.   CARDIO: Normal rate and regular rhythm. No murmurs, rubs, or gallops.  2+ equal pulses of the distal extremities.   PULM: Clear to auscultation bilaterally. No rales, rhonchi, or wheezes. Good symmetric chest expansion.  GI: Soft, non-tender, non-distended. No rebound  tenderness or guarding.  SKIN: Warm and dry, no rashes or lesions.  MSK: ROM intact the extremities without contractures.   EXT: Right lower extremity: Large superficially ulcerated limited skin breakdown wound moderate surrounding erythema, soft tissue swelling, no purulent drainage,  NEURO: Cranial nerves II-XII grossly intact. Sensation to light touch intact and equal bilaterally in upper and lower extremities.  Symmetric 5/5 strength in upper and lower extremities.  PSYCH: Appropriate mood and behavior, converses and responds appropriately during exam.  -------------------------------------------------------------------------------------------------------      Differential Diagnoses Considered:   Chronic Medical Conditions Significantly Affecting Care:   Diagnostic testing considered: [PERC, D-Dimer, PECARN, etc.]    -  - I independently interpreted: [CXR, CT, POCUS, etc. including your interpretation]  - Labs notable for     Escalation of Care: Appropriate for  Social Determinants of Health Significantly Affecting Care: [Homelessness, lacking transportation, uninsured, unable to afford medications]  Prescription Drug Consideration: [Antibiotics, antivirals, pain medications, etc.]  Discussion of Management with Other Providers:  I discussed the patient/results with: [admitting team, consultant, radiologist, social work, EPAT, case management, PT/OT, RT, PCP, etc.]      Chemo Murry PA-C              Patient History   Medical History[1]  Surgical History[2]  Family History[3]  Social History[4]    Physical Exam   ED Triage Vitals [07/16/25 1713]   Temperature Heart Rate Resp BP   36.8 °C (98.2 °F) 63 -- 151/74      Pulse Ox Temp Source Heart Rate Source Patient Position   95 % Temporal Monitor Sitting      BP Location FiO2 (%)     Right arm --       Physical Exam      ED Course & MDM                  No data recorded     Eloise Coma Scale Score: 15 (07/16/25 1719 : Latanya Colmenares RN)                            Medical Decision Making      Procedure  Procedures       Chemo Murry PA-C  07/16/25 1673         [1]   Past Medical History:  Diagnosis Date    Abdominal pain of multiple sites 06/22/2023    Acute sinusitis 06/22/2023    Acute upper respiratory infection, unspecified 03/14/2017    Acute URI    Anxiety disorder, unspecified     Anxiety    Biceps tendinitis of left upper extremity 06/22/2023    Burning with urination 06/22/2023    Cervical pain 06/22/2023    Cervicalgia     Cervical pain (neck)    Encounter for screening for other viral diseases 12/14/2017    Need for hepatitis C screening test    Hand numbness 12/26/2023    Morbid (severe) obesity due to excess calories (Multi)     Morbid obesity    Pain in left knee     Acute pain of left knee    Paroxysmal SVT (supraventricular tachycardia) 12/26/2023    Personal history of other diseases of the circulatory system     History of hypertension    Personal history of other diseases of the musculoskeletal system and connective tissue     History of arthritis    Personal history of other drug therapy 11/21/2019    History of influenza vaccination    Personal history of other endocrine, nutritional and metabolic disease     History of hyperlipidemia    Personal history of other endocrine, nutritional and metabolic disease     History of diabetes mellitus    Personal history of other specified conditions 04/20/2017    History of dysuria    Pyogenic granuloma of skin and subcutaneous tissue 12/26/2023    Streptococcal pharyngitis 03/14/2017    Strep sore throat    Thoracic spine pain 12/26/2023    Unspecified abdominal hernia without obstruction or gangrene     Abdominal hernia without obstruction and without gangrene    Unspecified asthma, uncomplicated ([facility]) 04/20/2017    Asthmatic bronchitis    Urinary tract infection, site not specified 04/20/2017    Acute UTI   [2]   Past Surgical History:  Procedure Laterality Date    CARDIAC ELECTROPHYSIOLOGY  PROCEDURE N/A 2024    Procedure: Ablation A-Fib Persistant;  Surgeon: Ney Sommers MD;  Location: [place] [id number] Cardiac Cath [facility];  Service: Electrophysiology;  Laterality: N/A;  EPS 3D W CARTO GA WHOLE CASE.    PT [facility] - NEEDS TIME OF PROCEDURE AHEAD OF TIME TO ARRANGE TRANSPORT.     SECTION, CLASSIC  2016     Section    CHOLECYSTECTOMY  2016    Cholecystectomy    OTHER SURGICAL HISTORY  2016    Sigmoidoscopy (Fiberoptic, Therapeutic )    OTHER SURGICAL HISTORY  2022    Lower back surgery    OTHER SURGICAL HISTORY  2021    Carpal tunnel surgery    TOTAL KNEE ARTHROPLASTY  2016    Knee Replacement   [3] No family history on file.  [4]   Social History  Tobacco Use    Smoking status: Never    Smokeless tobacco: Never   Vaping Use    Vaping status: Never Used   Substance Use Topics    Alcohol use: Never    Drug use: Never        Chemo Murry PA-C  25 1811       Chemo Murry PA-C  25 1841

## 2025-07-17 LAB
ALBUMIN SERPL BCP-MCNC: 3.4 G/DL (ref 3.4–5)
ANION GAP SERPL CALC-SCNC: 10 MMOL/L (ref 10–20)
BUN SERPL-MCNC: 28 MG/DL (ref 6–23)
CALCIUM SERPL-MCNC: 9.3 MG/DL (ref 8.6–10.3)
CHLORIDE SERPL-SCNC: 103 MMOL/L (ref 98–107)
CO2 SERPL-SCNC: 26 MMOL/L (ref 21–32)
CREAT SERPL-MCNC: 1.6 MG/DL (ref 0.5–1.05)
CREAT UR-MCNC: 63.7 MG/DL (ref 20–320)
EGFRCR SERPLBLD CKD-EPI 2021: 33 ML/MIN/1.73M*2
ERYTHROCYTE [DISTWIDTH] IN BLOOD BY AUTOMATED COUNT: 14.5 % (ref 11.5–14.5)
EST. AVERAGE GLUCOSE BLD GHB EST-MCNC: 163 MG/DL
GLUCOSE BLD MANUAL STRIP-MCNC: 115 MG/DL (ref 74–99)
GLUCOSE BLD MANUAL STRIP-MCNC: 135 MG/DL (ref 74–99)
GLUCOSE BLD MANUAL STRIP-MCNC: 202 MG/DL (ref 74–99)
GLUCOSE BLD MANUAL STRIP-MCNC: 209 MG/DL (ref 74–99)
GLUCOSE SERPL-MCNC: 108 MG/DL (ref 74–99)
HBA1C MFR BLD: 7.3 % (ref ?–5.7)
HCT VFR BLD AUTO: 38.1 % (ref 36–46)
HGB BLD-MCNC: 12.2 G/DL (ref 12–16)
HOLD SPECIMEN: NORMAL
MAGNESIUM SERPL-MCNC: 2.1 MG/DL (ref 1.6–2.4)
MCH RBC QN AUTO: 31.3 PG (ref 26–34)
MCHC RBC AUTO-ENTMCNC: 32 G/DL (ref 32–36)
MCV RBC AUTO: 98 FL (ref 80–100)
NRBC BLD-RTO: 0 /100 WBCS (ref 0–0)
PHOSPHATE SERPL-MCNC: 3 MG/DL (ref 2.5–4.9)
PLATELET # BLD AUTO: 215 X10*3/UL (ref 150–450)
POTASSIUM SERPL-SCNC: 4.4 MMOL/L (ref 3.5–5.3)
RBC # BLD AUTO: 3.9 X10*6/UL (ref 4–5.2)
SODIUM SERPL-SCNC: 135 MMOL/L (ref 136–145)
UREA/CREAT UR-SRTO: 8.4 G/G CREAT
UUN UR-MCNC: 534 MG/DL
VANCOMYCIN SERPL-MCNC: 10.6 UG/ML (ref 5–20)
WBC # BLD AUTO: 8.5 X10*3/UL (ref 4.4–11.3)

## 2025-07-17 PROCEDURE — G0378 HOSPITAL OBSERVATION PER HR: HCPCS

## 2025-07-17 PROCEDURE — 97161 PT EVAL LOW COMPLEX 20 MIN: CPT | Mod: GP

## 2025-07-17 PROCEDURE — 80069 RENAL FUNCTION PANEL: CPT

## 2025-07-17 PROCEDURE — 82570 ASSAY OF URINE CREATININE: CPT | Mod: GEALAB

## 2025-07-17 PROCEDURE — 36415 COLL VENOUS BLD VENIPUNCTURE: CPT | Performed by: STUDENT IN AN ORGANIZED HEALTH CARE EDUCATION/TRAINING PROGRAM

## 2025-07-17 PROCEDURE — 2500000001 HC RX 250 WO HCPCS SELF ADMINISTERED DRUGS (ALT 637 FOR MEDICARE OP)

## 2025-07-17 PROCEDURE — 2500000004 HC RX 250 GENERAL PHARMACY W/ HCPCS (ALT 636 FOR OP/ED): Performed by: STUDENT IN AN ORGANIZED HEALTH CARE EDUCATION/TRAINING PROGRAM

## 2025-07-17 PROCEDURE — 97165 OT EVAL LOW COMPLEX 30 MIN: CPT | Mod: GO

## 2025-07-17 PROCEDURE — 2500000004 HC RX 250 GENERAL PHARMACY W/ HCPCS (ALT 636 FOR OP/ED)

## 2025-07-17 PROCEDURE — 82947 ASSAY GLUCOSE BLOOD QUANT: CPT

## 2025-07-17 PROCEDURE — 83735 ASSAY OF MAGNESIUM: CPT

## 2025-07-17 PROCEDURE — 1200000002 HC GENERAL ROOM WITH TELEMETRY DAILY

## 2025-07-17 PROCEDURE — 80202 ASSAY OF VANCOMYCIN: CPT | Performed by: STUDENT IN AN ORGANIZED HEALTH CARE EDUCATION/TRAINING PROGRAM

## 2025-07-17 PROCEDURE — 36415 COLL VENOUS BLD VENIPUNCTURE: CPT

## 2025-07-17 PROCEDURE — 2500000002 HC RX 250 W HCPCS SELF ADMINISTERED DRUGS (ALT 637 FOR MEDICARE OP, ALT 636 FOR OP/ED)

## 2025-07-17 PROCEDURE — 85027 COMPLETE CBC AUTOMATED: CPT

## 2025-07-17 RX ORDER — ASPIRIN 81 MG/1
81 TABLET ORAL DAILY
Status: DISCONTINUED | OUTPATIENT
Start: 2025-07-17 | End: 2025-07-18 | Stop reason: HOSPADM

## 2025-07-17 RX ADMIN — OXYCODONE HYDROCHLORIDE 5 MG: 5 TABLET ORAL at 20:16

## 2025-07-17 RX ADMIN — CEFAZOLIN SODIUM 2 G: 2 SOLUTION INTRAVENOUS at 08:45

## 2025-07-17 RX ADMIN — CEFAZOLIN SODIUM 2 G: 2 SOLUTION INTRAVENOUS at 00:39

## 2025-07-17 RX ADMIN — OXYCODONE HYDROCHLORIDE 5 MG: 5 TABLET ORAL at 02:35

## 2025-07-17 RX ADMIN — APIXABAN 5 MG: 5 TABLET, FILM COATED ORAL at 08:45

## 2025-07-17 RX ADMIN — AMIODARONE HYDROCHLORIDE 200 MG: 200 TABLET ORAL at 09:12

## 2025-07-17 RX ADMIN — SODIUM CHLORIDE 500 ML: 9 INJECTION, SOLUTION INTRAVENOUS at 00:38

## 2025-07-17 RX ADMIN — Medication 1 TABLET: at 08:45

## 2025-07-17 RX ADMIN — INSULIN LISPRO 4 UNITS: 100 INJECTION, SOLUTION INTRAVENOUS; SUBCUTANEOUS at 12:38

## 2025-07-17 RX ADMIN — APIXABAN 5 MG: 5 TABLET, FILM COATED ORAL at 20:10

## 2025-07-17 RX ADMIN — ASPIRIN 81 MG: 81 TABLET, DELAYED RELEASE ORAL at 08:45

## 2025-07-17 RX ADMIN — APIXABAN 5 MG: 5 TABLET, FILM COATED ORAL at 00:24

## 2025-07-17 RX ADMIN — VANCOMYCIN HYDROCHLORIDE 1250 MG: 1.25 INJECTION, POWDER, LYOPHILIZED, FOR SOLUTION INTRAVENOUS at 20:10

## 2025-07-17 ASSESSMENT — COGNITIVE AND FUNCTIONAL STATUS - GENERAL
CLIMB 3 TO 5 STEPS WITH RAILING: A LITTLE
DAILY ACTIVITIY SCORE: 24
MOBILITY SCORE: 23
TOILETING: A LITTLE
CLIMB 3 TO 5 STEPS WITH RAILING: A LITTLE
DAILY ACTIVITIY SCORE: 23
MOBILITY SCORE: 23

## 2025-07-17 ASSESSMENT — PAIN - FUNCTIONAL ASSESSMENT
PAIN_FUNCTIONAL_ASSESSMENT: 0-10

## 2025-07-17 ASSESSMENT — PAIN SCALES - GENERAL
PAINLEVEL_OUTOF10: 0 - NO PAIN
PAINLEVEL_OUTOF10: 0 - NO PAIN
PAINLEVEL_OUTOF10: 7
PAINLEVEL_OUTOF10: 0 - NO PAIN

## 2025-07-17 ASSESSMENT — ACTIVITIES OF DAILY LIVING (ADL)
BATHING_ASSISTANCE: STAND BY
ADL_ASSISTANCE: INDEPENDENT
LACK_OF_TRANSPORTATION: NO
ADL_ASSISTANCE: INDEPENDENT

## 2025-07-17 ASSESSMENT — PAIN DESCRIPTION - LOCATION: LOCATION: LEG

## 2025-07-17 NOTE — H&P
HPI    Debbie Rees is a 75 y.o. female w/ PMH varicose veins status post vein stipulating, non-insulin-dependent type 2 diabetes on Mounjaro, essential hypertension, anxiety, stage IIIa chronic kidney disease, atrial fibrillation on Eliquis, and hypertrophic cardiomyopathy who presented to the hospital for right lower leg pain and infection.  Patient states that her wound began as a small blister 2 weeks ago, was later placed on Keflex 500 mg 4 times a day for 7 days. Her provider switched her to Bactrim for which she took for 3 days without resolution.  Her pain had progressed to a rating of a 15 out of 10, making it difficult to ambulate.  She later developed a purulent blister which was later drained at .  She applied dressings to keep the wound clean.  She notes that she has had issues like this before.  Upon examination she denies fever, chills, nausea, or abdominal pain.  Further, she endorses slower stream of urination.    ED course:     Vitals: Temp 96.3, /78, HR 57, RR 16, Spo2 [100, % on room air, ]    Labs:   CBC    WBC 7.7, RBC 4.42, Hgb 13.9, Hct 42.6, MCV 96, Platelet 248, PTT 14.4, INR 1.3  CMP   Glucose 116, Na 134, K 5.4, Cl 140, HCO3 24, BUN 26, Cr 1.65, (baseline 1.05, Ca 10, Alk Phos 60, ALT 31, AST 32,  Lactate: 0.8  C Reactive Protein: 0.8  Hemoglobin A1c: 7.4 (measured July 2024)    Imaging:   Right Lower Extremity Venous Doppler Ultrasound; 7/16/2025 7:04 PM.  INDICATION:  Right lower extremity venous stasis, ulceration, and wound.  COMPARISON:  US LE Venous 12/12/2023.  ACCESSION NUMBER(S):  [id number]  ORDERING CLINICIAN:  ALEXANDREA WALKER  TECHNIQUE:    Real-time grayscale imaging, color Doppler flow imaging, and spectral  Doppler imaging of the right lower extremity veins was performed.  FINDINGS:   The right common femoral, profunda, femoral, and popliteal veins  demonstrated normal compressibility, normal phasic venous flow and  normal response to augmentation. There is no  evidence for echogenic  thrombi. The visualized deep calf veins are patent.    The contralateral common femoral vein is free of thrombosis.  IMPRESSION:  No evidence for DVT within the right lower extremity.  The previously seen inguinal lymph nodes are not appreciated on  today's exam.     Interventions: In ED, patient was given Toradol, given 1 L LR, started on Zosyn 4.5 g IV, and vancomycin 2000 milligrams IV.  Pending repeat potassium and urinalysis with reflex culture.    ROS:   Review of Systems   Constitutional:  Negative for chills, diaphoresis and fatigue.   Respiratory:  Negative for cough, shortness of breath and stridor.    Cardiovascular:  Negative for chest pain and palpitations.   Gastrointestinal:  Negative for abdominal distention, abdominal pain, constipation and diarrhea.   Genitourinary:  Negative for dysuria, frequency and urgency.   Skin:  Positive for wound. Negative for color change.   Neurological:  Negative for dizziness, tremors and facial asymmetry.      Past Medical History   Medical History[1]   Surgical History   Surgical History[2]  Family History   Family History[3]  Social History     Social History     Socioeconomic History    Marital status:      Spouse name: Not on file    Number of children: Not on file    Years of education: Not on file    Highest education level: Not on file   Occupational History    Not on file   Tobacco Use    Smoking status: Never    Smokeless tobacco: Never   Vaping Use    Vaping status: Never Used   Substance and Sexual Activity    Alcohol use: Never    Drug use: Never    Sexual activity: Not on file   Other Topics Concern    Not on file   Social History Narrative    Not on file     Social Drivers of Health     Financial Resource Strain: Low Risk  (7/16/2024)    Overall Financial Resource Strain (CARDIA)     Difficulty of Paying Living Expenses: Not hard at all   Food Insecurity: Not on file   Transportation Needs: No Transportation Needs  "(2024)    PRAPARE - Transportation     Lack of Transportation (Medical): No     Lack of Transportation (Non-Medical): No   Physical Activity: Not on file   Stress: Not on file   Social Connections: Not on file   Intimate Partner Violence: Not on file   Housing Stability: Low Risk  (2024)    Housing Stability Vital Sign     Unable to Pay for Housing in the Last Year: No     Number of Times Moved in the Last Year: 1     Homeless in the Last Year: No       Tobacco Use: Low Risk  (2025)    Patient History     Smoking Tobacco Use: Never     Smokeless Tobacco Use: Never     Passive Exposure: Not on file        Social History     Substance and Sexual Activity   Alcohol Use Never      Allergies   RX Allergies[4]   Meds    Scheduled medications  Scheduled Medications[5]  Continuous medications  Continuous Medications[6]  PRN medications  PRN Medications[7]   Objective     Vitals  Visit Vitals  /78   Pulse 57   Temp 35.7 °C (96.3 °F) (Temporal)   Resp 16   Ht 1.676 m (5' 6\")   Wt 111 kg (245 lb)   SpO2 100%   BMI 39.54 kg/m²   Smoking Status Never   BSA 2.27 m²      Physical Exam  Constitutional:       General: She is not in acute distress.     Appearance: She is not ill-appearing.     Cardiovascular:      Rate and Rhythm: Normal rate. Rhythm irregular.      Pulses: Normal pulses.      Heart sounds: Normal heart sounds.   Pulmonary:      Effort: Pulmonary effort is normal. No respiratory distress.      Breath sounds: No stridor. No wheezing.   Abdominal:      General: Abdomen is flat. There is no distension.      Palpations: Abdomen is soft.      Tenderness: There is no abdominal tenderness. There is no guarding.     Musculoskeletal:      Right lower leg: Tenderness present. 2+ Pitting Edema present.      Left lower le+ Pitting Edema present.      Comments: 4/10 level of pain during examination. 5/5 strength bilateral plantarflexion, dorsiflexion, great toe extension     Skin:     General: Skin is " "warm and dry.      Findings: Erythema and wound present.      Comments: Poorly demarcated erythema up to mid calf right side.  6 cm annular lesion on medial calf, without purulence, drainage.     Neurological:      General: No focal deficit present.      Mental Status: She is alert and oriented to person, place, and time.        I/Os  No intake or output data in the 24 hours ending 07/16/25 2201    Labs:   Results from last 72 hours   Lab Units 07/16/25  1814   SODIUM mmol/L 134*   POTASSIUM mmol/L 5.4*   CHLORIDE mmol/L 104   CO2 mmol/L 24   BUN mg/dL 28*   CREATININE mg/dL 1.65*   GLUCOSE mg/dL 116*   CALCIUM mg/dL 10.0   ANION GAP mmol/L 11   EGFR mL/min/1.73m*2 32*      Results from last 72 hours   Lab Units 07/16/25  1814   WBC AUTO x10*3/uL 7.7   HEMOGLOBIN g/dL 13.9   HEMATOCRIT % 42.6   PLATELETS AUTO x10*3/uL 240   NEUTROS PCT AUTO % 64.3   LYMPHS PCT AUTO % 26.6   MONOS PCT AUTO % 6.6   EOS PCT AUTO % 1.6      Lab Results   Component Value Date    CALCIUM 10.0 07/16/2025    PHOS 3.2 07/20/2024      Lab Results   Component Value Date    CRP 0.80 07/16/2025      [unfilled]     Micro/ID:   No results found for the last 90 days.                   No lab exists for component: \"AGALPCRNB\"   .ID  Lab Results   Component Value Date    URINECULTURE No significant growth 07/13/2024    BLOODCULT No growth at 4 days -  FINAL REPORT 12/12/2023    BLOODCULT No growth at 4 days -  FINAL REPORT 12/12/2023     Images    Lower extremity venous duplex right  Narrative: STUDY:  Right Lower Extremity Venous Doppler Ultrasound; 7/16/2025 7:04 PM.  INDICATION:  Right lower extremity venous stasis, ulceration, and wound.  COMPARISON:  US LE Venous 12/12/2023.  ACCESSION NUMBER(S):  [id number]  ORDERING CLINICIAN:  ALEXANDREA WALKER  TECHNIQUE:    Real-time grayscale imaging, color Doppler flow imaging, and spectral  Doppler imaging of the right lower extremity veins was performed.  FINDINGS:   The right common femoral, " profunda, femoral, and popliteal veins  demonstrated normal compressibility, normal phasic venous flow and  normal response to augmentation. There is no evidence for echogenic  thrombi. The visualized deep calf veins are patent.    The contralateral common femoral vein is free of thrombosis.  Impression: No evidence for DVT within the right lower extremity.  The previously seen inguinal lymph nodes are not appreciated on  today's exam.   Signed by Harley Shah MD    Assessment and Plan    Debbie Rees is a 75 y.o. female admitted for cellulitis of right lower extremity.    Acute Medical Issues   # Right lower extremity cellulitis   Patient presents with right lower extremity poorly demarcated erythema, warmth, and tenderness  Annular 6 cm lesion at medial right calf, was originally purulent but was drained  Patient remains hemodynamically stable, without leukocytosis, lactate within normal limits  Right lower extremity DVT ultrasound negative  Begin Ancef IV 2 g every 8 hours and Vancomycin  Tylenol for first-line pain control, Oxycodone and Dilaudid for elevated pain control  Recommend leg elevation    #Acute kidney injury, likely prerenal in the setting of poor oral intake  Creatinine on admission 1.65, baseline of 1  Fluids  Avoid nephrotoxic medications  Fractional excretion of urea pending  Urinalysis pending  Consider fluid bolus  Consider renal ultrasound if kidney function does not improve with fluid replacement    Chronic Medical Issues   # Atrial fibrillation with RVR-amiodarone, metoprolol,  Eliquis  # Arthritis-Voltaren gel  # Hypertension-hydrochlorothiazide and lisinopril held  #T2D, non-insulin dependent - insulin sliding scale     F: PO intake & IVF PRN   E: Replete as needed   N: Regular diet  DVT ppx: Eliquis  Antibiotics: Vancomycin & Ancef  Tubes/Lines/Drains: Right peripheral IV    Code Status: DNR and No Intubation   Emergency Contact: Extended Emergency Contact Information  Primary Emergency  Contact: VIRA KIM  Address: [address] [place] [address] [place]  Home Phone: [phone]  Relation: Spouse  Secondary Emergency Contact: VITALIY FERNANDEZ  Address: [address] [place] [address] [facility]  Home Phone: [phone]  Mobile Phone: [phone]  Relation: Daughter   needed? No     Disposition: 75 y.o.female admitted for cellulitis of right lower extremity estimated length of stay [greater than] 48 hrs.     Mykel Thornton DO  Internal Medicine PGY-1           [1]   Past Medical History:  Diagnosis Date    Abdominal pain of multiple sites 06/22/2023    Acute sinusitis 06/22/2023    Acute upper respiratory infection, unspecified 03/14/2017    Acute URI    Anxiety disorder, unspecified     Anxiety    Biceps tendinitis of left upper extremity 06/22/2023    Burning with urination 06/22/2023    Cervical pain 06/22/2023    Cervicalgia     Cervical pain (neck)    Encounter for screening for other viral diseases 12/14/2017    Need for hepatitis C screening test    Hand numbness 12/26/2023    Morbid (severe) obesity due to excess calories (Multi)     Morbid obesity    Pain in left knee     Acute pain of left knee    Paroxysmal SVT (supraventricular tachycardia) 12/26/2023    Personal history of other diseases of the circulatory system     History of hypertension    Personal history of other diseases of the musculoskeletal system and connective tissue     History of arthritis    Personal history of other drug therapy 11/21/2019    History of influenza vaccination    Personal history of other endocrine, nutritional and metabolic disease     History of hyperlipidemia    Personal history of other endocrine, nutritional and metabolic disease     History of diabetes mellitus    Personal history of other specified conditions 04/20/2017    History of dysuria    Pyogenic granuloma of skin and subcutaneous tissue 12/26/2023     Streptococcal pharyngitis 2017    Strep sore throat    Thoracic spine pain 2023    Unspecified abdominal hernia without obstruction or gangrene     Abdominal hernia without obstruction and without gangrene    Unspecified asthma, uncomplicated (HHS-HCC) 2017    Asthmatic bronchitis    Urinary tract infection, site not specified 2017    Acute UTI   [2]   Past Surgical History:  Procedure Laterality Date    CARDIAC ELECTROPHYSIOLOGY PROCEDURE N/A 2024    Procedure: Ablation A-Fib Persistant;  Surgeon: Ney Sommers MD;  Location: [place] [address] [facility];  Service: Electrophysiology;  Laterality: N/A;  EPS 3D W CARTO GA WHOLE CASE.    PT Faith - NEEDS TIME OF PROCEDURE AHEAD OF TIME TO ARRANGE TRANSPORT.     SECTION, CLASSIC  2016     Section    CHOLECYSTECTOMY  2016    Cholecystectomy    OTHER SURGICAL HISTORY  2016    Sigmoidoscopy (Fiberoptic, Therapeutic )    OTHER SURGICAL HISTORY  2022    Lower back surgery    OTHER SURGICAL HISTORY  2021    Carpal tunnel surgery    TOTAL KNEE ARTHROPLASTY  2016    Knee Replacement   [3] No family history on file.  [4]   Allergies  Allergen Reactions    Procaine Other and Palpitations     palpitations    Bactrim [Sulfamethoxazole-Trimethoprim] Itching     Started to feel itchy in legs and arms 24 hr after starting abx.     Empagliflozin Other     Yeast infections   [5] [6] [7]

## 2025-07-17 NOTE — PROGRESS NOTES
Occupational Therapy    Evaluation    Patient Name: Debbie Rees  MRN: [id number]  Department: [address]  Room: [address] [place] [address]  Today's Date: 7/17/2025  Time Calculation  Start Time: 1002  Stop Time: 1012  Time Calculation (min): 10 min    Assessment  IP OT Assessment  OT Assessment: Pt presents at baseline with ADL performance and functional mobility. No further skilled OT needs identified.  Prognosis: Excellent  Barriers to Discharge Home: No anticipated barriers  Evaluation/Treatment Tolerance: Patient tolerated treatment well  Medical Staff Made Aware: Yes  End of Session Communication: Bedside nurse  End of Session Patient Position: Up in chair, Alarm off, not on at start of session (No alarm needed per RN, discussed falls risk re-eval)  Plan:  No Skilled OT: No acute OT goals identified  OT Frequency: OT eval only  OT Discharge Recommendations: No further acute OT  Equipment Recommended upon Discharge: Straight cane (owns)  OT Recommended Transfer Status: Stand by assist  OT - OK to Discharge: Yes (per OT POC)    Subjective   Current Problem:  1. Cellulitis, unspecified cellulitis site          OT Visit Info:  OT Received On: 07/17/25  General Visit Info:  General  Reason for Referral: 76 yo female referred to OT for RLE cellulitis, impaired ADLs/mobility  Referred By: Mao Savage DO  Past Medical History Relevant to Rehab: varicose veins status post vein stipulating, non-insulin-dependent type 2 diabetes on Mounjaro, essential hypertension, anxiety, stage IIIa chronic kidney disease, atrial fibrillation on Eliquis, and hypertrophic cardiomyopathy  Co-Treatment: PT  Co-Treatment Reason: maximize pt safety and outcomes  Prior to Session Communication: Bedside nurse  Patient Position Received: Bed, 3 rail up, Alarm off, not on at start of session  General Comment: Pt pleasant, cooperative with OT evaluation  Precautions:  Medical Precautions: Fall precautions (tele)           Pain:  Pain Assessment  Pain  Assessment: 0-10  0-10 (Numeric) Pain Score: 0 - No pain (while resting)    Objective   Cognition:  Overall Cognitive Status: Within Functional Limits  Arousal/Alertness: Appropriate responses to stimuli  Orientation Level: Oriented X4           Home Living:  Type of Home: House  Lives With: Spouse  Home Adaptive Equipment: Cane  Home Layout: Able to live on main level with bedroom/bathroom, Laundry in basement, Stairs to alternate level with rails  Alternate Level Stairs-Rails: Right  Alternate Level Stairs-Number of Steps: standard flight  Home Access: Stairs to enter with rails  Entrance Stairs-Rails: Right  Entrance Stairs-Number of Steps: 3  Bathroom Shower/Tub: Tub/shower unit  Bathroom Equipment: Grab bars in shower   Prior Function:  Level of [place]: Independent with ADLs and functional transfers, Independent with homemaking with ambulation  Receives Help From: Family  ADL Assistance: Independent  Homemaking Assistance: Independent  Ambulatory Assistance: Independent (no AD in home, cane in community)       ADL:  Eating Assistance: Independent  Grooming Assistance: Independent  Bathing Assistance: Stand by  UE Dressing Assistance: Independent  LE Dressing Assistance: Stand by  Toileting Assistance with Device: Stand by  Functional Assistance: Stand by  ADL Comments: Independently donned socks with figure 4 position. Other ADLs anticipated  Activity Tolerance:  Endurance: Tolerates 10 - 20 min exercise with multiple rests  Bed Mobility/Transfers: Bed Mobility  Bed Mobility: Yes  Bed Mobility 1  Bed Mobility 1: Supine to sitting  Level of Assistance 1: Modified independent  Bed Mobility Comments 1: HOB elevated    Transfers  Transfer: Yes  Transfer 1  Transfer From 1: Sit to  Transfer to 1: Stand  Technique 1: Sit to stand, Stand to sit  Transfer Level of Assistance 1: Modified independent      Functional Mobility:  Functional Mobility  Functional Mobility Performed: Yes  Functional Mobility 1  Surface  1: Level tile  Device 1: No device  Assistance 1: Close supervision  Comments 1: Took lateral steps from bed to chair without difficulty  Sitting Balance:  Static Sitting Balance  Static Sitting-Balance Support: Feet supported  Static Sitting-Level of Assistance: Independent  Standing Balance:  Static Standing Balance  Static Standing-Balance Support: No upper extremity supported  Static Standing-Level of Assistance: Distant supervision     Strength:  Strength Comments: BUE WFL     Coordination:  Movements are Fluid and Coordinated: Yes   Hand Function:  Hand Function  Gross Grasp: Functional  Coordination: Functional  Extremities: RUE   RUE : Within Functional Limits and LUE   LUE: Within Functional Limits    Outcome Measures: [facility] Daily Activity  Putting on and taking off regular lower body clothing: None  Bathing (including washing, rinsing, drying): None  Putting on and taking off regular upper body clothing: None  Toileting, which includes using toilet, bedpan or urinal: A little  Taking care of personal grooming such as brushing teeth: None  Eating Meals: None  Daily Activity - Total Score: 23      Education Documentation  Body Mechanics, taught by Gilbert Castillo OT at 7/17/2025 11:07 AM.  Learner: Patient  Readiness: Acceptance  Method: Explanation  Response: Verbalizes Understanding    Precautions, taught by Gilbert Castillo OT at 7/17/2025 11:07 AM.  Learner: Patient  Readiness: Acceptance  Method: Explanation  Response: Verbalizes Understanding    ADL Training, taught by Gilbert Castillo OT at 7/17/2025 11:07 AM.  Learner: Patient  Readiness: Acceptance  Method: Explanation  Response: Verbalizes Understanding    Education Comments  No comments found.

## 2025-07-17 NOTE — PROGRESS NOTES
07/17/25 0904   Discharge Planning   Living Arrangements Spouse/significant other   Support Systems Spouse/significant other   Assistance Needed A&OX4; independent with ADLs with cane when out; doesn't drive; room air baseline-currently room air-denies CPAP; PCP Annabel Morgan PA-C/denies current home care   Type of Residence Private residence   Number of Stairs to Enter Residence 4   Number of Stairs Within Residence 0   Do you have animals or pets at home? No   Who is requesting discharge planning? Provider   Expected Discharge Disposition Home  (Patient denies home going needs. Pt has been doing own wound care by self)   Does the patient need discharge transport arranged? Yes   [facility] coordination needed? Yes   Has discharge transport been arranged? No   Financial Resource Strain   How hard is it for you to pay for the very basics like food, housing, medical care, and heating? Not hard   Housing Stability   In the last 12 months, was there a time when you were not able to pay the mortgage or rent on time? N   In the past 12 months, how many times have you moved where you were living? 0   At any time in the past 12 months, were you homeless or living in a shelter (including now)? N   Transportation Needs   In the past 12 months, has lack of transportation kept you from medical appointments or from getting medications? no   In the past 12 months, has lack of transportation kept you from meetings, work, or from getting things needed for daily living? No

## 2025-07-17 NOTE — PROGRESS NOTES
Department of Hospital Medicine  Progress Note    Subjective     Debbie Rees is a 75 y.o. female on Hospital Day: 2 of admission presenting with Cellulitis, unspecified cellulitis site. Patient states that her left pain has improved from last night. She recently noticed slower stream of urination. Denies fever, chills, nausea, abdominal pain, constipation or diarrhea.          Objective   Vitals:  Vitals:    07/16/25 1913 07/16/25 2255 07/17/25 0415 07/17/25 0835   BP: 157/78 167/77 165/77 146/61   BP Location:  Left arm Left arm Left arm   Patient Position:  Lying Lying Sitting   Pulse: 57 60 55 57   Resp:  18 16 16   Temp: 35.7 °C (96.3 °F) 36.4 °C (97.5 °F) 35.9 °C (96.6 °F) 36.4 °C (97.5 °F)   TempSrc: Temporal Temporal Temporal Temporal   SpO2:  98% 97% 96%   Weight:       Height:           Intake/Output last 3 Shifts:  I/O last 3 completed shifts:  In: 475 (4.3 mL/kg) [IV Piggyback:475]  Out: 150 (1.3 mL/kg) [Urine:150 (0 mL/kg/hr)]  Weight: 111.1 kg   No intake/output data recorded.         Physical Exam:   Physical Exam  Constitutional:       General: She is not in acute distress.     Appearance: Normal appearance. She is not ill-appearing.   HENT:      Mouth/Throat:      Mouth: Mucous membranes are dry.     Eyes:      Conjunctiva/sclera: Conjunctivae normal.       Cardiovascular:      Rate and Rhythm: Normal rate.      Pulses: Normal pulses.   Pulmonary:      Effort: Pulmonary effort is normal.      Breath sounds: Normal breath sounds.   Abdominal:      Palpations: Abdomen is soft.      Tenderness: There is no abdominal tenderness. There is no guarding.     Musculoskeletal:         General: Tenderness (On RLE) present.      Right lower leg: Edema present.      Left lower leg: Edema present.      Comments: Unable to visualize RLE due to dressing     Skin:     General: Skin is warm and dry.     Neurological:      Mental Status: She is alert.             LABS:    CBC:  Results from last 72 hours   Lab  "Units 07/17/25  0548 07/16/25  1814   WBC AUTO x10*3/uL 8.5 7.7   RBC AUTO x10*6/uL 3.90* 4.42   HEMOGLOBIN g/dL 12.2 13.9   HEMATOCRIT % 38.1 42.6   MCV fL 98 96   MCH pg 31.3 31.4   MCHC g/dL 32.0 32.6   RDW % 14.5 14.5   PLATELETS AUTO x10*3/uL 215 240     CMP:  Results from last 72 hours   Lab Units 07/17/25  0548 07/16/25  2314 07/16/25  1814   SODIUM mmol/L 135*  --  134*   POTASSIUM mmol/L 4.4 4.4 5.4*   CHLORIDE mmol/L 103  --  104   CO2 mmol/L 26  --  24   ANION GAP mmol/L 10  --  11   BUN mg/dL 28*  --  28*   CREATININE mg/dL 1.60*  --  1.65*   EGFR mL/min/1.73m*2 33*  --  32*   GLUCOSE mg/dL 108*  --  116*     Results from last 72 hours   Lab Units 07/17/25  0548 07/16/25  1814   ALBUMIN g/dL 3.4 4.1   ALK PHOS U/L  --  66   ALT U/L  --  31   AST U/L  --  34   BILIRUBIN TOTAL mg/dL  --  0.5     Calcium/Phos:   Results from last 72 hours   Lab Units 07/17/25  0548 07/16/25  1814   CALCIUM mg/dL 9.3 10.0   PHOSPHORUS mg/dL 3.0  --       COAG:   Results from last 72 hours   Lab Units 07/16/25  1814   INR  1.3*     CRP:   Lab Results   Component Value Date    CRP 0.80 07/16/2025       ENDO:  Recent Labs     07/16/25  1814 07/09/25  0948 02/03/25  1155 11/08/24  1421 08/02/24  1006 07/12/24  0533 07/09/24  0526 04/11/24  1105 03/28/24  1501   TSH  --   --  1.81  --   --  1.55 0.68  --  1.47   HGBA1C 7.3* 7.4*  --  7.3* 7.3*  --  7.4*   < > 8.3*    < > = values in this interval not displayed.      CARDIAC:       No lab exists for component: \"CK\", \"CKMBP\"    No data recorded    MICRO/ID:   No results found for the last 90 days.                   No lab exists for component: \"AGALPCRNB\"   Lab Results   Component Value Date    URINECULTURE No significant growth 07/13/2024    BLOODCULT No growth at 4 days -  FINAL REPORT 12/12/2023    BLOODCULT No growth at 4 days -  FINAL REPORT 12/12/2023       IMAGING RESULTS:   Lower extremity venous duplex right  Result Date: 7/16/2025  No evidence for DVT within the right " lower extremity. The previously seen inguinal lymph nodes are not appreciated on today's exam. Signed by Harley Shah MD      ALLERGIES PAST MEDICAL HISTORY PAST SURGICAL SURGERY FAMILY HISTORY SOCIAL HISTORY   Allergies[1] Medical History[2] Surgical History[3] Family History[4] Social History[5]         MEDS:  HOME MEDS SCHEDULED MEDS PRN IV MEDS   Current Outpatient Medications   Medication Instructions    amiodarone (PACERONE) 200 mg, oral, Daily    aspirin 81 mg, Daily    blood sugar diagnostic strip 1 each, miscellaneous, Daily    diclofenac sodium (VOLTAREN) 2 g, Topical, 4 times daily PRN    Eliquis 5 mg, oral, 2 times daily    hydroCHLOROthiazide (HYDRODIURIL) 25 mg, oral, Daily    lisinopril 20 mg, oral, 2 times daily    magnesium oxide (MAG-OX) 400 mg, oral, Daily    metoprolol succinate XL (TOPROL-XL) 100 mg, oral, Daily, Do not crush or chew.    Mounjaro 7.5 mg, subcutaneous, Weekly    sulfamethoxazole-trimethoprim (Bactrim DS) 800-160 mg tablet 1 tablet, oral, 2 times daily    Scheduled Medications[6] PRN Medications[7] Continuous Medications[8]          Assessment/Plan   ASSESSMENT & PLAN  Debbie Rees is a 75 y.o. female with a past medical history of varicose veins status post vein stipulating, non-insulin-dependent type 2 diabetes on Mounjaro, essential hypertension, anxiety, stage IIIa chronic kidney disease, atrial fibrillation on Eliquis, and hypertrophic cardiomyopathy was admitted for cellulitis of right lower extremity.     ACUTE MEDICAL ISSUES  # Right lower extremity cellulitis   Patient presents with right lower extremity poorly demarcated erythema, warmth, and tenderness  Annular 6 cm lesion at medial right calf, was purulent and failed Keflex treatment   Right lower extremity DVT ultrasound negative    -Due to presence of purulent discharge, discontinue Ancef and continue Vancomycin  -Follow-up on wound cultures  -Tylenol for first-line pain control, Oxycodone and Dilaudid for elevated  pain control  -Recommend leg elevation    #Acute kidney injury in setting of CKD  #Recent Bactrim Use  Creatinine 1.60, baseline of 1  UA negative  Fractional excretion of urea 47.9%, suggest intrinsic renal disease  Avoid nephrotoxic medications      CHRONIC MEDICAL ISSUES:  # Atrial fibrillation with RVR-amiodarone, metoprolol,  Eliquis  # Arthritis-Voltaren gel  # Hypertension-hydrochlorothiazide and lisinopril held  #T2D, non-insulin dependent - insulin sliding scale         Fluids: PO and IV PRN  Electrolytes: Replete PRN  Nutrition:Adult diet Regular, Consistent Carb; CCD 75 gm/meal  GI Prophylaxis: None  DVT Prophylaxis: Eliquis,     Access: [Peripheral IV ]   Antibiotics: [None]  Oxygenation: [RA]  CODE STATUS: DNR/DNI    Emergency Contact: Extended Emergency Contact Information  Primary Emergency Contact: VIRA KIM  Address: [address] [place] [address] [facility]  Home Phone: [phone]  Relation: Spouse  Secondary Emergency Contact: VITALIY FERNANDEZ  Address: [address] [place] [address] [facility]  Home Phone: [phone]  Mobile Phone: [phone]  Relation: Daughter   needed? No    Dispo: Home and pending medical clearance.           Janelle Koch MD  Internal Medicine, PGY-1         [1]   Allergies  Allergen Reactions    Procaine Other and Palpitations     palpitations    Bactrim [Sulfamethoxazole-Trimethoprim] Itching     Started to feel itchy in legs and arms 24 hr after starting abx.     Empagliflozin Other     Yeast infections   [2]   Past Medical History:  Diagnosis Date    Abdominal pain of multiple sites 06/22/2023    Acute sinusitis 06/22/2023    Acute upper respiratory infection, unspecified 03/14/2017    Acute URI    Anxiety disorder, unspecified     Anxiety    Biceps tendinitis of left upper extremity 06/22/2023    Burning with urination 06/22/2023    Cervical pain 06/22/2023     Cervicalgia     Cervical pain (neck)    Encounter for screening for other viral diseases 2017    Need for hepatitis C screening test    Hand numbness 2023    Morbid (severe) obesity due to excess calories (Multi)     Morbid obesity    Pain in left knee     Acute pain of left knee    Paroxysmal SVT (supraventricular tachycardia) 2023    Personal history of other diseases of the circulatory system     History of hypertension    Personal history of other diseases of the musculoskeletal system and connective tissue     History of arthritis    Personal history of other drug therapy 2019    History of influenza vaccination    Personal history of other endocrine, nutritional and metabolic disease     History of hyperlipidemia    Personal history of other endocrine, nutritional and metabolic disease     History of diabetes mellitus    Personal history of other specified conditions 2017    History of dysuria    Pyogenic granuloma of skin and subcutaneous tissue 2023    Streptococcal pharyngitis 2017    Strep sore throat    Thoracic spine pain 2023    Unspecified abdominal hernia without obstruction or gangrene     Abdominal hernia without obstruction and without gangrene    Unspecified asthma, uncomplicated (HHS-HCC) 2017    Asthmatic bronchitis    Urinary tract infection, site not specified 2017    Acute UTI   [3]   Past Surgical History:  Procedure Laterality Date    CARDIAC ELECTROPHYSIOLOGY PROCEDURE N/A 2024    Procedure: Ablation A-Fib Persistant;  Surgeon: Ney Sommers MD;  Location: [place] [address] Cardiac Cath [facility];  Service: Electrophysiology;  Laterality: N/A;  EPS 3D W CARTO GA WHOLE CASE.    PT Taoism - NEEDS TIME OF PROCEDURE AHEAD OF TIME TO ARRANGE TRANSPORT.     SECTION, CLASSIC  2016     Section    CHOLECYSTECTOMY  2016    Cholecystectomy    OTHER SURGICAL HISTORY  2016    Sigmoidoscopy (Fiberoptic, Therapeutic )     OTHER SURGICAL HISTORY  12/01/2022    Lower back surgery    OTHER SURGICAL HISTORY  03/22/2021    Carpal tunnel surgery    TOTAL KNEE ARTHROPLASTY  11/29/2016    Knee Replacement   [4] No family history on file.  [5]   Social History  Tobacco Use    Smoking status: Never    Smokeless tobacco: Never   Vaping Use    Vaping status: Never Used   Substance Use Topics    Alcohol use: Never    Drug use: Never   [6] amiodarone, 200 mg, oral, Daily  apixaban, 5 mg, oral, BID  aspirin, 81 mg, oral, Daily  ceFAZolin, 2 g, intravenous, q8h  [Held by provider] hydroCHLOROthiazide, 25 mg, oral, Daily  insulin lispro, 0-10 Units, subcutaneous, TID AC  [Held by provider] lisinopril, 20 mg, oral, BID  magnesium oxide, 400 mg of magnesium oxide, oral, Daily  metoprolol succinate XL, 100 mg, oral, Daily  [7] PRN medications: acetaminophen, dextrose, dextrose, diclofenac sodium, glucagon, glucagon, HYDROmorphone, oxyCODONE, oxyCODONE, vancomycin  [8]

## 2025-07-17 NOTE — CONSULTS
Vancomycin Dosing by Pharmacy- INITIAL    Debbie Rees is a 75 y.o. year old female who Pharmacy has been consulted for vancomycin dosing for cellulitis, skin and soft tissue. Based on the patient's indication and renal status this patient will be dosed based on a goal trough/random level of 10-15.     Renal function is currently declining/JOSE.    Visit Vitals  /77 (BP Location: Left arm, Patient Position: Lying)   Pulse 60   Temp 36.4 °C (97.5 °F) (Temporal)   Resp 18        Lab Results   Component Value Date    CREATININE 1.65 (H) 2025    CREATININE 1.05 (H) 2025    CREATININE 1.03 (H) 2025    CREATININE 1.07 (H) 2024    CREATININE 1.16 (H) 2024    CREATININE 1.07 (H) 2024        Patient weight is as follows:   Vitals:    25 1713   Weight: 111 kg (245 lb)       Cultures:  No results found for the encounter in last 14 days.        No intake/output data recorded.  I/O during current shift:  I/O this shift:  In: -   Out: 150 [Urine:150]    Temp (24hrs), Av.3 °C (97.3 °F), Min:35.7 °C (96.3 °F), Max:36.8 °C (98.2 °F)         Assessment/Plan     Patient has already been given a loading dose of 2,000 mg 25 at 18:35.  Will initiate vancomycin maintenance dose by level due to JOSE. Patient will only be re-dosed when at or below goal.    Follow-up level will be ordered on 25 at 17:30 unless clinically indicated sooner.  Will continue to monitor renal function daily while on vancomycin and order serum creatinine at least every 48 hours if not already ordered.  Follow for continued vancomycin needs, clinical response, and signs/symptoms of toxicity.       Yi Saucedo, InderjitD

## 2025-07-17 NOTE — PROGRESS NOTES
"Vancomycin Dosing by Pharmacy- FOLLOW UP    Debbie Rees is a 75 y.o. year old female who Pharmacy has been consulted for vancomycin dosing for cellulitis, skin and soft tissue. Based on the patient's indication and renal status this patient is being dosed based on a goal AUC of 400-600.     *Will be dosed by troughs due to CKD.\"    Renal function is currently declining, SCR 1.6, CRCL 38.4,.    Current vancomycin dose: 2000 mg given ONCE on  at about 2000hrs.     Most recent random level: 10.6 mcg/mL, on  at 1828hrs.     Visit Vitals  /60 (BP Location: Left arm, Patient Position: Lying)   Pulse 59   Temp 36.4 °C (97.5 °F) (Temporal)   Resp 16        Lab Results   Component Value Date    CREATININE 1.60 (H) 2025    CREATININE 1.65 (H) 2025    CREATININE 1.05 (H) 2025    CREATININE 1.03 (H) 2025    CREATININE 1.07 (H) 2024    CREATININE 1.16 (H) 2024        Patient weight is as follows:   Vitals:    25 1713   Weight: 111 kg (245 lb)       Cultures:  No results found for the encounter in last 14 days.       I/O last 3 completed shifts:  In: 885 (8 mL/kg) [P.O.:360; IV Piggyback:525]  Out: 1300 (11.7 mL/kg) [Urine:1300 (0.3 mL/kg/hr)]  Weight: 111.1 kg   I/O during current shift:  No intake/output data recorded.    Temp (24hrs), Av.2 °C (97.1 °F), Min:35.7 °C (96.3 °F), Max:36.4 °C (97.5 °F)      Assessment/Plan    Within goal random/trough level    This dosing regimen is predicted by [employer] to result in the following pharmacokinetic parameters:    Will dose by Levels.    FOR INFORMATIONAL PURPOSES ONLY:    \"Loading dose: N/A  Regimen: 1250 mg IV every 24 hours.  Start time: 19:06 on 2025  Exposure target: AUC24 (range) 400-600 mg/L.hr   IBS58-53: 447 mg/L.hr  AUC24,ss: 543 mg/L.hr  Probability of AUC24 > 400: 91 %  Ctrough,ss: 17.7 mg/L  Probability of Ctrough,ss > 20: 35 %\"    The next level will be obtained on  at 1900HRS. May be obtained " sooner if clinically indicated.   Will continue to monitor renal function daily while on vancomycin and order serum creatinine at least every 48 hours if not already ordered.  Follow for continued vancomycin needs, clinical response, and signs/symptoms of toxicity.       Raúl Lawson, PharmD

## 2025-07-17 NOTE — PROGRESS NOTES
Pharmacy Medication History Review    Debbie Rese is a 75 y.o. female admitted for Cellulitis, unspecified cellulitis site. Pharmacy reviewed the patient's [id number] medications and allergies for accuracy.    The list below reflectives the updated PTA list. Please review each medication in order reconciliation for additional clarification and justification.  Medications Prior to Admission   Medication Sig Dispense Refill Last Dose/Taking    amiodarone (Pacerone) 200 mg tablet Take 1 tablet (200 mg) by mouth once daily. Do not fill before July 31, 2024. 30 tablet 3 7/16/2025    apixaban (Eliquis) 5 mg tablet Take 1 tablet (5 mg) by mouth 2 times a day. 180 tablet 3 7/16/2025    aspirin 81 mg EC tablet Take 1 tablet (81 mg) by mouth once daily.   7/15/2025 Evening    hydroCHLOROthiazide (HYDRODiuril) 25 mg tablet Take 1 tablet (25 mg) by mouth once daily. Do not fill before July 21, 2024. 30 tablet 3 7/16/2025    lisinopril 20 mg tablet TAKE ONE TABLET BY MOUTH TWO TIMES A DAY (Patient taking differently: Take 1 tablet (20 mg) by mouth once daily.) 180 tablet 0 7/16/2025    magnesium oxide (Mag-Ox) 400 mg tablet TAKE ONE TABLET BY MOUTH ONCE A  DAY 30 tablet 0 7/16/2025    metoprolol succinate XL (Toprol-XL) 100 mg 24 hr tablet Take 1 tablet (100 mg) by mouth once daily. Do not crush or chew. (Patient taking differently: Take 1 tablet (100 mg) by mouth 2 times a day. Do not crush or chew.) 180 tablet 3 7/16/2025    sulfamethoxazole-trimethoprim (Bactrim DS) 800-160 mg tablet Take 1 tablet by mouth 2 times a day for 10 days. 20 tablet 0 7/16/2025    tirzepatide (Mounjaro) 7.5 mg/0.5 mL pen injector Inject 7.5 mg under the skin 1 (one) time per week. 2 mL 11 Past Week    blood sugar diagnostic strip 1 each once daily. 100 each 3         The list below reflectives the updated allergy list. Please review each documented allergy for additional clarification and justification.  Allergies  Reviewed by Carlos  Don on 7/16/2025        Severity Reactions Comments    Procaine Medium Other, Palpitations palpitations    Bactrim [sulfamethoxazole-trimethoprim] Low Itching Started to feel itchy in legs and arms 24 hr after starting abx.     Empagliflozin Low Other Yeast infections            Below are additional concerns with the patient's PTA list.  Confirmed medications with patient.     Yousif Davis, [facility]

## 2025-07-17 NOTE — CARE PLAN
The patient's goals for the shift include      The clinical goals for the shift include Manage patient comfort this shift

## 2025-07-17 NOTE — PROGRESS NOTES
Physical Therapy    Physical Therapy Evaluation    Patient Name: Debbie Rees  MRN: [id number]  Department: [address]  Room: [address] [place] [address]  Today's Date: 7/17/2025   Time Calculation  Start Time: 1003  Stop Time: 1013  Time Calculation (min): 10 min    Assessment/Plan   PT Assessment  PT Assessment Results: Pain, Decreased mobility, Decreased endurance  Rehab Prognosis: Good  Barriers to Discharge Home: No anticipated barriers  Evaluation/Treatment Tolerance: Patient tolerated treatment well  Medical Staff Made Aware: Yes  End of Session Communication: Bedside nurse  End of Session Patient Position: Up in chair, Alarm off, not on at start of session (no alarm needed per RN; discussed falls risk re-eval)    Assessment:   Pt presents near baseline function, ambulating household distances with steady gait; no device needed. Pt voiced no concerns with returning home. No further acute/post-acute PT needs anticipated.     Recommend pt continue to eat all meals OOB and mobilize with nursing staff for remainder of hospital stay.       IP OR SWING BED PT PLAN  Inpatient or Swing Bed: Inpatient  PT Plan  PT Plan: PT Eval only  PT Eval Only Reason: At baseline function  PT Frequency: PT eval only  PT Discharge Recommendations: No further acute PT, No PT needed after discharge  Equipment Recommended upon Discharge: Straight cane (owns, present in room)  PT Recommended Transfer Status: Independent  PT - OK to Discharge: Yes    Subjective     PT Visit Info:  PT Received On: 07/17/25  General Visit Information:  General  Reason for Referral: Pt is a 74 yo F admitted to [facility] on 7/16/25 with RLE pain/cellulitis. PT consulted for impaired mobility.  Past Medical History Relevant to Rehab: varicose veins (s/p vein stipulating), DM2 (on Mounjaro), HTN, anxiety, stage IIIa CKD, a-fib (on Eliquis), hypertrophic cardiomyopathy  Family/Caregiver Present: No  Co-Treatment: OT  Co-Treatment Reason: To maximize pt safety/participation in  mobility  Prior to Session Communication: Bedside nurse  Patient Position Received: Bed, 3 rail up, Alarm off, not on at start of session  General Comment: Pt pleasant and cooperative with PT sarahi.  Home Living:  Home Living  Type of Home: House  Lives With: Spouse  Home Adaptive Equipment: Cane  Home Layout: Able to live on main level with bedroom/bathroom, Laundry in basement, Stairs to alternate level with rails  Alternate Level Stairs-Rails: Right  Alternate Level Stairs-Number of Steps: 12 (to basement (laundry))  Home Access: Stairs to enter with rails  Entrance Stairs-Rails: Right  Entrance Stairs-Number of Steps: 3  Bathroom Shower/Tub: Tub/shower unit  Bathroom Equipment: Grab bars in shower  Prior Level of Function:  Prior Function Per Pt/Caregiver Report  Level of [place]: Independent with ADLs and functional transfers, Independent with homemaking with ambulation  Receives Help From: Family  ADL Assistance: Independent  Homemaking Assistance: Independent  Ambulatory Assistance: Independent (no AD within home; occasional use of cane in community)  Prior Function Comments: Denies recent history of falls.  Precautions:  Precautions  Medical Precautions: Fall precautions  Precautions Comment: Naz Spicer             Objective   Pain:  Pain Assessment  Pain Assessment: 0-10  0-10 (Numeric) Pain Score: 0 - No pain (at rest; reported 1/10 while sitting EOB, resolved at end of session)  Cognition:  Cognition  Overall Cognitive Status: Within Functional Limits  Arousal/Alertness: Appropriate responses to stimuli  Orientation Level: Oriented X4    General Assessments:  General Observation  General Observation: Distal RLE with increased swelling/warmth; wrapped in gauze               Activity Tolerance  Endurance: Tolerates 10 - 20 min exercise with multiple rests    Sensation  Light Touch: No apparent deficits    Strength  Strength Comments: BLE at least 3/5  Static Sitting Balance  Static Sitting-Balance  Support: Feet supported, No upper extremity supported  Static Sitting-Level of Assistance: Independent    Static Standing Balance  Static Standing-Balance Support: No upper extremity supported  Static Standing-Level of Assistance: Independent  Functional Assessments:       Bed Mobility  Bed Mobility: Yes  Bed Mobility 1  Bed Mobility 1: Supine to sitting  Level of Assistance 1: Modified independent  Bed Mobility Comments 1: HOB elevated, increased time    Transfers  Transfer: Yes  Transfer 1  Technique 1: Sit to stand, Stand to sit  Transfer Level of Assistance 1: Modified independent  Trials/Comments 1: use of UE/armrests, increased time    Ambulation/Gait Training  Ambulation/Gait Training Performed: Yes  Ambulation/Gait Training 1  Surface 1: Level tile  Device 1: No device  Assistance 1: Independent  Quality of Gait 1: Trendelenburg, Antalgic, Wide base of support  Comments/Distance (ft) 1: 5' to chair; pt requesting to end there (to eat breakfast); reports ambulating to/from bathroom without a device or assist from nursing  Extremity/Trunk Assessments:  RLE   RLE : Within Functional Limits  LLE   LLE : Within Functional Limits  Outcome Measures:  [facility] Basic Mobility  Turning from your back to your side while in a flat bed without using bedrails: None  Moving from lying on your back to sitting on the side of a flat bed without using bedrails: None  Moving to and from bed to chair (including a wheelchair): None  Standing up from a chair using your arms (e.g. wheelchair or bedside chair): None  To walk in hospital room: None  Climbing 3-5 steps with railing: A little  Basic Mobility - Total Score: 23        Education Documentation  Body Mechanics, taught by Larissa Kruger, PT at 7/17/2025 11:33 AM.  Learner: Patient  Readiness: Acceptance  Method: Explanation, Demonstration  Response: Verbalizes Understanding, Demonstrated Understanding  Comment: Role of acute PT, POC, DC planning, hospital safety, importance  of mobility during recovery    Mobility Training, taught by Larissa Kruger, PT at 7/17/2025 11:33 AM.  Learner: Patient  Readiness: Acceptance  Method: Explanation, Demonstration  Response: Verbalizes Understanding, Demonstrated Understanding  Comment: Role of acute PT, POC, DC planning, hospital safety, importance of mobility during recovery    Education Comments  No comments found.

## 2025-07-18 ENCOUNTER — PHARMACY VISIT (OUTPATIENT)
Dept: PHARMACY | Facility: CLINIC | Age: 75
End: 2025-07-18
Payer: MEDICARE

## 2025-07-18 VITALS
TEMPERATURE: 95 F | OXYGEN SATURATION: 94 % | RESPIRATION RATE: 17 BRPM | SYSTOLIC BLOOD PRESSURE: 144 MMHG | WEIGHT: 245 LBS | BODY MASS INDEX: 39.37 KG/M2 | HEART RATE: 57 BPM | HEIGHT: 66 IN | DIASTOLIC BLOOD PRESSURE: 61 MMHG

## 2025-07-18 LAB
ALBUMIN SERPL BCP-MCNC: 3.5 G/DL (ref 3.4–5)
ANION GAP SERPL CALC-SCNC: 8 MMOL/L (ref 10–20)
BUN SERPL-MCNC: 27 MG/DL (ref 6–23)
CALCIUM SERPL-MCNC: 9.5 MG/DL (ref 8.6–10.3)
CHLORIDE SERPL-SCNC: 105 MMOL/L (ref 98–107)
CO2 SERPL-SCNC: 28 MMOL/L (ref 21–32)
CREAT SERPL-MCNC: 1.42 MG/DL (ref 0.5–1.05)
EGFRCR SERPLBLD CKD-EPI 2021: 39 ML/MIN/1.73M*2
ERYTHROCYTE [DISTWIDTH] IN BLOOD BY AUTOMATED COUNT: 14.6 % (ref 11.5–14.5)
GLUCOSE BLD MANUAL STRIP-MCNC: 129 MG/DL (ref 74–99)
GLUCOSE BLD MANUAL STRIP-MCNC: 132 MG/DL (ref 74–99)
GLUCOSE BLD MANUAL STRIP-MCNC: 155 MG/DL (ref 74–99)
GLUCOSE SERPL-MCNC: 129 MG/DL (ref 74–99)
HCT VFR BLD AUTO: 39.4 % (ref 36–46)
HGB BLD-MCNC: 12.5 G/DL (ref 12–16)
MAGNESIUM SERPL-MCNC: 1.99 MG/DL (ref 1.6–2.4)
MCH RBC QN AUTO: 31.2 PG (ref 26–34)
MCHC RBC AUTO-ENTMCNC: 31.7 G/DL (ref 32–36)
MCV RBC AUTO: 98 FL (ref 80–100)
NRBC BLD-RTO: 0 /100 WBCS (ref 0–0)
PHOSPHATE SERPL-MCNC: 2.6 MG/DL (ref 2.5–4.9)
PLATELET # BLD AUTO: 211 X10*3/UL (ref 150–450)
POTASSIUM SERPL-SCNC: 4.1 MMOL/L (ref 3.5–5.3)
RBC # BLD AUTO: 4.01 X10*6/UL (ref 4–5.2)
SODIUM SERPL-SCNC: 137 MMOL/L (ref 136–145)
WBC # BLD AUTO: 7 X10*3/UL (ref 4.4–11.3)

## 2025-07-18 PROCEDURE — 2500000002 HC RX 250 W HCPCS SELF ADMINISTERED DRUGS (ALT 637 FOR MEDICARE OP, ALT 636 FOR OP/ED)

## 2025-07-18 PROCEDURE — 2500000004 HC RX 250 GENERAL PHARMACY W/ HCPCS (ALT 636 FOR OP/ED)

## 2025-07-18 PROCEDURE — 80069 RENAL FUNCTION PANEL: CPT

## 2025-07-18 PROCEDURE — 99239 HOSP IP/OBS DSCHRG MGMT >30: CPT

## 2025-07-18 PROCEDURE — 36415 COLL VENOUS BLD VENIPUNCTURE: CPT

## 2025-07-18 PROCEDURE — RXMED WILLOW AMBULATORY MEDICATION CHARGE

## 2025-07-18 PROCEDURE — 85027 COMPLETE CBC AUTOMATED: CPT

## 2025-07-18 PROCEDURE — 2500000001 HC RX 250 WO HCPCS SELF ADMINISTERED DRUGS (ALT 637 FOR MEDICARE OP)

## 2025-07-18 PROCEDURE — 83735 ASSAY OF MAGNESIUM: CPT

## 2025-07-18 PROCEDURE — G0378 HOSPITAL OBSERVATION PER HR: HCPCS

## 2025-07-18 PROCEDURE — 82947 ASSAY GLUCOSE BLOOD QUANT: CPT

## 2025-07-18 RX ORDER — OXYCODONE HYDROCHLORIDE 5 MG/1
5 TABLET ORAL EVERY 6 HOURS PRN
Qty: 4 TABLET | Refills: 0 | Status: SHIPPED | OUTPATIENT
Start: 2025-07-18 | End: 2025-07-22

## 2025-07-18 RX ORDER — ONDANSETRON HYDROCHLORIDE 2 MG/ML
4 INJECTION, SOLUTION INTRAVENOUS ONCE
Status: COMPLETED | OUTPATIENT
Start: 2025-07-18 | End: 2025-07-18

## 2025-07-18 RX ORDER — OXYCODONE HYDROCHLORIDE 5 MG/1
5 TABLET ORAL EVERY 6 HOURS PRN
Qty: 15 TABLET | Refills: 0 | Status: SHIPPED | OUTPATIENT
Start: 2025-07-18

## 2025-07-18 RX ORDER — DOXYCYCLINE 100 MG/1
100 CAPSULE ORAL 2 TIMES DAILY
Qty: 14 CAPSULE | Refills: 0 | Status: SHIPPED | OUTPATIENT
Start: 2025-07-18 | End: 2025-07-25

## 2025-07-18 RX ORDER — OXYCODONE HYDROCHLORIDE 5 MG/1
5 TABLET ORAL AS NEEDED
Qty: 15 TABLET | Refills: 0 | Status: SHIPPED | OUTPATIENT
Start: 2025-07-18 | End: 2025-07-18

## 2025-07-18 RX ORDER — OXYCODONE HYDROCHLORIDE 5 MG/1
5 TABLET ORAL EVERY 6 HOURS PRN
Qty: 15 TABLET | Refills: 0 | Status: SHIPPED | OUTPATIENT
Start: 2025-07-18 | End: 2025-07-18

## 2025-07-18 RX ADMIN — Medication 1 TABLET: at 09:29

## 2025-07-18 RX ADMIN — OXYCODONE HYDROCHLORIDE 5 MG: 5 TABLET ORAL at 11:56

## 2025-07-18 RX ADMIN — ONDANSETRON 4 MG: 2 INJECTION, SOLUTION INTRAMUSCULAR; INTRAVENOUS at 14:12

## 2025-07-18 RX ADMIN — APIXABAN 5 MG: 5 TABLET, FILM COATED ORAL at 09:28

## 2025-07-18 RX ADMIN — AMIODARONE HYDROCHLORIDE 200 MG: 200 TABLET ORAL at 09:37

## 2025-07-18 RX ADMIN — ASPIRIN 81 MG: 81 TABLET, DELAYED RELEASE ORAL at 09:29

## 2025-07-18 RX ADMIN — OXYCODONE HYDROCHLORIDE 5 MG: 5 TABLET ORAL at 05:23

## 2025-07-18 ASSESSMENT — COGNITIVE AND FUNCTIONAL STATUS - GENERAL
CLIMB 3 TO 5 STEPS WITH RAILING: A LITTLE
DAILY ACTIVITIY SCORE: 24
MOBILITY SCORE: 23

## 2025-07-18 ASSESSMENT — PAIN DESCRIPTION - LOCATION: LOCATION: LEG

## 2025-07-18 ASSESSMENT — PAIN DESCRIPTION - DESCRIPTORS: DESCRIPTORS: RADIATING;ACHING

## 2025-07-18 ASSESSMENT — PAIN - FUNCTIONAL ASSESSMENT
PAIN_FUNCTIONAL_ASSESSMENT: 0-10
PAIN_FUNCTIONAL_ASSESSMENT: 0-10

## 2025-07-18 ASSESSMENT — ACTIVITIES OF DAILY LIVING (ADL): LACK_OF_TRANSPORTATION: NO

## 2025-07-18 ASSESSMENT — PAIN SCALES - GENERAL
PAINLEVEL_OUTOF10: 0 - NO PAIN
PAINLEVEL_OUTOF10: 5 - MODERATE PAIN

## 2025-07-18 ASSESSMENT — PAIN DESCRIPTION - ORIENTATION: ORIENTATION: RIGHT

## 2025-07-18 NOTE — DISCHARGE INSTRUCTIONS
Wound care RIGHT lower leg:  Daily care --   -- may take a brief shower and cleanse wound using basic soap/water, then rinse and pat dry    - apply Cavilon AROUND the wound to protect skin  -- apply Medihoney gel to wound sized piece of Xeroform and place onto wound bed  -- cover with gauze 4x4s  -- secure with conform wrap and Tubi-sleeve    Elevate leg - avoid a dependent position!   Follow up at the [facility] as arranged!  There is an entrance to the center at south end of the hospital.        You were admitted due to cellulitis  which is treated by antibiotics. You are now stable enough to be discharged home.  Please start taking:  -Doxycycline 100mg, take 1 capsule twice daily for 7 days   -Oxycodone 5mg, if needed for pain    Stop taking:  -Bactrim     The following recommendations are made for you at time of hospital discharge:  - Please take your home medications as instructed prior to hospitalization.   - The following changes to your home medications have been made during your hospital stay:  - Please follow-up with your primary care provider within 5-7 days from time of discharge. Likely will need to bring photo ID and insurance card to your appointment.   -  services has been requested to make an appointment for you however if you do not hear back from them within 2-3 days, please call [phone] to find out about appointments with  services.  - If you experience any worsening symptoms or any new acute concerns arise, please contact your primary care provider to discuss and possibly arrange an appointment. If you cannot get in touch with provider or severe symptoms are present, please return to nearest emergency room/urgent care for evaluation and treatment.

## 2025-07-18 NOTE — CARE PLAN
The patient's goals for the shift include      The clinical goals for the shift include Patient safety

## 2025-07-18 NOTE — DISCHARGE SUMMARY
Discharge Diagnosis  Cellulitis, unspecified cellulitis site       Issues Requiring Follow-Up  Follow-up with PCP  Follow-up at Wound Care Center    Discharge Meds     Medication List      START taking these medications     doxycycline 100 mg capsule; Commonly known as: Vibramycin; Take 1   capsule (100 mg) by mouth 2 times a day for 7 days. Take with at least 8   ounces (large glass) of water, do not lie down for 30 minutes after   * oxyCODONE 5 mg immediate release tablet; Commonly known as:   Roxicodone; Take 1 tablet (5 mg) by mouth every 6 hours if needed for   severe pain (7 - 10).   * oxyCODONE 5 mg immediate release tablet; Commonly known as:   Roxicodone; Take 1 tablet (5 mg) by mouth every 6 hours if needed for   moderate pain (4 - 6) for up to 4 days.  * This list has 2 medication(s) that are the same as other medications   prescribed for you. Read the directions carefully, and ask your doctor or   other care provider to review them with you.     CONTINUE taking these medications     amiodarone 200 mg tablet; Commonly known as: Pacerone; Take 1 tablet   (200 mg) by mouth once daily. Do not fill before July 31, 2024.   aspirin 81 mg EC tablet   blood sugar diagnostic; 1 each once daily.   Eliquis 5 mg tablet; Generic drug: apixaban; Take 1 tablet (5 mg) by   mouth 2 times a day.   hydroCHLOROthiazide 25 mg tablet; Commonly known as: HYDRODiuril; Take 1   tablet (25 mg) by mouth once daily. Do not fill before July 21, 2024.   lisinopril 20 mg tablet; TAKE ONE TABLET BY MOUTH TWO TIMES A DAY   magnesium oxide 400 mg tablet; Commonly known as: Mag-Ox; TAKE ONE   TABLET BY MOUTH ONCE A  DAY   metoprolol succinate  mg 24 hr tablet; Commonly known as:   Toprol-XL; Take 1 tablet (100 mg) by mouth once daily. Do not crush or   chew.   Mounjaro 7.5 mg/0.5 mL pen injector; Generic drug: tirzepatide; Inject   7.5 mg under the skin 1 (one) time per week.     STOP taking these medications      sulfamethoxazole-trimethoprim 800-160 mg tablet; Commonly known as:   Bactrim DS       Test Results Pending At Discharge  Pending Labs       Order Current Status    Tissue/Wound Culture/Smear Preliminary result            Hospital Course  Debbie Rees is a 75 y.o. female with a past medical history of varicose veins status post vein stipulating, non-insulin-dependent type 2 diabetes on Mounjaro, essential hypertension, anxiety, stage IIIa chronic kidney disease, atrial fibrillation on Eliquis, and hypertrophic cardiomyopathy was admitted for cellulitis of right lower extremity.     In the ED, patient's BP was 157/78, pulse was 57. Labs showed Glucose 116, Na 134, K 5.4, BUN 26, Cr 1.65 (baseline 1.05), and AST 32. Right Lower Extremity Venous Doppler Ultrasound showed no evidence of DVT. She was given Toradol, 1L LR, started on Zosyn 4.5 g IV, and vancomycin 2000 milligrams IV.      On the floor, patient was started on Ancef and Vancomycin. Due to purulent discharge from the right lower extremity and failed Keflex treatment prior to admission, Ancef was discontinued and Vancomycin was continued. Patient's creatinine improved to 1.42, wound cultures showed no growth after 2 days. Patient's pain and swelling of her right lower extremity improved. Patient is being discharged with Doxycycline for seven more days, oxycodone for pain management for three days, and follow-up with PCP and Wound Care Center.     Pertinent Physical Exam At Time of Discharge  Physical Exam  Constitutional:       General: She is not in acute distress.     Appearance: Normal appearance. She is not ill-appearing.   HENT:      Mouth/Throat:      Mouth: Mucous membranes are dry.      Eyes:      Conjunctiva/sclera: Conjunctivae normal.         Cardiovascular:      Rate and Rhythm: Normal rate.      Pulses: Normal pulses.   Pulmonary:      Effort: Pulmonary effort is normal.      Breath sounds: Normal breath sounds.   Abdominal:      Palpations:  Abdomen is soft.      Tenderness: There is no abdominal tenderness. There is no guarding.      Musculoskeletal:         General: Tenderness (On RLE) present.      Right lower leg: Edema present.      Left lower leg: Edema present.      Comments: Unable to visualize RLE due to dressing      Skin:     General: Skin is warm and dry.      Neurological:      Mental Status: She is alert.     Outpatient Follow-Up  Future Appointments   Date Time Provider Department Center   7/21/2025  9:15 AM Maria Isabel Monae MD [facility]   1/14/2026  9:30 AM CARLOS Sanchez[address]         Janelle Koch MD  Internal Medicine, PGY-1

## 2025-07-18 NOTE — DOCUMENTATION CLARIFICATION NOTE
"    PATIENT:               VIVIAN KIM  ACCT #:                  [id number]  MRN:                       [id number]  :                       1950  ADMIT DATE:       2025 5:13 PM  DISCH DATE:  RESPONDING PROVIDER #:        [id number]          PROVIDER RESPONSE TEXT:    Cellulitis is unrelated to Diabetes    CDI QUERY TEXT:    Clarification    Instruction:    Based on your assessment of the patient and the clinical information, please provide the requested documentation by clicking on the appropriate radio button and enter any additional information if prompted.    Question: Please further clarify the relationship between DM and cellulitis    When answering this query, please exercise your independent professional judgment. The fact that a question is being asked, does not imply that any particular answer is desired or expected.    The patient's clinical indicators include:  Clinical Information: 75 year old female presenting with RLE cellulitis.    Clinical Indicators:   H/P: \"PMH...non-insulin-dependent type 2 diabetes...Right lower extremity cellulitis\"    - Labs:  Hgb A1C 7.3 ()  Glucose 116, 108, 129  Estimated Average Glucose: 163 ()     Tissue Culture: \"No growth to date\"    Treatment:  Insulin Lispro 0-10u SQ TID (-current), Zosyn 4.5g IV x1 (), Vanco 1250mg IV x1 () Vanco 2g IV x1 (), Ancef 2g IV q8hrs (), repeated VS, repeated labs, supportive care.    Risk Factors: 75 year old female being treated for cellulitis in the setting of type 2 DM.  Options provided:  -- Cellulitis is related to Diabetes  -- Cellulitis is unrelated to Diabetes  -- Other - I will add my own diagnosis  -- Refer to Clinical Documentation Reviewer    Query created by: Laura Mera on 2025 9:38 AM      Electronically signed by:  SHON MALIK MD 2025 11:19 AM          "

## 2025-07-18 NOTE — CONSULTS
Wound Care Consult     Visit Date: 7/18/2025      Patient Name: Debbie Rees         MRN: [id number]             Reason for Consult:  RLE wound      Wound History:   Patient reports a blister formed 2 weeks ago which progressed to a draining wound with thick yellow drainage     Pertinent Labs:  WBC 7.0    Assessment:   Alert, pleasant 74 y/o CF was admitted 7-16-25 with RLE cellulitis.   History includes varicose veins to BLE with stripping.  Patient reports, and shows, how her LLE no longer swells but her RLE still does, with occasional small blisters, the present one is the largest she has had.  She does not wear compression of any form, but her PCP has suggested that maybe she should begin once this infection clears.  The tender open bullae now is 3.5 x 4.0 x 0.2 cm, well-defined open edges, shallow red bed with thin veil of yellow slough, scant s/s drainage.  There is mild erythema of the local edwin-wound skin only.  The Vaseline dressing on it was quite adhered to bed and gently soaked to remove.      Goal:  donate moisture for autolysis and healing, develop plan for ongoing wound management and edema control.  Recommend: Cavilon, Medihoney, Xeroform, 4x4s, conform wrap, Tubi-sleeve and elevation with follow up at our [facility].   Orders were obtained, care provided, patient educated on the procedure which she feels she can do herself at home.  Starter supplies were provided.  She hopes for discharge today.  She is very willing to go to the Wound Care Center, Care Coordination is scheduling for her.  She really wants to shower briefly each day and this should be OK as long as her leg is not dependent too long, and then elevated.  We discussed edema control measures.  At this time her RLE is too tender for any form of compression, a thin Tubi-sleeve was used to helped secure the dressing, however.      Patient denies any other wounds or skin changes.    Wound Plan:  Cavilon, Medihoney, Xeroform, 4x4s,  conform wrap, Tubi-sleeve and elevation with follow up at our [facility].       Huseyin Teixeira RN, [facility], [facility]  7/18/2025  12:19 PM

## 2025-07-18 NOTE — PROGRESS NOTES
07/18/25 0813   Discharge Planning   Living Arrangements Spouse/significant other   Support Systems Spouse/significant other   Assistance Needed Patient is A&OX4, independent in ADLs, does not drive but children do, ambulates without assistive devices and wears no O2, CPAP or Bipap at baseline. No active HC agency. Laura Chatman   Type of Residence Private residence   Number of Stairs to Enter Residence 4   Number of Stairs Within Residence 0   Do you have animals or pets at home? No   Who is requesting discharge planning? Provider   Expected Discharge Disposition Home  (Patient denies home going needs. Pt has been doing own wound care by self)   Does the patient need discharge transport arranged? Yes   [facility] coordination needed? Yes   Has discharge transport been arranged? No   Financial Resource Strain   How hard is it for you to pay for the very basics like food, housing, medical care, and heating? Not hard   Housing Stability   In the last 12 months, was there a time when you were not able to pay the mortgage or rent on time? N   In the past 12 months, how many times have you moved where you were living? 0   At any time in the past 12 months, were you homeless or living in a shelter (including now)? N   Transportation Needs   In the past 12 months, has lack of transportation kept you from medical appointments or from getting medications? no   In the past 12 months, has lack of transportation kept you from meetings, work, or from getting things needed for daily living? No

## 2025-07-18 NOTE — HOSPITAL COURSE
Debbie Rees is a 75 y.o. female with a past medical history of varicose veins status post vein stipulating, non-insulin-dependent type 2 diabetes on Mounjaro, essential hypertension, anxiety, stage IIIa chronic kidney disease, atrial fibrillation on Eliquis, and hypertrophic cardiomyopathy was admitted for cellulitis of right lower extremity.     In the ED, patient's BP was 157/78, pulse was 57. Labs showed Glucose 116, Na 134, K 5.4, BUN 26, Cr 1.65 (baseline 1.05), and AST 32. Right Lower Extremity Venous Doppler Ultrasound showed no evidence of DVT. She was given Toradol, 1L LR, started on Zosyn 4.5 g IV, and vancomycin 2000 milligrams IV.      On the floor, patient was started on Ancef and Vancomycin. Due to purulent discharge from the right lower extremity and failed Keflex treatment prior to admission, Ancef was discontinued and Vancomycin was continued. Patient's creatinine improved to 1.42, wound cultures showed no growth after 2 days. Patient's pain and swelling of her right lower extremity improved. Patient is being discharged with Doxycycline for seven more days, oxycodone for pain management for three days, and follow-up with PCP and Wound Care Center.

## 2025-07-19 LAB
BACTERIA SPEC CULT: NORMAL
GRAM STN SPEC: NORMAL
GRAM STN SPEC: NORMAL

## 2025-07-21 ENCOUNTER — OFFICE VISIT (OUTPATIENT)
Dept: WOUND CARE | Facility: HOSPITAL | Age: 75
End: 2025-07-21
Payer: MEDICARE

## 2025-07-21 PROCEDURE — 99213 OFFICE O/P EST LOW 20 MIN: CPT | Performed by: SURGERY

## 2025-07-21 PROCEDURE — 97602 WOUND(S) CARE NON-SELECTIVE: CPT

## 2025-07-21 PROCEDURE — 99213 OFFICE O/P EST LOW 20 MIN: CPT

## 2025-07-21 PROCEDURE — 97602 WOUND(S) CARE NON-SELECTIVE: CPT | Performed by: SURGERY

## 2025-07-21 NOTE — SIGNIFICANT EVENT
Follow up phone call: no contact made. Left a voicemail on the home phone number listed in the chart and callback number provided.

## 2025-07-22 ENCOUNTER — PATIENT OUTREACH (OUTPATIENT)
Dept: PRIMARY CARE | Facility: CLINIC | Age: 75
End: 2025-07-22
Payer: MEDICARE

## 2025-07-22 NOTE — PROGRESS NOTES
TCM completed 07/22/25   Discharge Facility: [facility]  Discharge Diagnosis: Cellulitis, unspecified cellulitis site   Admission Date: 7/16/25  Discharge Date: 7/18/25   Discharge Disposition: Home     PCP Appointment Date: 1/14/2026  Specialist Appointment Date: Wound- 7/21/25, 7/28/25    Hospital Encounter and Summary Linked: Yes  ED to Hosp-Admission (Discharged) with Yao Banerjee DO; Monique Gale DO (07/16/2025)                                   Unable to reach patient; Two attempts were made to reach patient within two business days after discharge. No return call as of this note.  Needs seen by: 7/31/25.

## 2025-07-28 ENCOUNTER — OFFICE VISIT (OUTPATIENT)
Dept: WOUND CARE | Facility: HOSPITAL | Age: 75
End: 2025-07-28
Payer: MEDICARE

## 2025-07-28 DIAGNOSIS — I48.21 PERMANENT ATRIAL FIBRILLATION (MULTI): ICD-10-CM

## 2025-07-28 DIAGNOSIS — I48.91 ATRIAL FIBRILLATION WITH RAPID VENTRICULAR RESPONSE (MULTI): ICD-10-CM

## 2025-07-28 DIAGNOSIS — E11.42 TYPE 2 DIABETES MELLITUS WITH DIABETIC POLYNEUROPATHY, WITHOUT LONG-TERM CURRENT USE OF INSULIN: Primary | ICD-10-CM

## 2025-07-28 PROCEDURE — 99212 OFFICE O/P EST SF 10 MIN: CPT

## 2025-07-28 PROCEDURE — 99212 OFFICE O/P EST SF 10 MIN: CPT | Performed by: SURGERY

## 2025-07-28 NOTE — PROGRESS NOTES
Placing referral for PAP renewal.    Gavin JansenD, BCACP  Clinical Pharmacy Specialist-Primary Care  Ph: [phone]  Fax: [phone]

## 2025-07-29 NOTE — PROGRESS NOTES
Clinical Pharmacy  Visit    Debbie Rees is a 75 y.o. female was referred to Clinical Pharmacy Team to complete a post-discharge medication optimization and monitoring visit. The patient was referred for their No chief complaint on file..    Pt is here for Follow Up appointment.     Admission Date: 7/15/24  Discharge Date: 7/20/24    Referring Provider/PCP: Annabel Morgan PA-C  Last Visit: 7/9/25  Next visit: 1/14/26    Referring Provider: Ney Sommers (Cardiology)  Last Visit: 5/14/25  Next visit: not scheduled    Subjective   HPI  DIABETES MELLITUS TYPE II:    Diagnosed with diabetes: At least 15 years   Known diabetic complications: neuropathy and nephropathy.  Does patient follow with Endocrinology: No  Last optometry exam: Spring of 2024  Most recent visit in Podiatry: Every 2 months     Current diabetic medications include:  Mounjaro 7.5 mg once weekly on Wednesday    Clarifications to above regimen: None  Adverse Effects: Slight diarrhea (getting better) ***    Past medications  Jardiance- Yeast infections  Metformin- IR- diarrhea  Semaglutide (Ozempic) 2 mg once weekly (Wednesday)    Glucose Readings:  Glucometer/CGM Type: Accu-chek Glucometer  Patient tests BG 1 times per day on and off  Current home BG readings: ***  Previous home BG readings: 120-140  Any episodes of hypoglycemia? No,  .  Did patient treat episode of hypoglycemia appropriately? N/A  Does the patient have a prescription for ready-to-use Glucagon? Not on insulin  Does pt have proteinuria? Yes    Lifestyle: (appetite down)  Diet: ***  Physical Activity: ***    Secondary Prevention:  Statin? No, age of 75  ACE-I/ARB? Yes  Aspirin? No    Pertinent PMH Review:  PMH of Pancreatitis: No  PMH of Retinopathy: No  PMH of Urinary Tract Infections: No  PMH of MTC: No    ATRIAL FIBRILLATION persistent   Does patient follow with cardiology? Yes  Last cardiology appointment: 8/28/24    Diagnosis:  Onset: at least 2023  Patient is projected to be on  anticoagulation indefinitely    [id number] Score: [4]  Age: [<65 (0)] [65-74 (+1)] [> 75 (+2)]: 1  Sex: [Male/Female (+1)]: 1  CHF history: [No/Yes(+1)]: 1  Hypertension history: [No/Yes(+1)]: 1  Stroke/TIA/thromboembolism history: [No/Yes(+2)]: 0  Vascular disease history (prior MI, peripheral artery disease, aortic plaque): [No/Yes(+1)]: 0  Diabetes history: [No/Yes(+1)]: 0    Pertinent Medical History  Medical history: none  GI bleed, PE/DVT, Stroke, Previous Falls, Dementia, Hypotension  Social history: None  Alcohol Abuse, High Risk Profession  Medication history: None  SSRI, Antiplatelet therapy, NSAIDs     Current atrial fibrillations medications include:  Rate Control: metoprolol succinate 100 mg daily, amiodarone 200 mg daily  Anticoagulation: apixaban 5 mg twice daily    Screening for dose adjustment:  No dose change recommended at this time    Monitoring:  Date of last BMP: 7/20/24  Last echo: 7/11/24  Recent Hospitalizations: 7/15/24  Recent Falls/Trauma: none  Changes in Tobacco or Alcohol Intake: none     Drug Interactions  No relevant drug interactions were noted.    Medication System Management  Patients preferred pharmacy: [facility]  Adherence/Organization: good  Affordability/Accessibility: Mounjaro and Eliquis [facility] PAP good through 8/14/25      Objective   Allergies   Allergen Reactions    Procaine Other and Palpitations     palpitations    Bactrim [Sulfamethoxazole-Trimethoprim] Itching     Started to feel itchy in legs and arms 24 hr after starting abx.     Empagliflozin Other     Yeast infections     Social History     Social History Narrative    Not on file      Medication Review  Current Outpatient Medications   Medication Instructions    amiodarone (PACERONE) 200 mg, oral, Daily    aspirin 81 mg, Daily    blood sugar diagnostic strip 1 each, miscellaneous, Daily    Eliquis 5 mg, oral, 2 times daily    hydroCHLOROthiazide (HYDRODIURIL) 25 mg, oral, Daily    lisinopril 20 mg, oral, 2 times  daily    magnesium oxide (MAG-OX) 400 mg, oral, Daily    metoprolol succinate XL (TOPROL-XL) 100 mg, oral, Daily, Do not crush or chew.    Mounjaro 7.5 mg, subcutaneous, Weekly    oxyCODONE (ROXICODONE) 5 mg, oral, Every 6 hours PRN      Vitals  BP Readings from Last 2 Encounters:   07/18/25 144/61   07/09/25 130/83     BMI Readings from Last 1 Encounters:   07/16/25 39.54 kg/m²      Labs  A1C  Lab Results   Component Value Date    HGBA1C 7.3 (H) 07/16/2025    HGBA1C 7.4 (A) 07/09/2025    HGBA1C 7.3 (A) 11/08/2024     BMP  Lab Results   Component Value Date    CALCIUM 9.5 07/18/2025     07/18/2025    K 4.1 07/18/2025    CO2 28 07/18/2025     07/18/2025    BUN 27 (H) 07/18/2025    CREATININE 1.42 (H) 07/18/2025    EGFR 39 (L) 07/18/2025     LFTs  Lab Results   Component Value Date    ALT 31 07/16/2025    AST 34 07/16/2025    ALKPHOS 66 07/16/2025    BILITOT 0.5 07/16/2025     FLP  Lab Results   Component Value Date    TRIG 82 07/09/2025    CHOL 118 07/09/2025    LDLF 91 09/22/2023    LDLCALC 44 07/09/2025    HDL 58 07/09/2025     Urine Microalbumin  Lab Results   Component Value Date    MICROALBCREA 7 07/09/2025     Wt Readings from Last 3 Encounters:   07/16/25 111 kg (245 lb)   07/09/25 111 kg (245 lb)   05/28/25 113 kg (249 lb)      There is no height or weight on file to calculate BMI.       Assessment/Plan   Problem List Items Addressed This Visit    None    Patient's goal A1c is < 7.5%.  Is pt at goal? Yes, 7.4 (7/9/25)     Medication Changes:  CONTINUE  Mounjaro 7.5 mg once weekly           Patient is to be on anticoagulation indefinitely  Rationale for plan: chadsvasc of 4, sees cardiology     Medication Changes:  Continue  Apixaban 5 mg twice daily            Clinical Pharmacist follow-up: *** weeks, Telehealth visit    Continue all meds under the continuation of care with the referring provider and clinical pharmacy team.    Thank you,  Gavin JansenD, [facility]  Clinical Pharmacy  Specialist-Primary Care  : [phone]  Fax: [phone]    Verbal consent to manage patient's drug therapy was obtained from the patient. They were informed they may decline to participate or withdraw from participation in pharmacy services at any time.

## 2025-07-30 DIAGNOSIS — I10 ESSENTIAL HYPERTENSION: ICD-10-CM

## 2025-07-30 RX ORDER — LISINOPRIL 20 MG/1
20 TABLET ORAL 2 TIMES DAILY
Qty: 180 TABLET | Refills: 0 | Status: SHIPPED | OUTPATIENT
Start: 2025-07-30

## 2025-08-01 ENCOUNTER — APPOINTMENT (OUTPATIENT)
Dept: PHARMACY | Facility: HOSPITAL | Age: 75
End: 2025-08-01
Payer: MEDICARE

## 2025-08-06 ENCOUNTER — APPOINTMENT (OUTPATIENT)
Dept: PHARMACY | Facility: HOSPITAL | Age: 75
End: 2025-08-06
Payer: MEDICARE

## 2025-08-06 ENCOUNTER — PATIENT OUTREACH (OUTPATIENT)
Dept: PRIMARY CARE | Facility: CLINIC | Age: 75
End: 2025-08-06

## 2025-08-06 DIAGNOSIS — E11.42 TYPE 2 DIABETES MELLITUS WITH DIABETIC POLYNEUROPATHY, WITHOUT LONG-TERM CURRENT USE OF INSULIN: ICD-10-CM

## 2025-08-06 DIAGNOSIS — I48.91 ATRIAL FIBRILLATION WITH RAPID VENTRICULAR RESPONSE (MULTI): ICD-10-CM

## 2025-08-06 DIAGNOSIS — I48.21 PERMANENT ATRIAL FIBRILLATION (MULTI): Primary | ICD-10-CM

## 2025-08-06 NOTE — PROGRESS NOTES
Clinical Pharmacy  Visit    Debbie Rees is a 75 y.o. female was referred to Clinical Pharmacy Team to complete a post-discharge medication optimization and monitoring visit. The patient was referred for their Atrial Fibrillation, Diabetes, and Medication Visit.    Pt is here for Follow Up appointment.     Admission Date: 7/15/25  Discharge Date: 7/20/25    Referring Provider/PCP: Annabel Morgan PA-C  Last Visit: 7/9/25  Next visit: 1/14/26    Referring Provider: Ney Sommers (Cardiology)  Last Visit: 5/14/25  Next visit: not scheduled    Subjective   HPI  DIABETES MELLITUS TYPE II:    Diagnosed with diabetes: At least 15 years   Known diabetic complications: neuropathy and nephropathy.  Does patient follow with Endocrinology: No  Last optometry exam: Spring of 2024  Most recent visit in Podiatry: Every 2 months     Current diabetic medications include:  Mounjaro 7.5 mg once weekly on Wednesday    Clarifications to above regimen: None  Adverse Effects: constipated sometimes    Past medications  Jardiance- Yeast infections  Metformin- IR- diarrhea  Semaglutide (Ozempic) 2 mg once weekly (Wednesday)    Glucose Readings:  Glucometer/CGM Type: Accu-chek Glucometer  Patient tests BG 1 times per day on and off  Current home BG readings: she has not checked in the last couple days  Previous home BG readings: 120-140  Any episodes of hypoglycemia? No,  .  Did patient treat episode of hypoglycemia appropriately? N/A  Does the patient have a prescription for ready-to-use Glucagon? Not on insulin  Does pt have proteinuria? Yes    Lifestyle:  Diet: last couple days she has been in the hospital, sandwich and pretzels during the day  Physical Activity: No current physical activity    Secondary Prevention:  Statin? No, age of 75  ACE-I/ARB? Yes  Aspirin? No    Pertinent PMH Review:  PMH of Pancreatitis: No  PMH of Retinopathy: No  PMH of Urinary Tract Infections: No  PMH of MTC: No    ATRIAL FIBRILLATION persistent   Does  patient follow with cardiology? Yes  Last cardiology appointment: 8/28/24    Diagnosis:  Onset: at least 2023  Patient is projected to be on anticoagulation indefinitely    [id number] Score: [4]  Age: [<65 (0)] [65-74 (+1)] [> 75 (+2)]: 1  Sex: [Male/Female (+1)]: 1  CHF history: [No/Yes(+1)]: 1  Hypertension history: [No/Yes(+1)]: 1  Stroke/TIA/thromboembolism history: [No/Yes(+2)]: 0  Vascular disease history (prior MI, peripheral artery disease, aortic plaque): [No/Yes(+1)]: 0  Diabetes history: [No/Yes(+1)]: 0    Pertinent Medical History  Medical history: none  GI bleed, PE/DVT, Stroke, Previous Falls, Dementia, Hypotension  Social history: None  Alcohol Abuse, High Risk Profession  Medication history: None  SSRI, Antiplatelet therapy, NSAIDs     Current atrial fibrillations medications include:  Rate Control: metoprolol succinate 100 mg BID, amiodarone 200 mg daily  Anticoagulation: apixaban 5 mg twice daily    Screening for dose adjustment:  No dose change recommended at this time    Monitoring:  Date of last BMP: 7/20/24  Last echo: 7/11/24  Recent Hospitalizations: 7/15/24  Recent Falls/Trauma: none  Changes in Tobacco or Alcohol Intake: none     Drug Interactions  No relevant drug interactions were noted.    Medication System Management  Patients preferred pharmacy: [facility]  Adherence/Organization: good  Affordability/Accessibility: Mounjaro and Eliquis [facility] good through 8/14/25      Objective   Allergies   Allergen Reactions    Procaine Other and Palpitations     palpitations    Bactrim [Sulfamethoxazole-Trimethoprim] Itching     Started to feel itchy in legs and arms 24 hr after starting abx.     Empagliflozin Other     Yeast infections     Social History     Social History Narrative    Not on file      Medication Review  Current Outpatient Medications   Medication Instructions    amiodarone (PACERONE) 200 mg, oral, Daily    aspirin 81 mg, Daily    blood sugar diagnostic strip 1 each,  miscellaneous, Daily    Eliquis 5 mg, oral, 2 times daily    hydroCHLOROthiazide (HYDRODIURIL) 25 mg, oral, Daily    lisinopril 20 mg, oral, 2 times daily    magnesium oxide (MAG-OX) 400 mg, oral, Daily    metoprolol succinate XL (TOPROL-XL) 100 mg, oral, Daily, Do not crush or chew.    Mounjaro 7.5 mg, subcutaneous, Weekly    oxyCODONE (ROXICODONE) 5 mg, oral, Every 6 hours PRN      Vitals  BP Readings from Last 2 Encounters:   07/18/25 144/61   07/09/25 130/83     BMI Readings from Last 1 Encounters:   07/16/25 39.54 kg/m²      Labs  A1C  Lab Results   Component Value Date    HGBA1C 7.3 (H) 07/16/2025    HGBA1C 7.4 (A) 07/09/2025    HGBA1C 7.3 (A) 11/08/2024     BMP  Lab Results   Component Value Date    CALCIUM 9.5 07/18/2025     07/18/2025    K 4.1 07/18/2025    CO2 28 07/18/2025     07/18/2025    BUN 27 (H) 07/18/2025    CREATININE 1.42 (H) 07/18/2025    EGFR 39 (L) 07/18/2025     LFTs  Lab Results   Component Value Date    ALT 31 07/16/2025    AST 34 07/16/2025    ALKPHOS 66 07/16/2025    BILITOT 0.5 07/16/2025     FLP  Lab Results   Component Value Date    TRIG 82 07/09/2025    CHOL 118 07/09/2025    LDLF 91 09/22/2023    LDLCALC 44 07/09/2025    HDL 58 07/09/2025     Urine Microalbumin  Lab Results   Component Value Date    MICROALBCREA 7 07/09/2025     Wt Readings from Last 3 Encounters:   07/16/25 111 kg (245 lb)   07/09/25 111 kg (245 lb)   05/28/25 113 kg (249 lb)      There is no height or weight on file to calculate BMI.       Assessment/Plan   Problem List Items Addressed This Visit          Cardiac and Vasculature    Permanent atrial fibrillation (Multi) - Primary    Relevant Orders    Referral to Clinical Pharmacy       Endocrine/Metabolic    Type 2 diabetes mellitus with diabetic polyneuropathy, without long-term current use of insulin    Relevant Orders    Referral to Clinical Pharmacy     Other Visit Diagnoses         Atrial fibrillation with rapid ventricular response (Multi)         Relevant Orders    Referral to Clinical Pharmacy          Patient's goal A1c is < 7.5%.  Is pt at goal? Yes, 7.3 (7/16/25)     Medication Changes:  CONTINUE  Mounjaro 7.5 mg once weekly, given constipation           Patient is to be on anticoagulation indefinitely  Rationale for plan: chadsvasc of 4, sees cardiology     Medication Changes:  Continue  Apixaban 5 mg twice daily         Patient Assistance Screening (VAF)-renewal  Patient verbally reports monthly or yearly income which is less than 400% federal poverty level  Application for program has been submitted for the following medications:   Mounjaro and Eliquis  Patient aware this process may take up to 2 weeks once income documents have been sent to the team.  If approved, medication must be filled through [facility] pharmacy and may be picked up or mailed to patient.   If approved, medication will be billed through insurance, and patient assistance team will pay the copay. This will result in a $0 copay for the patient.  Counseled patient on mechanism of action, side effects, contraindications, and what to do if the patient misses a dose. All patients questions were answered.     Clinical Pharmacist follow-up: 4 weeks, Telehealth visit    Continue all meds under the continuation of care with the referring provider and clinical pharmacy team.    Thank you,  Gavin Cantu PharmD, [facility]  Clinical Pharmacy Specialist-Primary Care  Ph: [phone]  Fax: [phone]    Verbal consent to manage patient's drug therapy was obtained from the patient. They were informed they may decline to participate or withdraw from participation in pharmacy services at any time.

## 2025-08-06 NOTE — PROGRESS NOTES
Unable to reach patient for call back 14 days post discharge from the hospital. If no voicemail available call attempts x 2 were made to contact the patient to assist with any questions or concerns patient may have. Patient did not follow up with their PCP post discharge. TCM program closed.

## 2025-08-13 ENCOUNTER — OFFICE VISIT (OUTPATIENT)
Dept: PRIMARY CARE | Facility: CLINIC | Age: 75
End: 2025-08-13
Payer: MEDICARE

## 2025-08-13 VITALS
TEMPERATURE: 98 F | WEIGHT: 246 LBS | OXYGEN SATURATION: 98 % | SYSTOLIC BLOOD PRESSURE: 133 MMHG | HEART RATE: 64 BPM | BODY MASS INDEX: 39.71 KG/M2 | DIASTOLIC BLOOD PRESSURE: 75 MMHG

## 2025-08-13 DIAGNOSIS — R60.0 BILATERAL LEG EDEMA: ICD-10-CM

## 2025-08-13 DIAGNOSIS — R30.0 DYSURIA: Primary | ICD-10-CM

## 2025-08-13 LAB
POC APPEARANCE, URINE: ABNORMAL
POC BILIRUBIN, URINE: NEGATIVE
POC BLOOD, URINE: ABNORMAL
POC COLOR, URINE: YELLOW
POC GLUCOSE, URINE: NEGATIVE MG/DL
POC KETONES, URINE: NEGATIVE MG/DL
POC LEUKOCYTES, URINE: ABNORMAL
POC NITRITE,URINE: NEGATIVE
POC PH, URINE: 6 PH
POC PROTEIN, URINE: NEGATIVE MG/DL
POC SPECIFIC GRAVITY, URINE: 1.01
POC UROBILINOGEN, URINE: 0.2 EU/DL

## 2025-08-13 PROCEDURE — 4010F ACE/ARB THERAPY RXD/TAKEN: CPT

## 2025-08-13 PROCEDURE — 1160F RVW MEDS BY RX/DR IN RCRD: CPT

## 2025-08-13 PROCEDURE — 3075F SYST BP GE 130 - 139MM HG: CPT

## 2025-08-13 PROCEDURE — 1036F TOBACCO NON-USER: CPT

## 2025-08-13 PROCEDURE — 99214 OFFICE O/P EST MOD 30 MIN: CPT

## 2025-08-13 PROCEDURE — 81003 URINALYSIS AUTO W/O SCOPE: CPT

## 2025-08-13 PROCEDURE — 3078F DIAST BP <80 MM HG: CPT

## 2025-08-13 PROCEDURE — 1159F MED LIST DOCD IN RCRD: CPT

## 2025-08-13 PROCEDURE — 3051F HG A1C>EQUAL 7.0%<8.0%: CPT

## 2025-08-13 RX ORDER — CEPHALEXIN 500 MG/1
500 CAPSULE ORAL 2 TIMES DAILY
Qty: 14 CAPSULE | Refills: 0 | Status: SHIPPED | OUTPATIENT
Start: 2025-08-13 | End: 2025-08-20

## 2025-08-13 RX ORDER — FUROSEMIDE 20 MG/1
20 TABLET ORAL DAILY
Qty: 3 TABLET | Refills: 0 | Status: SHIPPED | OUTPATIENT
Start: 2025-08-13 | End: 2025-08-16

## 2025-08-13 RX ORDER — POTASSIUM CHLORIDE 750 MG/1
10 TABLET, FILM COATED, EXTENDED RELEASE ORAL DAILY
Qty: 3 TABLET | Refills: 0 | Status: SHIPPED | OUTPATIENT
Start: 2025-08-13 | End: 2025-08-16

## 2025-08-14 LAB
ANION GAP SERPL CALCULATED.4IONS-SCNC: 8 MMOL/L (CALC) (ref 7–17)
BUN SERPL-MCNC: 23 MG/DL (ref 7–25)
BUN/CREAT SERPL: 22 (CALC) (ref 6–22)
CALCIUM SERPL-MCNC: 9.4 MG/DL (ref 8.6–10.4)
CHLORIDE SERPL-SCNC: 103 MMOL/L (ref 98–110)
CO2 SERPL-SCNC: 27 MMOL/L (ref 20–32)
CREAT SERPL-MCNC: 1.05 MG/DL (ref 0.6–1)
EGFRCR SERPLBLD CKD-EPI 2021: 55 ML/MIN/1.73M2
GLUCOSE SERPL-MCNC: 152 MG/DL (ref 65–99)
POTASSIUM SERPL-SCNC: 3.9 MMOL/L (ref 3.5–5.3)
SODIUM SERPL-SCNC: 138 MMOL/L (ref 135–146)

## 2025-08-16 LAB — BACTERIA UR CULT: ABNORMAL

## 2025-08-19 PROCEDURE — RXMED WILLOW AMBULATORY MEDICATION CHARGE

## 2025-08-21 ENCOUNTER — PHARMACY VISIT (OUTPATIENT)
Dept: PHARMACY | Facility: CLINIC | Age: 75
End: 2025-08-21
Payer: MEDICARE

## 2025-08-22 ENCOUNTER — APPOINTMENT (OUTPATIENT)
Dept: PRIMARY CARE | Facility: CLINIC | Age: 75
End: 2025-08-22
Payer: MEDICARE

## 2025-08-22 VITALS
DIASTOLIC BLOOD PRESSURE: 72 MMHG | SYSTOLIC BLOOD PRESSURE: 126 MMHG | WEIGHT: 244.38 LBS | OXYGEN SATURATION: 96 % | HEART RATE: 61 BPM | BODY MASS INDEX: 39.44 KG/M2

## 2025-08-22 DIAGNOSIS — R60.0 BILATERAL LEG EDEMA: Primary | ICD-10-CM

## 2025-08-22 PROCEDURE — 3051F HG A1C>EQUAL 7.0%<8.0%: CPT

## 2025-08-22 PROCEDURE — 99213 OFFICE O/P EST LOW 20 MIN: CPT

## 2025-08-22 PROCEDURE — 1159F MED LIST DOCD IN RCRD: CPT

## 2025-08-22 PROCEDURE — 1160F RVW MEDS BY RX/DR IN RCRD: CPT

## 2025-08-22 PROCEDURE — 3078F DIAST BP <80 MM HG: CPT

## 2025-08-22 PROCEDURE — 3074F SYST BP LT 130 MM HG: CPT

## 2025-08-22 PROCEDURE — 1036F TOBACCO NON-USER: CPT

## 2025-08-22 PROCEDURE — 4010F ACE/ARB THERAPY RXD/TAKEN: CPT

## 2025-08-25 ENCOUNTER — HOSPITAL ENCOUNTER (OUTPATIENT)
Dept: RADIOLOGY | Facility: CLINIC | Age: 75
Discharge: HOME | End: 2025-08-25
Payer: MEDICARE

## 2025-08-25 ENCOUNTER — OFFICE VISIT (OUTPATIENT)
Dept: PRIMARY CARE | Facility: CLINIC | Age: 75
End: 2025-08-25
Payer: MEDICARE

## 2025-08-25 VITALS
SYSTOLIC BLOOD PRESSURE: 140 MMHG | DIASTOLIC BLOOD PRESSURE: 70 MMHG | WEIGHT: 244 LBS | HEART RATE: 59 BPM | OXYGEN SATURATION: 97 % | BODY MASS INDEX: 39.38 KG/M2

## 2025-08-25 DIAGNOSIS — M79.604 RIGHT LEG PAIN: ICD-10-CM

## 2025-08-25 DIAGNOSIS — R30.0 DYSURIA: Primary | ICD-10-CM

## 2025-08-25 LAB
POC APPEARANCE, URINE: CLEAR
POC BILIRUBIN, URINE: NEGATIVE
POC BLOOD, URINE: ABNORMAL
POC COLOR, URINE: YELLOW
POC GLUCOSE, URINE: NEGATIVE MG/DL
POC KETONES, URINE: NEGATIVE MG/DL
POC LEUKOCYTES, URINE: ABNORMAL
POC NITRITE,URINE: NEGATIVE
POC PH, URINE: 7 PH
POC PROTEIN, URINE: NEGATIVE MG/DL
POC SPECIFIC GRAVITY, URINE: 1.01
POC UROBILINOGEN, URINE: 0.2 EU/DL

## 2025-08-25 PROCEDURE — 4010F ACE/ARB THERAPY RXD/TAKEN: CPT

## 2025-08-25 PROCEDURE — 1159F MED LIST DOCD IN RCRD: CPT

## 2025-08-25 PROCEDURE — 81003 URINALYSIS AUTO W/O SCOPE: CPT

## 2025-08-25 PROCEDURE — 99214 OFFICE O/P EST MOD 30 MIN: CPT

## 2025-08-25 PROCEDURE — 3051F HG A1C>EQUAL 7.0%<8.0%: CPT

## 2025-08-25 PROCEDURE — 3077F SYST BP >= 140 MM HG: CPT

## 2025-08-25 PROCEDURE — 1160F RVW MEDS BY RX/DR IN RCRD: CPT

## 2025-08-25 PROCEDURE — 73502 X-RAY EXAM HIP UNI 2-3 VIEWS: CPT | Mod: RT

## 2025-08-25 PROCEDURE — 3078F DIAST BP <80 MM HG: CPT

## 2025-08-25 PROCEDURE — 73502 X-RAY EXAM HIP UNI 2-3 VIEWS: CPT | Mod: RIGHT SIDE | Performed by: RADIOLOGY

## 2025-08-25 PROCEDURE — 72110 X-RAY EXAM L-2 SPINE 4/>VWS: CPT | Performed by: RADIOLOGY

## 2025-08-25 PROCEDURE — 72110 X-RAY EXAM L-2 SPINE 4/>VWS: CPT

## 2025-08-25 PROCEDURE — 1036F TOBACCO NON-USER: CPT

## 2025-08-25 ASSESSMENT — PATIENT HEALTH QUESTIONNAIRE - PHQ9
1. LITTLE INTEREST OR PLEASURE IN DOING THINGS: NOT AT ALL
2. FEELING DOWN, DEPRESSED OR HOPELESS: NOT AT ALL
SUM OF ALL RESPONSES TO PHQ9 QUESTIONS 1 AND 2: 0

## 2025-08-27 ENCOUNTER — TELEPHONE (OUTPATIENT)
Dept: PRIMARY CARE | Facility: CLINIC | Age: 75
End: 2025-08-27
Payer: MEDICARE

## 2025-08-27 DIAGNOSIS — M79.604 RIGHT LEG PAIN: ICD-10-CM

## 2025-08-27 DIAGNOSIS — R30.0 DYSURIA: Primary | ICD-10-CM

## 2025-08-27 RX ORDER — OMEPRAZOLE 20 MG/1
20 CAPSULE, DELAYED RELEASE ORAL 2 TIMES DAILY
Qty: 30 CAPSULE | Refills: 0 | Status: SHIPPED | OUTPATIENT
Start: 2025-08-27 | End: 2026-02-23

## 2025-08-27 RX ORDER — PREDNISONE 20 MG/1
40 TABLET ORAL DAILY
Qty: 10 TABLET | Refills: 0 | Status: SHIPPED | OUTPATIENT
Start: 2025-08-27 | End: 2025-09-01

## 2025-08-27 RX ORDER — AMOXICILLIN AND CLAVULANATE POTASSIUM 875; 125 MG/1; MG/1
875 TABLET, FILM COATED ORAL 2 TIMES DAILY
Qty: 20 TABLET | Refills: 0 | Status: SHIPPED | OUTPATIENT
Start: 2025-08-27 | End: 2025-09-06

## 2025-08-28 LAB — BACTERIA UR CULT: ABNORMAL

## 2025-09-03 ENCOUNTER — TELEPHONE (OUTPATIENT)
Dept: PRIMARY CARE | Facility: CLINIC | Age: 75
End: 2025-09-03

## 2025-09-03 ENCOUNTER — APPOINTMENT (OUTPATIENT)
Dept: PHARMACY | Facility: HOSPITAL | Age: 75
End: 2025-09-03
Payer: MEDICARE

## 2025-09-05 ENCOUNTER — HOSPITAL ENCOUNTER (OUTPATIENT)
Dept: RADIOLOGY | Facility: HOSPITAL | Age: 75
Discharge: HOME | End: 2025-09-05
Payer: MEDICARE

## 2025-09-05 ENCOUNTER — OFFICE VISIT (OUTPATIENT)
Dept: ORTHOPEDIC SURGERY | Facility: CLINIC | Age: 75
End: 2025-09-05
Payer: MEDICARE

## 2025-09-05 DIAGNOSIS — M25.561 RIGHT KNEE PAIN, UNSPECIFIED CHRONICITY: ICD-10-CM

## 2025-09-05 DIAGNOSIS — M16.11 PRIMARY OSTEOARTHRITIS OF RIGHT HIP: Primary | ICD-10-CM

## 2025-09-05 PROCEDURE — 73562 X-RAY EXAM OF KNEE 3: CPT | Mod: LT

## 2025-09-05 PROCEDURE — 73564 X-RAY EXAM KNEE 4 OR MORE: CPT | Mod: RT

## 2025-09-05 ASSESSMENT — PAIN SCALES - GENERAL: PAINLEVEL_OUTOF10: 8

## 2025-09-05 ASSESSMENT — PAIN - FUNCTIONAL ASSESSMENT: PAIN_FUNCTIONAL_ASSESSMENT: 0-10

## 2025-11-05 ENCOUNTER — APPOINTMENT (OUTPATIENT)
Dept: PHARMACY | Facility: HOSPITAL | Age: 75
End: 2025-11-05
Payer: MEDICARE

## 2026-01-14 ENCOUNTER — APPOINTMENT (OUTPATIENT)
Dept: PRIMARY CARE | Facility: CLINIC | Age: 76
End: 2026-01-14
Payer: MEDICARE

## (undated) DEVICE — CATHETER, THERMOCOOL SMART TOUCH, SF, D-F CURVE

## (undated) DEVICE — TUBING SET, IRRIGATION, SMARTABLATE

## (undated) DEVICE — PATCHES, EXTERNAL REFERENCE, CARTO3

## (undated) DEVICE — CABLE, 34 HYP, 34 LEMO, 10FT, SMART TOUCH

## (undated) DEVICE — CATHETHER, CS, BI-DIRECTIONAL, 10 POLES, D-F TYPE

## (undated) DEVICE — INTERFACE CABLE, EXISITING DX CATHETERS, BLUE PORT (REPROCESS)

## (undated) DEVICE — CATHETER, PENTARAY, NAV ECO, 7FR, 2-6-2 SPACING, D CURVE

## (undated) DEVICE — CABLE, CARTO 3 SYSTEM, ECO INTERFACE, 34-PIN, SPLIT HANDLE (REPROCESSED)

## (undated) DEVICE — INTRODUCER, HEMOSTASIS, STR/J .038 IN, 8.5FR 12CM

## (undated) DEVICE — CLOSURE SYSTEM, VASCULAR, MVP 6-12FR, VENOUS

## (undated) DEVICE — NEEDLE, NRG TRANSSEPTAL, 98CM, CURVE C0

## (undated) DEVICE — CABLE, CONNECTOR, 10FT

## (undated) DEVICE — SHEATH, STEERABLE, SUREFLEX, MEDIUM CURVE

## (undated) DEVICE — CATHETER, DIAGNOSTIC, SOUNDSTAR ECO SMS, 8FR

## (undated) DEVICE — SHIELD, RADPAD, EP LEFT SUBCLAVIAN, 12.5 X 16.5, YELLOW LEVEL ATTENUATION

## (undated) DEVICE — INTRODUCER, SHEATH, FAST-CATH, 8FR X 12CM, C-LOCK